# Patient Record
Sex: MALE | Race: BLACK OR AFRICAN AMERICAN | NOT HISPANIC OR LATINO | Employment: OTHER | URBAN - NONMETROPOLITAN AREA
[De-identification: names, ages, dates, MRNs, and addresses within clinical notes are randomized per-mention and may not be internally consistent; named-entity substitution may affect disease eponyms.]

---

## 2018-02-28 ENCOUNTER — TELEPHONE (OUTPATIENT)
Dept: FAMILY MEDICINE CLINIC | Facility: CLINIC | Age: 48
End: 2018-02-28

## 2018-02-28 ENCOUNTER — OFFICE VISIT (OUTPATIENT)
Dept: FAMILY MEDICINE CLINIC | Facility: CLINIC | Age: 48
End: 2018-02-28

## 2018-02-28 VITALS
HEART RATE: 73 BPM | HEIGHT: 71 IN | OXYGEN SATURATION: 98 % | BODY MASS INDEX: 30.02 KG/M2 | DIASTOLIC BLOOD PRESSURE: 76 MMHG | SYSTOLIC BLOOD PRESSURE: 120 MMHG | RESPIRATION RATE: 18 BRPM | WEIGHT: 214.4 LBS | TEMPERATURE: 97.8 F

## 2018-02-28 DIAGNOSIS — Z79.4 TYPE 2 DIABETES MELLITUS WITH DIABETIC POLYNEUROPATHY, WITH LONG-TERM CURRENT USE OF INSULIN (HCC): Primary | ICD-10-CM

## 2018-02-28 DIAGNOSIS — G89.29 CHRONIC BILATERAL LOW BACK PAIN WITHOUT SCIATICA: ICD-10-CM

## 2018-02-28 DIAGNOSIS — E11.42 TYPE 2 DIABETES MELLITUS WITH DIABETIC POLYNEUROPATHY, WITH LONG-TERM CURRENT USE OF INSULIN (HCC): Primary | ICD-10-CM

## 2018-02-28 DIAGNOSIS — M54.50 CHRONIC BILATERAL LOW BACK PAIN WITHOUT SCIATICA: ICD-10-CM

## 2018-02-28 DIAGNOSIS — Z76.89 ENCOUNTER TO ESTABLISH CARE: ICD-10-CM

## 2018-02-28 PROCEDURE — 99203 OFFICE O/P NEW LOW 30 MIN: CPT | Performed by: NURSE PRACTITIONER

## 2018-03-12 ENCOUNTER — TELEPHONE (OUTPATIENT)
Dept: FAMILY MEDICINE CLINIC | Facility: CLINIC | Age: 48
End: 2018-03-12

## 2018-03-12 DIAGNOSIS — L70.0 ACNE VULGARIS: Primary | ICD-10-CM

## 2018-03-12 RX ORDER — CLINDAMYCIN PHOSPHATE 11.9 MG/ML
SOLUTION TOPICAL EVERY 12 HOURS SCHEDULED
Qty: 60 ML | Refills: 3 | Status: SHIPPED | OUTPATIENT
Start: 2018-03-12 | End: 2018-10-25

## 2018-03-13 ENCOUNTER — TELEPHONE (OUTPATIENT)
Dept: FAMILY MEDICINE CLINIC | Facility: CLINIC | Age: 48
End: 2018-03-13

## 2018-03-13 NOTE — TELEPHONE ENCOUNTER
Per NATHANIEL Garcia, Edd Willingham was called and informed that Proactive cannot be prescribed.  He was prescribed a similar medication sent to [facility]; instructed to use twice a day on his face.  Patient verbalized understanding.      ----- Message from NATHANIEL Oliva sent at 3/12/2018  4:35 PM CDT -----  Regarding: Medication  Please call Mr. Strickland let him know that proactive is not prescription medication.  Will send in some clindamycin gel he is to use twice a day on his face.  I sent it in to [facility].  Thank you!

## 2018-10-25 ENCOUNTER — APPOINTMENT (OUTPATIENT)
Dept: CT IMAGING | Facility: HOSPITAL | Age: 48
End: 2018-10-25

## 2018-10-25 ENCOUNTER — APPOINTMENT (OUTPATIENT)
Dept: GENERAL RADIOLOGY | Facility: HOSPITAL | Age: 48
End: 2018-10-25

## 2018-10-25 ENCOUNTER — HOSPITAL ENCOUNTER (INPATIENT)
Facility: HOSPITAL | Age: 48
LOS: 5 days | Discharge: HOME | End: 2018-10-30
Attending: EMERGENCY MEDICINE | Admitting: FAMILY MEDICINE

## 2018-10-25 DIAGNOSIS — R07.9 CHEST PAIN, UNSPECIFIED TYPE: Primary | ICD-10-CM

## 2018-10-25 DIAGNOSIS — J90 PLEURAL EFFUSION, BILATERAL: ICD-10-CM

## 2018-10-25 DIAGNOSIS — I48.91 ATRIAL FIBRILLATION, UNSPECIFIED TYPE (HCC): ICD-10-CM

## 2018-10-25 DIAGNOSIS — R77.8 TROPONIN LEVEL ELEVATED: ICD-10-CM

## 2018-10-25 PROBLEM — F41.9 ANXIETY: Status: ACTIVE | Noted: 2018-10-25

## 2018-10-25 PROBLEM — R01.1 HEART MURMUR: Status: ACTIVE | Noted: 2018-10-25

## 2018-10-25 PROBLEM — F32.A DEPRESSION: Status: ACTIVE | Noted: 2018-10-25

## 2018-10-25 PROBLEM — K21.9 GERD (GASTROESOPHAGEAL REFLUX DISEASE): Status: ACTIVE | Noted: 2018-10-25

## 2018-10-25 PROBLEM — E11.9 TYPE 2 DIABETES MELLITUS (HCC): Status: ACTIVE | Noted: 2018-10-25

## 2018-10-25 PROBLEM — I50.9 CHF (CONGESTIVE HEART FAILURE) (HCC): Status: ACTIVE | Noted: 2018-10-25

## 2018-10-25 PROBLEM — R06.00 DYSPNEA: Status: ACTIVE | Noted: 2018-10-25

## 2018-10-25 LAB
ALBUMIN SERPL-MCNC: 3.5 G/DL (ref 3.4–4.8)
ALBUMIN/GLOB SERPL: 1.1 G/DL (ref 1.1–1.8)
ALP SERPL-CCNC: 73 U/L (ref 38–126)
ALT SERPL W P-5'-P-CCNC: 43 U/L (ref 21–72)
AMPHET+METHAMPHET UR QL: POSITIVE
ANION GAP SERPL CALCULATED.3IONS-SCNC: 4 MMOL/L (ref 5–15)
AST SERPL-CCNC: 30 U/L (ref 17–59)
BARBITURATES UR QL SCN: NEGATIVE
BASOPHILS # BLD AUTO: 0.02 10*3/MM3 (ref 0–0.2)
BASOPHILS NFR BLD AUTO: 0.3 % (ref 0–2)
BENZODIAZ UR QL SCN: NEGATIVE
BILIRUB SERPL-MCNC: 0.4 MG/DL (ref 0.2–1.3)
BUN BLD-MCNC: 14 MG/DL (ref 7–21)
BUN/CREAT SERPL: 15.6 (ref 7–25)
CALCIUM SPEC-SCNC: 8.7 MG/DL (ref 8.4–10.2)
CANNABINOIDS SERPL QL: NEGATIVE
CHLORIDE SERPL-SCNC: 103 MMOL/L (ref 95–110)
CO2 SERPL-SCNC: 30 MMOL/L (ref 22–31)
COCAINE UR QL: NEGATIVE
CREAT BLD-MCNC: 0.9 MG/DL (ref 0.7–1.3)
D-DIMER, QUANTITATIVE (MAD,POW, STR): 556 NG/ML (FEU) (ref 0–470)
DEPRECATED RDW RBC AUTO: 40.8 FL (ref 35.1–43.9)
EOSINOPHIL # BLD AUTO: 0.08 10*3/MM3 (ref 0–0.7)
EOSINOPHIL NFR BLD AUTO: 1.4 % (ref 0–7)
ERYTHROCYTE [DISTWIDTH] IN BLOOD BY AUTOMATED COUNT: 13.2 % (ref 11.5–14.5)
GFR SERPL CREATININE-BSD FRML MDRD: 109 ML/MIN/1.73 (ref 63–147)
GLOBULIN UR ELPH-MCNC: 3.2 GM/DL (ref 2.3–3.5)
GLUCOSE BLD-MCNC: 98 MG/DL (ref 60–100)
GLUCOSE BLDC GLUCOMTR-MCNC: 169 MG/DL (ref 70–130)
HCT VFR BLD AUTO: 41.7 % (ref 39–49)
HGB BLD-MCNC: 13.7 G/DL (ref 13.7–17.3)
HOLD SPECIMEN: NORMAL
IMM GRANULOCYTES # BLD: 0 10*3/MM3 (ref 0–0.02)
IMM GRANULOCYTES NFR BLD: 0 % (ref 0–0.5)
INR PPP: 1.06 (ref 0.8–1.2)
LYMPHOCYTES # BLD AUTO: 1.27 10*3/MM3 (ref 0.6–4.2)
LYMPHOCYTES NFR BLD AUTO: 22.1 % (ref 10–50)
MCH RBC QN AUTO: 27.7 PG (ref 26.5–34)
MCHC RBC AUTO-ENTMCNC: 32.9 G/DL (ref 31.5–36.3)
MCV RBC AUTO: 84.4 FL (ref 80–98)
METHADONE UR QL SCN: NEGATIVE
MONOCYTES # BLD AUTO: 0.32 10*3/MM3 (ref 0–0.9)
MONOCYTES NFR BLD AUTO: 5.6 % (ref 0–12)
NEUTROPHILS # BLD AUTO: 4.06 10*3/MM3 (ref 2–8.6)
NEUTROPHILS NFR BLD AUTO: 70.6 % (ref 37–80)
NT-PROBNP SERPL-MCNC: 3410 PG/ML (ref 0–450)
OPIATES UR QL: NEGATIVE
OXYCODONE UR QL SCN: NEGATIVE
PLATELET # BLD AUTO: 238 10*3/MM3 (ref 150–450)
PMV BLD AUTO: 11.4 FL (ref 8–12)
POTASSIUM BLD-SCNC: 4.1 MMOL/L (ref 3.5–5.1)
PROT SERPL-MCNC: 6.7 G/DL (ref 6.3–8.6)
PROTHROMBIN TIME: 13.6 SECONDS (ref 11.1–15.3)
RBC # BLD AUTO: 4.94 10*6/MM3 (ref 4.37–5.74)
SODIUM BLD-SCNC: 137 MMOL/L (ref 137–145)
TROPONIN I SERPL-MCNC: 0.09 NG/ML
TROPONIN I SERPL-MCNC: 0.13 NG/ML
TROPONIN I SERPL-MCNC: 0.14 NG/ML
TROPONIN I SERPL-MCNC: 0.17 NG/ML
WBC NRBC COR # BLD: 5.75 10*3/MM3 (ref 3.2–9.8)
WHOLE BLOOD HOLD SPECIMEN: NORMAL
WHOLE BLOOD HOLD SPECIMEN: NORMAL

## 2018-10-25 PROCEDURE — 80307 DRUG TEST PRSMV CHEM ANLYZR: CPT | Performed by: NURSE PRACTITIONER

## 2018-10-25 PROCEDURE — 93005 ELECTROCARDIOGRAM TRACING: CPT | Performed by: FAMILY MEDICINE

## 2018-10-25 PROCEDURE — 25010000002 LEVOFLOXACIN PER 250 MG: Performed by: PHYSICIAN ASSISTANT

## 2018-10-25 PROCEDURE — 25010000002 FUROSEMIDE PER 20 MG: Performed by: PHYSICIAN ASSISTANT

## 2018-10-25 PROCEDURE — 84484 ASSAY OF TROPONIN QUANT: CPT | Performed by: PHYSICIAN ASSISTANT

## 2018-10-25 PROCEDURE — 85025 COMPLETE CBC W/AUTO DIFF WBC: CPT | Performed by: PHYSICIAN ASSISTANT

## 2018-10-25 PROCEDURE — 94760 N-INVAS EAR/PLS OXIMETRY 1: CPT

## 2018-10-25 PROCEDURE — 80053 COMPREHEN METABOLIC PANEL: CPT | Performed by: PHYSICIAN ASSISTANT

## 2018-10-25 PROCEDURE — 63710000001 INSULIN ASPART PER 5 UNITS: Performed by: NURSE PRACTITIONER

## 2018-10-25 PROCEDURE — 94799 UNLISTED PULMONARY SVC/PX: CPT

## 2018-10-25 PROCEDURE — 85379 FIBRIN DEGRADATION QUANT: CPT | Performed by: PHYSICIAN ASSISTANT

## 2018-10-25 PROCEDURE — 85610 PROTHROMBIN TIME: CPT | Performed by: PHYSICIAN ASSISTANT

## 2018-10-25 PROCEDURE — 99285 EMERGENCY DEPT VISIT HI MDM: CPT

## 2018-10-25 PROCEDURE — 83880 ASSAY OF NATRIURETIC PEPTIDE: CPT | Performed by: PHYSICIAN ASSISTANT

## 2018-10-25 PROCEDURE — 93010 ELECTROCARDIOGRAM REPORT: CPT | Performed by: INTERNAL MEDICINE

## 2018-10-25 PROCEDURE — G0378 HOSPITAL OBSERVATION PER HR: HCPCS

## 2018-10-25 PROCEDURE — 71275 CT ANGIOGRAPHY CHEST: CPT

## 2018-10-25 PROCEDURE — 84484 ASSAY OF TROPONIN QUANT: CPT | Performed by: NURSE PRACTITIONER

## 2018-10-25 PROCEDURE — 93005 ELECTROCARDIOGRAM TRACING: CPT | Performed by: EMERGENCY MEDICINE

## 2018-10-25 PROCEDURE — 36415 COLL VENOUS BLD VENIPUNCTURE: CPT | Performed by: PHYSICIAN ASSISTANT

## 2018-10-25 PROCEDURE — 25010000002 ENOXAPARIN PER 10 MG: Performed by: FAMILY MEDICINE

## 2018-10-25 PROCEDURE — 0 IOPAMIDOL PER 1 ML: Performed by: EMERGENCY MEDICINE

## 2018-10-25 PROCEDURE — 82962 GLUCOSE BLOOD TEST: CPT

## 2018-10-25 PROCEDURE — 71046 X-RAY EXAM CHEST 2 VIEWS: CPT

## 2018-10-25 RX ORDER — NITROGLYCERIN 0.4 MG/1
0.4 TABLET SUBLINGUAL
Status: DISCONTINUED | OUTPATIENT
Start: 2018-10-25 | End: 2018-10-30 | Stop reason: HOSPADM

## 2018-10-25 RX ORDER — NICOTINE POLACRILEX 4 MG
15 LOZENGE BUCCAL
Status: DISCONTINUED | OUTPATIENT
Start: 2018-10-25 | End: 2018-10-30 | Stop reason: HOSPADM

## 2018-10-25 RX ORDER — FUROSEMIDE 10 MG/ML
80 INJECTION INTRAMUSCULAR; INTRAVENOUS ONCE
Status: COMPLETED | OUTPATIENT
Start: 2018-10-25 | End: 2018-10-25

## 2018-10-25 RX ORDER — ASPIRIN 81 MG/1
324 TABLET, CHEWABLE ORAL ONCE
Status: COMPLETED | OUTPATIENT
Start: 2018-10-25 | End: 2018-10-25

## 2018-10-25 RX ORDER — SODIUM CHLORIDE 0.9 % (FLUSH) 0.9 %
10 SYRINGE (ML) INJECTION AS NEEDED
Status: DISCONTINUED | OUTPATIENT
Start: 2018-10-25 | End: 2018-10-30 | Stop reason: HOSPADM

## 2018-10-25 RX ORDER — ONDANSETRON 2 MG/ML
4 INJECTION INTRAMUSCULAR; INTRAVENOUS EVERY 6 HOURS PRN
Status: DISCONTINUED | OUTPATIENT
Start: 2018-10-25 | End: 2018-10-30 | Stop reason: HOSPADM

## 2018-10-25 RX ORDER — DEXTROSE MONOHYDRATE 25 G/50ML
25 INJECTION, SOLUTION INTRAVENOUS
Status: DISCONTINUED | OUTPATIENT
Start: 2018-10-25 | End: 2018-10-30 | Stop reason: HOSPADM

## 2018-10-25 RX ORDER — LEVOFLOXACIN 5 MG/ML
750 INJECTION, SOLUTION INTRAVENOUS ONCE
Status: COMPLETED | OUTPATIENT
Start: 2018-10-25 | End: 2018-10-25

## 2018-10-25 RX ORDER — ASPIRIN 81 MG/1
81 TABLET ORAL DAILY
Status: DISCONTINUED | OUTPATIENT
Start: 2018-10-26 | End: 2018-10-30 | Stop reason: HOSPADM

## 2018-10-25 RX ORDER — ONDANSETRON 4 MG/1
4 TABLET, ORALLY DISINTEGRATING ORAL EVERY 6 HOURS PRN
Status: DISCONTINUED | OUTPATIENT
Start: 2018-10-25 | End: 2018-10-30 | Stop reason: HOSPADM

## 2018-10-25 RX ORDER — ATORVASTATIN CALCIUM 20 MG/1
20 TABLET, FILM COATED ORAL DAILY
Status: DISCONTINUED | OUTPATIENT
Start: 2018-10-25 | End: 2018-10-30 | Stop reason: HOSPADM

## 2018-10-25 RX ORDER — HEPARIN SODIUM 5000 [USP'U]/ML
5000 INJECTION, SOLUTION INTRAVENOUS; SUBCUTANEOUS EVERY 8 HOURS SCHEDULED
Status: DISCONTINUED | OUTPATIENT
Start: 2018-10-25 | End: 2018-10-25

## 2018-10-25 RX ORDER — ASPIRIN 81 MG/1
81 TABLET, CHEWABLE ORAL ONCE
Status: COMPLETED | OUTPATIENT
Start: 2018-10-25 | End: 2018-10-25

## 2018-10-25 RX ORDER — SODIUM CHLORIDE 0.9 % (FLUSH) 0.9 %
3-10 SYRINGE (ML) INJECTION AS NEEDED
Status: DISCONTINUED | OUTPATIENT
Start: 2018-10-25 | End: 2018-10-30 | Stop reason: HOSPADM

## 2018-10-25 RX ORDER — ONDANSETRON 4 MG/1
4 TABLET, FILM COATED ORAL EVERY 6 HOURS PRN
Status: DISCONTINUED | OUTPATIENT
Start: 2018-10-25 | End: 2018-10-30 | Stop reason: HOSPADM

## 2018-10-25 RX ORDER — NALOXONE HCL 0.4 MG/ML
0.4 VIAL (ML) INJECTION
Status: DISCONTINUED | OUTPATIENT
Start: 2018-10-25 | End: 2018-10-30 | Stop reason: HOSPADM

## 2018-10-25 RX ORDER — LOSARTAN POTASSIUM 100 MG/1
50 TABLET ORAL DAILY
COMMUNITY
End: 2018-11-13 | Stop reason: CLARIF

## 2018-10-25 RX ORDER — METFORMIN HYDROCHLORIDE 500 MG/1
500 TABLET, EXTENDED RELEASE ORAL 2 TIMES DAILY
COMMUNITY
End: 2018-11-07 | Stop reason: SDUPTHER

## 2018-10-25 RX ORDER — FUROSEMIDE 10 MG/ML
40 INJECTION INTRAMUSCULAR; INTRAVENOUS EVERY 12 HOURS
Status: DISCONTINUED | OUTPATIENT
Start: 2018-10-26 | End: 2018-10-30 | Stop reason: HOSPADM

## 2018-10-25 RX ORDER — METFORMIN HYDROCHLORIDE 500 MG/1
500 TABLET, EXTENDED RELEASE ORAL 2 TIMES DAILY
Status: DISCONTINUED | OUTPATIENT
Start: 2018-10-25 | End: 2018-10-26

## 2018-10-25 RX ORDER — GABAPENTIN 400 MG/1
400 CAPSULE ORAL 3 TIMES DAILY
COMMUNITY
End: 2020-10-14 | Stop reason: SDDI

## 2018-10-25 RX ORDER — NAPROXEN 500 MG/1
500 TABLET ORAL 2 TIMES DAILY PRN
COMMUNITY
End: 2018-10-30 | Stop reason: HOSPADM

## 2018-10-25 RX ORDER — LOSARTAN POTASSIUM 50 MG/1
100 TABLET ORAL DAILY
Status: DISCONTINUED | OUTPATIENT
Start: 2018-10-25 | End: 2018-10-30 | Stop reason: HOSPADM

## 2018-10-25 RX ORDER — MORPHINE SULFATE 2 MG/ML
1 INJECTION, SOLUTION INTRAMUSCULAR; INTRAVENOUS EVERY 4 HOURS PRN
Status: DISCONTINUED | OUTPATIENT
Start: 2018-10-25 | End: 2018-10-30 | Stop reason: HOSPADM

## 2018-10-25 RX ORDER — SIMVASTATIN 40 MG
20 TABLET ORAL NIGHTLY
COMMUNITY
End: 2019-02-21 | Stop reason: ALTCHOICE

## 2018-10-25 RX ORDER — GABAPENTIN 400 MG/1
400 CAPSULE ORAL 3 TIMES DAILY
Status: DISCONTINUED | OUTPATIENT
Start: 2018-10-25 | End: 2018-10-30 | Stop reason: HOSPADM

## 2018-10-25 RX ORDER — ACETAMINOPHEN 325 MG/1
650 TABLET ORAL EVERY 4 HOURS PRN
Status: DISCONTINUED | OUTPATIENT
Start: 2018-10-25 | End: 2018-10-30 | Stop reason: HOSPADM

## 2018-10-25 RX ORDER — SODIUM CHLORIDE 0.9 % (FLUSH) 0.9 %
3 SYRINGE (ML) INJECTION EVERY 12 HOURS SCHEDULED
Status: DISCONTINUED | OUTPATIENT
Start: 2018-10-25 | End: 2018-10-30 | Stop reason: HOSPADM

## 2018-10-25 RX ADMIN — ASPIRIN 81 MG CHEWABLE TABLET 81 MG: 81 TABLET CHEWABLE at 20:28

## 2018-10-25 RX ADMIN — LOSARTAN POTASSIUM 100 MG: 50 TABLET, FILM COATED ORAL at 20:28

## 2018-10-25 RX ADMIN — ATORVASTATIN CALCIUM 20 MG: 20 TABLET, FILM COATED ORAL at 20:28

## 2018-10-25 RX ADMIN — FUROSEMIDE 80 MG: 10 INJECTION, SOLUTION INTRAMUSCULAR; INTRAVENOUS at 14:55

## 2018-10-25 RX ADMIN — ENOXAPARIN SODIUM 100 MG: 100 INJECTION SUBCUTANEOUS at 20:47

## 2018-10-25 RX ADMIN — METFORMIN HYDROCHLORIDE 500 MG: 500 TABLET, EXTENDED RELEASE ORAL at 20:28

## 2018-10-25 RX ADMIN — ASPIRIN 81 MG 324 MG: 81 TABLET ORAL at 13:00

## 2018-10-25 RX ADMIN — INSULIN ASPART 2 UNITS: 100 INJECTION, SOLUTION INTRAVENOUS; SUBCUTANEOUS at 20:29

## 2018-10-25 RX ADMIN — LEVOFLOXACIN 750 MG: 5 INJECTION, SOLUTION INTRAVENOUS at 17:26

## 2018-10-25 RX ADMIN — GABAPENTIN 400 MG: 400 CAPSULE ORAL at 20:28

## 2018-10-25 RX ADMIN — Medication 3 ML: at 20:29

## 2018-10-25 RX ADMIN — IOPAMIDOL 79 ML: 755 INJECTION, SOLUTION INTRAVENOUS at 16:25

## 2018-10-26 ENCOUNTER — APPOINTMENT (OUTPATIENT)
Dept: GENERAL RADIOLOGY | Facility: HOSPITAL | Age: 48
End: 2018-10-26

## 2018-10-26 ENCOUNTER — APPOINTMENT (OUTPATIENT)
Dept: CARDIOLOGY | Facility: HOSPITAL | Age: 48
End: 2018-10-26

## 2018-10-26 LAB
ANION GAP SERPL CALCULATED.3IONS-SCNC: 8 MMOL/L (ref 5–15)
BASOPHILS # BLD AUTO: 0.02 10*3/MM3 (ref 0–0.2)
BASOPHILS NFR BLD AUTO: 0.3 % (ref 0–2)
BH CV ECHO MEAS - ACS: 2.5 CM
BH CV ECHO MEAS - AI DEC SLOPE: 169 CM/SEC^2
BH CV ECHO MEAS - AI MAX PG: 57.8 MMHG
BH CV ECHO MEAS - AI MAX VEL: 380 CM/SEC
BH CV ECHO MEAS - AI P1/2T: 658.6 MSEC
BH CV ECHO MEAS - AO MAX PG (FULL): 6.5 MMHG
BH CV ECHO MEAS - AO MAX PG: 10.8 MMHG
BH CV ECHO MEAS - AO MEAN PG (FULL): 3 MMHG
BH CV ECHO MEAS - AO MEAN PG: 6 MMHG
BH CV ECHO MEAS - AO ROOT AREA (BSA CORRECTED): 1.7
BH CV ECHO MEAS - AO ROOT AREA: 10.8 CM^2
BH CV ECHO MEAS - AO ROOT DIAM: 3.7 CM
BH CV ECHO MEAS - AO V2 MAX: 164 CM/SEC
BH CV ECHO MEAS - AO V2 MEAN: 113 CM/SEC
BH CV ECHO MEAS - AO V2 VTI: 24.2 CM
BH CV ECHO MEAS - ASC AORTA: 3.5 CM
BH CV ECHO MEAS - AVA(I,A): 2.7 CM^2
BH CV ECHO MEAS - AVA(I,D): 2.7 CM^2
BH CV ECHO MEAS - AVA(V,A): 2.6 CM^2
BH CV ECHO MEAS - AVA(V,D): 2.6 CM^2
BH CV ECHO MEAS - BSA(HAYCOCK): 2.2 M^2
BH CV ECHO MEAS - BSA: 2.2 M^2
BH CV ECHO MEAS - BZI_BMI: 27.9 KILOGRAMS/M^2
BH CV ECHO MEAS - BZI_METRIC_HEIGHT: 182.9 CM
BH CV ECHO MEAS - BZI_METRIC_WEIGHT: 93.4 KG
BH CV ECHO MEAS - EDV(CUBED): 190.1 ML
BH CV ECHO MEAS - EDV(MOD-SP4): 154 ML
BH CV ECHO MEAS - EDV(TEICH): 163.3 ML
BH CV ECHO MEAS - EF(CUBED): 13.4 %
BH CV ECHO MEAS - EF(MOD-SP4): 28.6 %
BH CV ECHO MEAS - EF(TEICH): 10.5 %
BH CV ECHO MEAS - ESV(CUBED): 164.6 ML
BH CV ECHO MEAS - ESV(MOD-SP4): 110 ML
BH CV ECHO MEAS - ESV(TEICH): 146.2 ML
BH CV ECHO MEAS - FS: 4.7 %
BH CV ECHO MEAS - IVS/LVPW: 0.99
BH CV ECHO MEAS - IVSD: 1.2 CM
BH CV ECHO MEAS - LA DIMENSION: 4.2 CM
BH CV ECHO MEAS - LA/AO: 1.1
BH CV ECHO MEAS - LV DIASTOLIC VOL/BSA (35-75): 71.4 ML/M^2
BH CV ECHO MEAS - LV MASS(C)D: 304.5 GRAMS
BH CV ECHO MEAS - LV MASS(C)DI: 141.2 GRAMS/M^2
BH CV ECHO MEAS - LV MAX PG: 4.2 MMHG
BH CV ECHO MEAS - LV MEAN PG: 3 MMHG
BH CV ECHO MEAS - LV SYSTOLIC VOL/BSA (12-30): 51 ML/M^2
BH CV ECHO MEAS - LV V1 MAX: 103 CM/SEC
BH CV ECHO MEAS - LV V1 MEAN: 77.7 CM/SEC
BH CV ECHO MEAS - LV V1 VTI: 16 CM
BH CV ECHO MEAS - LVIDD: 5.8 CM
BH CV ECHO MEAS - LVIDS: 5.5 CM
BH CV ECHO MEAS - LVLD AP4: 9.8 CM
BH CV ECHO MEAS - LVLS AP4: 9.6 CM
BH CV ECHO MEAS - LVOT AREA (M): 4.2 CM^2
BH CV ECHO MEAS - LVOT AREA: 4.2 CM^2
BH CV ECHO MEAS - LVOT DIAM: 2.3 CM
BH CV ECHO MEAS - LVPWD: 1.2 CM
BH CV ECHO MEAS - MR MAX PG: 67.2 MMHG
BH CV ECHO MEAS - MR MAX VEL: 410 CM/SEC
BH CV ECHO MEAS - MV A MAX VEL: 121 CM/SEC
BH CV ECHO MEAS - MV DEC SLOPE: 520 CM/SEC^2
BH CV ECHO MEAS - MV E MAX VEL: 79.8 CM/SEC
BH CV ECHO MEAS - MV E/A: 0.66
BH CV ECHO MEAS - MV P1/2T MAX VEL: 121 CM/SEC
BH CV ECHO MEAS - MV P1/2T: 68.2 MSEC
BH CV ECHO MEAS - MVA P1/2T LCG: 1.8 CM^2
BH CV ECHO MEAS - MVA(P1/2T): 3.2 CM^2
BH CV ECHO MEAS - PA MAX PG: 1 MMHG
BH CV ECHO MEAS - PA V2 MAX: 50.4 CM/SEC
BH CV ECHO MEAS - RAP SYSTOLE: 5 MMHG
BH CV ECHO MEAS - RVDD: 3.1 CM
BH CV ECHO MEAS - RVSP: 19.9 MMHG
BH CV ECHO MEAS - SI(AO): 120.6 ML/M^2
BH CV ECHO MEAS - SI(CUBED): 11.8 ML/M^2
BH CV ECHO MEAS - SI(LVOT): 30.8 ML/M^2
BH CV ECHO MEAS - SI(MOD-SP4): 20.4 ML/M^2
BH CV ECHO MEAS - SI(TEICH): 7.9 ML/M^2
BH CV ECHO MEAS - SV(AO): 260.2 ML
BH CV ECHO MEAS - SV(CUBED): 25.5 ML
BH CV ECHO MEAS - SV(LVOT): 66.5 ML
BH CV ECHO MEAS - SV(MOD-SP4): 44 ML
BH CV ECHO MEAS - SV(TEICH): 17.1 ML
BH CV ECHO MEAS - TR MAX VEL: 193 CM/SEC
BUN BLD-MCNC: 14 MG/DL (ref 7–21)
BUN/CREAT SERPL: 14.3 (ref 7–25)
CALCIUM SPEC-SCNC: 8.4 MG/DL (ref 8.4–10.2)
CHLORIDE SERPL-SCNC: 103 MMOL/L (ref 95–110)
CO2 SERPL-SCNC: 24 MMOL/L (ref 22–31)
CREAT BLD-MCNC: 0.98 MG/DL (ref 0.7–1.3)
DEPRECATED RDW RBC AUTO: 39.3 FL (ref 35.1–43.9)
EOSINOPHIL # BLD AUTO: 0.09 10*3/MM3 (ref 0–0.7)
EOSINOPHIL NFR BLD AUTO: 1.3 % (ref 0–7)
ERYTHROCYTE [DISTWIDTH] IN BLOOD BY AUTOMATED COUNT: 13 % (ref 11.5–14.5)
GFR SERPL CREATININE-BSD FRML MDRD: 99 ML/MIN/1.73 (ref 63–147)
GLUCOSE BLD-MCNC: 287 MG/DL (ref 60–100)
GLUCOSE BLDC GLUCOMTR-MCNC: 121 MG/DL (ref 70–130)
GLUCOSE BLDC GLUCOMTR-MCNC: 223 MG/DL (ref 70–130)
GLUCOSE BLDC GLUCOMTR-MCNC: 317 MG/DL (ref 70–130)
GLUCOSE BLDC GLUCOMTR-MCNC: 93 MG/DL (ref 70–130)
HCT VFR BLD AUTO: 42.1 % (ref 39–49)
HGB BLD-MCNC: 14.2 G/DL (ref 13.7–17.3)
IMM GRANULOCYTES # BLD: 0.02 10*3/MM3 (ref 0–0.02)
IMM GRANULOCYTES NFR BLD: 0.3 % (ref 0–0.5)
LYMPHOCYTES # BLD AUTO: 1.65 10*3/MM3 (ref 0.6–4.2)
LYMPHOCYTES NFR BLD AUTO: 24 % (ref 10–50)
MCH RBC QN AUTO: 27.6 PG (ref 26.5–34)
MCHC RBC AUTO-ENTMCNC: 33.7 G/DL (ref 31.5–36.3)
MCV RBC AUTO: 81.9 FL (ref 80–98)
MONOCYTES # BLD AUTO: 0.45 10*3/MM3 (ref 0–0.9)
MONOCYTES NFR BLD AUTO: 6.6 % (ref 0–12)
NEUTROPHILS # BLD AUTO: 4.64 10*3/MM3 (ref 2–8.6)
NEUTROPHILS NFR BLD AUTO: 67.5 % (ref 37–80)
PLATELET # BLD AUTO: 250 10*3/MM3 (ref 150–450)
PMV BLD AUTO: 11.9 FL (ref 8–12)
POTASSIUM BLD-SCNC: 3.9 MMOL/L (ref 3.5–5.1)
RBC # BLD AUTO: 5.14 10*6/MM3 (ref 4.37–5.74)
SODIUM BLD-SCNC: 135 MMOL/L (ref 137–145)
WBC NRBC COR # BLD: 6.87 10*3/MM3 (ref 3.2–9.8)

## 2018-10-26 PROCEDURE — 25010000002 MAGNESIUM SULFATE 2 GM/50ML SOLUTION: Performed by: INTERNAL MEDICINE

## 2018-10-26 PROCEDURE — 99152 MOD SED SAME PHYS/QHP 5/>YRS: CPT | Performed by: INTERNAL MEDICINE

## 2018-10-26 PROCEDURE — 25010000002 IBUTILIDE FUMARATE PER 1 MG: Performed by: INTERNAL MEDICINE

## 2018-10-26 PROCEDURE — C1894 INTRO/SHEATH, NON-LASER: HCPCS | Performed by: INTERNAL MEDICINE

## 2018-10-26 PROCEDURE — 0 IOPAMIDOL PER 1 ML: Performed by: INTERNAL MEDICINE

## 2018-10-26 PROCEDURE — 85025 COMPLETE CBC W/AUTO DIFF WBC: CPT | Performed by: NURSE PRACTITIONER

## 2018-10-26 PROCEDURE — B2151ZZ FLUOROSCOPY OF LEFT HEART USING LOW OSMOLAR CONTRAST: ICD-10-PCS | Performed by: INTERNAL MEDICINE

## 2018-10-26 PROCEDURE — 71045 X-RAY EXAM CHEST 1 VIEW: CPT

## 2018-10-26 PROCEDURE — 25010000002 MIDAZOLAM PER 1 MG: Performed by: INTERNAL MEDICINE

## 2018-10-26 PROCEDURE — 93458 L HRT ARTERY/VENTRICLE ANGIO: CPT | Performed by: INTERNAL MEDICINE

## 2018-10-26 PROCEDURE — 99254 IP/OBS CNSLTJ NEW/EST MOD 60: CPT | Performed by: INTERNAL MEDICINE

## 2018-10-26 PROCEDURE — B2111ZZ FLUOROSCOPY OF MULTIPLE CORONARY ARTERIES USING LOW OSMOLAR CONTRAST: ICD-10-PCS | Performed by: INTERNAL MEDICINE

## 2018-10-26 PROCEDURE — 93306 TTE W/DOPPLER COMPLETE: CPT

## 2018-10-26 PROCEDURE — 4A023N7 MEASUREMENT OF CARDIAC SAMPLING AND PRESSURE, LEFT HEART, PERCUTANEOUS APPROACH: ICD-10-PCS | Performed by: INTERNAL MEDICINE

## 2018-10-26 PROCEDURE — C1760 CLOSURE DEV, VASC: HCPCS | Performed by: INTERNAL MEDICINE

## 2018-10-26 PROCEDURE — 80048 BASIC METABOLIC PNL TOTAL CA: CPT | Performed by: NURSE PRACTITIONER

## 2018-10-26 PROCEDURE — 25010000002 ENOXAPARIN PER 10 MG: Performed by: FAMILY MEDICINE

## 2018-10-26 PROCEDURE — C1769 GUIDE WIRE: HCPCS | Performed by: INTERNAL MEDICINE

## 2018-10-26 PROCEDURE — 25010000002 FUROSEMIDE PER 20 MG: Performed by: NURSE PRACTITIONER

## 2018-10-26 PROCEDURE — 63710000001 INSULIN ASPART PER 5 UNITS: Performed by: NURSE PRACTITIONER

## 2018-10-26 PROCEDURE — 82962 GLUCOSE BLOOD TEST: CPT

## 2018-10-26 PROCEDURE — 25010000002 FENTANYL CITRATE (PF) 100 MCG/2ML SOLUTION: Performed by: INTERNAL MEDICINE

## 2018-10-26 RX ORDER — MAGNESIUM SULFATE HEPTAHYDRATE 40 MG/ML
2 INJECTION, SOLUTION INTRAVENOUS ONCE
Status: COMPLETED | OUTPATIENT
Start: 2018-10-26 | End: 2018-10-26

## 2018-10-26 RX ORDER — METFORMIN HYDROCHLORIDE 500 MG/1
500 TABLET, EXTENDED RELEASE ORAL 2 TIMES DAILY
Status: DISCONTINUED | OUTPATIENT
Start: 2018-10-28 | End: 2018-10-30 | Stop reason: HOSPADM

## 2018-10-26 RX ORDER — SODIUM CHLORIDE 0.9 % (FLUSH) 0.9 %
3 SYRINGE (ML) INJECTION EVERY 12 HOURS SCHEDULED
Status: DISCONTINUED | OUTPATIENT
Start: 2018-10-26 | End: 2018-10-26 | Stop reason: HOSPADM

## 2018-10-26 RX ORDER — FENTANYL CITRATE 50 UG/ML
INJECTION, SOLUTION INTRAMUSCULAR; INTRAVENOUS AS NEEDED
Status: DISCONTINUED | OUTPATIENT
Start: 2018-10-26 | End: 2018-10-26 | Stop reason: HOSPADM

## 2018-10-26 RX ORDER — DILTIAZEM HYDROCHLORIDE 180 MG/1
180 CAPSULE, COATED, EXTENDED RELEASE ORAL
Status: DISCONTINUED | OUTPATIENT
Start: 2018-10-26 | End: 2018-10-30 | Stop reason: HOSPADM

## 2018-10-26 RX ORDER — AMIODARONE HYDROCHLORIDE 200 MG/1
200 TABLET ORAL EVERY 12 HOURS SCHEDULED
Status: DISCONTINUED | OUTPATIENT
Start: 2018-10-26 | End: 2018-10-30 | Stop reason: HOSPADM

## 2018-10-26 RX ORDER — MIDAZOLAM HYDROCHLORIDE 1 MG/ML
INJECTION INTRAMUSCULAR; INTRAVENOUS AS NEEDED
Status: DISCONTINUED | OUTPATIENT
Start: 2018-10-26 | End: 2018-10-26 | Stop reason: HOSPADM

## 2018-10-26 RX ORDER — SODIUM CHLORIDE 0.9 % (FLUSH) 0.9 %
1-10 SYRINGE (ML) INJECTION AS NEEDED
Status: DISCONTINUED | OUTPATIENT
Start: 2018-10-26 | End: 2018-10-26 | Stop reason: HOSPADM

## 2018-10-26 RX ORDER — SODIUM CHLORIDE 9 MG/ML
1-3 INJECTION, SOLUTION INTRAVENOUS CONTINUOUS
Status: DISCONTINUED | OUTPATIENT
Start: 2018-10-26 | End: 2018-10-26 | Stop reason: SDUPTHER

## 2018-10-26 RX ORDER — SODIUM CHLORIDE 9 MG/ML
100 INJECTION, SOLUTION INTRAVENOUS CONTINUOUS
Status: DISCONTINUED | OUTPATIENT
Start: 2018-10-26 | End: 2018-10-27

## 2018-10-26 RX ORDER — LIDOCAINE HYDROCHLORIDE 20 MG/ML
INJECTION, SOLUTION INFILTRATION; PERINEURAL AS NEEDED
Status: DISCONTINUED | OUTPATIENT
Start: 2018-10-26 | End: 2018-10-26 | Stop reason: HOSPADM

## 2018-10-26 RX ADMIN — GABAPENTIN 400 MG: 400 CAPSULE ORAL at 08:57

## 2018-10-26 RX ADMIN — GABAPENTIN 400 MG: 400 CAPSULE ORAL at 15:55

## 2018-10-26 RX ADMIN — DILTIAZEM HYDROCHLORIDE 180 MG: 180 CAPSULE, COATED, EXTENDED RELEASE ORAL at 15:55

## 2018-10-26 RX ADMIN — AMIODARONE HYDROCHLORIDE 200 MG: 200 TABLET ORAL at 22:26

## 2018-10-26 RX ADMIN — DILTIAZEM HYDROCHLORIDE 5 MG/HR: 5 INJECTION INTRAVENOUS at 18:52

## 2018-10-26 RX ADMIN — APIXABAN 5 MG: 5 TABLET, FILM COATED ORAL at 22:26

## 2018-10-26 RX ADMIN — GABAPENTIN 400 MG: 400 CAPSULE ORAL at 22:26

## 2018-10-26 RX ADMIN — ATORVASTATIN CALCIUM 20 MG: 20 TABLET, FILM COATED ORAL at 08:56

## 2018-10-26 RX ADMIN — METOPROLOL TARTRATE 5 MG: 1 INJECTION, SOLUTION INTRAVENOUS at 01:10

## 2018-10-26 RX ADMIN — SODIUM CHLORIDE 1 MG: 9 INJECTION, SOLUTION INTRAVENOUS at 14:52

## 2018-10-26 RX ADMIN — Medication 3 ML: at 09:05

## 2018-10-26 RX ADMIN — ASPIRIN 81 MG: 81 TABLET, COATED ORAL at 08:57

## 2018-10-26 RX ADMIN — LOSARTAN POTASSIUM 100 MG: 50 TABLET, FILM COATED ORAL at 08:56

## 2018-10-26 RX ADMIN — SODIUM CHLORIDE 100 ML/HR: 900 INJECTION, SOLUTION INTRAVENOUS at 15:57

## 2018-10-26 RX ADMIN — MAGNESIUM SULFATE HEPTAHYDRATE 2 G: 40 INJECTION, SOLUTION INTRAVENOUS at 14:39

## 2018-10-26 RX ADMIN — INSULIN ASPART 7 UNITS: 100 INJECTION, SOLUTION INTRAVENOUS; SUBCUTANEOUS at 09:04

## 2018-10-26 RX ADMIN — INSULIN ASPART 4 UNITS: 100 INJECTION, SOLUTION INTRAVENOUS; SUBCUTANEOUS at 22:26

## 2018-10-26 RX ADMIN — FUROSEMIDE 40 MG: 10 INJECTION, SOLUTION INTRAMUSCULAR; INTRAVENOUS at 22:26

## 2018-10-26 RX ADMIN — ENOXAPARIN SODIUM 100 MG: 100 INJECTION SUBCUTANEOUS at 08:56

## 2018-10-26 RX ADMIN — FUROSEMIDE 40 MG: 10 INJECTION, SOLUTION INTRAMUSCULAR; INTRAVENOUS at 06:26

## 2018-10-26 RX ADMIN — METOPROLOL TARTRATE 5 MG: 1 INJECTION, SOLUTION INTRAVENOUS at 00:16

## 2018-10-27 PROBLEM — I48.91 ATRIAL FIBRILLATION WITH RVR: Status: ACTIVE | Noted: 2018-10-27

## 2018-10-27 LAB
ANION GAP SERPL CALCULATED.3IONS-SCNC: 7 MMOL/L (ref 5–15)
BASOPHILS # BLD AUTO: 0.02 10*3/MM3 (ref 0–0.2)
BASOPHILS NFR BLD AUTO: 0.4 % (ref 0–2)
BUN BLD-MCNC: 12 MG/DL (ref 7–21)
BUN/CREAT SERPL: 11.4 (ref 7–25)
CALCIUM SPEC-SCNC: 8.6 MG/DL (ref 8.4–10.2)
CHLORIDE SERPL-SCNC: 101 MMOL/L (ref 95–110)
CO2 SERPL-SCNC: 25 MMOL/L (ref 22–31)
CREAT BLD-MCNC: 1.05 MG/DL (ref 0.7–1.3)
DEPRECATED RDW RBC AUTO: 38 FL (ref 35.1–43.9)
EOSINOPHIL # BLD AUTO: 0.07 10*3/MM3 (ref 0–0.7)
EOSINOPHIL NFR BLD AUTO: 1.4 % (ref 0–7)
ERYTHROCYTE [DISTWIDTH] IN BLOOD BY AUTOMATED COUNT: 12.8 % (ref 11.5–14.5)
GFR SERPL CREATININE-BSD FRML MDRD: 91 ML/MIN/1.73 (ref 63–147)
GLUCOSE BLD-MCNC: 331 MG/DL (ref 60–100)
GLUCOSE BLDC GLUCOMTR-MCNC: 279 MG/DL (ref 70–130)
GLUCOSE BLDC GLUCOMTR-MCNC: 319 MG/DL (ref 70–130)
HCT VFR BLD AUTO: 42.6 % (ref 39–49)
HGB BLD-MCNC: 14.6 G/DL (ref 13.7–17.3)
IMM GRANULOCYTES # BLD: 0.02 10*3/MM3 (ref 0–0.02)
IMM GRANULOCYTES NFR BLD: 0.4 % (ref 0–0.5)
LYMPHOCYTES # BLD AUTO: 1.76 10*3/MM3 (ref 0.6–4.2)
LYMPHOCYTES NFR BLD AUTO: 36.1 % (ref 10–50)
MCH RBC QN AUTO: 27.7 PG (ref 26.5–34)
MCHC RBC AUTO-ENTMCNC: 34.3 G/DL (ref 31.5–36.3)
MCV RBC AUTO: 80.8 FL (ref 80–98)
MONOCYTES # BLD AUTO: 0.47 10*3/MM3 (ref 0–0.9)
MONOCYTES NFR BLD AUTO: 9.6 % (ref 0–12)
NEUTROPHILS # BLD AUTO: 2.54 10*3/MM3 (ref 2–8.6)
NEUTROPHILS NFR BLD AUTO: 52.1 % (ref 37–80)
PLATELET # BLD AUTO: 252 10*3/MM3 (ref 150–450)
PMV BLD AUTO: 11.4 FL (ref 8–12)
POTASSIUM BLD-SCNC: 4 MMOL/L (ref 3.5–5.1)
RBC # BLD AUTO: 5.27 10*6/MM3 (ref 4.37–5.74)
SODIUM BLD-SCNC: 133 MMOL/L (ref 137–145)
WBC NRBC COR # BLD: 4.88 10*3/MM3 (ref 3.2–9.8)

## 2018-10-27 PROCEDURE — 93005 ELECTROCARDIOGRAM TRACING: CPT | Performed by: FAMILY MEDICINE

## 2018-10-27 PROCEDURE — 25010000002 FUROSEMIDE PER 20 MG: Performed by: NURSE PRACTITIONER

## 2018-10-27 PROCEDURE — 82962 GLUCOSE BLOOD TEST: CPT

## 2018-10-27 PROCEDURE — 83036 HEMOGLOBIN GLYCOSYLATED A1C: CPT | Performed by: FAMILY MEDICINE

## 2018-10-27 PROCEDURE — 63710000001 INSULIN ASPART PER 5 UNITS: Performed by: NURSE PRACTITIONER

## 2018-10-27 PROCEDURE — 85025 COMPLETE CBC W/AUTO DIFF WBC: CPT | Performed by: NURSE PRACTITIONER

## 2018-10-27 PROCEDURE — 93010 ELECTROCARDIOGRAM REPORT: CPT | Performed by: INTERNAL MEDICINE

## 2018-10-27 PROCEDURE — 80048 BASIC METABOLIC PNL TOTAL CA: CPT | Performed by: NURSE PRACTITIONER

## 2018-10-27 RX ADMIN — ATORVASTATIN CALCIUM 20 MG: 20 TABLET, FILM COATED ORAL at 20:26

## 2018-10-27 RX ADMIN — GABAPENTIN 400 MG: 400 CAPSULE ORAL at 18:12

## 2018-10-27 RX ADMIN — APIXABAN 5 MG: 5 TABLET, FILM COATED ORAL at 20:26

## 2018-10-27 RX ADMIN — FUROSEMIDE 40 MG: 10 INJECTION, SOLUTION INTRAMUSCULAR; INTRAVENOUS at 06:52

## 2018-10-27 RX ADMIN — INSULIN ASPART 7 UNITS: 100 INJECTION, SOLUTION INTRAVENOUS; SUBCUTANEOUS at 20:27

## 2018-10-27 RX ADMIN — APIXABAN 5 MG: 5 TABLET, FILM COATED ORAL at 08:28

## 2018-10-27 RX ADMIN — LOSARTAN POTASSIUM 100 MG: 50 TABLET, FILM COATED ORAL at 08:28

## 2018-10-27 RX ADMIN — Medication 3 ML: at 08:29

## 2018-10-27 RX ADMIN — ASPIRIN 81 MG: 81 TABLET, COATED ORAL at 08:28

## 2018-10-27 RX ADMIN — INSULIN ASPART 7 UNITS: 100 INJECTION, SOLUTION INTRAVENOUS; SUBCUTANEOUS at 11:59

## 2018-10-27 RX ADMIN — INSULIN ASPART 6 UNITS: 100 INJECTION, SOLUTION INTRAVENOUS; SUBCUTANEOUS at 08:27

## 2018-10-27 RX ADMIN — Medication 3 ML: at 20:29

## 2018-10-27 RX ADMIN — GABAPENTIN 400 MG: 400 CAPSULE ORAL at 20:27

## 2018-10-27 RX ADMIN — DILTIAZEM HYDROCHLORIDE 180 MG: 180 CAPSULE, COATED, EXTENDED RELEASE ORAL at 08:28

## 2018-10-27 RX ADMIN — FUROSEMIDE 40 MG: 10 INJECTION, SOLUTION INTRAMUSCULAR; INTRAVENOUS at 18:12

## 2018-10-27 RX ADMIN — AMIODARONE HYDROCHLORIDE 200 MG: 200 TABLET ORAL at 08:28

## 2018-10-27 RX ADMIN — AMIODARONE HYDROCHLORIDE 200 MG: 200 TABLET ORAL at 20:26

## 2018-10-27 RX ADMIN — GABAPENTIN 400 MG: 400 CAPSULE ORAL at 08:28

## 2018-10-28 LAB
ANION GAP SERPL CALCULATED.3IONS-SCNC: 9 MMOL/L (ref 5–15)
BASOPHILS # BLD AUTO: 0.02 10*3/MM3 (ref 0–0.2)
BASOPHILS NFR BLD AUTO: 0.4 % (ref 0–2)
BUN BLD-MCNC: 14 MG/DL (ref 7–21)
BUN/CREAT SERPL: 12.3 (ref 7–25)
CALCIUM SPEC-SCNC: 8.9 MG/DL (ref 8.4–10.2)
CHLORIDE SERPL-SCNC: 97 MMOL/L (ref 95–110)
CO2 SERPL-SCNC: 27 MMOL/L (ref 22–31)
CREAT BLD-MCNC: 1.14 MG/DL (ref 0.7–1.3)
DEPRECATED RDW RBC AUTO: 38 FL (ref 35.1–43.9)
EOSINOPHIL # BLD AUTO: 0.09 10*3/MM3 (ref 0–0.7)
EOSINOPHIL NFR BLD AUTO: 1.6 % (ref 0–7)
ERYTHROCYTE [DISTWIDTH] IN BLOOD BY AUTOMATED COUNT: 12.8 % (ref 11.5–14.5)
GFR SERPL CREATININE-BSD FRML MDRD: 83 ML/MIN/1.73 (ref 63–147)
GLUCOSE BLD-MCNC: 355 MG/DL (ref 60–100)
GLUCOSE BLDC GLUCOMTR-MCNC: 142 MG/DL (ref 70–130)
GLUCOSE BLDC GLUCOMTR-MCNC: 200 MG/DL (ref 70–130)
GLUCOSE BLDC GLUCOMTR-MCNC: 214 MG/DL (ref 70–130)
GLUCOSE BLDC GLUCOMTR-MCNC: 301 MG/DL (ref 70–130)
GLUCOSE BLDC GLUCOMTR-MCNC: 308 MG/DL (ref 70–130)
GLUCOSE BLDC GLUCOMTR-MCNC: 321 MG/DL (ref 70–130)
GLUCOSE BLDC GLUCOMTR-MCNC: 339 MG/DL (ref 70–130)
HCT VFR BLD AUTO: 43 % (ref 39–49)
HGB BLD-MCNC: 14.6 G/DL (ref 13.7–17.3)
IMM GRANULOCYTES # BLD: 0.02 10*3/MM3 (ref 0–0.02)
IMM GRANULOCYTES NFR BLD: 0.4 % (ref 0–0.5)
LYMPHOCYTES # BLD AUTO: 1.47 10*3/MM3 (ref 0.6–4.2)
LYMPHOCYTES NFR BLD AUTO: 25.8 % (ref 10–50)
MCH RBC QN AUTO: 27.3 PG (ref 26.5–34)
MCHC RBC AUTO-ENTMCNC: 34 G/DL (ref 31.5–36.3)
MCV RBC AUTO: 80.5 FL (ref 80–98)
MONOCYTES # BLD AUTO: 0.6 10*3/MM3 (ref 0–0.9)
MONOCYTES NFR BLD AUTO: 10.5 % (ref 0–12)
NEUTROPHILS # BLD AUTO: 3.5 10*3/MM3 (ref 2–8.6)
NEUTROPHILS NFR BLD AUTO: 61.3 % (ref 37–80)
PLATELET # BLD AUTO: 238 10*3/MM3 (ref 150–450)
PMV BLD AUTO: 11.5 FL (ref 8–12)
POTASSIUM BLD-SCNC: 4.6 MMOL/L (ref 3.5–5.1)
RBC # BLD AUTO: 5.34 10*6/MM3 (ref 4.37–5.74)
SODIUM BLD-SCNC: 133 MMOL/L (ref 137–145)
WBC NRBC COR # BLD: 5.7 10*3/MM3 (ref 3.2–9.8)

## 2018-10-28 PROCEDURE — 82962 GLUCOSE BLOOD TEST: CPT

## 2018-10-28 PROCEDURE — 25010000002 FUROSEMIDE PER 20 MG: Performed by: NURSE PRACTITIONER

## 2018-10-28 PROCEDURE — 80048 BASIC METABOLIC PNL TOTAL CA: CPT | Performed by: NURSE PRACTITIONER

## 2018-10-28 PROCEDURE — 63710000001 INSULIN DETEMIR PER 5 UNITS: Performed by: FAMILY MEDICINE

## 2018-10-28 PROCEDURE — 93005 ELECTROCARDIOGRAM TRACING: CPT | Performed by: FAMILY MEDICINE

## 2018-10-28 PROCEDURE — 85025 COMPLETE CBC W/AUTO DIFF WBC: CPT | Performed by: NURSE PRACTITIONER

## 2018-10-28 PROCEDURE — 63710000001 INSULIN ASPART PER 5 UNITS: Performed by: NURSE PRACTITIONER

## 2018-10-28 PROCEDURE — 83036 HEMOGLOBIN GLYCOSYLATED A1C: CPT | Performed by: FAMILY MEDICINE

## 2018-10-28 PROCEDURE — 93010 ELECTROCARDIOGRAM REPORT: CPT | Performed by: INTERNAL MEDICINE

## 2018-10-28 RX ADMIN — INSULIN DETEMIR 15 UNITS: 100 INJECTION, SOLUTION SUBCUTANEOUS at 21:47

## 2018-10-28 RX ADMIN — INSULIN ASPART 4 UNITS: 100 INJECTION, SOLUTION INTRAVENOUS; SUBCUTANEOUS at 12:53

## 2018-10-28 RX ADMIN — FUROSEMIDE 40 MG: 10 INJECTION, SOLUTION INTRAMUSCULAR; INTRAVENOUS at 17:30

## 2018-10-28 RX ADMIN — AMIODARONE HYDROCHLORIDE 200 MG: 200 TABLET ORAL at 08:37

## 2018-10-28 RX ADMIN — INSULIN ASPART 7 UNITS: 100 INJECTION, SOLUTION INTRAVENOUS; SUBCUTANEOUS at 17:30

## 2018-10-28 RX ADMIN — GABAPENTIN 400 MG: 400 CAPSULE ORAL at 08:37

## 2018-10-28 RX ADMIN — ASPIRIN 81 MG: 81 TABLET, COATED ORAL at 08:37

## 2018-10-28 RX ADMIN — ACETAMINOPHEN 650 MG: 325 TABLET ORAL at 22:20

## 2018-10-28 RX ADMIN — DILTIAZEM HYDROCHLORIDE 180 MG: 180 CAPSULE, COATED, EXTENDED RELEASE ORAL at 08:37

## 2018-10-28 RX ADMIN — INSULIN ASPART 7 UNITS: 100 INJECTION, SOLUTION INTRAVENOUS; SUBCUTANEOUS at 08:38

## 2018-10-28 RX ADMIN — APIXABAN 5 MG: 5 TABLET, FILM COATED ORAL at 21:44

## 2018-10-28 RX ADMIN — APIXABAN 5 MG: 5 TABLET, FILM COATED ORAL at 08:37

## 2018-10-28 RX ADMIN — GABAPENTIN 400 MG: 400 CAPSULE ORAL at 21:43

## 2018-10-28 RX ADMIN — Medication 3 ML: at 08:40

## 2018-10-28 RX ADMIN — FUROSEMIDE 40 MG: 10 INJECTION, SOLUTION INTRAMUSCULAR; INTRAVENOUS at 06:48

## 2018-10-28 RX ADMIN — GABAPENTIN 400 MG: 400 CAPSULE ORAL at 17:30

## 2018-10-28 RX ADMIN — METFORMIN HYDROCHLORIDE 500 MG: 500 TABLET, EXTENDED RELEASE ORAL at 21:43

## 2018-10-28 RX ADMIN — ATORVASTATIN CALCIUM 20 MG: 20 TABLET, FILM COATED ORAL at 08:37

## 2018-10-28 RX ADMIN — INSULIN ASPART 4 UNITS: 100 INJECTION, SOLUTION INTRAVENOUS; SUBCUTANEOUS at 21:45

## 2018-10-28 RX ADMIN — Medication 3 ML: at 21:49

## 2018-10-28 RX ADMIN — LOSARTAN POTASSIUM 100 MG: 50 TABLET, FILM COATED ORAL at 08:37

## 2018-10-28 RX ADMIN — AMIODARONE HYDROCHLORIDE 200 MG: 200 TABLET ORAL at 21:44

## 2018-10-29 LAB
ANION GAP SERPL CALCULATED.3IONS-SCNC: 10 MMOL/L (ref 5–15)
BASOPHILS # BLD AUTO: 0.05 10*3/MM3 (ref 0–0.2)
BASOPHILS NFR BLD AUTO: 0.8 % (ref 0–2)
BUN BLD-MCNC: 17 MG/DL (ref 7–21)
BUN/CREAT SERPL: 16.8 (ref 7–25)
CALCIUM SPEC-SCNC: 9 MG/DL (ref 8.4–10.2)
CHLORIDE SERPL-SCNC: 98 MMOL/L (ref 95–110)
CO2 SERPL-SCNC: 26 MMOL/L (ref 22–31)
CREAT BLD-MCNC: 1.01 MG/DL (ref 0.7–1.3)
DEPRECATED RDW RBC AUTO: 36.7 FL (ref 35.1–43.9)
EOSINOPHIL # BLD AUTO: 0.15 10*3/MM3 (ref 0–0.7)
EOSINOPHIL NFR BLD AUTO: 2.3 % (ref 0–7)
ERYTHROCYTE [DISTWIDTH] IN BLOOD BY AUTOMATED COUNT: 12.5 % (ref 11.5–14.5)
GFR SERPL CREATININE-BSD FRML MDRD: 95 ML/MIN/1.73 (ref 63–147)
GLUCOSE BLD-MCNC: 362 MG/DL (ref 60–100)
GLUCOSE BLDC GLUCOMTR-MCNC: 161 MG/DL (ref 70–130)
GLUCOSE BLDC GLUCOMTR-MCNC: 201 MG/DL (ref 70–130)
GLUCOSE BLDC GLUCOMTR-MCNC: 438 MG/DL (ref 70–130)
GLUCOSE BLDC GLUCOMTR-MCNC: 88 MG/DL (ref 70–130)
HBA1C MFR BLD: 9.5 % (ref 4–5.6)
HBA1C MFR BLD: 9.5 % (ref 4–5.6)
HCT VFR BLD AUTO: 44.2 % (ref 39–49)
HGB BLD-MCNC: 15.4 G/DL (ref 13.7–17.3)
IMM GRANULOCYTES # BLD: 0.01 10*3/MM3 (ref 0–0.02)
IMM GRANULOCYTES NFR BLD: 0.2 % (ref 0–0.5)
LYMPHOCYTES # BLD AUTO: 1.86 10*3/MM3 (ref 0.6–4.2)
LYMPHOCYTES NFR BLD AUTO: 28.5 % (ref 10–50)
MCH RBC QN AUTO: 28.1 PG (ref 26.5–34)
MCHC RBC AUTO-ENTMCNC: 34.8 G/DL (ref 31.5–36.3)
MCV RBC AUTO: 80.5 FL (ref 80–98)
MONOCYTES # BLD AUTO: 0.49 10*3/MM3 (ref 0–0.9)
MONOCYTES NFR BLD AUTO: 7.5 % (ref 0–12)
NEUTROPHILS # BLD AUTO: 3.97 10*3/MM3 (ref 2–8.6)
NEUTROPHILS NFR BLD AUTO: 60.7 % (ref 37–80)
NRBC BLD MANUAL-RTO: 0 /100 WBC (ref 0–0)
PLATELET # BLD AUTO: 232 10*3/MM3 (ref 150–450)
PMV BLD AUTO: 11.5 FL (ref 8–12)
POTASSIUM BLD-SCNC: 4.2 MMOL/L (ref 3.5–5.1)
RBC # BLD AUTO: 5.49 10*6/MM3 (ref 4.37–5.74)
SODIUM BLD-SCNC: 134 MMOL/L (ref 137–145)
WBC NRBC COR # BLD: 6.53 10*3/MM3 (ref 3.2–9.8)

## 2018-10-29 PROCEDURE — 85025 COMPLETE CBC W/AUTO DIFF WBC: CPT | Performed by: NURSE PRACTITIONER

## 2018-10-29 PROCEDURE — 93010 ELECTROCARDIOGRAM REPORT: CPT | Performed by: INTERNAL MEDICINE

## 2018-10-29 PROCEDURE — 63710000001 INSULIN DETEMIR PER 5 UNITS: Performed by: FAMILY MEDICINE

## 2018-10-29 PROCEDURE — 82962 GLUCOSE BLOOD TEST: CPT

## 2018-10-29 PROCEDURE — 93005 ELECTROCARDIOGRAM TRACING: CPT | Performed by: FAMILY MEDICINE

## 2018-10-29 PROCEDURE — 25010000002 DIGOXIN PER 500 MCG: Performed by: FAMILY MEDICINE

## 2018-10-29 PROCEDURE — 80048 BASIC METABOLIC PNL TOTAL CA: CPT | Performed by: NURSE PRACTITIONER

## 2018-10-29 PROCEDURE — 25010000002 FUROSEMIDE PER 20 MG: Performed by: NURSE PRACTITIONER

## 2018-10-29 PROCEDURE — 63710000001 INSULIN ASPART PER 5 UNITS: Performed by: FAMILY MEDICINE

## 2018-10-29 PROCEDURE — 63710000001 INSULIN ASPART PER 5 UNITS: Performed by: NURSE PRACTITIONER

## 2018-10-29 RX ORDER — DIGOXIN 0.25 MG/ML
250 INJECTION INTRAMUSCULAR; INTRAVENOUS
Status: DISCONTINUED | OUTPATIENT
Start: 2018-10-29 | End: 2018-10-29

## 2018-10-29 RX ORDER — DIGOXIN 0.25 MG/ML
250 INJECTION INTRAMUSCULAR; INTRAVENOUS ONCE
Status: COMPLETED | OUTPATIENT
Start: 2018-10-29 | End: 2018-10-29

## 2018-10-29 RX ORDER — DIGOXIN 0.25 MG/ML
250 INJECTION INTRAMUSCULAR; INTRAVENOUS
Status: DISCONTINUED | OUTPATIENT
Start: 2018-10-29 | End: 2018-10-29 | Stop reason: SDUPTHER

## 2018-10-29 RX ORDER — DIGOXIN 250 MCG
250 TABLET ORAL
Status: DISCONTINUED | OUTPATIENT
Start: 2018-10-30 | End: 2018-10-30

## 2018-10-29 RX ORDER — AMIODARONE HYDROCHLORIDE 200 MG/1
400 TABLET ORAL ONCE
Status: COMPLETED | OUTPATIENT
Start: 2018-10-29 | End: 2018-10-29

## 2018-10-29 RX ADMIN — FUROSEMIDE 40 MG: 10 INJECTION, SOLUTION INTRAMUSCULAR; INTRAVENOUS at 06:27

## 2018-10-29 RX ADMIN — Medication 3 ML: at 08:18

## 2018-10-29 RX ADMIN — APIXABAN 5 MG: 5 TABLET, FILM COATED ORAL at 08:15

## 2018-10-29 RX ADMIN — ATORVASTATIN CALCIUM 20 MG: 20 TABLET, FILM COATED ORAL at 08:15

## 2018-10-29 RX ADMIN — GABAPENTIN 400 MG: 400 CAPSULE ORAL at 08:15

## 2018-10-29 RX ADMIN — METFORMIN HYDROCHLORIDE 500 MG: 500 TABLET, EXTENDED RELEASE ORAL at 21:48

## 2018-10-29 RX ADMIN — INSULIN ASPART 2 UNITS: 100 INJECTION, SOLUTION INTRAVENOUS; SUBCUTANEOUS at 12:07

## 2018-10-29 RX ADMIN — GABAPENTIN 400 MG: 400 CAPSULE ORAL at 16:34

## 2018-10-29 RX ADMIN — LOSARTAN POTASSIUM 100 MG: 50 TABLET, FILM COATED ORAL at 08:15

## 2018-10-29 RX ADMIN — INSULIN ASPART 4 UNITS: 100 INJECTION, SOLUTION INTRAVENOUS; SUBCUTANEOUS at 21:49

## 2018-10-29 RX ADMIN — INSULIN ASPART 9 UNITS: 100 INJECTION, SOLUTION INTRAVENOUS; SUBCUTANEOUS at 08:17

## 2018-10-29 RX ADMIN — APIXABAN 5 MG: 5 TABLET, FILM COATED ORAL at 21:48

## 2018-10-29 RX ADMIN — DIGOXIN 250 MCG: 0.25 INJECTION INTRAMUSCULAR; INTRAVENOUS at 12:02

## 2018-10-29 RX ADMIN — DILTIAZEM HYDROCHLORIDE 180 MG: 180 CAPSULE, COATED, EXTENDED RELEASE ORAL at 08:15

## 2018-10-29 RX ADMIN — AMIODARONE HYDROCHLORIDE 400 MG: 200 TABLET ORAL at 21:48

## 2018-10-29 RX ADMIN — INSULIN ASPART 10 UNITS: 100 INJECTION, SOLUTION INTRAVENOUS; SUBCUTANEOUS at 12:06

## 2018-10-29 RX ADMIN — GABAPENTIN 400 MG: 400 CAPSULE ORAL at 21:54

## 2018-10-29 RX ADMIN — FUROSEMIDE 40 MG: 10 INJECTION, SOLUTION INTRAMUSCULAR; INTRAVENOUS at 18:22

## 2018-10-29 RX ADMIN — DIGOXIN 250 MCG: 0.25 INJECTION INTRAMUSCULAR; INTRAVENOUS at 14:35

## 2018-10-29 RX ADMIN — POLYETHYLENE GLYCOL 3350 17 G: 17 POWDER, FOR SOLUTION ORAL at 09:39

## 2018-10-29 RX ADMIN — ASPIRIN 81 MG: 81 TABLET, COATED ORAL at 08:15

## 2018-10-29 RX ADMIN — INSULIN ASPART 10 UNITS: 100 INJECTION, SOLUTION INTRAVENOUS; SUBCUTANEOUS at 09:40

## 2018-10-29 RX ADMIN — METFORMIN HYDROCHLORIDE 500 MG: 500 TABLET, EXTENDED RELEASE ORAL at 08:15

## 2018-10-29 RX ADMIN — AMIODARONE HYDROCHLORIDE 200 MG: 200 TABLET ORAL at 08:15

## 2018-10-29 RX ADMIN — INSULIN DETEMIR 25 UNITS: 100 INJECTION, SOLUTION SUBCUTANEOUS at 21:48

## 2018-10-30 VITALS
TEMPERATURE: 98.9 F | HEIGHT: 72 IN | BODY MASS INDEX: 26.55 KG/M2 | DIASTOLIC BLOOD PRESSURE: 72 MMHG | OXYGEN SATURATION: 99 % | RESPIRATION RATE: 20 BRPM | HEART RATE: 88 BPM | SYSTOLIC BLOOD PRESSURE: 122 MMHG | WEIGHT: 196 LBS

## 2018-10-30 LAB
ANION GAP SERPL CALCULATED.3IONS-SCNC: 9 MMOL/L (ref 5–15)
BASOPHILS # BLD AUTO: 0.04 10*3/MM3 (ref 0–0.2)
BASOPHILS NFR BLD AUTO: 0.5 % (ref 0–2)
BUN BLD-MCNC: 22 MG/DL (ref 7–21)
BUN/CREAT SERPL: 19 (ref 7–25)
CALCIUM SPEC-SCNC: 9.5 MG/DL (ref 8.4–10.2)
CHLORIDE SERPL-SCNC: 102 MMOL/L (ref 95–110)
CO2 SERPL-SCNC: 23 MMOL/L (ref 22–31)
CREAT BLD-MCNC: 1.16 MG/DL (ref 0.7–1.3)
DEPRECATED RDW RBC AUTO: 37.8 FL (ref 35.1–43.9)
EOSINOPHIL # BLD AUTO: 0.07 10*3/MM3 (ref 0–0.7)
EOSINOPHIL NFR BLD AUTO: 0.9 % (ref 0–7)
ERYTHROCYTE [DISTWIDTH] IN BLOOD BY AUTOMATED COUNT: 12.8 % (ref 11.5–14.5)
GFR SERPL CREATININE-BSD FRML MDRD: 81 ML/MIN/1.73 (ref 63–147)
GLUCOSE BLD-MCNC: 214 MG/DL (ref 60–100)
GLUCOSE BLDC GLUCOMTR-MCNC: 150 MG/DL (ref 70–130)
GLUCOSE BLDC GLUCOMTR-MCNC: 210 MG/DL (ref 70–130)
GLUCOSE BLDC GLUCOMTR-MCNC: 238 MG/DL (ref 70–130)
HCT VFR BLD AUTO: 50.9 % (ref 39–49)
HGB BLD-MCNC: 17.6 G/DL (ref 13.7–17.3)
IMM GRANULOCYTES # BLD: 0.02 10*3/MM3 (ref 0–0.02)
IMM GRANULOCYTES NFR BLD: 0.2 % (ref 0–0.5)
LYMPHOCYTES # BLD AUTO: 1.69 10*3/MM3 (ref 0.6–4.2)
LYMPHOCYTES NFR BLD AUTO: 20.9 % (ref 10–50)
MCH RBC QN AUTO: 27.6 PG (ref 26.5–34)
MCHC RBC AUTO-ENTMCNC: 34.6 G/DL (ref 31.5–36.3)
MCV RBC AUTO: 79.9 FL (ref 80–98)
MONOCYTES # BLD AUTO: 0.59 10*3/MM3 (ref 0–0.9)
MONOCYTES NFR BLD AUTO: 7.3 % (ref 0–12)
NEUTROPHILS # BLD AUTO: 5.69 10*3/MM3 (ref 2–8.6)
NEUTROPHILS NFR BLD AUTO: 70.2 % (ref 37–80)
NRBC BLD MANUAL-RTO: 0 /100 WBC (ref 0–0)
PLATELET # BLD AUTO: 261 10*3/MM3 (ref 150–450)
PMV BLD AUTO: 10.6 FL (ref 8–12)
POTASSIUM BLD-SCNC: 4.7 MMOL/L (ref 3.5–5.1)
POTASSIUM BLD-SCNC: 5.2 MMOL/L (ref 3.5–5.1)
RBC # BLD AUTO: 6.37 10*6/MM3 (ref 4.37–5.74)
SODIUM BLD-SCNC: 134 MMOL/L (ref 137–145)
WBC NRBC COR # BLD: 8.1 10*3/MM3 (ref 3.2–9.8)

## 2018-10-30 PROCEDURE — 85025 COMPLETE CBC W/AUTO DIFF WBC: CPT | Performed by: NURSE PRACTITIONER

## 2018-10-30 PROCEDURE — 80048 BASIC METABOLIC PNL TOTAL CA: CPT | Performed by: NURSE PRACTITIONER

## 2018-10-30 PROCEDURE — 63710000001 INSULIN ASPART PER 5 UNITS: Performed by: FAMILY MEDICINE

## 2018-10-30 PROCEDURE — 63710000001 INSULIN ASPART PER 5 UNITS: Performed by: NURSE PRACTITIONER

## 2018-10-30 PROCEDURE — 84132 ASSAY OF SERUM POTASSIUM: CPT | Performed by: FAMILY MEDICINE

## 2018-10-30 PROCEDURE — 82962 GLUCOSE BLOOD TEST: CPT

## 2018-10-30 PROCEDURE — 93010 ELECTROCARDIOGRAM REPORT: CPT | Performed by: INTERNAL MEDICINE

## 2018-10-30 PROCEDURE — 25010000002 FUROSEMIDE PER 20 MG: Performed by: NURSE PRACTITIONER

## 2018-10-30 PROCEDURE — 93005 ELECTROCARDIOGRAM TRACING: CPT | Performed by: FAMILY MEDICINE

## 2018-10-30 RX ORDER — AMIODARONE HYDROCHLORIDE 200 MG/1
200 TABLET ORAL EVERY 12 HOURS SCHEDULED
Qty: 60 TABLET | Refills: 0 | Status: SHIPPED | OUTPATIENT
Start: 2018-10-30 | End: 2018-11-27 | Stop reason: SDUPTHER

## 2018-10-30 RX ORDER — ASPIRIN 81 MG/1
81 TABLET ORAL DAILY
Qty: 30 TABLET | Refills: 0 | Status: SHIPPED | OUTPATIENT
Start: 2018-10-31 | End: 2020-10-14 | Stop reason: SDDI

## 2018-10-30 RX ORDER — DILTIAZEM HYDROCHLORIDE 180 MG/1
180 CAPSULE, COATED, EXTENDED RELEASE ORAL
Qty: 30 CAPSULE | Refills: 0 | Status: SHIPPED | OUTPATIENT
Start: 2018-10-31 | End: 2018-11-27 | Stop reason: SDUPTHER

## 2018-10-30 RX ORDER — METFORMIN HYDROCHLORIDE 500 MG/1
500 TABLET, EXTENDED RELEASE ORAL 2 TIMES DAILY
Qty: 60 TABLET | Refills: 0 | Status: SHIPPED | OUTPATIENT
Start: 2018-10-30 | End: 2018-11-27 | Stop reason: SDUPTHER

## 2018-10-30 RX ORDER — DIGOXIN 125 MCG
125 TABLET ORAL
Qty: 30 TABLET | Refills: 0 | Status: SHIPPED | OUTPATIENT
Start: 2018-10-31 | End: 2018-11-27 | Stop reason: SDUPTHER

## 2018-10-30 RX ORDER — DIGOXIN 125 MCG
125 TABLET ORAL
Status: DISCONTINUED | OUTPATIENT
Start: 2018-10-31 | End: 2018-10-30 | Stop reason: HOSPADM

## 2018-10-30 RX ORDER — SODIUM POLYSTYRENE SULFONATE 15 G/60ML
15 SUSPENSION ORAL; RECTAL ONCE
Status: COMPLETED | OUTPATIENT
Start: 2018-10-30 | End: 2018-10-30

## 2018-10-30 RX ORDER — POTASSIUM CHLORIDE 750 MG/1
10 TABLET, FILM COATED, EXTENDED RELEASE ORAL DAILY
Qty: 30 TABLET | Refills: 0 | Status: SHIPPED | OUTPATIENT
Start: 2018-10-30 | End: 2018-11-27 | Stop reason: SDUPTHER

## 2018-10-30 RX ORDER — FUROSEMIDE 40 MG/1
40 TABLET ORAL 2 TIMES DAILY
Qty: 60 TABLET | Refills: 0 | Status: SHIPPED | OUTPATIENT
Start: 2018-10-30 | End: 2018-11-27 | Stop reason: SDUPTHER

## 2018-10-30 RX ADMIN — FUROSEMIDE 40 MG: 10 INJECTION, SOLUTION INTRAMUSCULAR; INTRAVENOUS at 05:49

## 2018-10-30 RX ADMIN — METFORMIN HYDROCHLORIDE 500 MG: 500 TABLET, EXTENDED RELEASE ORAL at 08:18

## 2018-10-30 RX ADMIN — AMIODARONE HYDROCHLORIDE 200 MG: 200 TABLET ORAL at 08:17

## 2018-10-30 RX ADMIN — INSULIN ASPART 4 UNITS: 100 INJECTION, SOLUTION INTRAVENOUS; SUBCUTANEOUS at 12:08

## 2018-10-30 RX ADMIN — ATORVASTATIN CALCIUM 20 MG: 20 TABLET, FILM COATED ORAL at 08:18

## 2018-10-30 RX ADMIN — INSULIN ASPART 10 UNITS: 100 INJECTION, SOLUTION INTRAVENOUS; SUBCUTANEOUS at 12:07

## 2018-10-30 RX ADMIN — GABAPENTIN 400 MG: 400 CAPSULE ORAL at 17:46

## 2018-10-30 RX ADMIN — INSULIN ASPART 10 UNITS: 100 INJECTION, SOLUTION INTRAVENOUS; SUBCUTANEOUS at 08:17

## 2018-10-30 RX ADMIN — Medication 3 ML: at 09:00

## 2018-10-30 RX ADMIN — DIGOXIN 250 MCG: 250 TABLET ORAL at 12:08

## 2018-10-30 RX ADMIN — GABAPENTIN 400 MG: 400 CAPSULE ORAL at 08:18

## 2018-10-30 RX ADMIN — ACETAMINOPHEN 650 MG: 325 TABLET ORAL at 05:49

## 2018-10-30 RX ADMIN — DILTIAZEM HYDROCHLORIDE 180 MG: 180 CAPSULE, COATED, EXTENDED RELEASE ORAL at 08:18

## 2018-10-30 RX ADMIN — INSULIN ASPART 10 UNITS: 100 INJECTION, SOLUTION INTRAVENOUS; SUBCUTANEOUS at 17:46

## 2018-10-30 RX ADMIN — APIXABAN 5 MG: 5 TABLET, FILM COATED ORAL at 08:17

## 2018-10-30 RX ADMIN — LOSARTAN POTASSIUM 100 MG: 50 TABLET, FILM COATED ORAL at 08:17

## 2018-10-30 RX ADMIN — SODIUM POLYSTYRENE SULFONATE 15 G: 15 SUSPENSION ORAL; RECTAL at 10:13

## 2018-10-30 RX ADMIN — POLYETHYLENE GLYCOL 3350 17 G: 17 POWDER, FOR SOLUTION ORAL at 08:19

## 2018-10-30 RX ADMIN — INSULIN ASPART 4 UNITS: 100 INJECTION, SOLUTION INTRAVENOUS; SUBCUTANEOUS at 17:47

## 2018-10-30 RX ADMIN — ASPIRIN 81 MG: 81 TABLET, COATED ORAL at 08:18

## 2018-10-31 ENCOUNTER — READMISSION MANAGEMENT (OUTPATIENT)
Dept: CALL CENTER | Facility: HOSPITAL | Age: 48
End: 2018-10-31

## 2018-10-31 NOTE — OUTREACH NOTE
Prep Survey      Responses   Facility patient discharged from?  [facility]   Is patient eligible?  Yes   Discharge diagnosis  Dyspnea, CHF, anxiety, depression, GERD, heart murmur, DM II, Bilateral pleural effusion,chest pain, Afib with RVR   Does the patient have one of the following disease processes/diagnoses(primary or secondary)?  CHF   Does the patient have Home health ordered?  Yes   What is the Home health agency?   No agency named in AVS   Is there a DME ordered?  No   Comments regarding appointments  See AVS   Prep survey completed?  Yes          Nelly Gross RN

## 2018-11-01 ENCOUNTER — READMISSION MANAGEMENT (OUTPATIENT)
Dept: CALL CENTER | Facility: HOSPITAL | Age: 48
End: 2018-11-01

## 2018-11-01 NOTE — OUTREACH NOTE
CHF Week 1 Survey      Responses   Facility patient discharged from?  [facility]   Does the patient have one of the following disease processes/diagnoses(primary or secondary)?  CHF   Is there a successful TCM telephone encounter documented?  No   CHF Week 1 attempt successful?  No   Unsuccessful attempts  Attempt 1          Nataliya Andrews RN

## 2018-11-02 ENCOUNTER — READMISSION MANAGEMENT (OUTPATIENT)
Dept: CALL CENTER | Facility: HOSPITAL | Age: 48
End: 2018-11-02

## 2018-11-02 NOTE — OUTREACH NOTE
CHF Week 1 Survey      Responses   Facility patient discharged from?  [facility]   Does the patient have one of the following disease processes/diagnoses(primary or secondary)?  CHF   Is there a successful TCM telephone encounter documented?  No   CHF Week 1 attempt successful?  Yes   Call start time  1712   Call end time  1722   Discharge diagnosis  Dyspnea, CHF, anxiety, depression, GERD, heart murmur, DM II, Bilateral pleural effusion,chest pain, Afib with RVR   Is patient permission given to speak with other caregiver?  Yes   List who call center can speak with  MotherNelly   Person spoke with today (if not patient) and relationship  Mother, Nelly Willingham   Meds reviewed with patient/caregiver?  Yes   Is the patient having any side effects they believe may be caused by any medication additions or changes?  -- [Mother does not know if patient got all his medications or other details regarding meds. ]   Medication comments  I advised mother to have patient review discharge instructions carefully and to call PCP with medication issues.    Does the patient have a primary care provider?   Yes   Does the patient have an appointment with their PCP within 7 days of discharge?  Yes   Comments regarding PCP  Cam Perry, APRN. Nov 7th, 930am.    Has the patient kept scheduled appointments due by today?  N/A   Comments  Reviewed needed and scheduled appts with mother.    Home health comments  Mother does not know if patient has been seen by HH.    Psychosocial issues?  No   Did the patient receive a copy of their discharge instructions?  Yes   Nursing interventions  Reviewed instructions with patient [Reviewed with mother. ]   What is the patient's perception of their health status since discharge?  Improving   Nursing interventions  Nurse provided patient education   Is the patient weighing daily?  No   Does the patient have scales?  No [Mother states that she was aware that patient needed to get scales. ]    Daily weight interventions  Education provided on importance of daily weight   Is the patient able to teach back Heart Failure diet management?  No   Is the patient able to teach back Heart Failure Zones?  No   Is the patient able to teach back signs and symptoms of worsening condition? (i.e. weight gain, shortness of air, etc.)  Yes   Is the patient/caregiver able to teach back the hierarchy of who to call/visit for symptoms/problems? PCP, Specialist, Home health nurse, Urgent Care, ED, 911  Yes   Additional teach back comments  Reviewed importance of daily weight monitoring and perimeters to notify cardiology with gain > 2# overnight or > 5# in a week. Mother states patient going to get a fitbit to monitor heart rate.     CHF Week 1 call completed?  Yes          Nelly Buckley RN

## 2018-11-07 ENCOUNTER — APPOINTMENT (OUTPATIENT)
Dept: LAB | Facility: HOSPITAL | Age: 48
End: 2018-11-07

## 2018-11-07 ENCOUNTER — OFFICE VISIT (OUTPATIENT)
Dept: FAMILY MEDICINE CLINIC | Facility: CLINIC | Age: 48
End: 2018-11-07

## 2018-11-07 VITALS
TEMPERATURE: 98 F | DIASTOLIC BLOOD PRESSURE: 86 MMHG | HEIGHT: 72 IN | WEIGHT: 195.8 LBS | OXYGEN SATURATION: 98 % | BODY MASS INDEX: 26.52 KG/M2 | HEART RATE: 74 BPM | SYSTOLIC BLOOD PRESSURE: 120 MMHG | RESPIRATION RATE: 18 BRPM

## 2018-11-07 DIAGNOSIS — Z09 HOSPITAL DISCHARGE FOLLOW-UP: ICD-10-CM

## 2018-11-07 DIAGNOSIS — I50.21 ACUTE SYSTOLIC CONGESTIVE HEART FAILURE (HCC): Primary | ICD-10-CM

## 2018-11-07 LAB
ALBUMIN SERPL-MCNC: 4.2 G/DL (ref 3.4–4.8)
ALBUMIN/GLOB SERPL: 1.1 G/DL (ref 1.1–1.8)
ALP SERPL-CCNC: 89 U/L (ref 38–126)
ALT SERPL W P-5'-P-CCNC: 27 U/L (ref 21–72)
ANION GAP SERPL CALCULATED.3IONS-SCNC: 10 MMOL/L (ref 5–15)
AST SERPL-CCNC: 31 U/L (ref 17–59)
BASOPHILS # BLD AUTO: 0.03 10*3/MM3 (ref 0–0.2)
BASOPHILS NFR BLD AUTO: 0.6 % (ref 0–2)
BILIRUB SERPL-MCNC: 0.2 MG/DL (ref 0.2–1.3)
BUN BLD-MCNC: 32 MG/DL (ref 7–21)
BUN/CREAT SERPL: 22.2 (ref 7–25)
CALCIUM SPEC-SCNC: 9.4 MG/DL (ref 8.4–10.2)
CHLORIDE SERPL-SCNC: 92 MMOL/L (ref 95–110)
CO2 SERPL-SCNC: 34 MMOL/L (ref 22–31)
CREAT BLD-MCNC: 1.44 MG/DL (ref 0.7–1.3)
DEPRECATED RDW RBC AUTO: 38.3 FL (ref 35.1–43.9)
EOSINOPHIL # BLD AUTO: 0.08 10*3/MM3 (ref 0–0.7)
EOSINOPHIL NFR BLD AUTO: 1.6 % (ref 0–7)
ERYTHROCYTE [DISTWIDTH] IN BLOOD BY AUTOMATED COUNT: 12.7 % (ref 11.5–14.5)
GFR SERPL CREATININE-BSD FRML MDRD: 63 ML/MIN/1.73 (ref 63–147)
GLOBULIN UR ELPH-MCNC: 3.9 GM/DL (ref 2.3–3.5)
GLUCOSE BLD-MCNC: 266 MG/DL (ref 60–100)
HCT VFR BLD AUTO: 46.1 % (ref 39–49)
HGB BLD-MCNC: 15.7 G/DL (ref 13.7–17.3)
IMM GRANULOCYTES # BLD: 0.01 10*3/MM3 (ref 0–0.02)
IMM GRANULOCYTES NFR BLD: 0.2 % (ref 0–0.5)
LYMPHOCYTES # BLD AUTO: 1.46 10*3/MM3 (ref 0.6–4.2)
LYMPHOCYTES NFR BLD AUTO: 29.7 % (ref 10–50)
MCH RBC QN AUTO: 27.9 PG (ref 26.5–34)
MCHC RBC AUTO-ENTMCNC: 34.1 G/DL (ref 31.5–36.3)
MCV RBC AUTO: 81.9 FL (ref 80–98)
MONOCYTES # BLD AUTO: 0.49 10*3/MM3 (ref 0–0.9)
MONOCYTES NFR BLD AUTO: 10 % (ref 0–12)
NEUTROPHILS # BLD AUTO: 2.84 10*3/MM3 (ref 2–8.6)
NEUTROPHILS NFR BLD AUTO: 57.9 % (ref 37–80)
PLATELET # BLD AUTO: 331 10*3/MM3 (ref 150–450)
PMV BLD AUTO: 11.5 FL (ref 8–12)
POTASSIUM BLD-SCNC: 4.4 MMOL/L (ref 3.5–5.1)
PROT SERPL-MCNC: 8.1 G/DL (ref 6.3–8.6)
RBC # BLD AUTO: 5.63 10*6/MM3 (ref 4.37–5.74)
SODIUM BLD-SCNC: 136 MMOL/L (ref 137–145)
WBC NRBC COR # BLD: 4.91 10*3/MM3 (ref 3.2–9.8)

## 2018-11-07 PROCEDURE — 99213 OFFICE O/P EST LOW 20 MIN: CPT | Performed by: NURSE PRACTITIONER

## 2018-11-07 PROCEDURE — 36415 COLL VENOUS BLD VENIPUNCTURE: CPT | Performed by: NURSE PRACTITIONER

## 2018-11-07 PROCEDURE — 80053 COMPREHEN METABOLIC PANEL: CPT | Performed by: NURSE PRACTITIONER

## 2018-11-07 PROCEDURE — 85025 COMPLETE CBC W/AUTO DIFF WBC: CPT | Performed by: NURSE PRACTITIONER

## 2018-11-07 RX ORDER — POTASSIUM CHLORIDE 750 MG/1
CAPSULE, EXTENDED RELEASE ORAL
COMMUNITY
Start: 2018-10-31 | End: 2018-11-20 | Stop reason: SDUPTHER

## 2018-11-08 DIAGNOSIS — R79.89 ELEVATED SERUM CREATININE: Primary | ICD-10-CM

## 2018-11-08 NOTE — PROGRESS NOTES
Creatinine is elevated. I placed an order for him to get another CMP next week. Tell him to go to lab next week and get his CMP collected. If he has an issues or not feeling well have him schedule a follow up sooner than next month. Thanks.

## 2018-11-09 ENCOUNTER — TELEPHONE (OUTPATIENT)
Dept: FAMILY MEDICINE CLINIC | Facility: CLINIC | Age: 48
End: 2018-11-09

## 2018-11-12 ENCOUNTER — READMISSION MANAGEMENT (OUTPATIENT)
Dept: CALL CENTER | Facility: HOSPITAL | Age: 48
End: 2018-11-12

## 2018-11-12 NOTE — OUTREACH NOTE
CHF Week 2 Survey      Responses   Facility patient discharged from?  [facility]   Does the patient have one of the following disease processes/diagnoses(primary or secondary)?  CHF   Week 2 attempt successful?  Yes   Call start time  1021   Revoke  Decline to participate   Call end time  1022   Discharge diagnosis  Dyspnea, CHF, anxiety, depression, GERD, heart murmur, DM II, Bilateral pleural effusion,chest pain, Afib with RVR   Person spoke with today (if not patient) and relationship  Mother, Nelly Willingham          Tete Leone, RN   5

## 2018-11-13 ENCOUNTER — LAB (OUTPATIENT)
Dept: LAB | Facility: HOSPITAL | Age: 48
End: 2018-11-13

## 2018-11-13 ENCOUNTER — OFFICE VISIT (OUTPATIENT)
Dept: CARDIOLOGY | Facility: CLINIC | Age: 48
End: 2018-11-13

## 2018-11-13 VITALS
WEIGHT: 204.4 LBS | DIASTOLIC BLOOD PRESSURE: 80 MMHG | SYSTOLIC BLOOD PRESSURE: 110 MMHG | OXYGEN SATURATION: 96 % | HEIGHT: 72 IN | BODY MASS INDEX: 27.68 KG/M2 | HEART RATE: 86 BPM

## 2018-11-13 DIAGNOSIS — R79.89 ELEVATED SERUM CREATININE: ICD-10-CM

## 2018-11-13 DIAGNOSIS — I50.22 CHRONIC SYSTOLIC CONGESTIVE HEART FAILURE (HCC): Primary | ICD-10-CM

## 2018-11-13 DIAGNOSIS — I48.0 PAF (PAROXYSMAL ATRIAL FIBRILLATION) (HCC): ICD-10-CM

## 2018-11-13 LAB
ALBUMIN SERPL-MCNC: 4.3 G/DL (ref 3.4–4.8)
ALBUMIN/GLOB SERPL: 1.4 G/DL (ref 1.1–1.8)
ALP SERPL-CCNC: 97 U/L (ref 38–126)
ALT SERPL W P-5'-P-CCNC: 16 U/L (ref 21–72)
ANION GAP SERPL CALCULATED.3IONS-SCNC: 12 MMOL/L (ref 5–15)
AST SERPL-CCNC: 25 U/L (ref 17–59)
BILIRUB SERPL-MCNC: 0.2 MG/DL (ref 0.2–1.3)
BUN BLD-MCNC: 18 MG/DL (ref 7–21)
BUN/CREAT SERPL: 16.5 (ref 7–25)
CALCIUM SPEC-SCNC: 8.9 MG/DL (ref 8.4–10.2)
CHLORIDE SERPL-SCNC: 100 MMOL/L (ref 95–110)
CO2 SERPL-SCNC: 24 MMOL/L (ref 22–31)
CREAT BLD-MCNC: 1.09 MG/DL (ref 0.7–1.3)
GFR SERPL CREATININE-BSD FRML MDRD: 87 ML/MIN/1.73 (ref 63–147)
GLOBULIN UR ELPH-MCNC: 3.1 GM/DL (ref 2.3–3.5)
GLUCOSE BLD-MCNC: 297 MG/DL (ref 60–100)
NT-PROBNP SERPL-MCNC: 925 PG/ML (ref 0–450)
POTASSIUM BLD-SCNC: 4.3 MMOL/L (ref 3.5–5.1)
PROT SERPL-MCNC: 7.4 G/DL (ref 6.3–8.6)
SODIUM BLD-SCNC: 136 MMOL/L (ref 137–145)

## 2018-11-13 PROCEDURE — 83880 ASSAY OF NATRIURETIC PEPTIDE: CPT | Performed by: NURSE PRACTITIONER

## 2018-11-13 PROCEDURE — 99215 OFFICE O/P EST HI 40 MIN: CPT | Performed by: NURSE PRACTITIONER

## 2018-11-13 PROCEDURE — 36415 COLL VENOUS BLD VENIPUNCTURE: CPT

## 2018-11-13 PROCEDURE — 80053 COMPREHEN METABOLIC PANEL: CPT

## 2018-11-13 RX ORDER — OMEPRAZOLE 20 MG/1
20 CAPSULE, DELAYED RELEASE ORAL DAILY
COMMUNITY
End: 2020-12-21 | Stop reason: SDUPTHER

## 2018-11-13 NOTE — PATIENT INSTRUCTIONS
STOP Losartan     BEGIN Entresto 24/26mg twice daily.    REDUCE lasix tablet to once daily if possible.     IF weight increases by 3lbs overnight or 5lbs in one week, will need to take additional Lasix. CALL ME.

## 2018-11-13 NOTE — PROGRESS NOTES
Creatinine back down to within normal limits. Glucose elevated 297. Monitor blood glucose levels. Follow-up with PCP as scheduled.

## 2018-11-13 NOTE — PROGRESS NOTES
[facility] CHF CLINIC OFFICE VISIT    Subjective:     Congestive Heart Failure (chief complaint)      Congestive Heart Failure   Presents for initial visit. The disease course has been stable. The condition has lasted for 3 weeks. Pertinent negatives include no abdominal pain, chest pain, claudication, edema, fatigue, orthopnea, palpitations, shortness of breath or unexpected weight change. The symptoms have been stable. Past treatments include angiotensin receptor blockers, beta blockers, digoxin, exercise and salt and fluid restriction. The treatment provided moderate relief. Compliance with prior treatments has been good. His past medical history is significant for arrhythmia, DM and HTN. There is no history of CAD or chronic lung disease.     Mr. Willingham is a 47 year old male who presented to [facility] with 2 week symptoms of orthopnea, palpitations, fatigue, dyspnea, and chest pressure. He was found to have AF with RVR, EF of 20%, and diagnosed with NICM. He diuresed well on IV Lasix and lost 26lbs. His rate was controlled with Lopressor, Amiodarone, Cardizem, and Digoxin. He does have history of DM and HTN.   Began noticing symptoms sometime in the summer. But symptoms escalated in October when he could not walk back from the store. Usually smokes less than a pack a day. Uses Dip. Has been using Meth for the past two years. Has not used in the past 2 weeks.     Diet: Used to add salt to food before tasting. Now they spend more time looking at food labels and buying lower sodium food. Not adding salt to food now.   Fluids: Drinks water. Used to drink as much water as he could. Now he has been drinking more soda than usual. Sprite Zero. Drinks out of a 32oz cup. Buys 2L bottles.   Activity: Very active out around house. He is finding now how much he cannot do with dyspnea and fatigue. Catches his breath quicker than he used to.     PCP: NATHANIEL Garrido  Cardiologist: Dr. Mcfarland  Nephrologist: n/a  Pulmonologist:  n/a    Hospitalizations:  10/25/18- 10/30/18- [facility]    Past Medical History:   Diagnosis Date   • Anxiety    • Depression    • Diabetes mellitus (CMS/HCC)    • GERD (gastroesophageal reflux disease)    • Heart murmur    • Low back pain      Past Surgical History:   Procedure Laterality Date   • ELBOW PROCEDURE      left   • ROTATOR CUFF REPAIR      RIGHT   • TUMOR REMOVAL      FATTY TUMOR   • WRIST SURGERY      RIGHT     Social History     Socioeconomic History   • Marital status:      Spouse name: Not on file   • Number of children: Not on file   • Years of education: Not on file   • Highest education level: Not on file   Tobacco Use   • Smoking status: Current Every Day Smoker     Packs/day: 1.00     Types: Cigarettes   • Smokeless tobacco: Current User     Types: Snuff   Substance and Sexual Activity   • Alcohol use: Yes     Alcohol/week: 0.6 oz     Types: 1 Cans of beer per week     Frequency: 2-4 times a month     Drinks per session: 1 or 2     Binge frequency: Never     Comment: moderate   • Drug use: Yes     Types: Marijuana, Methamphetamines     Comment: occassionally   • Sexual activity: Defer     Allergies:  Allergies   Allergen Reactions   • Sulfa Antibiotics Unknown (See Comments)     Remembers mother always saying he was allergic       Review of Systems   Constitution: Negative for chills, decreased appetite, fatigue, fever, weakness and unexpected weight change.   HENT: Negative.    Eyes: Negative.    Cardiovascular: Negative for chest pain, claudication, dyspnea on exertion, irregular heartbeat, leg swelling and palpitations.   Respiratory: Negative for cough, shortness of breath and wheezing.    Endocrine: Negative.    Skin: Negative for dry skin, flushing and rash.   Musculoskeletal: Negative for falls and myalgias.   Gastrointestinal: Negative for abdominal pain, change in bowel habit and melena.   Genitourinary: Negative for frequency and hematuria.   Neurological: Negative for dizziness,  light-headedness and loss of balance.   Psychiatric/Behavioral: Negative for altered mental status and memory loss. The patient is not nervous/anxious.        Current Outpatient Medications   Medication Sig Dispense Refill   • amiodarone (PACERONE) 200 MG tablet Take 1 tablet by mouth Every 12 (Twelve) Hours. 60 tablet 0   • apixaban (ELIQUIS) 5 MG tablet tablet Take 1 tablet by mouth Every 12 (Twelve) Hours. 60 tablet 0   • aspirin 81 MG EC tablet Take 1 tablet by mouth Daily. 30 tablet 0   • Cholecalciferol (VITAMIN D PO) Take 2,000 Units by mouth 2 (Two) Times a Day. Take 1 tablet 2 times daily     • digoxin (LANOXIN) 125 MCG tablet Take 1 tablet by mouth Daily. 30 tablet 0   • diltiaZEM CD (CARDIZEM CD) 180 MG 24 hr capsule Take 1 capsule by mouth Daily. 30 capsule 0   • furosemide (LASIX) 40 MG tablet Take 1 tablet by mouth 2 (Two) Times a Day. 60 tablet 0   • gabapentin (NEURONTIN) 400 MG capsule Take 400 mg by mouth 3 (Three) Times a Day.     • insulin aspart (novoLOG) 100 UNIT/ML injection Inject 10 Units under the skin into the appropriate area as directed 3 (Three) Times a Day With Meals. 10 mL 0   • Insulin Glargine (LANTUS SOLOSTAR) 100 UNIT/ML injection pen Inject 1-42 Units under the skin into the appropriate area as directed 3 (Three) Times a Day.     • losartan (COZAAR) 100 MG tablet Take 50 mg by mouth Daily.     • metFORMIN ER (GLUCOPHAGE-XR) 500 MG 24 hr tablet Take 1 tablet by mouth 2 (Two) Times a Day. 60 tablet 0   • metoprolol tartrate (LOPRESSOR) 25 MG tablet Take 0.5 tablets by mouth Every 12 (Twelve) Hours. 30 tablet 0   • Multiple Vitamins-Minerals (CENTRUM ADULTS PO) Take 1 tablet by mouth Daily.     • omeprazole (priLOSEC) 20 MG capsule Take 20 mg by mouth Daily.     • potassium chloride (K-DUR) 10 MEQ CR tablet Take 1 tablet by mouth Daily. When taking lasix. 30 tablet 0   • potassium chloride (MICRO-K) 10 MEQ CR capsule      • simvastatin (ZOCOR) 40 MG tablet Take 20 mg by mouth Every  "Night.       No current facility-administered medications for this visit.         Objective:     Vitals:    11/13/18 0937   BP: 110/80   BP Location: Left arm   Patient Position: Sitting   Cuff Size: Adult   Pulse: 86   SpO2: 96%   Weight: 92.7 kg (204 lb 6.4 oz)   Height: 182.9 cm (72\")       Wt Readings from Last 3 Encounters:   11/13/18 92.7 kg (204 lb 6.4 oz)   11/07/18 88.8 kg (195 lb 12.8 oz)   10/30/18 88.9 kg (196 lb)   hospital weights:        Physical Exam   Constitutional: He is oriented to person, place, and time. He appears well-developed and well-nourished. No distress.   HENT:   Head: Normocephalic and atraumatic.   Neck: Normal range of motion. No JVD present.   Cardiovascular: Normal rate, regular rhythm, S1 normal, S2 normal, normal heart sounds and intact distal pulses.   No murmur heard.  Pulmonary/Chest: Effort normal and breath sounds normal. No respiratory distress. He has no wheezes. He has no rales.   Abdominal: Soft. Bowel sounds are normal.   Musculoskeletal: Normal range of motion. He exhibits no edema.   Neurological: He is alert and oriented to person, place, and time.   Skin: Skin is warm and dry. No erythema.   Psychiatric: He has a normal mood and affect. His behavior is normal. Judgment and thought content normal.       Cardiographics      Results for orders placed during the hospital encounter of 10/25/18   Adult Transthoracic Echo Complete W/ Cont if Necessary Per Protocol    Narrative · The following left ventricular wall segments are hypokinetic: mid   anterior, apical anterior, basal anterolateral, mid anterolateral, apical   lateral, basal inferolateral, mid inferolateral, apical inferior, mid   inferior, apical septal, basal inferoseptal, mid inferoseptal, apex   hypokinetic, mid anteroseptal, basal anterior, basal inferior and basal   inferoseptal.  · The left ventricular cavity is borderline dilated.  · Left atrial cavity size is mildly dilated.  · Mild mitral valve " regurgitation is present  · Mild aortic valve regurgitation is present.  · Mild tricuspid valve regurgitation is present.        Left Ventricle Estimated EF appears to be in the range of 21 - 25%. There is left ventricular global hypokinesis noted.   The left ventricular cavity is borderline dilated. Left ventricular diastolic function is normal. There is no evidence of a left ventricular mass or thrombus present.       [facility] 10/26/18  Hemodynamic   Aortic pressure:140/96  Mean:113  Left ventricular pressure:120/32  Left ventriculogram: Not performed.         Coronaries: Right dominant system.     Left Main coronary artery: Left main coronary artery was a medium caliber vessel which was free of any disease.  The left main coronary artery bifurcated into the left anterior descending artery and circumflex artery.     Left anterior descending artery: Left anterior descending artery was a medium caliber vessel which were free of any obstructive stenosis with good GAMA-3 flow.  The diagonal branch was free of any obstructive stenosis with good GAMA-3 flow.     Circumflex artery: The circumflex artery was a medium caliber vessel with the ramus intermedius branch the first and the second obtuse marginal branch which was free of any obstructive stenosis with good GAMA-3 flow.  The circumflex artery distally was also free of any obstructive stenosis with good GAMA-3 flow     Right coronary artery: The right coronary artery was a medium to large caliber dominant vessel which were free of any obstructive stenosis with good GAMA-3 flow     Estimated Blood Loss:  Minimal         Complications:  None; patient tolerated the procedure well.      Impression   1.  Elevated left ventricular end-diastolic pressure.  2.  No evidence of any obstructive epicardial coronary artery disease.     Recommendations: Patient positive troponin was probably secondary to the atrial fibrillation.  Patient was recommended medical management for the  atrial fibrillation and to workup noncardiac etiology of chest pain.  Patient would need anticoagulation with Eliquis for the atrial fibrillation.           Param Mcfarland MD  10/26/2018  2:41 PM    Xr Chest 2 View    Result Date: 10/25/2018  Predominantly bilateral perihilar heterogeneous airspace disease with associated mild vascular congestion most likely represents edema with differential pneumonitis. No pleural effusion. [id number] Electronically signed by:  Beau La MD  10/25/2018 1:36 PM CDT [facility]station: [facility]    Xr Chest 1 View    Result Date: 10/26/2018  Improved bilateral opacities likely improved edema. Electronically signed by:  Vic Gardner  10/26/2018 5:51 AM CDT Workstation: [id number]    Ct Angiogram Chest With Contrast    Result Date: 10/25/2018  CONCLUSION:      1.  No evidence of pulmonary embolism.          2.  No evidence of pathology associated with the visualized aorta.   3. Bilateral multifocal airspace disease. 4. Moderate-sized bilateral pleural fluid collections.                                                     Electronically signed by:  JORDEN Casillas MD  10/25/2018 4:46 PM CDT Workstation: [id number]    Lab Review     No results found for: TSH  Lab Results   Component Value Date    GLUCOSE 266 (H) 11/07/2018    BUN 32 (H) 11/07/2018    CREATININE 1.44 (H) 11/07/2018    EGFRIFAFRI 63 11/07/2018    BCR 22.2 11/07/2018    K 4.4 11/07/2018    CO2 34.0 (H) 11/07/2018    CALCIUM 9.4 11/07/2018    ALBUMIN 4.20 11/07/2018    AST 31 11/07/2018    ALT 27 11/07/2018     Lab Results   Component Value Date    WBC 4.91 11/07/2018    HGB 15.7 11/07/2018    HCT 46.1 11/07/2018    MCV 81.9 11/07/2018     11/07/2018       Lab Results   Component Value Date    TROPONINI 0.142 (C) 10/25/2018     Lab Results   Component Value Date    PROBNP 3,410.0 (H) 10/25/2018       PFTS:         The following portions of the patient's history were reviewed and updated as appropriate:  allergies, current medications, past family history, past medical history, past social history, past surgical history and problem list.     Old records reviewed and pertinent information is included in the above objective data.     Assessment/Plan:   BUN/Cr stable in hospital. Now elevated with 40mg BID Lasix. However, there is a 9lb weight gain on my scale from hospital. He has notice a variable weight from 195-203lbs.     Will change Losartan to Entresto for Mr. Willingham. PA completed with favorable outcome.      Diagnosis Plan   1. Chronic systolic congestive heart failure (CMS/[facility])  NICM: EF:20%. NYHA Class II, Stage C. Patient appears euvolemic.  and in a well perfused physiologic state. Hemodynamics are acceptable  BETA-BLOCKER: Lopressor 25mg BID per Dr. Mcfarland. Not an CHF approved BB (also on Cardizem, Dig and Amio)  ACE/ARB: STOP losartan  ENTRESTO: indicated. Started 24/26mg BID 11/13/18  DIURETIC: Lasix 40mg BID- will decrease due to elevated BUN/Cr  ALDOSTERONT ANTAGONIST: NYHA Class II- AA indicated.   IMDUR/HYDRALAZINE: n/a  DIGOXIN: 125mcg Dig  Fluid restriction: 2 L  Sodium restriction:2 grams  6MWT: 11/13/18: not enough time to complete.   Cardiac Rehab: indicated.  ICD: needs 3 month OMT   ADHF: yes  CardioMEMS:not indicated  Corvue: n/a  LVAD: not indicated at this time. Will evaluate response over the next 3 months.    Presented an overview of heart failure, expected course, considerations, risk factors and exacerbation prevention.  Recommended daily weight monitoring. Information provided.  Recommended restricted dietary sodium intake of 2g/day  Fluid restrictions of 2L/day.  Discussed patient action plan for heart failure;  Recommended avoiding NSAIDs use.  Discussed warning signs requiring additional medical attention for heart failure.  Do not hesitate to contact this office for further symptoms or concerns. Contact information provided to the patient and understanding verbalized.     BMP, BNP  today. Improved creatinine.        2. PAF (paroxysmal atrial fibrillation) (CMS/HCC)  Atrial fibrillation paroxysmal - 7 days or less  CHADSVASC: CHF, HTN and DM  EF is 20%. No evidence for LVH. LA size is mildly dilated.    Anticoagulation:ordered  - Eliquis 5mg BID    Rate control agents:ordered   - Digoxin 125mcg  - Lopressor 25mg BID  - Amiodarone 200mg BID   - Cardizem 180mg     Rhythm control agents:not indicated    Follows with Dr. Mcfarland           Follow up in 2 weeks     60 minutes out of 60 minutes face to face spent in counseling/coordination of care as relates to presentation of CHF with dietary and lifestyle modifications/procedures as outlined above.

## 2018-11-14 ENCOUNTER — TELEPHONE (OUTPATIENT)
Dept: FAMILY MEDICINE CLINIC | Facility: CLINIC | Age: 48
End: 2018-11-14

## 2018-11-14 NOTE — TELEPHONE ENCOUNTER
Edd odell was called regarding recent lab results.  No answer, could not leave a message.  A result letter was mailed.             ----- Message from NATHANIEL Cole sent at 11/13/2018 11:47 AM CST -----  Creatinine back down to within normal limits. Glucose elevated 297. Monitor blood glucose levels. Follow-up with PCP as scheduled.

## 2018-11-20 ENCOUNTER — OFFICE VISIT (OUTPATIENT)
Dept: CARDIAC SURGERY | Facility: CLINIC | Age: 48
End: 2018-11-20

## 2018-11-20 VITALS
DIASTOLIC BLOOD PRESSURE: 70 MMHG | SYSTOLIC BLOOD PRESSURE: 122 MMHG | WEIGHT: 202 LBS | HEART RATE: 87 BPM | HEIGHT: 72 IN | BODY MASS INDEX: 27.36 KG/M2 | TEMPERATURE: 96.9 F | OXYGEN SATURATION: 99 %

## 2018-11-20 DIAGNOSIS — Z71.89 ENCOUNTER FOR ANTICOAGULATION DISCUSSION AND COUNSELING: ICD-10-CM

## 2018-11-20 DIAGNOSIS — I48.0 PAF (PAROXYSMAL ATRIAL FIBRILLATION) (HCC): Primary | ICD-10-CM

## 2018-11-20 PROCEDURE — 99214 OFFICE O/P EST MOD 30 MIN: CPT | Performed by: NURSE PRACTITIONER

## 2018-11-27 ENCOUNTER — OFFICE VISIT (OUTPATIENT)
Dept: CARDIOLOGY | Facility: CLINIC | Age: 48
End: 2018-11-27

## 2018-11-27 VITALS
BODY MASS INDEX: 27.63 KG/M2 | SYSTOLIC BLOOD PRESSURE: 106 MMHG | WEIGHT: 204 LBS | DIASTOLIC BLOOD PRESSURE: 78 MMHG | OXYGEN SATURATION: 99 % | HEIGHT: 72 IN | HEART RATE: 74 BPM

## 2018-11-27 DIAGNOSIS — I50.22 CHRONIC SYSTOLIC CONGESTIVE HEART FAILURE (HCC): Primary | ICD-10-CM

## 2018-11-27 DIAGNOSIS — I48.0 PAF (PAROXYSMAL ATRIAL FIBRILLATION) (HCC): ICD-10-CM

## 2018-11-27 PROCEDURE — 99214 OFFICE O/P EST MOD 30 MIN: CPT | Performed by: NURSE PRACTITIONER

## 2018-11-27 RX ORDER — DIGOXIN 125 MCG
125 TABLET ORAL
Qty: 30 TABLET | Refills: 6 | Status: SHIPPED | OUTPATIENT
Start: 2018-11-27 | End: 2020-10-14 | Stop reason: SDDI

## 2018-11-27 RX ORDER — DILTIAZEM HYDROCHLORIDE 180 MG/1
180 CAPSULE, COATED, EXTENDED RELEASE ORAL
Qty: 30 CAPSULE | Refills: 6 | Status: SHIPPED | OUTPATIENT
Start: 2018-11-27 | End: 2020-10-14 | Stop reason: SDDI

## 2018-11-27 RX ORDER — POTASSIUM CHLORIDE 750 MG/1
10 TABLET, FILM COATED, EXTENDED RELEASE ORAL DAILY
Qty: 30 TABLET | Refills: 0 | Status: SHIPPED | OUTPATIENT
Start: 2018-11-27 | End: 2019-04-16 | Stop reason: SDUPTHER

## 2018-11-27 RX ORDER — AMIODARONE HYDROCHLORIDE 200 MG/1
200 TABLET ORAL EVERY 12 HOURS SCHEDULED
Qty: 60 TABLET | Refills: 6 | Status: SHIPPED | OUTPATIENT
Start: 2018-11-27 | End: 2020-10-14 | Stop reason: SDDI

## 2018-11-27 RX ORDER — METFORMIN HYDROCHLORIDE 500 MG/1
500 TABLET, EXTENDED RELEASE ORAL 2 TIMES DAILY
Qty: 60 TABLET | Refills: 11 | Status: SHIPPED | OUTPATIENT
Start: 2018-11-27 | End: 2019-02-21 | Stop reason: DRUGHIGH

## 2018-11-27 RX ORDER — FUROSEMIDE 40 MG/1
40 TABLET ORAL 2 TIMES DAILY
Qty: 60 TABLET | Refills: 6 | Status: SHIPPED | OUTPATIENT
Start: 2018-11-27 | End: 2019-03-12 | Stop reason: SDUPTHER

## 2018-11-27 RX ORDER — METOPROLOL SUCCINATE 25 MG/1
25 TABLET, EXTENDED RELEASE ORAL DAILY
Qty: 30 TABLET | Refills: 11 | Status: SHIPPED | OUTPATIENT
Start: 2018-11-27 | End: 2020-10-14 | Stop reason: SDUPTHER

## 2018-11-27 NOTE — PROGRESS NOTES
[facility] CHF CLINIC OFFICE VISIT    Subjective:     Congestive Heart Failure (chief complaint)      Congestive Heart Failure   Presents for initial visit. The disease course has been stable. The condition has lasted for 3 weeks. Pertinent negatives include no abdominal pain, chest pain, claudication, edema, fatigue, orthopnea, palpitations, shortness of breath or unexpected weight change. The symptoms have been stable. Past treatments include angiotensin receptor blockers, beta blockers, digoxin, exercise and salt and fluid restriction. The treatment provided moderate relief. Compliance with prior treatments has been good. His past medical history is significant for arrhythmia, DM and HTN. There is no history of CAD or chronic lung disease.     Mr. Willingham is a 47 year old male who presented to [facility] with 2 week symptoms of orthopnea, palpitations, fatigue, dyspnea, and chest pressure. He was found to have AF with RVR, EF of 20%, and diagnosed with NICM. He diuresed well on IV Lasix and lost 26lbs. His rate was controlled with Lopressor, Amiodarone, Cardizem, and Digoxin. He does have history of DM and HTN.   Began noticing symptoms sometime in the summer. But symptoms escalated in October when he could not walk back from the store. Usually smokes less than a pack a day. Uses Dip. Has been using Meth for the past two years. Has not used in the past 2 weeks.     Changed Mr. Willingham to Entresto 11/13/18 and attempted to lower Lasix dose.     Diet: Used to add salt to food before tasting. Now they spend more time looking at food labels and buying lower sodium food. Not adding salt to food now.   Fluids: Drinks water. Used to drink as much water as he could. Now he has been drinking more soda than usual. Sprite Zero. Drinks out of a 32oz cup. Buys 2L bottles.   Activity: Very active out around house. He is finding now how much he cannot do with dyspnea and fatigue. Catches his breath quicker than he used to.     PCP: Unruly Perry,  NATHANIEL  Cardiologist: Dr. Mcfarland  Nephrologist: n/a  Pulmonologist: n/a    Hospitalizations:  10/25/18- 10/30/18- [facility]    Past Medical History:   Diagnosis Date   • Anxiety    • Depression    • Diabetes mellitus (CMS/HCC)    • GERD (gastroesophageal reflux disease)    • Heart murmur    • Low back pain      Past Surgical History:   Procedure Laterality Date   • ELBOW PROCEDURE      left   • ROTATOR CUFF REPAIR      RIGHT   • TUMOR REMOVAL      FATTY TUMOR   • WRIST SURGERY      RIGHT     Social History     Socioeconomic History   • Marital status:      Spouse name: Not on file   • Number of children: Not on file   • Years of education: Not on file   • Highest education level: Not on file   Tobacco Use   • Smoking status: Current Every Day Smoker     Packs/day: 1.00     Types: Cigarettes   • Smokeless tobacco: Current User     Types: Snuff   Substance and Sexual Activity   • Alcohol use: Yes     Alcohol/week: 0.6 oz     Types: 1 Cans of beer per week     Frequency: 2-4 times a month     Drinks per session: 1 or 2     Binge frequency: Never     Comment: moderate   • Drug use: Yes     Types: Marijuana, Methamphetamines     Comment: occassionally   • Sexual activity: Defer     Allergies:  Allergies   Allergen Reactions   • Sulfa Antibiotics Unknown (See Comments)     Remembers mother always saying he was allergic       Review of Systems   Constitution: Negative for chills, decreased appetite, fatigue, fever, weakness and unexpected weight change.   HENT: Negative.    Eyes: Negative.    Cardiovascular: Negative for chest pain, claudication, dyspnea on exertion, irregular heartbeat, leg swelling and palpitations.   Respiratory: Negative for cough, shortness of breath and wheezing.    Endocrine: Negative.    Skin: Negative for dry skin, flushing and rash.   Musculoskeletal: Negative for falls and myalgias.   Gastrointestinal: Negative for abdominal pain, change in bowel habit and melena.   Genitourinary: Negative for  frequency and hematuria.   Neurological: Negative for dizziness, light-headedness and loss of balance.   Psychiatric/Behavioral: Negative for altered mental status and memory loss. The patient is not nervous/anxious.        Current Outpatient Medications   Medication Sig Dispense Refill   • amiodarone (PACERONE) 200 MG tablet Take 1 tablet by mouth Every 12 (Twelve) Hours. 60 tablet 0   • apixaban (ELIQUIS) 5 MG tablet tablet Take 1 tablet by mouth Every 12 (Twelve) Hours. 60 tablet 0   • aspirin 81 MG EC tablet Take 1 tablet by mouth Daily. 30 tablet 0   • Cholecalciferol (VITAMIN D PO) Take 2,000 Units by mouth 2 (Two) Times a Day. Take 1 tablet 2 times daily     • digoxin (LANOXIN) 125 MCG tablet Take 1 tablet by mouth Daily. 30 tablet 0   • diltiaZEM CD (CARDIZEM CD) 180 MG 24 hr capsule Take 1 capsule by mouth Daily. 30 capsule 0   • furosemide (LASIX) 40 MG tablet Take 1 tablet by mouth 2 (Two) Times a Day. 60 tablet 0   • gabapentin (NEURONTIN) 400 MG capsule Take 400 mg by mouth 3 (Three) Times a Day.     • insulin aspart (novoLOG) 100 UNIT/ML injection Inject 10 Units under the skin into the appropriate area as directed 3 (Three) Times a Day With Meals. 10 mL 0   • Insulin Glargine (LANTUS SOLOSTAR) 100 UNIT/ML injection pen Inject 1-42 Units under the skin into the appropriate area as directed 3 (Three) Times a Day.     • metFORMIN ER (GLUCOPHAGE-XR) 500 MG 24 hr tablet Take 1 tablet by mouth 2 (Two) Times a Day. 60 tablet 0   • metoprolol tartrate (LOPRESSOR) 25 MG tablet Take 0.5 tablets by mouth Every 12 (Twelve) Hours. 30 tablet 0   • Multiple Vitamins-Minerals (CENTRUM ADULTS PO) Take 1 tablet by mouth Daily.     • omeprazole (priLOSEC) 20 MG capsule Take 20 mg by mouth Daily.     • potassium chloride (K-DUR) 10 MEQ CR tablet Take 1 tablet by mouth Daily. When taking lasix. 30 tablet 0   • sacubitril-valsartan (ENTRESTO) 24-26 MG tablet Take 1 tablet by mouth Every 12 (Twelve) Hours. 60 tablet 6   •  "simvastatin (ZOCOR) 40 MG tablet Take 20 mg by mouth Every Night.       No current facility-administered medications for this visit.         Objective:     Vitals:    11/27/18 1119   BP: 106/78   BP Location: Left arm   Patient Position: Sitting   Cuff Size: Adult   Pulse: 74   SpO2: 99%   Weight: 92.5 kg (204 lb)   Height: 182.9 cm (72\")       Wt Readings from Last 3 Encounters:   11/27/18 92.5 kg (204 lb)   11/20/18 91.6 kg (202 lb)   11/13/18 92.7 kg (204 lb 6.4 oz)   hospital weights:        Physical Exam   Constitutional: He is oriented to person, place, and time. He appears well-developed and well-nourished. No distress.   HENT:   Head: Normocephalic and atraumatic.   Neck: Normal range of motion. No JVD present.   Cardiovascular: Normal rate, regular rhythm, S1 normal, S2 normal, normal heart sounds and intact distal pulses.   No murmur heard.  Pulmonary/Chest: Effort normal and breath sounds normal. No respiratory distress. He has no wheezes. He has no rales.   Abdominal: Soft. Bowel sounds are normal.   Musculoskeletal: Normal range of motion. He exhibits no edema.   Neurological: He is alert and oriented to person, place, and time.   Skin: Skin is warm and dry. No erythema.   Psychiatric: He has a normal mood and affect. His behavior is normal. Judgment and thought content normal.       Cardiographics      Results for orders placed during the hospital encounter of 10/25/18   Adult Transthoracic Echo Complete W/ Cont if Necessary Per Protocol    Narrative · The following left ventricular wall segments are hypokinetic: mid   anterior, apical anterior, basal anterolateral, mid anterolateral, apical   lateral, basal inferolateral, mid inferolateral, apical inferior, mid   inferior, apical septal, basal inferoseptal, mid inferoseptal, apex   hypokinetic, mid anteroseptal, basal anterior, basal inferior and basal   inferoseptal.  · The left ventricular cavity is borderline dilated.  · Left atrial cavity size is " mildly dilated.  · Mild mitral valve regurgitation is present  · Mild aortic valve regurgitation is present.  · Mild tricuspid valve regurgitation is present.        Left Ventricle Estimated EF appears to be in the range of 21 - 25%. There is left ventricular global hypokinesis noted.   The left ventricular cavity is borderline dilated. Left ventricular diastolic function is normal. There is no evidence of a left ventricular mass or thrombus present.       [facility] 10/26/18  Hemodynamic   Aortic pressure:140/96  Mean:113  Left ventricular pressure:120/32  Left ventriculogram: Not performed.         Coronaries: Right dominant system.     Left Main coronary artery: Left main coronary artery was a medium caliber vessel which was free of any disease.  The left main coronary artery bifurcated into the left anterior descending artery and circumflex artery.     Left anterior descending artery: Left anterior descending artery was a medium caliber vessel which were free of any obstructive stenosis with good GAMA-3 flow.  The diagonal branch was free of any obstructive stenosis with good GAMA-3 flow.     Circumflex artery: The circumflex artery was a medium caliber vessel with the ramus intermedius branch the first and the second obtuse marginal branch which was free of any obstructive stenosis with good GAMA-3 flow.  The circumflex artery distally was also free of any obstructive stenosis with good GAMA-3 flow     Right coronary artery: The right coronary artery was a medium to large caliber dominant vessel which were free of any obstructive stenosis with good GAMA-3 flow     Estimated Blood Loss:  Minimal         Complications:  None; patient tolerated the procedure well.      Impression   1.  Elevated left ventricular end-diastolic pressure.  2.  No evidence of any obstructive epicardial coronary artery disease.     Recommendations: Patient positive troponin was probably secondary to the atrial fibrillation.  Patient was  recommended medical management for the atrial fibrillation and to workup noncardiac etiology of chest pain.  Patient would need anticoagulation with Eliquis for the atrial fibrillation.           Param Mcfarland MD  10/26/2018  2:41 PM    No radiology results for the last 30 days.  Lab Review     No results found for: TSH  Lab Results   Component Value Date    GLUCOSE 297 (H) 11/13/2018    BUN 18 11/13/2018    CREATININE 1.09 11/13/2018    EGFRIFAFRI 87 11/13/2018    BCR 16.5 11/13/2018    K 4.3 11/13/2018    CO2 24.0 11/13/2018    CALCIUM 8.9 11/13/2018    ALBUMIN 4.30 11/13/2018    AST 25 11/13/2018    ALT 16 (L) 11/13/2018     Lab Results   Component Value Date    WBC 4.91 11/07/2018    HGB 15.7 11/07/2018    HCT 46.1 11/07/2018    MCV 81.9 11/07/2018     11/07/2018       Lab Results   Component Value Date    TROPONINI 0.142 (C) 10/25/2018     Lab Results   Component Value Date    PROBNP 925.0 (H) 11/13/2018       PFTS:         The following portions of the patient's history were reviewed and updated as appropriate: allergies, current medications, past family history, past medical history, past social history, past surgical history and problem list.     Old records reviewed and pertinent information is included in the above objective data.     Assessment/Plan:   BUN/Cr stable in hospital. Now elevated with 40mg BID Lasix. However, there is a 9lb weight gain on my scale from hospital. He has notice a variable weight from 195-203lbs.     Will change Losartan to Entresto for Mr. Willingham. PA completed with favorable outcome. Had not started this yet as directed.      Diagnosis Plan   1. Chronic systolic congestive heart failure (CMS/HCC)  NICM: EF:20%. NYHA Class II, Stage C. Patient appears euvolemic.  and in a well perfused physiologic state. Hemodynamics are acceptable  BETA-BLOCKER: Changed to Toprol XL 25mg   ACE/ARB: STOP losartan  ENTRESTO: indicated. Started 24/26mg BID. Did not fill until 11/26/18.    DIURETIC: Lasix 40mg BID-will decrease to daily  ALDOSTERONT ANTAGONIST: NYHA Class II- AA indicated.   IMDUR/HYDRALAZINE: n/a  DIGOXIN: 125mcg Dig  Fluid restriction: 2 L  Sodium restriction:2 grams  6MWT: 11/13/18: not enough time to complete.   Cardiac Rehab: indicated.  ICD: needs 3 month OMT   ADHF: yes  CardioMEMS:not indicated  Corvue: n/a  LVAD: not indicated at this time. Will evaluate response over the next 3 months.    Reiterated all educational points this visit.  Continue daily weight monitoring.  Continue restricted dietary sodium intake.  Discussed patient action plan for heart failure. Contact information for this office verbalized.  Recommended avoiding NSAIDs use.  Discussed warning signs requiring additional medical attention for heart failure.   Education points repeated back by patient.          2. PAF (paroxysmal atrial fibrillation) (CMS/HCC)  Atrial fibrillation paroxysmal - 7 days or less  CHADSVASC: CHF, HTN and DM  EF is 20%. No evidence for LVH. LA size is mildly dilated.    Anticoagulation:ordered  - Eliquis 5mg BID    Rate control agents:ordered   - Digoxin 125mcg  - Lopressor 25mg BID- change to Toprol XL 25mg daily  - Amiodarone 200mg BID   - Cardizem 180mg     Rhythm control agents:not indicated    Follows with Dr. Mcfarland         Follow up in 2 weeks after beginning entresto.

## 2018-11-27 NOTE — PATIENT INSTRUCTIONS
Patient Instructions      STOP Losartan      BEGIN Entresto 24/26mg twice daily.     REDUCE lasix tablet to once daily if possible.      IF weight increases by 3lbs overnight or 5lbs in one week, will need to take additional Lasix. CALL ME.     STOP Metoprolol Tartate and begin Metoprolol succinate. I put an X on the Metoprolol bottle.

## 2018-11-30 ENCOUNTER — TELEPHONE (OUTPATIENT)
Dept: CARDIOLOGY | Facility: CLINIC | Age: 48
End: 2018-11-30

## 2018-11-30 ENCOUNTER — PATIENT MESSAGE (OUTPATIENT)
Dept: CARDIOLOGY | Facility: CLINIC | Age: 48
End: 2018-11-30

## 2018-12-02 NOTE — PROGRESS NOTES
Subjective   Patient ID: Edd Willingham is a 48 y.o. male is being seen for consultation today at the request of Hospitalist, Dr Galarza for anticoagulation.    Chief Complaint   Patient presents with   • Anticoagulation     A fib.   Denies any bleeding or unexplained bruising.  Tolerating Eliquis well.      History of Present Illness  47 y/o gentleman presented to [facility] with chest pain, Afib RVR, and CHF.  Comorbidity includes:  DM, GERD anxiety and depression.  Treated with amiodarone as well as rate control with BB, Dig, and diltiazem.  Anticoagulation was initiated with Eliquis.  GFR is WNL.  Pt is not anemic.  Does not use NSAIDs or take SSI.    Presents today for anticoagulation management and counseling.     The following portions of the patient's history were reviewed and updated as appropriate: allergies, current medications, past family history, past medical history, past social history, past surgical history and problem list.  Allergies   Allergen Reactions   • Sulfa Antibiotics Unknown (See Comments)     Remembers mother always saying he was allergic       Current Outpatient Medications:   •  aspirin 81 MG EC tablet, Take 1 tablet by mouth Daily., Disp: 30 tablet, Rfl: 0  •  Cholecalciferol (VITAMIN D PO), Take 2,000 Units by mouth 2 (Two) Times a Day. Take 1 tablet 2 times daily, Disp: , Rfl:   •  gabapentin (NEURONTIN) 400 MG capsule, Take 400 mg by mouth 3 (Three) Times a Day., Disp: , Rfl:   •  insulin aspart (novoLOG) 100 UNIT/ML injection, Inject 10 Units under the skin into the appropriate area as directed 3 (Three) Times a Day With Meals., Disp: 10 mL, Rfl: 0  •  Insulin Glargine (LANTUS SOLOSTAR) 100 UNIT/ML injection pen, Inject 1-42 Units under the skin into the appropriate area as directed 3 (Three) Times a Day., Disp: , Rfl:   •  Multiple Vitamins-Minerals (CENTRUM ADULTS PO), Take 1 tablet by mouth Daily., Disp: , Rfl:   •  omeprazole (priLOSEC) 20 MG capsule, Take 20 mg by mouth Daily.,  Disp: , Rfl:   •  sacubitril-valsartan (ENTRESTO) 24-26 MG tablet, Take 1 tablet by mouth Every 12 (Twelve) Hours., Disp: 60 tablet, Rfl: 6  •  simvastatin (ZOCOR) 40 MG tablet, Take 20 mg by mouth Every Night., Disp: , Rfl:   •  amiodarone (PACERONE) 200 MG tablet, Take 1 tablet by mouth Every 12 (Twelve) Hours., Disp: 60 tablet, Rfl: 6  •  apixaban (ELIQUIS) 5 MG tablet tablet, Take 1 tablet by mouth Every 12 (Twelve) Hours., Disp: 60 tablet, Rfl: 6  •  digoxin (LANOXIN) 125 MCG tablet, Take 1 tablet by mouth Daily., Disp: 30 tablet, Rfl: 6  •  diltiaZEM CD (CARDIZEM CD) 180 MG 24 hr capsule, Take 1 capsule by mouth Daily., Disp: 30 capsule, Rfl: 6  •  furosemide (LASIX) 40 MG tablet, Take 1 tablet by mouth 2 (Two) Times a Day., Disp: 60 tablet, Rfl: 6  •  metFORMIN ER (GLUCOPHAGE-XR) 500 MG 24 hr tablet, Take 1 tablet by mouth 2 (Two) Times a Day., Disp: 60 tablet, Rfl: 11  •  metoprolol succinate XL (TOPROL-XL) 25 MG 24 hr tablet, Take 1 tablet by mouth Daily., Disp: 30 tablet, Rfl: 11  •  potassium chloride (K-DUR) 10 MEQ CR tablet, Take 1 tablet by mouth Daily. When taking lasix., Disp: 30 tablet, Rfl: 0  Current outpatient and discharge medications have been reconciled for the patient.  Reviewed by: NATHANIEL Cintron  Past Medical History:   Diagnosis Date   • Anxiety    • Depression    • Diabetes mellitus (CMS/HCC)    • GERD (gastroesophageal reflux disease)    • Heart murmur    • Low back pain      Past Surgical History:   Procedure Laterality Date   • ELBOW PROCEDURE      left   • ROTATOR CUFF REPAIR      RIGHT   • TUMOR REMOVAL      FATTY TUMOR   • WRIST SURGERY      RIGHT       Review of Systems   Constitution: Negative for chills, decreased appetite, fever and malaise/fatigue.        Denies use of power tools, electric knives, chain saws, or conttact sports.  Shaves with safety razor.  Aware to use soft tooth brush and waxed floss.  No recent falls.  Denies:  GIB, GUB, or Seizures.     HENT:  Negative for hoarse voice, nosebleeds and sore throat.    Eyes: Negative for visual disturbance.   Cardiovascular: Negative for chest pain, cyanosis, dyspnea on exertion, leg swelling, near-syncope and syncope.   Respiratory: Negative for cough, hemoptysis and shortness of breath.    Hematologic/Lymphatic: Negative for bleeding problem. Does not bruise/bleed easily.   Skin: Negative for color change, flushing, poor wound healing and rash.   Musculoskeletal: Negative for falls, muscle cramps and muscle weakness.   Gastrointestinal: Negative for change in bowel habit, heartburn, hematemesis, melena, nausea and vomiting.   Genitourinary: Negative for hematuria.   Neurological: Negative for brief paralysis, difficulty with concentration, focal weakness, headaches, light-headedness, numbness, paresthesias, seizures and sensory change.   Psychiatric/Behavioral: Negative for altered mental status.        Hx of anxiety and depression    Allergic/Immunologic: Negative for hives.        Objective   Physical Exam   Constitutional: He is oriented to person, place, and time. He appears well-nourished. No distress.   Body mass index is 27.4 kg/m².     HENT:   Head: Normocephalic.   Mouth/Throat: Oropharynx is clear and moist.   Tongue midline Facial movements symmetrical      Eyes: Conjunctivae and EOM are normal. Pupils are equal, round, and reactive to light.   Neck: Neck supple. No JVD present. No tracheal deviation present.   Cardiovascular: Normal rate, regular rhythm, normal heart sounds and intact distal pulses.   Pulses:       Carotid pulses are 1+ on the right side, and 1+ on the left side.       Radial pulses are 2+ on the right side, and 2+ on the left side.        Posterior tibial pulses are 2+ on the right side, and 2+ on the left side.   Pulmonary/Chest: Effort normal and breath sounds normal. No respiratory distress.   Abdominal: Soft. Bowel sounds are normal. He exhibits no distension. There is no tenderness.    Musculoskeletal: He exhibits no edema or tenderness.   Neurological: He is alert and oriented to person, place, and time. No cranial nerve deficit.   Skin: Skin is warm and dry. Capillary refill takes less than 2 seconds. No rash noted. No erythema. No pallor.   Psychiatric: His behavior is normal. Judgment normal.   Nursing note and vitals reviewed.      Vitals:    11/20/18 1115   BP: 122/70   Pulse: 87   Temp: 96.9 °F (36.1 °C)   SpO2: 99%     Lab Results   Component Value Date    GLUCOSE 297 (H) 11/13/2018    BUN 18 11/13/2018    CREATININE 1.09 11/13/2018    EGFRIFAFRI 87 11/13/2018    BCR 16.5 11/13/2018    K 4.3 11/13/2018    CO2 24.0 11/13/2018    CALCIUM 8.9 11/13/2018    ALBUMIN 4.30 11/13/2018    AST 25 11/13/2018    ALT 16 (L) 11/13/2018     Lab Results   Component Value Date    WBC 4.91 11/07/2018    HGB 15.7 11/07/2018    HCT 46.1 11/07/2018    MCV 81.9 11/07/2018     11/07/2018   · 10/26/18  ECHO:  The following left ventricular wall segments are hypokinetic: mid anterior, apical anterior, basal anterolateral, mid anterolateral, apical lateral, basal inferolateral, mid inferolateral, apical inferior, mid inferior, apical septal, basal inferoseptal, mid inferoseptal, apex hypokinetic, mid anteroseptal, basal anterior, basal inferior and basal inferoseptal.  · The left ventricular cavity is borderline dilated.  · Left atrial cavity size is mildly dilated.  · Mild mitral valve regurgitation is present  · Mild aortic valve regurgitation is present.  · Mild tricuspid valve regurgitation is present.   10/26/18  LHC:  1.  Elevated left ventricular end-diastolic pressure.  2.  No evidence of any obstructive epicardial coronary artery disease.  10/31/18  EKG RSR with PAC         Assessment/Plan   Independent Review of Radiographic Studies:    As above    1. PAF (paroxysmal atrial fibrillation) (CMS/HCC)  Indication for anticoagulation     2. Encounter for anticoagulation discussion and  counseling  Continue present Eliquis maintance  dosing Twenty minutes of education provided, including but not limited to medication, dosing, side effects, signs of bleeding and inappropriate brusing, control of risk, and nutritional education provided.  Verbal and written education.  Notify provider if you experience excessive bleeding from the nose, cuts, gums, rectum, urinary tract. Reddish or brown urine or stool. Vomiting of blood or hemorrhoidal bleeding. If major injury occurs present to the Emergency Department.Patient Education:  Prescription risks and side effects discussed, Should issues or concerns arise regarding anticoagulation therapy, please contact our services regarding further instructions,, or present to an urgent/emergent care facility for further evaluation  Patient Education Materials provided to pt (anticoag booklet).   Pt. stated willingness to be compliant and understanding of life style restrictions and medication dosing.  No other safety concerns at this time.

## 2018-12-11 ENCOUNTER — TELEPHONE (OUTPATIENT)
Dept: CARDIOLOGY | Facility: CLINIC | Age: 48
End: 2018-12-11

## 2018-12-11 NOTE — PROGRESS NOTES
[facility] CHF CLINIC OFFICE VISIT    Subjective:     Congestive Heart Failure (chief complaint)      Congestive Heart Failure   Presents for initial visit. The disease course has been stable. The condition has lasted for 3 weeks. Pertinent negatives include no abdominal pain, chest pain, claudication, edema, fatigue, orthopnea, palpitations, shortness of breath or unexpected weight change. The symptoms have been stable. Past treatments include angiotensin receptor blockers, beta blockers, digoxin, exercise and salt and fluid restriction. The treatment provided moderate relief. Compliance with prior treatments has been good. His past medical history is significant for arrhythmia, DM and HTN. There is no history of CAD or chronic lung disease.     Mr. Willingham is a 47 year old male who presented to [facility] with 2 week symptoms of orthopnea, palpitations, fatigue, dyspnea, and chest pressure. He was found to have AF with RVR, EF of 20%, and diagnosed with NICM. He diuresed well on IV Lasix and lost 26lbs. His rate was controlled with Lopressor, Amiodarone, Cardizem, and Digoxin. He does have history of DM and HTN.   Began noticing symptoms sometime in the summer. But symptoms escalated in October when he could not walk back from the store. Usually smokes less than a pack a day. Uses Dip. Has been using Meth for the past two years. Has not used in the past 2 weeks. Appears high today.     Changed Mr. Willingham to Entresto 11/13/18 and attempted to lower Lasix dose. Tolerating this change well. He has seen Dr. Mcfarland who lowered AMIO to once daily.      Diet: Used to add salt to food before tasting. Now they spend more time looking at food labels and buying lower sodium food. Not adding salt to food now.   Fluids: Drinks water. Used to drink as much water as he could. Now he has been drinking more soda than usual. Sprite Zero. Drinks out of a 32oz cup. Buys 2L bottles.   Activity: Very active out around house. He is finding now how  much he cannot do with dyspnea and fatigue. Catches his breath quicker than he used to.     PCP: NATHANIEL Garrido  Cardiologist: Dr. Mcfarland  Nephrologist: n/a  Pulmonologist: n/a    Hospitalizations:  10/25/18- 10/30/18- [facility]    Past Medical History:   Diagnosis Date   • Anxiety    • Depression    • Diabetes mellitus (CMS/HCC)    • GERD (gastroesophageal reflux disease)    • Heart murmur    • Low back pain      Past Surgical History:   Procedure Laterality Date   • ELBOW PROCEDURE      left   • ROTATOR CUFF REPAIR      RIGHT   • TUMOR REMOVAL      FATTY TUMOR   • WRIST SURGERY      RIGHT     Social History     Socioeconomic History   • Marital status:      Spouse name: Not on file   • Number of children: Not on file   • Years of education: Not on file   • Highest education level: Not on file   Tobacco Use   • Smoking status: Current Every Day Smoker     Packs/day: 1.00     Types: Cigarettes   • Smokeless tobacco: Current User     Types: Snuff   Substance and Sexual Activity   • Alcohol use: Yes     Alcohol/week: 0.6 oz     Types: 1 Cans of beer per week     Frequency: 2-4 times a month     Drinks per session: 1 or 2     Binge frequency: Never     Comment: moderate   • Drug use: Yes     Types: Marijuana, Methamphetamines     Comment: occassionally   • Sexual activity: Defer     Allergies:  Allergies   Allergen Reactions   • Sulfa Antibiotics Unknown (See Comments)     Remembers mother always saying he was allergic       Review of Systems   Constitution: Negative for chills, decreased appetite, fatigue, fever, weakness and unexpected weight change.   HENT: Negative.    Eyes: Negative.    Cardiovascular: Negative for chest pain, claudication, dyspnea on exertion, irregular heartbeat, leg swelling and palpitations.   Respiratory: Negative for cough, shortness of breath and wheezing.    Endocrine: Negative.    Skin: Negative for dry skin, flushing and rash.   Musculoskeletal: Negative for falls and myalgias.    Gastrointestinal: Negative for abdominal pain, change in bowel habit and melena.   Genitourinary: Negative for frequency and hematuria.   Neurological: Negative for dizziness, light-headedness and loss of balance.   Psychiatric/Behavioral: Negative for altered mental status and memory loss. The patient is not nervous/anxious.        Current Outpatient Medications   Medication Sig Dispense Refill   • amiodarone (PACERONE) 200 MG tablet Take 1 tablet by mouth Every 12 (Twelve) Hours. (Patient taking differently: Take 100 mg by mouth Daily.) 60 tablet 6   • apixaban (ELIQUIS) 5 MG tablet tablet Take 1 tablet by mouth Every 12 (Twelve) Hours. 60 tablet 6   • aspirin 81 MG EC tablet Take 1 tablet by mouth Daily. 30 tablet 0   • Cholecalciferol (VITAMIN D PO) Take 2,000 Units by mouth 2 (Two) Times a Day. Take 1 tablet 2 times daily     • digoxin (LANOXIN) 125 MCG tablet Take 1 tablet by mouth Daily. 30 tablet 6   • diltiaZEM CD (CARDIZEM CD) 180 MG 24 hr capsule Take 1 capsule by mouth Daily. 30 capsule 6   • furosemide (LASIX) 40 MG tablet Take 1 tablet by mouth 2 (Two) Times a Day. 60 tablet 6   • gabapentin (NEURONTIN) 400 MG capsule Take 400 mg by mouth 3 (Three) Times a Day.     • insulin aspart (novoLOG) 100 UNIT/ML injection Inject 10 Units under the skin into the appropriate area as directed 3 (Three) Times a Day With Meals. 10 mL 0   • Insulin Glargine (LANTUS SOLOSTAR) 100 UNIT/ML injection pen Inject 1-42 Units under the skin into the appropriate area as directed 3 (Three) Times a Day.     • metFORMIN ER (GLUCOPHAGE-XR) 500 MG 24 hr tablet Take 1 tablet by mouth 2 (Two) Times a Day. 60 tablet 11   • metoprolol succinate XL (TOPROL-XL) 25 MG 24 hr tablet Take 1 tablet by mouth Daily. 30 tablet 11   • Multiple Vitamins-Minerals (CENTRUM ADULTS PO) Take 1 tablet by mouth Daily.     • omeprazole (priLOSEC) 20 MG capsule Take 20 mg by mouth Daily.     • potassium chloride (K-DUR) 10 MEQ CR tablet Take 1 tablet by  "mouth Daily. When taking lasix. 30 tablet 0   • sacubitril-valsartan (ENTRESTO) 24-26 MG tablet Take 1 tablet by mouth Every 12 (Twelve) Hours. 60 tablet 6   • simvastatin (ZOCOR) 40 MG tablet Take 20 mg by mouth Every Night.       No current facility-administered medications for this visit.         Objective:     Vitals:    12/12/18 1058   BP: 118/70   BP Location: Left arm   Patient Position: Sitting   Cuff Size: Adult   Pulse: 88   SpO2: 99%   Weight: 91 kg (200 lb 9.6 oz)   Height: 182.9 cm (72\")       Wt Readings from Last 3 Encounters:   12/12/18 91 kg (200 lb 9.6 oz)   11/27/18 92.5 kg (204 lb)   11/20/18 91.6 kg (202 lb)   hospital weights:        Physical Exam   Constitutional: He is oriented to person, place, and time. He appears well-developed and well-nourished. No distress.   HENT:   Head: Normocephalic and atraumatic.   Neck: Normal range of motion. No JVD present.   Cardiovascular: Normal rate, regular rhythm, S1 normal, S2 normal, normal heart sounds and intact distal pulses.   No murmur heard.  Pulmonary/Chest: Effort normal and breath sounds normal. No respiratory distress. He has no wheezes. He has no rales.   Abdominal: Soft. Bowel sounds are normal.   Musculoskeletal: Normal range of motion. He exhibits no edema.   Neurological: He is alert and oriented to person, place, and time.   Skin: Skin is warm and dry. No erythema.   Psychiatric: He has a normal mood and affect. His behavior is normal. Judgment and thought content normal.       Cardiographics      Results for orders placed during the hospital encounter of 10/25/18   Adult Transthoracic Echo Complete W/ Cont if Necessary Per Protocol    Narrative · The following left ventricular wall segments are hypokinetic: mid   anterior, apical anterior, basal anterolateral, mid anterolateral, apical   lateral, basal inferolateral, mid inferolateral, apical inferior, mid   inferior, apical septal, basal inferoseptal, mid inferoseptal, apex "   hypokinetic, mid anteroseptal, basal anterior, basal inferior and basal   inferoseptal.  · The left ventricular cavity is borderline dilated.  · Left atrial cavity size is mildly dilated.  · Mild mitral valve regurgitation is present  · Mild aortic valve regurgitation is present.  · Mild tricuspid valve regurgitation is present.        Left Ventricle Estimated EF appears to be in the range of 21 - 25%. There is left ventricular global hypokinesis noted.   The left ventricular cavity is borderline dilated. Left ventricular diastolic function is normal. There is no evidence of a left ventricular mass or thrombus present.       [facility] 10/26/18  Hemodynamic   Aortic pressure:140/96  Mean:113  Left ventricular pressure:120/32  Left ventriculogram: Not performed.         Coronaries: Right dominant system.     Left Main coronary artery: Left main coronary artery was a medium caliber vessel which was free of any disease.  The left main coronary artery bifurcated into the left anterior descending artery and circumflex artery.     Left anterior descending artery: Left anterior descending artery was a medium caliber vessel which were free of any obstructive stenosis with good GAMA-3 flow.  The diagonal branch was free of any obstructive stenosis with good GAMA-3 flow.     Circumflex artery: The circumflex artery was a medium caliber vessel with the ramus intermedius branch the first and the second obtuse marginal branch which was free of any obstructive stenosis with good GAMA-3 flow.  The circumflex artery distally was also free of any obstructive stenosis with good GAMA-3 flow     Right coronary artery: The right coronary artery was a medium to large caliber dominant vessel which were free of any obstructive stenosis with good GAMA-3 flow     Estimated Blood Loss:  Minimal         Complications:  None; patient tolerated the procedure well.      Impression   1.  Elevated left ventricular end-diastolic pressure.  2.  No  evidence of any obstructive epicardial coronary artery disease.     Recommendations: Patient positive troponin was probably secondary to the atrial fibrillation.  Patient was recommended medical management for the atrial fibrillation and to workup noncardiac etiology of chest pain.  Patient would need anticoagulation with Eliquis for the atrial fibrillation.           Param Mcfarland MD  10/26/2018  2:41 PM    No radiology results for the last 30 days.  Lab Review     No results found for: TSH  Lab Results   Component Value Date    GLUCOSE 297 (H) 11/13/2018    BUN 18 11/13/2018    CREATININE 1.09 11/13/2018    EGFRIFAFRI 87 11/13/2018    BCR 16.5 11/13/2018    K 4.3 11/13/2018    CO2 24.0 11/13/2018    CALCIUM 8.9 11/13/2018    ALBUMIN 4.30 11/13/2018    AST 25 11/13/2018    ALT 16 (L) 11/13/2018     Lab Results   Component Value Date    WBC 4.91 11/07/2018    HGB 15.7 11/07/2018    HCT 46.1 11/07/2018    MCV 81.9 11/07/2018     11/07/2018       Lab Results   Component Value Date    TROPONINI 0.142 (C) 10/25/2018     Lab Results   Component Value Date    PROBNP 925.0 (H) 11/13/2018       PFTS:         The following portions of the patient's history were reviewed and updated as appropriate: allergies, current medications, past family history, past medical history, past social history, past surgical history and problem list.     Old records reviewed and pertinent information is included in the above objective data.     Assessment/Plan:     Will change Losartan to Entresto for Mr. Willingham. PA completed with favorable outcome. Tolerating well. Tolerating decrease of Lasix to once daily.      Diagnosis Plan   1. Chronic systolic congestive heart failure (CMS/LTAC, located within [facility])  NICM: EF:20%. NYHA Class II, Stage C. Patient appears euvolemic.  and in a well perfused physiologic state. Hemodynamics are acceptable  BETA-BLOCKER: Toprol XL 25mg   ACE/ARB/ENTRESTO: indicated. Started 24/26mg BID. Did not fill until 11/26/18.    DIURETIC: Lasix 40mg daily  ALDOSTERONT ANTAGONIST: NYHA Class II- AA indicated.   IMDUR/HYDRALAZINE: n/a  DIGOXIN: 125mcg Dig  Fluid restriction: 2 L  Sodium restriction:2 grams  6MWT: 11/13/18: not enough time to complete.   Cardiac Rehab: indicated.  ICD: needs 3 month OMT   ADHF: yes  CardioMEMS:not indicated  Corvue: n/a  LVAD: not indicated at this time. Will evaluate response over the next 3 months.    Reiterated all educational points this visit.  Continue daily weight monitoring.  Continue restricted dietary sodium intake.  Discussed patient action plan for heart failure. Contact information for this office verbalized.  Recommended avoiding NSAIDs use.  Discussed warning signs requiring additional medical attention for heart failure.   Education points repeated back by patient.          2. PAF (paroxysmal atrial fibrillation) (CMS/HCC)  Atrial fibrillation paroxysmal - 7 days or less  CHADSVASC: CHF, HTN and DM  EF is 20%. No evidence for LVH. LA size is mildly dilated.    Anticoagulation:ordered  - Eliquis 5mg BID    Rate control agents:ordered   - Digoxin 125mcg  - Toprol XL 25mg daily  - Amiodarone 200mg daily  - Cardizem 180mg     Rhythm control agents:not indicated    Follows with Dr. Mcfarland. Medication regimen directed by him.          Follow up 3 months.

## 2018-12-11 NOTE — TELEPHONE ENCOUNTER
Received a message from Lasha[place] that Krissy from the [facility] pharmacy was needing someone from [place] office to contact them about patients medications so I contacted her and she was wanting to discuss drug interactions and about the middle of the conversations she was asking for me to give her a yes or a no on whether or not the patient should continue the medications so at that point I reached out to Kimberly so that she could verify and she informed her that the patient should stay on the meds that she prescribed

## 2018-12-12 ENCOUNTER — OFFICE VISIT (OUTPATIENT)
Dept: CARDIOLOGY | Facility: CLINIC | Age: 48
End: 2018-12-12

## 2018-12-12 VITALS
WEIGHT: 200.6 LBS | DIASTOLIC BLOOD PRESSURE: 70 MMHG | HEIGHT: 72 IN | SYSTOLIC BLOOD PRESSURE: 118 MMHG | HEART RATE: 88 BPM | BODY MASS INDEX: 27.17 KG/M2 | OXYGEN SATURATION: 99 %

## 2018-12-12 DIAGNOSIS — I50.22 CHRONIC SYSTOLIC CONGESTIVE HEART FAILURE (HCC): Primary | ICD-10-CM

## 2018-12-12 DIAGNOSIS — I48.0 PAF (PAROXYSMAL ATRIAL FIBRILLATION) (HCC): ICD-10-CM

## 2018-12-12 PROCEDURE — 99214 OFFICE O/P EST MOD 30 MIN: CPT | Performed by: NURSE PRACTITIONER

## 2019-02-05 NOTE — PROGRESS NOTES
Abiel Willingham is a 47 y.o. male.     Mr. Willingham presents today for hospital follow up related to CHF. He reported increased fatigue and loss of endurance for approximately 6 months. Recently he also developed increasing soa. He had a heart cath while hospitalized did reveal any blockages but did show a decreased EF of 21-25%. Since hospital discharge patient has not had chest pain, soa, cough, fever, chills, leg pain or dysuria. SOA with ambulation and decreased indurance persist.          The following portions of the patient's history were reviewed and updated as appropriate: allergies, current medications, past family history, past medical history, past social history, past surgical history and problem list.    Review of Systems   Constitutional: Positive for fatigue. Negative for activity change, appetite change, unexpected weight gain and unexpected weight loss.   HENT: Negative for congestion, sore throat, trouble swallowing and voice change.    Eyes: Negative.    Respiratory: Positive for shortness of breath. Negative for cough, chest tightness and wheezing.    Cardiovascular: Negative for chest pain, palpitations and leg swelling.   Gastrointestinal: Negative for abdominal pain, diarrhea, nausea and vomiting.   Endocrine: Negative.    Genitourinary: Negative for dysuria, hematuria and urgency.   Musculoskeletal: Negative for arthralgias and myalgias.   Skin: Negative for rash.   Neurological: Negative for dizziness, weakness, light-headedness and headache.   Hematological: Negative.    Psychiatric/Behavioral: Negative.        Objective   Physical Exam   Constitutional: He is oriented to person, place, and time. He appears well-developed and well-nourished. No distress.   HENT:   Head: Normocephalic and atraumatic.   Eyes: Conjunctivae are normal.   Neck: Normal range of motion.   Cardiovascular: Normal rate, regular rhythm, normal heart sounds and intact distal pulses.  Exam reveals no  gallop and no friction rub.    No murmur heard.  Pulmonary/Chest: Effort normal and breath sounds normal. No respiratory distress. He has no wheezes. He has no rales.   Abdominal: Soft. Bowel sounds are normal. He exhibits no distension and no mass. There is no tenderness. There is no rebound and no guarding. No hernia.   Musculoskeletal: Normal range of motion. He exhibits no edema or tenderness.   Neurological: He is alert and oriented to person, place, and time.   Skin: Skin is warm and dry. No rash noted. He is not diaphoretic. No erythema. No pallor.   Psychiatric: He has a normal mood and affect. His behavior is normal. Judgment and thought content normal.   Nursing note and vitals reviewed.  Current outpatient and discharge medications have been reconciled for the patient.  Reviewed by: NATHANIEL Cole        Assessment/Plan   dEd was seen today for follow-up.    Diagnoses and all orders for this visit:    Acute systolic congestive heart failure (CMS/HCC)  -     CBC & Differential  -     CBC Auto Differential    Hospital discharge follow-up    Other orders  -     Comprehensive Metabolic Panel    1. Acute systolic heart failure- stabilized. Patient in no distress. Will do repeat CBC and CMP to followup on hospital labs.will call with results.     2. Follow up in 4 weeks or sooner if needed.             This document has been electronically signed by NATHANIEL Cole on November 10, 2018 12:13 PM                Universal Safety Interventions

## 2019-02-21 ENCOUNTER — OFFICE VISIT (OUTPATIENT)
Dept: CARDIAC SURGERY | Facility: CLINIC | Age: 49
End: 2019-02-21

## 2019-02-21 VITALS
TEMPERATURE: 98.8 F | HEART RATE: 81 BPM | SYSTOLIC BLOOD PRESSURE: 120 MMHG | WEIGHT: 205.2 LBS | OXYGEN SATURATION: 98 % | HEIGHT: 72 IN | DIASTOLIC BLOOD PRESSURE: 80 MMHG | BODY MASS INDEX: 27.79 KG/M2

## 2019-02-21 DIAGNOSIS — Z71.89 ENCOUNTER FOR ANTICOAGULATION DISCUSSION AND COUNSELING: Primary | ICD-10-CM

## 2019-02-21 PROCEDURE — 99213 OFFICE O/P EST LOW 20 MIN: CPT | Performed by: NURSE PRACTITIONER

## 2019-02-21 NOTE — PATIENT INSTRUCTIONS
Notify provider if you experience excessive bleeding from the nose, cuts, gums, rectum, urinary tract, or vagina. Reddish or brown urine or stool. Vomiting of blood or hemorrhoidal bleeding. If major injury occurs present to the Emergency Department.  Kimberly ARMSTRONG will follow your anticoagulation and heart failure with next appointment in March.

## 2019-02-26 NOTE — PROGRESS NOTES
CVTS Office Progress Note     Subjective   Patient ID: Edd Willingham is a 48 y.o. male is here today for follow-up Anticoagulation Counseling.    Chief Complaint:    Chief Complaint   Patient presents with   • Atrial Fibrillation   • Anticoagulation     eliquis       PCP:  Elyssa Prado APRN  Cardiology:  Dr. Mcfarland/Kimberly ARMSTRONG     History of Present Illness  Mr. Willingham is a 48 year old male who presents with a history of HTN, HLD, PAF, HFrEF, prior use of methamphetamine, and tobacco dependence syndrome.  Prior admission to [facility] with orthopnea, CHF exacerbation, and A-Fib RVR in 2018.  He was placed on rate control agents and anticoagulation.  He has since established with heart failure clinic and is being managed with maximized medical therapy entresto, toprol XL, Digoxin, maintenance diuretic therapy.  He had established with [facility] for follow up of anticoagulation, initially was having some difficulty with affording his eliquis.  He denies history of adverse bleeding events, most recent H/H stable, and his eliquis is now affordable.  His compliance remains questionable.      The following portions of the patient's history were reviewed and updated as appropriate: allergies, current medications, past family history, past medical history, past social history, past surgical history and problem list.  Recent images independently reviewed.  Available laboratory values reviewed.    Past Medical History:   Diagnosis Date   • Anxiety    • Depression    • Diabetes mellitus (CMS/HCC)    • GERD (gastroesophageal reflux disease)    • Heart murmur    • Low back pain      Past Surgical History:   Procedure Laterality Date   • CARDIAC CATHETERIZATION N/A 10/26/2018    Procedure: Left Heart Cath;  Surgeon: Param Mcfarland MD;  Location: [facility] CATH INVASIVE LOCATION;  Service: Cardiology   • ELBOW PROCEDURE      left   • ROTATOR CUFF REPAIR      RIGHT   • TUMOR REMOVAL      FATTY TUMOR   • WRIST SURGERY       RIGHT     Family History   Problem Relation Age of Onset   • Diabetes Mother    • Hypertension Mother    • Diabetes Father    • No Known Problems Brother    • Colon polyps Paternal Uncle      Social History     Tobacco Use   • Smoking status: Current Every Day Smoker     Packs/day: 1.00     Types: Cigarettes   • Smokeless tobacco: Current User     Types: Snuff   Substance Use Topics   • Alcohol use: Yes     Alcohol/week: 0.6 oz     Types: 1 Cans of beer per week     Frequency: 2-4 times a month     Drinks per session: 1 or 2     Binge frequency: Never     Comment: moderate   • Drug use: Yes     Types: Marijuana, Methamphetamines     Comment: occassionally       ALLERGIES:   Sulfa antibiotics    MEDICATIONS:      Current Outpatient Medications:   •  amiodarone (PACERONE) 200 MG tablet, Take 1 tablet by mouth Every 12 (Twelve) Hours. (Patient taking differently: Take 100 mg by mouth Daily.), Disp: 60 tablet, Rfl: 6  •  apixaban (ELIQUIS) 5 MG tablet tablet, Take 1 tablet by mouth Every 12 (Twelve) Hours., Disp: 60 tablet, Rfl: 6  •  aspirin 81 MG EC tablet, Take 1 tablet by mouth Daily., Disp: 30 tablet, Rfl: 0  •  Cholecalciferol (VITAMIN D PO), Take 2,000 Units by mouth 2 (Two) Times a Day. Take 1 tablet 2 times daily, Disp: , Rfl:   •  digoxin (LANOXIN) 125 MCG tablet, Take 1 tablet by mouth Daily., Disp: 30 tablet, Rfl: 6  •  diltiaZEM CD (CARDIZEM CD) 180 MG 24 hr capsule, Take 1 capsule by mouth Daily., Disp: 30 capsule, Rfl: 6  •  furosemide (LASIX) 40 MG tablet, Take 1 tablet by mouth 2 (Two) Times a Day., Disp: 60 tablet, Rfl: 6  •  gabapentin (NEURONTIN) 400 MG capsule, Take 400 mg by mouth 3 (Three) Times a Day., Disp: , Rfl:   •  insulin aspart (novoLOG) 100 UNIT/ML injection, Inject 10 Units under the skin into the appropriate area as directed 3 (Three) Times a Day With Meals., Disp: 10 mL, Rfl: 0  •  Insulin Glargine (LANTUS SOLOSTAR) 100 UNIT/ML injection pen, Inject 1-42 Units under the skin into the  appropriate area as directed 3 (Three) Times a Day., Disp: , Rfl:   •  metFORMIN (GLUCOPHAGE) 1000 MG tablet, Take 1,000 mg by mouth 2 (Two) Times a Day With Meals., Disp: , Rfl:   •  metoprolol succinate XL (TOPROL-XL) 25 MG 24 hr tablet, Take 1 tablet by mouth Daily., Disp: 30 tablet, Rfl: 11  •  Multiple Vitamins-Minerals (CENTRUM ADULTS PO), Take 1 tablet by mouth Daily., Disp: , Rfl:   •  omeprazole (priLOSEC) 20 MG capsule, Take 20 mg by mouth Daily., Disp: , Rfl:   •  potassium chloride (K-DUR) 10 MEQ CR tablet, Take 1 tablet by mouth Daily. When taking lasix., Disp: 30 tablet, Rfl: 0  •  sacubitril-valsartan (ENTRESTO) 24-26 MG tablet, Take 1 tablet by mouth Every 12 (Twelve) Hours., Disp: 60 tablet, Rfl: 6    Review of Systems   Constitution: Positive for decreased appetite. Negative for chills, fever, weakness and weight loss.   HENT: Negative for congestion, nosebleeds and sore throat.    Eyes: Negative for blurred vision, visual disturbance and visual halos.   Cardiovascular: Positive for dyspnea on exertion and irregular heartbeat. Negative for chest pain and leg swelling.   Respiratory: Positive for cough and shortness of breath. Negative for sputum production and wheezing.    Endocrine: Negative for cold intolerance and polyuria.   Hematologic/Lymphatic: Negative for bleeding problem. Bruises/bleeds easily.        Does not report S/S of adverse bleeding event including nose bleeds, hematuria, melena, gingival bleeding, or hematemesis.   Skin: Negative for flushing, nail changes and unusual hair distribution.   Musculoskeletal: Positive for arthritis, back pain and joint pain.   Gastrointestinal: Negative for bloating, abdominal pain, hematemesis, melena, nausea and vomiting.   Genitourinary: Negative for flank pain and hematuria.   Neurological: Negative for brief paralysis, difficulty with concentration, focal weakness, light-headedness, loss of balance, numbness and paresthesias.        No amaurosis  fugax, TIA, or CVA.     Psychiatric/Behavioral: Negative for altered mental status, depression, substance abuse and suicidal ideas.        He is lethargic at todays visit   Allergic/Immunologic: Negative for hives and persistent infections.     Vitals:    02/21/19 1308   BP: 120/80   Pulse: 81   Temp: 98.8 °F (37.1 °C)   SpO2: 98%      Objective      Body mass index is 27.83 kg/m².  Physical Exam   Constitutional: He is oriented to person, place, and time. He appears well-developed and well-nourished.   HENT:   Head: Normocephalic and atraumatic.   Mouth/Throat: Oropharynx is clear and moist.   Eyes: Conjunctivae and EOM are normal. Pupils are equal, round, and reactive to light.   Neck: Normal range of motion. Neck supple. No JVD present.   Cardiovascular: Normal rate. An irregularly irregular rhythm present.   Murmur heard.  Pulmonary/Chest: Effort normal and breath sounds normal. No respiratory distress.   Abdominal: Soft. Bowel sounds are normal. He exhibits no distension. There is no tenderness.   Genitourinary:   Genitourinary Comments: deferred   Musculoskeletal: Normal range of motion. He exhibits edema (trace BLE).   Lymphadenopathy:     He has no cervical adenopathy.   Neurological: He is alert and oriented to person, place, and time. No cranial nerve deficit. Coordination normal.   Skin: Skin is warm and dry. Capillary refill takes 2 to 3 seconds. No erythema.   Psychiatric: He has a normal mood and affect. His behavior is normal. Judgment normal.   Nursing note and vitals reviewed.        Assessment & Plan       Paroxsymal Atrial Fibrillation  Controlled with agents digoxin, diltiazem, amiodarone, and toprol XL.   Embolic prophylaxis with eliquis.  Indication for anticoagulation.    Encounter for Anticoagulation Counseling  Adverse bleeding events that require evaluation includes blood in the urine, stool, gums, and nose.  He denies these symptoms, last H/H stable.  If you are to experience these  symptoms you should present to the heart and vascular center for evaluation.  Avoid NSAID's for pain relief as these medications increase your risk of gastrointestinal bleeding.  Over the counter supplements such as garlic, gingko biloba, and other herbs may increase bleeding risks.  I have discussed case with Kimberly ARMSTRONG who is agreeable to prescribe and manage Mr. Willingham's anticoagulation.      Detailed discussion regarding risks, benefits, and treatment plan. Images independently reviewed. Patient understands, agrees, and wishes to proceed with plan.       This document has been electronically signed by FRANKIE nAne  On February 26, 2019 2:33 PM

## 2019-03-12 ENCOUNTER — OFFICE VISIT (OUTPATIENT)
Dept: CARDIOLOGY | Facility: CLINIC | Age: 49
End: 2019-03-12

## 2019-03-12 VITALS
WEIGHT: 196 LBS | DIASTOLIC BLOOD PRESSURE: 70 MMHG | HEIGHT: 72 IN | OXYGEN SATURATION: 98 % | SYSTOLIC BLOOD PRESSURE: 110 MMHG | BODY MASS INDEX: 26.55 KG/M2 | HEART RATE: 92 BPM

## 2019-03-12 DIAGNOSIS — I50.22 CHRONIC SYSTOLIC CONGESTIVE HEART FAILURE (HCC): Primary | ICD-10-CM

## 2019-03-12 DIAGNOSIS — N52.1 ERECTILE DISORDER DUE TO MEDICAL CONDITION IN MALE: ICD-10-CM

## 2019-03-12 DIAGNOSIS — I48.0 PAF (PAROXYSMAL ATRIAL FIBRILLATION) (HCC): ICD-10-CM

## 2019-03-12 PROCEDURE — 94618 PULMONARY STRESS TESTING: CPT | Performed by: NURSE PRACTITIONER

## 2019-03-12 PROCEDURE — 99214 OFFICE O/P EST MOD 30 MIN: CPT | Performed by: NURSE PRACTITIONER

## 2019-03-12 RX ORDER — FUROSEMIDE 40 MG/1
40 TABLET ORAL 2 TIMES DAILY
Qty: 60 TABLET | Refills: 6 | Status: SHIPPED | OUTPATIENT
Start: 2019-03-12 | End: 2020-10-14 | Stop reason: SDDI

## 2019-03-12 RX ORDER — TADALAFIL 5 MG/1
5 TABLET ORAL DAILY PRN
Qty: 30 TABLET | Refills: 3 | Status: SHIPPED | OUTPATIENT
Start: 2019-03-12 | End: 2020-10-14 | Stop reason: SDDI

## 2019-03-12 NOTE — PROGRESS NOTES
Edd Willingham  Procedure: 6 Minute Walk Test   3/12/19  Indication:CHF    Pretest: BP:110/70               HR:92               Sa02:0               Dyspnea:0               Fatigue:98    Post Test: BP:128/80               HR:110               Sa02:88               Dyspnea:5               Fatigue:7    First 6MWT:yes    Supplemental oxygen during test:no    Stopped before 6 minutes: no    Pauses: no    Results in distance walked: 516.1m    Did individual experience any pain or discomfort: no    I was immediately present and agree with the data.   NYHA Class I          This document has been electronically signed by NATHANIEL Wilson on March 12, 2019 11:22 AM

## 2019-03-12 NOTE — PROGRESS NOTES
[facility] CHF CLINIC OFFICE VISIT    Subjective:     Follow-up      Congestive Heart Failure   Presents for initial visit. The disease course has been stable. The condition has lasted for 3 weeks. Pertinent negatives include no abdominal pain, chest pain, claudication, edema, fatigue, orthopnea, palpitations, shortness of breath or unexpected weight change. The symptoms have been stable. Past treatments include angiotensin receptor blockers, beta blockers, digoxin, exercise and salt and fluid restriction. The treatment provided moderate relief. Compliance with prior treatments has been good. His past medical history is significant for arrhythmia, DM and HTN. There is no history of CAD or chronic lung disease.     Mr. Willingham is a 47 year old male who presented to [facility] with 2 week symptoms of orthopnea, palpitations, fatigue, dyspnea, and chest pressure. He was found to have AF with RVR, EF of 20%, and diagnosed with NICM. He diuresed well on IV Lasix and lost 26lbs. His rate was controlled with Lopressor, Amiodarone, Cardizem, and Digoxin. He does have history of DM and HTN.   Began noticing symptoms sometime in the summer. But symptoms escalated in October when he could not walk back from the store. Usually smokes less than a pack a day. Uses Dip. Has been using Meth for the past two years. Has not used in the past 2 weeks. Appears high today.     Changed Mr. Willingham to Entresto 11/13/18 and attempted to lower Lasix dose. Tolerating this change well. He has seen Dr. Mcfarland who lowered AMIO to once daily.      Diet: Used to add salt to food before tasting. Now they spend more time looking at food labels and buying lower sodium food. Not adding salt to food now.   Fluids: Drinks water. Used to drink as much water as he could. Now he has been drinking more soda than usual. Sprite Zero. Drinks out of a 32oz cup. Buys 2L bottles.   Activity: Very active out around house. He is finding now how much he cannot do with dyspnea and  fatigue. Catches his breath quicker than he used to.     PCP: NATHANIEL Garrido  Cardiologist: Dr. Mcfarland  Nephrologist: n/a  Pulmonologist: n/a    Hospitalizations:  10/25/18- 10/30/18- [facility]    Past Medical History:   Diagnosis Date   • Anxiety    • Depression    • Diabetes mellitus (CMS/HCC)    • GERD (gastroesophageal reflux disease)    • Heart murmur    • Low back pain      Past Surgical History:   Procedure Laterality Date   • CARDIAC CATHETERIZATION N/A 10/26/2018    Procedure: Left Heart Cath;  Surgeon: Param Mcfarland MD;  Location: [facility] CATH INVASIVE LOCATION;  Service: Cardiology   • ELBOW PROCEDURE      left   • ROTATOR CUFF REPAIR      RIGHT   • TUMOR REMOVAL      FATTY TUMOR   • WRIST SURGERY      RIGHT     Social History     Socioeconomic History   • Marital status:      Spouse name: Not on file   • Number of children: Not on file   • Years of education: Not on file   • Highest education level: Not on file   Tobacco Use   • Smoking status: Current Every Day Smoker     Packs/day: 1.00     Types: Cigarettes   • Smokeless tobacco: Current User     Types: Snuff   Substance and Sexual Activity   • Alcohol use: Yes     Alcohol/week: 0.6 oz     Types: 1 Cans of beer per week     Frequency: 2-4 times a month     Drinks per session: 1 or 2     Binge frequency: Never     Comment: moderate   • Drug use: Yes     Types: Marijuana, Methamphetamines     Comment: occassionally   • Sexual activity: Defer     Allergies:  Allergies   Allergen Reactions   • Sulfa Antibiotics Unknown (See Comments)     Remembers mother always saying he was allergic       Review of Systems   Constitution: Negative for chills, decreased appetite, fatigue, fever, weakness and unexpected weight change.   HENT: Negative.    Eyes: Negative.    Cardiovascular: Negative for chest pain, claudication, dyspnea on exertion, irregular heartbeat, leg swelling and palpitations.   Respiratory: Negative for cough, shortness of breath and  wheezing.    Endocrine: Negative.    Skin: Negative for dry skin, flushing and rash.   Musculoskeletal: Negative for falls and myalgias.   Gastrointestinal: Negative for abdominal pain, change in bowel habit and melena.   Genitourinary: Negative for frequency and hematuria.   Neurological: Negative for dizziness, light-headedness and loss of balance.   Psychiatric/Behavioral: Negative for altered mental status and memory loss. The patient is not nervous/anxious.        Current Outpatient Medications   Medication Sig Dispense Refill   • amiodarone (PACERONE) 200 MG tablet Take 1 tablet by mouth Every 12 (Twelve) Hours. (Patient taking differently: Take 100 mg by mouth Daily.) 60 tablet 6   • apixaban (ELIQUIS) 5 MG tablet tablet Take 1 tablet by mouth Every 12 (Twelve) Hours. 60 tablet 6   • aspirin 81 MG EC tablet Take 1 tablet by mouth Daily. 30 tablet 0   • Cholecalciferol (VITAMIN D PO) Take 2,000 Units by mouth 2 (Two) Times a Day. Take 1 tablet 2 times daily     • digoxin (LANOXIN) 125 MCG tablet Take 1 tablet by mouth Daily. 30 tablet 6   • diltiaZEM CD (CARDIZEM CD) 180 MG 24 hr capsule Take 1 capsule by mouth Daily. 30 capsule 6   • furosemide (LASIX) 40 MG tablet Take 1 tablet by mouth 2 (Two) Times a Day. 60 tablet 6   • gabapentin (NEURONTIN) 400 MG capsule Take 400 mg by mouth 3 (Three) Times a Day.     • insulin aspart (novoLOG) 100 UNIT/ML injection Inject 10 Units under the skin into the appropriate area as directed 3 (Three) Times a Day With Meals. 10 mL 0   • Insulin Glargine (LANTUS SOLOSTAR) 100 UNIT/ML injection pen Inject 1-42 Units under the skin into the appropriate area as directed 3 (Three) Times a Day.     • metFORMIN (GLUCOPHAGE) 1000 MG tablet Take 1,000 mg by mouth 2 (Two) Times a Day With Meals.     • metoprolol succinate XL (TOPROL-XL) 25 MG 24 hr tablet Take 1 tablet by mouth Daily. 30 tablet 11   • Multiple Vitamins-Minerals (CENTRUM ADULTS PO) Take 1 tablet by mouth Daily.     •  "omeprazole (priLOSEC) 20 MG capsule Take 20 mg by mouth Daily.     • potassium chloride (K-DUR) 10 MEQ CR tablet Take 1 tablet by mouth Daily. When taking lasix. 30 tablet 0   • sacubitril-valsartan (ENTRESTO) 24-26 MG tablet Take 1 tablet by mouth Every 12 (Twelve) Hours. 60 tablet 6     No current facility-administered medications for this visit.         Objective:     Vitals:    03/12/19 1044   BP: 110/70   Pulse: 92   SpO2: 98%   Weight: 88.9 kg (196 lb)   Height: 182.9 cm (72.01\")       Wt Readings from Last 3 Encounters:   03/12/19 88.9 kg (196 lb)   02/21/19 93.1 kg (205 lb 3.2 oz)   12/12/18 91 kg (200 lb 9.6 oz)   hospital weights:        Physical Exam   Constitutional: He is oriented to person, place, and time. He appears well-developed and well-nourished. No distress.   HENT:   Head: Normocephalic and atraumatic.   Neck: Normal range of motion. No JVD present.   Cardiovascular: Normal rate, regular rhythm, S1 normal, S2 normal, normal heart sounds and intact distal pulses.   No murmur heard.  Pulmonary/Chest: Effort normal and breath sounds normal. No respiratory distress. He has no wheezes. He has no rales.   Abdominal: Soft. Bowel sounds are normal.   Musculoskeletal: Normal range of motion. He exhibits no edema.   Neurological: He is alert and oriented to person, place, and time.   Skin: Skin is warm and dry. No erythema.   Psychiatric: He has a normal mood and affect. His behavior is normal. Judgment and thought content normal.       Cardiographics      Results for orders placed during the hospital encounter of 10/25/18   Adult Transthoracic Echo Complete W/ Cont if Necessary Per Protocol    Narrative · The following left ventricular wall segments are hypokinetic: mid   anterior, apical anterior, basal anterolateral, mid anterolateral, apical   lateral, basal inferolateral, mid inferolateral, apical inferior, mid   inferior, apical septal, basal inferoseptal, mid inferoseptal, apex   hypokinetic, mid " anteroseptal, basal anterior, basal inferior and basal   inferoseptal.  · The left ventricular cavity is borderline dilated.  · Left atrial cavity size is mildly dilated.  · Mild mitral valve regurgitation is present  · Mild aortic valve regurgitation is present.  · Mild tricuspid valve regurgitation is present.        Left Ventricle Estimated EF appears to be in the range of 21 - 25%. There is left ventricular global hypokinesis noted.   The left ventricular cavity is borderline dilated. Left ventricular diastolic function is normal. There is no evidence of a left ventricular mass or thrombus present.       [facility] 10/26/18  Hemodynamic   Aortic pressure:140/96  Mean:113  Left ventricular pressure:120/32  Left ventriculogram: Not performed.         Coronaries: Right dominant system.     Left Main coronary artery: Left main coronary artery was a medium caliber vessel which was free of any disease.  The left main coronary artery bifurcated into the left anterior descending artery and circumflex artery.     Left anterior descending artery: Left anterior descending artery was a medium caliber vessel which were free of any obstructive stenosis with good GAMA-3 flow.  The diagonal branch was free of any obstructive stenosis with good GAMA-3 flow.     Circumflex artery: The circumflex artery was a medium caliber vessel with the ramus intermedius branch the first and the second obtuse marginal branch which was free of any obstructive stenosis with good GAMA-3 flow.  The circumflex artery distally was also free of any obstructive stenosis with good GAMA-3 flow     Right coronary artery: The right coronary artery was a medium to large caliber dominant vessel which were free of any obstructive stenosis with good GAMA-3 flow     Estimated Blood Loss:  Minimal         Complications:  None; patient tolerated the procedure well.      Impression   1.  Elevated left ventricular end-diastolic pressure.  2.  No evidence of any  obstructive epicardial coronary artery disease.     Recommendations: Patient positive troponin was probably secondary to the atrial fibrillation.  Patient was recommended medical management for the atrial fibrillation and to workup noncardiac etiology of chest pain.  Patient would need anticoagulation with Eliquis for the atrial fibrillation.           Param Mcfarland MD  10/26/2018  2:41 PM    No radiology results for the last 30 days.  Lab Review     No results found for: TSH  Lab Results   Component Value Date    GLUCOSE 297 (H) 11/13/2018    BUN 18 11/13/2018    CREATININE 1.09 11/13/2018    EGFRIFAFRI 87 11/13/2018    BCR 16.5 11/13/2018    K 4.3 11/13/2018    CO2 24.0 11/13/2018    CALCIUM 8.9 11/13/2018    ALBUMIN 4.30 11/13/2018    AST 25 11/13/2018    ALT 16 (L) 11/13/2018     Lab Results   Component Value Date    WBC 4.91 11/07/2018    HGB 15.7 11/07/2018    HCT 46.1 11/07/2018    MCV 81.9 11/07/2018     11/07/2018       Lab Results   Component Value Date    TROPONINI 0.142 (C) 10/25/2018     Lab Results   Component Value Date    PROBNP 925.0 (H) 11/13/2018       PFTS:         The following portions of the patient's history were reviewed and updated as appropriate: allergies, current medications, past family history, past medical history, past social history, past surgical history and problem list.     Old records reviewed and pertinent information is included in the above objective data.     Assessment/Plan:     Will change Losartan to Entresto for Mr. Willingham. PA completed with favorable outcome. Tolerating well. Tolerating decrease of Lasix to once daily. Will attempt PA on Cialis.     Diagnosis Plan   1. Chronic systolic congestive heart failure (CMS/[facility])  NICM: EF:20%. NYHA Class II, Stage C. Patient appears euvolemic.  and in a well perfused physiologic state. Hemodynamics are acceptable  BETA-BLOCKER: Toprol XL 25mg   ACE/ARB/ENTRESTO: indicated. 24/26mg BID  DIURETIC: Lasix 40mg daily. Has  taken twice daily on rare occasion- 2 in a month  ALDOSTERONT ANTAGONIST: NYHA Class I-II  IMDUR/HYDRALAZINE: n/a  DIGOXIN: 125mcg Dig  Fluid restriction: 2 L  Sodium restriction:2 grams  6MWT: 11/13/18: not enough time to complete.   3/12/19: 516.1m  Cardiac Rehab: indicated.  ICD: needs 3 month OMT   ADHF: yes  CardioMEMS:not indicated  Corvue: n/a  LVAD: not indicated at this time. Will evaluate response over the next 3 months.    Reiterated all educational points this visit.  Continue daily weight monitoring.  Continue restricted dietary sodium intake.  Discussed patient action plan for heart failure. Contact information for this office verbalized.  Recommended avoiding NSAIDs use.  Discussed warning signs requiring additional medical attention for heart failure.   Education points repeated back by patient.     Echo soon re-eval EF       2. PAF (paroxysmal atrial fibrillation) (CMS/HCC)  Atrial fibrillation paroxysmal - 7 days or less  CHADSVASC: CHF, HTN and DM  EF is 20%. No evidence for LVH. LA size is mildly dilated.    Anticoagulation:ordered  - Eliquis 5mg BID    Rate control agents:ordered   - Digoxin 125mcg  - Toprol XL 25mg daily  - Amiodarone 200mg daily  - Cardizem 180mg     Rhythm control agents:not indicated    Follows with Dr. Mcfarland. Medication regimen directed by him.        3. Erectile dysfunction secondary to medical condition WIll attempt PA on Cialis 5mg daily PRN         Follow up 3 months.

## 2019-04-09 ENCOUNTER — TELEPHONE (OUTPATIENT)
Dept: FAMILY MEDICINE CLINIC | Facility: CLINIC | Age: 49
End: 2019-04-09

## 2019-04-09 NOTE — TELEPHONE ENCOUNTER
Pt wants to be referred to have a test for diabetic neuropathy on his hands and feet. Phone is [phone].

## 2019-04-15 ENCOUNTER — TELEPHONE (OUTPATIENT)
Dept: FAMILY MEDICINE CLINIC | Facility: CLINIC | Age: 49
End: 2019-04-15

## 2019-04-16 RX ORDER — POTASSIUM CHLORIDE 750 MG/1
10 TABLET, FILM COATED, EXTENDED RELEASE ORAL DAILY
Qty: 30 TABLET | Refills: 0 | Status: SHIPPED | OUTPATIENT
Start: 2019-04-16 | End: 2019-07-01 | Stop reason: SDUPTHER

## 2019-04-24 ENCOUNTER — OFFICE VISIT (OUTPATIENT)
Dept: FAMILY MEDICINE CLINIC | Facility: CLINIC | Age: 49
End: 2019-04-24

## 2019-04-24 VITALS
DIASTOLIC BLOOD PRESSURE: 82 MMHG | HEIGHT: 72 IN | WEIGHT: 200.2 LBS | SYSTOLIC BLOOD PRESSURE: 130 MMHG | BODY MASS INDEX: 27.12 KG/M2

## 2019-04-24 DIAGNOSIS — R20.0 NUMBNESS AND TINGLING IN BOTH HANDS: ICD-10-CM

## 2019-04-24 DIAGNOSIS — Z79.4 TYPE 2 DIABETES MELLITUS WITH DIABETIC POLYNEUROPATHY, WITH LONG-TERM CURRENT USE OF INSULIN (HCC): ICD-10-CM

## 2019-04-24 DIAGNOSIS — I50.22 CHRONIC SYSTOLIC CONGESTIVE HEART FAILURE (HCC): Primary | ICD-10-CM

## 2019-04-24 DIAGNOSIS — F41.9 ANXIETY: ICD-10-CM

## 2019-04-24 DIAGNOSIS — K02.9 DENTAL DECAY: ICD-10-CM

## 2019-04-24 DIAGNOSIS — E11.42 TYPE 2 DIABETES MELLITUS WITH DIABETIC POLYNEUROPATHY, WITH LONG-TERM CURRENT USE OF INSULIN (HCC): ICD-10-CM

## 2019-04-24 DIAGNOSIS — Z13.220 SCREENING FOR HYPERLIPIDEMIA: ICD-10-CM

## 2019-04-24 DIAGNOSIS — R20.2 NUMBNESS AND TINGLING IN BOTH HANDS: ICD-10-CM

## 2019-04-24 DIAGNOSIS — R42 DIZZY SPELLS: ICD-10-CM

## 2019-04-24 PROCEDURE — 99214 OFFICE O/P EST MOD 30 MIN: CPT | Performed by: NURSE PRACTITIONER

## 2019-04-24 RX ORDER — PENICILLIN V POTASSIUM 500 MG/1
500 TABLET ORAL 4 TIMES DAILY
Qty: 28 TABLET | Refills: 0 | Status: SHIPPED | OUTPATIENT
Start: 2019-04-24 | End: 2019-05-01

## 2019-04-24 NOTE — PROGRESS NOTES
"Subjective   Edd Kraig Willingham is a 48 y.o. male.   Follow-up for diabetes and hypertension.  Has been complaining of burning and numbness to his hands and feet for the past several months.  \"I did check him a sugars and they are just running high all the time.\"  For the past several months has been having intermittent dizzy spells \"especially when I bend over.\"  Is currently attempting to obtain disability \"but they keep denying me.\"  Also complains of a sore inside of his mouth that he has had for the past several months.  \"It comes and goes but certain real bad today.\"    Back Pain   This is a chronic problem. The current episode started more than 1 year ago. The problem occurs constantly. The problem is unchanged. The pain is present in the lumbar spine. The quality of the pain is described as aching and stabbing. Radiates to: left hip. The pain is at a severity of 6/10. The pain is moderate. The pain is the same all the time. The symptoms are aggravated by standing and sitting. Associated symptoms include leg pain, numbness and tingling. Pertinent negatives include no fever. Risk factors include poor posture and sedentary lifestyle. He has tried muscle relaxant, NSAIDs and bed rest for the symptoms. The treatment provided mild relief.   Diabetes   He presents for his follow-up diabetic visit. He has type 2 diabetes mellitus. No MedicAlert identification noted. The initial diagnosis of diabetes was made 15 years ago. His disease course has been fluctuating. Hypoglycemia symptoms include dizziness. Associated symptoms include fatigue and foot paresthesias. There are no hypoglycemic complications. Symptoms are stable. Diabetic complications include peripheral neuropathy. Risk factors for coronary artery disease include diabetes mellitus, hypertension, male sex and sedentary lifestyle. Current diabetic treatment includes insulin injections and oral agent (monotherapy). He is compliant with treatment some of the " time. His weight is stable. He is following a generally unhealthy diet. When asked about meal planning, he reported none. He rarely participates in exercise. His home blood glucose trend is fluctuating dramatically. An ACE inhibitor/angiotensin II receptor blocker is being taken. He does not see a podiatrist.Eye exam is not current.   Dizziness   This is a chronic problem. The current episode started more than 1 month ago. The problem occurs intermittently. The problem has been waxing and waning. Associated symptoms include arthralgias, fatigue, numbness and vertigo. Pertinent negatives include no chills, diaphoresis or fever. Exacerbated by: position changes. He has tried nothing for the symptoms. The treatment provided no relief.   Oral Pain    This is a chronic problem. The current episode started more than 1 month ago. The problem has been waxing and waning. The pain is at a severity of 8/10. The pain is moderate. Associated symptoms include facial pain. Pertinent negatives include no fever. He has tried acetaminophen for the symptoms. The treatment provided no relief.   Upper Extremity Issue   This is a chronic problem. The current episode started more than 1 year ago. The problem occurs constantly. The problem has been gradually worsening. Associated symptoms include arthralgias, fatigue, numbness and vertigo. Pertinent negatives include no chills, diaphoresis or fever. Nothing aggravates the symptoms. He has tried nothing for the symptoms. The treatment provided no relief.        The following portions of the patient's history were reviewed and updated as appropriate:     Current Outpatient Medications   Medication Sig Dispense Refill   • amiodarone (PACERONE) 200 MG tablet Take 1 tablet by mouth Every 12 (Twelve) Hours. (Patient taking differently: Take 100 mg by mouth Daily.) 60 tablet 6   • apixaban (ELIQUIS) 5 MG tablet tablet Take 1 tablet by mouth Every 12 (Twelve) Hours. 60 tablet 6   • aspirin 81 MG  EC tablet Take 1 tablet by mouth Daily. 30 tablet 0   • Cholecalciferol (VITAMIN D PO) Take 2,000 Units by mouth 2 (Two) Times a Day. Take 1 tablet 2 times daily     • digoxin (LANOXIN) 125 MCG tablet Take 1 tablet by mouth Daily. 30 tablet 6   • diltiaZEM CD (CARDIZEM CD) 180 MG 24 hr capsule Take 1 capsule by mouth Daily. 30 capsule 6   • furosemide (LASIX) 40 MG tablet Take 1 tablet by mouth 2 (Two) Times a Day. 60 tablet 6   • gabapentin (NEURONTIN) 400 MG capsule Take 400 mg by mouth 3 (Three) Times a Day.     • insulin aspart (novoLOG) 100 UNIT/ML injection Inject 10 Units under the skin into the appropriate area as directed 3 (Three) Times a Day With Meals. 10 mL 0   • Insulin Glargine (LANTUS SOLOSTAR) 100 UNIT/ML injection pen Inject 1-42 Units under the skin into the appropriate area as directed 3 (Three) Times a Day.     • metFORMIN (GLUCOPHAGE) 1000 MG tablet Take 1,000 mg by mouth 2 (Two) Times a Day With Meals.     • metoprolol succinate XL (TOPROL-XL) 25 MG 24 hr tablet Take 1 tablet by mouth Daily. 30 tablet 11   • Multiple Vitamins-Minerals (CENTRUM ADULTS PO) Take 1 tablet by mouth Daily.     • omeprazole (priLOSEC) 20 MG capsule Take 20 mg by mouth Daily.     • potassium chloride (K-DUR) 10 MEQ CR tablet Take 1 tablet by mouth Daily. When taking lasix. 30 tablet 0   • sacubitril-valsartan (ENTRESTO) 24-26 MG tablet Take 1 tablet by mouth Every 12 (Twelve) Hours. 60 tablet 6   • tadalafil (CIALIS) 5 MG tablet Take 1 tablet by mouth Daily As Needed for erectile dysfunction. 30 tablet 3   • penicillin v potassium (VEETID) 500 MG tablet Take 1 tablet by mouth 4 (Four) Times a Day for 7 days. 28 tablet 0     No current facility-administered medications for this visit.      Current Outpatient Medications on File Prior to Visit   Medication Sig   • amiodarone (PACERONE) 200 MG tablet Take 1 tablet by mouth Every 12 (Twelve) Hours. (Patient taking differently: Take 100 mg by mouth Daily.)   • apixaban  (ELIQUIS) 5 MG tablet tablet Take 1 tablet by mouth Every 12 (Twelve) Hours.   • aspirin 81 MG EC tablet Take 1 tablet by mouth Daily.   • Cholecalciferol (VITAMIN D PO) Take 2,000 Units by mouth 2 (Two) Times a Day. Take 1 tablet 2 times daily   • digoxin (LANOXIN) 125 MCG tablet Take 1 tablet by mouth Daily.   • diltiaZEM CD (CARDIZEM CD) 180 MG 24 hr capsule Take 1 capsule by mouth Daily.   • furosemide (LASIX) 40 MG tablet Take 1 tablet by mouth 2 (Two) Times a Day.   • gabapentin (NEURONTIN) 400 MG capsule Take 400 mg by mouth 3 (Three) Times a Day.   • insulin aspart (novoLOG) 100 UNIT/ML injection Inject 10 Units under the skin into the appropriate area as directed 3 (Three) Times a Day With Meals.   • Insulin Glargine (LANTUS SOLOSTAR) 100 UNIT/ML injection pen Inject 1-42 Units under the skin into the appropriate area as directed 3 (Three) Times a Day.   • metFORMIN (GLUCOPHAGE) 1000 MG tablet Take 1,000 mg by mouth 2 (Two) Times a Day With Meals.   • metoprolol succinate XL (TOPROL-XL) 25 MG 24 hr tablet Take 1 tablet by mouth Daily.   • Multiple Vitamins-Minerals (CENTRUM ADULTS PO) Take 1 tablet by mouth Daily.   • omeprazole (priLOSEC) 20 MG capsule Take 20 mg by mouth Daily.   • potassium chloride (K-DUR) 10 MEQ CR tablet Take 1 tablet by mouth Daily. When taking lasix.   • sacubitril-valsartan (ENTRESTO) 24-26 MG tablet Take 1 tablet by mouth Every 12 (Twelve) Hours.   • tadalafil (CIALIS) 5 MG tablet Take 1 tablet by mouth Daily As Needed for erectile dysfunction.     No current facility-administered medications on file prior to visit.      He is allergic to sulfa antibiotics..    Review of Systems   Constitutional: Positive for fatigue. Negative for chills, diaphoresis and fever.   HENT: Positive for dental problem.    Eyes: Negative.    Respiratory: Negative.    Cardiovascular: Negative.    Gastrointestinal: Negative.    Genitourinary: Negative.    Musculoskeletal: Positive for arthralgias and back  "pain.   Skin: Negative.    Neurological: Positive for dizziness, vertigo, tingling and numbness.   Psychiatric/Behavioral: Negative.        Objective    Visit Vitals  /82   Ht 182.9 cm (72\")   Wt 90.8 kg (200 lb 3.2 oz)   BMI 27.15 kg/m²       Physical Exam   Constitutional: He is oriented to person, place, and time. He appears well-developed and well-nourished.   HENT:   Head: Normocephalic.   Right Ear: External ear normal.   Left Ear: External ear normal.   Mouth/Throat: Tonsillar exudate: penicillin v.       Eyes: EOM are normal. Pupils are equal, round, and reactive to light.   Neck: Normal range of motion. Neck supple.   Cardiovascular: Normal rate, regular rhythm and normal heart sounds.   Pulmonary/Chest: Effort normal and breath sounds normal.   Abdominal: Soft. Bowel sounds are normal.   Musculoskeletal: Normal range of motion.    Edd had a diabetic foot exam performed today.   During the foot exam he had a monofilament test performed (5/10 bilaterally ).  Skin Integrity  -  His right foot skin is intact. He has right foot onychomycosis, callous right foot and right foot ingrown toenail.His left foot skin is intact.He has left foot onychomycosis, callous left foot and left foot ingrown toenail..  Neurological: He is alert and oriented to person, place, and time.   Skin: Skin is warm. Capillary refill takes less than 2 seconds.   Psychiatric: He has a normal mood and affect. His behavior is normal.   Nursing note and vitals reviewed.      Assessment/Plan   Problems Addressed this Visit        Cardiovascular and Mediastinum    Chronic systolic congestive heart failure (CMS/HCC) - Primary    Relevant Orders    CBC & Differential    Comprehensive Metabolic Panel       Endocrine    Type 2 diabetes mellitus (CMS/HCC)    Relevant Orders    Hemoglobin A1c       Other    Anxiety    Relevant Orders    TSH      Other Visit Diagnoses     Dizzy spells        Numbness and tingling in both hands        Relevant " Orders    Ambulatory Referral to Neurology (Completed)    Nerve Conduction Test    Screening for hyperlipidemia        Relevant Orders    Lipid panel    Dental decay        Relevant Medications    penicillin v potassium (VEETID) 500 MG tablet        1.  Chronic systolic congestive heart failure:  Continue on Lanoxin, Entresto, Pacerone and Eliquis as previously prescribed  Complete CBC and chemistry panel as ordered and will notify of results when available  Continue cardiology follow-up with NATHANIEL Jonas as scheduled  Encouraged to seek emergency medical treatment for any new or worsening edema, shortness of breath    2.  Type 2 diabetes mellitus:  Continue on Glucophage, Lantus and NovoLog as previously prescribed  Complete hemoglobin A1c is ordered and will notify of results when available  Encouraged to adhere to ADA diet  Educated on importance of thorough foot care with diabetes  Strongly encouraged to schedule ophthalmologist appointment for eye examination    3.  Anxiety:  Complete TSH is ordered and will notify of results when available    4.  Numbness and tingling in both hands:  Referral placed to Dr. Jeffery for evaluation for neurology and will call to schedule appointment  Order placed for nerve conduction study    5.  Screening for hyperlipidemia:  Complete fasting lipid panel is ordered and will notify results when available  Encouraged to adhere to low-fat diet    6.  Dental decay:  Begin penicillin V potassium as prescribed  Strongly educated on importance of completing full dose of antibiotic therapy  Encouraged to contact dentist office immediately for extraction of decayed tooth    2.  Dizzy spells:  Complete CBC and chemistry panel as discussed above  Encouraged to increase fluids to help promote hydration  Encouraged to change positions slowly in order to reduce fall risk  Begin Antivert OTC according to package directions    Continue on current medications as previously prescribed    Return in about 3 months (around 7/24/2019) for Recheck.  Strongly educated on importance of keeping scheduled appointments to provide optimal medical care        This document has been electronically signed by NATHANIEL Oliva on April 24, 2019 10:06 AM

## 2019-06-04 DIAGNOSIS — R20.0 NUMBNESS AND TINGLING IN BOTH HANDS: ICD-10-CM

## 2019-06-04 DIAGNOSIS — R20.2 NUMBNESS AND TINGLING IN BOTH HANDS: ICD-10-CM

## 2019-06-28 ENCOUNTER — TELEPHONE (OUTPATIENT)
Dept: FAMILY MEDICINE CLINIC | Facility: CLINIC | Age: 49
End: 2019-06-28

## 2019-07-01 RX ORDER — POTASSIUM CHLORIDE 750 MG/1
10 TABLET, FILM COATED, EXTENDED RELEASE ORAL DAILY
Qty: 30 TABLET | Refills: 0 | Status: SHIPPED | OUTPATIENT
Start: 2019-07-01 | End: 2020-10-14 | Stop reason: SDDI

## 2020-05-27 ENCOUNTER — TELEPHONE (OUTPATIENT)
Dept: CARDIOLOGY | Facility: CLINIC | Age: 50
End: 2020-05-27

## 2020-05-27 NOTE — TELEPHONE ENCOUNTER
Called both numbers to reach patient, left a vm on the mobile number and the vm wasn't set up on the home number

## 2020-08-28 ENCOUNTER — HOSPITAL ENCOUNTER (EMERGENCY)
Facility: HOSPITAL | Age: 50
Discharge: HOME | End: 2020-08-28
Attending: EMERGENCY MEDICINE | Admitting: EMERGENCY MEDICINE

## 2020-08-28 ENCOUNTER — APPOINTMENT (OUTPATIENT)
Dept: GENERAL RADIOLOGY | Facility: HOSPITAL | Age: 50
End: 2020-08-28

## 2020-08-28 VITALS
BODY MASS INDEX: 31.5 KG/M2 | TEMPERATURE: 98.2 F | OXYGEN SATURATION: 97 % | RESPIRATION RATE: 20 BRPM | DIASTOLIC BLOOD PRESSURE: 81 MMHG | HEART RATE: 106 BPM | HEIGHT: 71 IN | WEIGHT: 225 LBS | SYSTOLIC BLOOD PRESSURE: 157 MMHG

## 2020-08-28 DIAGNOSIS — R73.9 HYPERGLYCEMIA: ICD-10-CM

## 2020-08-28 DIAGNOSIS — R07.9 CHEST PAIN, UNSPECIFIED TYPE: Primary | ICD-10-CM

## 2020-08-28 DIAGNOSIS — R51.9 ACUTE NONINTRACTABLE HEADACHE, UNSPECIFIED HEADACHE TYPE: ICD-10-CM

## 2020-08-28 LAB
ALBUMIN SERPL-MCNC: 4.2 G/DL (ref 3.5–5.2)
ALBUMIN/GLOB SERPL: 1.3 G/DL
ALP SERPL-CCNC: 102 U/L (ref 39–117)
ALT SERPL W P-5'-P-CCNC: 17 U/L (ref 1–41)
ANION GAP SERPL CALCULATED.3IONS-SCNC: 13 MMOL/L (ref 5–15)
AST SERPL-CCNC: 13 U/L (ref 1–40)
BASOPHILS # BLD AUTO: 0.03 10*3/MM3 (ref 0–0.2)
BASOPHILS NFR BLD AUTO: 0.5 % (ref 0–1.5)
BILIRUB SERPL-MCNC: 0.3 MG/DL (ref 0–1.2)
BUN SERPL-MCNC: 19 MG/DL (ref 6–20)
BUN/CREAT SERPL: 16.5 (ref 7–25)
CALCIUM SPEC-SCNC: 9.6 MG/DL (ref 8.6–10.5)
CHLORIDE SERPL-SCNC: 96 MMOL/L (ref 98–107)
CO2 SERPL-SCNC: 23 MMOL/L (ref 22–29)
CREAT SERPL-MCNC: 1.15 MG/DL (ref 0.76–1.27)
DEPRECATED RDW RBC AUTO: 39.2 FL (ref 37–54)
DIGOXIN SERPL-MCNC: <0.3 NG/ML (ref 0.6–1.2)
EOSINOPHIL # BLD AUTO: 0.06 10*3/MM3 (ref 0–0.4)
EOSINOPHIL NFR BLD AUTO: 1.1 % (ref 0.3–6.2)
ERYTHROCYTE [DISTWIDTH] IN BLOOD BY AUTOMATED COUNT: 13.4 % (ref 12.3–15.4)
GFR SERPL CREATININE-BSD FRML MDRD: 81 ML/MIN/1.73
GLOBULIN UR ELPH-MCNC: 3.3 GM/DL
GLUCOSE BLDC GLUCOMTR-MCNC: 321 MG/DL (ref 70–130)
GLUCOSE SERPL-MCNC: 534 MG/DL (ref 65–99)
HCT VFR BLD AUTO: 45.1 % (ref 37.5–51)
HGB BLD-MCNC: 15 G/DL (ref 13–17.7)
HOLD SPECIMEN: NORMAL
HOLD SPECIMEN: NORMAL
IMM GRANULOCYTES # BLD AUTO: 0.02 10*3/MM3 (ref 0–0.05)
IMM GRANULOCYTES NFR BLD AUTO: 0.4 % (ref 0–0.5)
LIPASE SERPL-CCNC: 25 U/L (ref 13–60)
LYMPHOCYTES # BLD AUTO: 1.2 10*3/MM3 (ref 0.7–3.1)
LYMPHOCYTES NFR BLD AUTO: 21.7 % (ref 19.6–45.3)
MCH RBC QN AUTO: 26.9 PG (ref 26.6–33)
MCHC RBC AUTO-ENTMCNC: 33.3 G/DL (ref 31.5–35.7)
MCV RBC AUTO: 80.8 FL (ref 79–97)
MONOCYTES # BLD AUTO: 0.42 10*3/MM3 (ref 0.1–0.9)
MONOCYTES NFR BLD AUTO: 7.6 % (ref 5–12)
NEUTROPHILS NFR BLD AUTO: 3.81 10*3/MM3 (ref 1.7–7)
NEUTROPHILS NFR BLD AUTO: 68.7 % (ref 42.7–76)
NRBC BLD AUTO-RTO: 0 /100 WBC (ref 0–0.2)
NT-PROBNP SERPL-MCNC: 107.9 PG/ML (ref 0–900)
PLATELET # BLD AUTO: 182 10*3/MM3 (ref 140–450)
PMV BLD AUTO: 12.5 FL (ref 6–12)
POTASSIUM SERPL-SCNC: 4.5 MMOL/L (ref 3.5–5.2)
PROT SERPL-MCNC: 7.5 G/DL (ref 6–8.5)
RBC # BLD AUTO: 5.58 10*6/MM3 (ref 4.14–5.8)
SODIUM SERPL-SCNC: 132 MMOL/L (ref 136–145)
TROPONIN T SERPL-MCNC: <0.01 NG/ML (ref 0–0.03)
WBC # BLD AUTO: 5.54 10*3/MM3 (ref 3.4–10.8)
WHOLE BLOOD HOLD SPECIMEN: NORMAL
WHOLE BLOOD HOLD SPECIMEN: NORMAL

## 2020-08-28 PROCEDURE — 25010000002 ONDANSETRON PER 1 MG: Performed by: EMERGENCY MEDICINE

## 2020-08-28 PROCEDURE — 83880 ASSAY OF NATRIURETIC PEPTIDE: CPT | Performed by: EMERGENCY MEDICINE

## 2020-08-28 PROCEDURE — 71045 X-RAY EXAM CHEST 1 VIEW: CPT

## 2020-08-28 PROCEDURE — 96375 TX/PRO/DX INJ NEW DRUG ADDON: CPT

## 2020-08-28 PROCEDURE — 80162 ASSAY OF DIGOXIN TOTAL: CPT | Performed by: EMERGENCY MEDICINE

## 2020-08-28 PROCEDURE — 93010 ELECTROCARDIOGRAM REPORT: CPT | Performed by: INTERNAL MEDICINE

## 2020-08-28 PROCEDURE — 80053 COMPREHEN METABOLIC PANEL: CPT | Performed by: EMERGENCY MEDICINE

## 2020-08-28 PROCEDURE — 82962 GLUCOSE BLOOD TEST: CPT

## 2020-08-28 PROCEDURE — 99285 EMERGENCY DEPT VISIT HI MDM: CPT

## 2020-08-28 PROCEDURE — 93005 ELECTROCARDIOGRAM TRACING: CPT | Performed by: EMERGENCY MEDICINE

## 2020-08-28 PROCEDURE — 83690 ASSAY OF LIPASE: CPT | Performed by: EMERGENCY MEDICINE

## 2020-08-28 PROCEDURE — 63710000001 INSULIN REGULAR HUMAN PER 5 UNITS: Performed by: EMERGENCY MEDICINE

## 2020-08-28 PROCEDURE — 96374 THER/PROPH/DIAG INJ IV PUSH: CPT

## 2020-08-28 PROCEDURE — 85025 COMPLETE CBC W/AUTO DIFF WBC: CPT | Performed by: EMERGENCY MEDICINE

## 2020-08-28 PROCEDURE — 84484 ASSAY OF TROPONIN QUANT: CPT | Performed by: EMERGENCY MEDICINE

## 2020-08-28 RX ORDER — ASPIRIN 325 MG
325 TABLET ORAL ONCE
Status: DISCONTINUED | OUTPATIENT
Start: 2020-08-28 | End: 2020-08-28 | Stop reason: HOSPADM

## 2020-08-28 RX ORDER — FAMOTIDINE 10 MG/ML
20 INJECTION, SOLUTION INTRAVENOUS ONCE
Status: COMPLETED | OUTPATIENT
Start: 2020-08-28 | End: 2020-08-28

## 2020-08-28 RX ORDER — SODIUM CHLORIDE 0.9 % (FLUSH) 0.9 %
10 SYRINGE (ML) INJECTION AS NEEDED
Status: DISCONTINUED | OUTPATIENT
Start: 2020-08-28 | End: 2020-08-28 | Stop reason: HOSPADM

## 2020-08-28 RX ORDER — ONDANSETRON 2 MG/ML
4 INJECTION INTRAMUSCULAR; INTRAVENOUS ONCE
Status: COMPLETED | OUTPATIENT
Start: 2020-08-28 | End: 2020-08-28

## 2020-08-28 RX ADMIN — FAMOTIDINE 20 MG: 10 INJECTION INTRAVENOUS at 01:47

## 2020-08-28 RX ADMIN — HUMAN INSULIN 8 UNITS: 100 INJECTION, SOLUTION SUBCUTANEOUS at 02:59

## 2020-08-28 RX ADMIN — ONDANSETRON 4 MG: 2 INJECTION INTRAMUSCULAR; INTRAVENOUS at 03:05

## 2020-08-29 LAB — GLUCOSE BLDC GLUCOMTR-MCNC: 501 MG/DL (ref 70–130)

## 2020-10-14 ENCOUNTER — OFFICE VISIT (OUTPATIENT)
Dept: CARDIOLOGY | Facility: CLINIC | Age: 50
End: 2020-10-14

## 2020-10-14 VITALS
SYSTOLIC BLOOD PRESSURE: 128 MMHG | HEART RATE: 65 BPM | OXYGEN SATURATION: 96 % | HEIGHT: 71 IN | DIASTOLIC BLOOD PRESSURE: 80 MMHG | BODY MASS INDEX: 31.08 KG/M2 | WEIGHT: 222 LBS

## 2020-10-14 DIAGNOSIS — R55 SYNCOPE AND COLLAPSE: ICD-10-CM

## 2020-10-14 DIAGNOSIS — E11.42 TYPE 2 DIABETES MELLITUS WITH DIABETIC POLYNEUROPATHY, WITH LONG-TERM CURRENT USE OF INSULIN (HCC): ICD-10-CM

## 2020-10-14 DIAGNOSIS — Z79.4 TYPE 2 DIABETES MELLITUS WITH DIABETIC POLYNEUROPATHY, WITH LONG-TERM CURRENT USE OF INSULIN (HCC): ICD-10-CM

## 2020-10-14 DIAGNOSIS — I50.22 CHRONIC SYSTOLIC CONGESTIVE HEART FAILURE (HCC): Primary | ICD-10-CM

## 2020-10-14 DIAGNOSIS — R25.2 LEG CRAMPS: ICD-10-CM

## 2020-10-14 DIAGNOSIS — I48.0 PAF (PAROXYSMAL ATRIAL FIBRILLATION) (HCC): ICD-10-CM

## 2020-10-14 PROCEDURE — 99215 OFFICE O/P EST HI 40 MIN: CPT | Performed by: NURSE PRACTITIONER

## 2020-10-14 RX ORDER — DAPAGLIFLOZIN 10 MG/1
10 TABLET, FILM COATED ORAL DAILY
Qty: 30 TABLET | Refills: 11 | Status: SHIPPED | OUTPATIENT
Start: 2020-10-14 | End: 2021-03-29 | Stop reason: SDUPTHER

## 2020-10-14 RX ORDER — METOPROLOL SUCCINATE 25 MG/1
25 TABLET, EXTENDED RELEASE ORAL DAILY
Qty: 30 TABLET | Refills: 11 | Status: SHIPPED | OUTPATIENT
Start: 2020-10-14 | End: 2020-11-09

## 2020-10-14 RX ORDER — SIMVASTATIN 40 MG
20 TABLET ORAL NIGHTLY
COMMUNITY
End: 2020-12-21 | Stop reason: SINTOL

## 2020-10-14 RX ORDER — HUMAN INSULIN 100 [USP'U]/ML
30 INJECTION, SUSPENSION SUBCUTANEOUS 2 TIMES DAILY WITH MEALS
Qty: 1 EACH | Refills: 12 | Status: SHIPPED | OUTPATIENT
Start: 2020-10-14 | End: 2021-11-05 | Stop reason: SDUPTHER

## 2020-10-14 NOTE — PROGRESS NOTES
[facility] CHF CLINIC OFFICE VISIT    Subjective:     Congestive Heart Failure      Congestive Heart Failure  Presents for follow-up visit. The disease course has been stable. The condition has lasted for 3 weeks. Associated symptoms include near-syncope. Pertinent negatives include no abdominal pain, chest pain, claudication, edema, fatigue, orthopnea, palpitations, shortness of breath or unexpected weight change. The symptoms have been stable. Past treatments include angiotensin receptor blockers, beta blockers, digoxin, exercise and salt and fluid restriction. The treatment provided moderate relief. Compliance with prior treatments has been good. His past medical history is significant for arrhythmia, DM and HTN. There is no history of CAD or chronic lung disease. Compliance with total regimen is 51-75%. Compliance with diet is 51-75%. Compliance with exercise is 51-75%. Compliance with medications is 0-25%.     Mr. Willingham is a 47 year old male who presented to [facility] with 2 week symptoms of orthopnea, palpitations, fatigue, dyspnea, and chest pressure. He was found to have AF with RVR, EF of 20%, and diagnosed with NICM. He diuresed well on IV Lasix and lost 26lbs. His rate was controlled with Lopressor, Amiodarone, Cardizem, and Digoxin. He does have history of DM and HTN.   Began noticing symptoms sometime in the summer. But symptoms escalated in October when he could not walk back from the store. Usually smokes less than a pack a day. Uses Dip. Has been using Meth for the past two years. Has not used in the past 2 weeks. Appears high today.     Changed Mr. Willingham to Entresto 11/13/18 and attempted to lower Lasix dose. Tolerating this change well. He has seen Dr. Mcfarland who lowered AMIO to once daily.      10/14/20: Mr. Willingham presents today following a 1.5 year absence due to incarceration. He was on most of his medications while in [facility]. He remains NYHA Class I.   Complains of recent falls. Falls from standing with a  tunnel vision. Sounds like syncope. Happened 7 times since March. Will place heart monitor. Repeat echo.   Leg cramps this morning. Sounds like a statin allergy, but he is not sure since he's only had a couple doses this month. Has dealt with cramps in hands and legs prior with self medication of magnesium, potassium, tomato juice, and bananas. Has neuropathy in his legs already.         Diet: Used to add salt to food before tasting. Now they spend more time looking at food labels and buying lower sodium food. Not adding salt to food now.   Fluids: Drinks water. Used to drink as much water as he could. Now he has been drinking more soda than usual. Sprite Zero. Drinks out of a 32oz cup. Buys 2L bottles.   Activity: Very active out around house. He is finding now how much he cannot do with dyspnea and fatigue. Catches his breath quicker than he used to.     PCP: NATHANIEL Garcia  Cardiologist: [facility]. I will follow him for now and establish as needed with MD  Nephrologist: n/a  Pulmonologist: n/a    Hospitalizations:  10/25/18- 10/30/18- [facility]    Past Medical History:   Diagnosis Date   • Anxiety    • Depression    • Diabetes mellitus (CMS/HCC)    • GERD (gastroesophageal reflux disease)    • Heart murmur    • Low back pain      Past Surgical History:   Procedure Laterality Date   • CARDIAC CATHETERIZATION N/A 10/26/2018    Procedure: Left Heart Cath;  Surgeon: Param Mcfarland MD;  Location: [facility] INVASIVE LOCATION;  Service: Cardiology   • ELBOW PROCEDURE      left   • ROTATOR CUFF REPAIR      RIGHT   • TUMOR REMOVAL      FATTY TUMOR   • WRIST SURGERY      RIGHT     Social History     Socioeconomic History   • Marital status:      Spouse name: Not on file   • Number of children: Not on file   • Years of education: Not on file   • Highest education level: Not on file   Tobacco Use   • Smoking status: Current Every Day Smoker     Packs/day: 1.00     Types: Cigarettes   • Smokeless tobacco:  Current User     Types: Snuff   Substance and Sexual Activity   • Alcohol use: Yes     Alcohol/week: 1.0 standard drinks     Types: 1 Cans of beer per week     Frequency: 2-4 times a month     Drinks per session: 1 or 2     Binge frequency: Never     Comment: moderate   • Drug use: Yes     Types: Marijuana, Methamphetamines     Comment: meth in the last month or so.    • Sexual activity: Defer     Allergies:  Allergies   Allergen Reactions   • Sulfa Antibiotics Unknown (See Comments)     Remembers mother always saying he was allergic       Review of Systems   Constitution: Negative for chills, decreased appetite, fatigue, fever and unexpected weight change.   HENT: Negative.    Eyes: Negative.    Cardiovascular: Positive for near-syncope. Negative for chest pain, claudication, dyspnea on exertion, irregular heartbeat, leg swelling and palpitations.   Respiratory: Negative for cough, shortness of breath and wheezing.    Endocrine: Negative.    Skin: Negative for dry skin, flushing and rash.   Musculoskeletal: Negative for falls and myalgias.   Gastrointestinal: Negative for abdominal pain, change in bowel habit and melena.   Genitourinary: Negative for frequency and hematuria.   Neurological: Negative for dizziness, light-headedness, loss of balance and weakness.   Psychiatric/Behavioral: Negative for altered mental status and memory loss. The patient is not nervous/anxious.        Current Outpatient Medications   Medication Sig Dispense Refill   • apixaban (ELIQUIS) 5 MG tablet tablet Take 1 tablet by mouth Every 12 (Twelve) Hours. 60 tablet 11   • Cholecalciferol (vitamin D3) 125 MCG (5000 UT) capsule capsule Take 2,000 Units by mouth Daily.     • insulin aspart (novoLOG) 100 UNIT/ML injection Inject 10 Units under the skin into the appropriate area as directed 3 (Three) Times a Day With Meals. 10 mL 6   • metoprolol succinate XL (TOPROL-XL) 25 MG 24 hr tablet Take 1 tablet by mouth Daily. 30 tablet 11   •  "omeprazole (priLOSEC) 20 MG capsule Take 20 mg by mouth Daily.     • sacubitril-valsartan (ENTRESTO) 24-26 MG tablet Take 1 tablet by mouth 2 (Two) Times a Day. 60 tablet 6   • simvastatin (ZOCOR) 40 MG tablet Take 20 mg by mouth Every Night.     • Dapagliflozin Propanediol (Farxiga) 10 MG tablet Take 10 mg by mouth Daily. 30 tablet 11   • insulin NPH-insulin regular (NovoLIN 70/30) (70-30) 100 UNIT/ML injection Inject 30 Units under the skin into the appropriate area as directed 2 (Two) Times a Day With Meals. 1 each 12     No current facility-administered medications for this visit.         Objective:     Vitals:    10/14/20 1055   BP: 128/80   BP Location: Left arm   Patient Position: Sitting   Cuff Size: Adult   Pulse: 65   SpO2: 96%   Weight: 101 kg (222 lb)   Height: 180.3 cm (70.98\")       Wt Readings from Last 3 Encounters:   10/14/20 101 kg (222 lb)   08/28/20 102 kg (225 lb)   04/24/19 90.8 kg (200 lb 3.2 oz)   hospital weights:        Physical Exam   Constitutional: He is oriented to person, place, and time. He appears well-developed and well-nourished. No distress.   HENT:   Head: Normocephalic and atraumatic.   Neck: Normal range of motion. No JVD present.   Cardiovascular: Normal rate, regular rhythm, S1 normal, S2 normal, normal heart sounds and intact distal pulses.   No murmur heard.  Pulmonary/Chest: Effort normal and breath sounds normal. No respiratory distress. He has no wheezes. He has no rales.   Abdominal: Soft. Bowel sounds are normal.   Musculoskeletal: Normal range of motion.         General: No edema.   Neurological: He is alert and oriented to person, place, and time.   Skin: Skin is warm and dry. No erythema.   Psychiatric: He has a normal mood and affect. His behavior is normal. Judgment and thought content normal.       Cardiographics      Results for orders placed during the hospital encounter of 04/09/19   Adult Transthoracic Echo Complete W/ Cont if Necessary Per Protocol    " Narrative · Mild aortic valve regurgitation is present.  · Mild tricuspid valve regurgitation is present.  · Left atrial cavity size is mildly dilated.  · Left ventricular diastolic dysfunction (grade I a) consistent with   impaired relaxation.  · Estimated EF appears to be in the range of 41 - 45%.        Left Ventricle Estimated EF appears to be in the range of 21 - 25%. There is left ventricular global hypokinesis noted.   The left ventricular cavity is borderline dilated. Left ventricular diastolic function is normal. There is no evidence of a left ventricular mass or thrombus present.       [facility] 10/26/18  Hemodynamic   Aortic pressure:140/96  Mean:113  Left ventricular pressure:120/32  Left ventriculogram: Not performed.         Coronaries: Right dominant system.     Left Main coronary artery: Left main coronary artery was a medium caliber vessel which was free of any disease.  The left main coronary artery bifurcated into the left anterior descending artery and circumflex artery.     Left anterior descending artery: Left anterior descending artery was a medium caliber vessel which were free of any obstructive stenosis with good GAMA-3 flow.  The diagonal branch was free of any obstructive stenosis with good GAMA-3 flow.     Circumflex artery: The circumflex artery was a medium caliber vessel with the ramus intermedius branch the first and the second obtuse marginal branch which was free of any obstructive stenosis with good GAMA-3 flow.  The circumflex artery distally was also free of any obstructive stenosis with good GAMA-3 flow     Right coronary artery: The right coronary artery was a medium to large caliber dominant vessel which were free of any obstructive stenosis with good GAMA-3 flow     Estimated Blood Loss:  Minimal         Complications:  None; patient tolerated the procedure well.      Impression   1.  Elevated left ventricular end-diastolic pressure.  2.  No evidence of any obstructive epicardial  coronary artery disease.     Recommendations: Patient positive troponin was probably secondary to the atrial fibrillation.  Patient was recommended medical management for the atrial fibrillation and to workup noncardiac etiology of chest pain.  Patient would need anticoagulation with Eliquis for the atrial fibrillation.           Param Mcfarland MD  10/26/2018  2:41 PM    No radiology results for the last 30 days.  Lab Review     No results found for: TSH  Lab Results   Component Value Date    GLUCOSE 534 (C) 08/28/2020    BUN 19 08/28/2020    CREATININE 1.15 08/28/2020    EGFRIFAFRI 81 08/28/2020    BCR 16.5 08/28/2020    K 4.5 08/28/2020    CO2 23.0 08/28/2020    CALCIUM 9.6 08/28/2020    ALBUMIN 4.20 08/28/2020    AST 13 08/28/2020    ALT 17 08/28/2020     Lab Results   Component Value Date    WBC 5.54 08/28/2020    HGB 15.0 08/28/2020    HCT 45.1 08/28/2020    MCV 80.8 08/28/2020     08/28/2020       Lab Results   Component Value Date    TROPONINI 0.142 (C) 10/25/2018    TROPONINT <0.010 08/28/2020     Lab Results   Component Value Date    PROBNP 107.9 08/28/2020          The following portions of the patient's history were reviewed and updated as appropriate: allergies, current medications, past family history, past medical history, past social history, past surgical history and problem list.     Old records reviewed and pertinent information is included in the above objective data.     Assessment/Plan:   Mr. Willingham is seen today after a 1.5 year absence due to incarceration. He is still using Meth. He appears euvolemic. He has very uncontrolled blood sugars which we discussed today and made changes on. I refilled his medications. His BP and HR are controlled.      Diagnosis Plan   1. Chronic systolic congestive heart failure, EF improved (CMS/[facility])  NICM: EF:20% improved to 41-45%. NYHA Class I, Stage C. Patient appears euvolemic.  and in a well perfused physiologic state. Hemodynamics are  acceptable  BETA-BLOCKER: Toprol XL 25mg   ACE/ARB/ENTRESTO: 24/26mg BID  DIURETIC: n/a  ALDOSTERONT ANTAGONIST: NYHA Class I-II  IMDUR/HYDRALAZINE: n/a  DIGOXIN: n/a  Fluid restriction: 2 L  Sodium restriction:2 grams  6MWT: 11/13/18: not enough time to complete.   3/12/19: 516.1m  Cardiac Rehab: indicated.  ICD: needs 3 month OMT   ADHF: yes  CardioMEMS:not indicated  Corvue: n/a  LVAD: not indicated at this time. Will evaluate response over the next 3 months.    Reiterated all educational points this visit.  Continue daily weight monitoring.  Continue restricted dietary sodium intake.  Discussed patient action plan for heart failure. Contact information for this office verbalized.  Recommended avoiding NSAIDs use.  Discussed warning signs requiring additional medical attention for heart failure.   Education points repeated back by patient.     Mobile Cardiac Outpatient Telemetry    Adult Transthoracic Echo Complete W/ Cont if Necessary Per Protocol       2. PAF (paroxysmal atrial fibrillation) (CMS/[facility])  Atrial fibrillation paroxysmal - 7 days or less  CHADSVASC: CHF, HTN and DM  EF is 20%. No evidence for LVH. LA size is mildly dilated.    Anticoagulation:ordered  - Eliquis 5mg BID    Rate control agents:ordered   - Toprol XL 25mg daily      Rhythm control agents:not indicated    No longer follows with Dr. Mcfarland        3. Syncope and collapse  Concern for VT since he had quick return to baseline  Will order 21 day heart monitor.       4. Leg cramps  Could be due to the fact that he took his statin this AM, but he has had cramps in the past.   He self medicates with tomato juice, potassium, and magnesium.   Definitely has neuropathy, but this may be medication side effect. We discussed the best way to protect our legs is to control our blood sugar.        5. Type 2 diabetes mellitus with diabetic polyneuropathy, with long-term current use of insulin (CMS/[facility])  He has not done a good job at all with his blood  sugars. They were not controlled in senior living either.   He has been out of insulin, but using his dad's 70/30. He is using 40 units BID.   He also has Novolog-r (?). He has been taking 5 units for every 100 over 250-300.   I think he could use more short acting insulin. Lantus/levimer is not on formulary for [place] (according to Epic), so I will stick with the 70/30 because he did not like the PEN to begin with.     70/30 - 30 units BID  Novolog- 10 units for every 100 over 200.   Farxiga 10mg daily    Will see in 2 weeks to evaluate response to the changes.          Follow up 3 months.     50 minutes out of 60 minutes face to face spent in counseling/coordination of care as relates to presentation of CHF, DM2 with dietary and lifestyle modifications/procedures as outlined above. Most time was spent with diabetes education and outlining SSI and multiple new complaints as above.           This document has been electronically signed by NATHANIEL Wilson on October 14, 2020 12:10 CDT

## 2020-10-14 NOTE — PATIENT INSTRUCTIONS
NOVOLOG 10units for every 100 over 200    70/30 - 30 units twice daily. Do not take as needed. Take twice daily. This will take the place of Lantus    Add Farxiga for an oral diabetes medications    Heart monitor for 21 days, but I want to catch the passing out episodes. I am afraid you are having V-tach, which is an arrythmia that can cause death.    Will repeat echo to check on heart functions. Was at 40%, up from 20%.

## 2020-10-21 ENCOUNTER — TELEPHONE (OUTPATIENT)
Dept: CARDIOLOGY | Facility: CLINIC | Age: 50
End: 2020-10-21

## 2020-10-21 NOTE — TELEPHONE ENCOUNTER
Event reports received from [employer]. They have been reviewed by que lau. Reports were from 10/21 at 10:44 am, 10:05 am, 9:47 am, and 9:45 am.

## 2020-10-28 ENCOUNTER — DOCUMENTATION (OUTPATIENT)
Dept: CARDIOLOGY | Facility: CLINIC | Age: 50
End: 2020-10-28

## 2020-10-28 NOTE — PROGRESS NOTES
Short run of VT noted on Simtrole event monitor. Faxed to us 10/27/20 on 6:51pm.   This is noted and being monitored. No syncopal events.     See attached.

## 2020-11-09 ENCOUNTER — OFFICE VISIT (OUTPATIENT)
Dept: CARDIOLOGY | Facility: CLINIC | Age: 50
End: 2020-11-09

## 2020-11-09 VITALS
SYSTOLIC BLOOD PRESSURE: 142 MMHG | HEART RATE: 81 BPM | BODY MASS INDEX: 30.8 KG/M2 | DIASTOLIC BLOOD PRESSURE: 80 MMHG | WEIGHT: 227.4 LBS | OXYGEN SATURATION: 94 % | HEIGHT: 72 IN

## 2020-11-09 DIAGNOSIS — I50.22 CHRONIC SYSTOLIC CONGESTIVE HEART FAILURE (HCC): Primary | ICD-10-CM

## 2020-11-09 DIAGNOSIS — R55 SYNCOPE AND COLLAPSE: ICD-10-CM

## 2020-11-09 DIAGNOSIS — I48.0 PAF (PAROXYSMAL ATRIAL FIBRILLATION) (HCC): ICD-10-CM

## 2020-11-09 PROCEDURE — 99214 OFFICE O/P EST MOD 30 MIN: CPT | Performed by: NURSE PRACTITIONER

## 2020-11-09 RX ORDER — METOPROLOL SUCCINATE 50 MG/1
50 TABLET, EXTENDED RELEASE ORAL DAILY
Qty: 90 TABLET | Refills: 3 | Status: SHIPPED | OUTPATIENT
Start: 2020-11-09 | End: 2021-03-29 | Stop reason: SDUPTHER

## 2020-11-09 NOTE — PROGRESS NOTES
[facility] CH[facility] CLINIC OFFICE VISIT    Subjective:     Congestive Heart Failure (chief complaint)      Congestive Heart Failure  Presents for follow-up visit. The disease course has been stable. The condition has lasted for 3 weeks. Associated symptoms include near-syncope. Pertinent negatives include no abdominal pain, chest pain, claudication, edema, fatigue, orthopnea, palpitations, shortness of breath or unexpected weight change. The symptoms have been stable. Past treatments include angiotensin receptor blockers, beta blockers, digoxin, exercise and salt and fluid restriction. The treatment provided moderate relief. Compliance with prior treatments has been good. His past medical history is significant for arrhythmia, DM and HTN. There is no history of CAD or chronic lung disease. Compliance with total regimen is 51-75%. Compliance with diet is 51-75%. Compliance with exercise is 51-75%. Compliance with medications is 0-25%.     Mr. Willingham is a 47 year old male who presented to [facility] with 2 week symptoms of orthopnea, palpitations, fatigue, dyspnea, and chest pressure. He was found to have AF with RVR, EF of 20%, and diagnosed with NICM. He diuresed well on IV Lasix and lost 26lbs. His rate was controlled with Lopressor, Amiodarone, Cardizem, and Digoxin. He does have history of DM and HTN.   Began noticing symptoms sometime in the summer. But symptoms escalated in October when he could not walk back from the store. Usually smokes less than a pack a day. Uses Dip. Has been using Meth for the past two years. Has not used in the past 2 weeks. Appears high today.     Changed Mr. Willingham to Entresto 11/13/18 and attempted to lower Lasix dose. Tolerating this change well. He has seen Dr. Mcfarland who lowered AMIO to once daily.      10/14/20: Mr. Willingham presents today following a 1.5 year absence due to incarceration. He was on most of his medications while in long-term. He remains NYHA Class I.   Complains of recent falls. Falls  from standing with a tunnel vision. Sounds like syncope. Happened 7 times since March. Will place heart monitor. Repeat echo.   Leg cramps this morning. Sounds like a statin allergy, but he is not sure since he's only had a couple doses this month. Has dealt with cramps in hands and legs prior with self medication of magnesium, potassium, tomato juice, and bananas. Has neuropathy in his legs already.     11/9/20: Improved. Blood sugars around 200. Farxiga and 70/30 working. Small weight gain, eating well. Back to adding salt. Drinking lots of water and fluids. We discussed the balance of intake and output. Discussed medication complaince. He is making a good attempt. Mom and dad are helpful, the house he lives in with GF and her kids is not a good environment. He told me his anxiety and depression is worsening. He does admit to THC use which is helpful in controlling his anxiety and keeps him from using meth.       Diet: Back to adding salt to his food. Has a salt shaker on his night stand.   Fluids: Drinks water. Used to drink as much water as he could. Now he has been drinking more soda than usual. Sprite Zero. Drinks out of a 32oz cup. Buys 2L bottles.   Activity: Very active out around house.     PCP: NATHANIEL Garcia  Cardiologist: [facility]. I will follow him for now and establish as needed with MD  Nephrologist: n/a  Pulmonologist: n/a    Hospitalizations:  10/25/18- 10/30/18- [facility]    Past Medical History:   Diagnosis Date   • Anxiety    • Depression    • Diabetes mellitus (CMS/HCC)    • GERD (gastroesophageal reflux disease)    • Heart murmur    • Low back pain      Past Surgical History:   Procedure Laterality Date   • CARDIAC CATHETERIZATION N/A 10/26/2018    Procedure: Left Heart Cath;  Surgeon: Param Mcfarland MD;  Location: [facility] CATH INVASIVE LOCATION;  Service: Cardiology   • ELBOW PROCEDURE      left   • ROTATOR CUFF REPAIR      RIGHT   • TUMOR REMOVAL      FATTY TUMOR   • WRIST SURGERY       RIGHT     Social History     Socioeconomic History   • Marital status:      Spouse name: Not on file   • Number of children: Not on file   • Years of education: Not on file   • Highest education level: Not on file   Tobacco Use   • Smoking status: Current Every Day Smoker     Packs/day: 1.00     Types: Cigarettes   • Smokeless tobacco: Current User     Types: Snuff   Substance and Sexual Activity   • Alcohol use: Yes     Alcohol/week: 1.0 standard drinks     Types: 1 Cans of beer per week     Frequency: 2-4 times a month     Drinks per session: 1 or 2     Binge frequency: Never     Comment: moderate   • Drug use: Yes     Types: Marijuana, Methamphetamines     Comment: meth in the last month or so.    • Sexual activity: Defer     Allergies:  Allergies   Allergen Reactions   • Sulfa Antibiotics Unknown (See Comments)     Remembers mother always saying he was allergic       Review of Systems   Constitution: Negative for chills, decreased appetite, fatigue, fever and unexpected weight change.   HENT: Negative.    Eyes: Negative.    Cardiovascular: Positive for near-syncope. Negative for chest pain, claudication, dyspnea on exertion, irregular heartbeat, leg swelling and palpitations.   Respiratory: Negative for cough, shortness of breath and wheezing.    Endocrine: Negative.    Skin: Negative for dry skin, flushing and rash.   Musculoskeletal: Negative for falls and myalgias.   Gastrointestinal: Negative for abdominal pain, change in bowel habit and melena.   Genitourinary: Negative for frequency and hematuria.   Neurological: Negative for dizziness, light-headedness, loss of balance and weakness.   Psychiatric/Behavioral: Negative for altered mental status and memory loss. The patient is not nervous/anxious.        Current Outpatient Medications   Medication Sig Dispense Refill   • apixaban (ELIQUIS) 5 MG tablet tablet Take 1 tablet by mouth Every 12 (Twelve) Hours. 60 tablet 11   • Cholecalciferol (vitamin D3)  "125 MCG (5000 UT) capsule capsule Take 2,000 Units by mouth Daily.     • Dapagliflozin Propanediol (Farxiga) 10 MG tablet Take 10 mg by mouth Daily. 30 tablet 11   • insulin aspart (novoLOG) 100 UNIT/ML injection Inject 10 Units under the skin into the appropriate area as directed 3 (Three) Times a Day With Meals. 10 mL 6   • insulin NPH-insulin regular (NovoLIN 70/30) (70-30) 100 UNIT/ML injection Inject 30 Units under the skin into the appropriate area as directed 2 (Two) Times a Day With Meals. 1 each 12   • metoprolol succinate XL (TOPROL-XL) 25 MG 24 hr tablet Take 1 tablet by mouth Daily. 30 tablet 11   • omeprazole (priLOSEC) 20 MG capsule Take 20 mg by mouth Daily.     • sacubitril-valsartan (ENTRESTO) 24-26 MG tablet Take 1 tablet by mouth 2 (Two) Times a Day. 60 tablet 6   • simvastatin (ZOCOR) 40 MG tablet Take 20 mg by mouth Every Night.       No current facility-administered medications for this visit.         Objective:     Vitals:    11/09/20 0947   BP: 142/80   BP Location: Left arm   Patient Position: Sitting   Cuff Size: Adult   Pulse: 81   SpO2: 94%   Weight: 103 kg (227 lb 6.4 oz)   Height: 182.9 cm (72\")       Wt Readings from Last 3 Encounters:   11/09/20 103 kg (227 lb 6.4 oz)   10/19/20 101 kg (222 lb 10.6 oz)   10/14/20 101 kg (222 lb)   hospital weights:        Physical Exam   Constitutional: He is oriented to person, place, and time. He appears well-developed and well-nourished. No distress.   HENT:   Head: Normocephalic and atraumatic.   Neck: Normal range of motion. No JVD present.   Cardiovascular: Normal rate, regular rhythm, S1 normal, S2 normal, normal heart sounds and intact distal pulses.   No murmur heard.  Pulmonary/Chest: Effort normal and breath sounds normal. No respiratory distress. He has no wheezes. He has no rales.   Abdominal: Soft. Bowel sounds are normal.   Musculoskeletal: Normal range of motion.         General: No edema.   Neurological: He is alert and oriented to " person, place, and time.   Skin: Skin is warm and dry. No erythema.   Psychiatric: He has a normal mood and affect. His behavior is normal. Judgment and thought content normal.       Cardiographics        Results for orders placed during the hospital encounter of 10/19/20   Adult Transthoracic Echo Complete W/ Cont if Necessary Per Protocol    Narrative · Left ventricular wall thickness is consistent with concentric   hypertrophy.  · Estimated left ventricular EF = 53% Left ventricular ejection fraction   appears to be 51 - 55%. Left ventricular systolic function is normal.  · Left ventricular diastolic function is consistent with (grade I)   impaired relaxation.  · The right ventricular cavity is mildly dilated.  · Mild aortic valve regurgitation is present.  · Trace mitral valve regurgitation is present.  · Estimated right ventricular systolic pressure from tricuspid   regurgitation is normal (<35 mmHg).  · Mild dilation of the aortic root is present (3.8 cms)        Left Ventricle Estimated EF appears to be in the range of 21 - 25%. There is left ventricular global hypokinesis noted.   The left ventricular cavity is borderline dilated. Left ventricular diastolic function is normal. There is no evidence of a left ventricular mass or thrombus present.       [facility] 10/26/18  Hemodynamic   Aortic pressure:140/96  Mean:113  Left ventricular pressure:120/32  Left ventriculogram: Not performed.         Coronaries: Right dominant system.     Left Main coronary artery: Left main coronary artery was a medium caliber vessel which was free of any disease.  The left main coronary artery bifurcated into the left anterior descending artery and circumflex artery.     Left anterior descending artery: Left anterior descending artery was a medium caliber vessel which were free of any obstructive stenosis with good GAMA-3 flow.  The diagonal branch was free of any obstructive stenosis with good GAMA-3 flow.     Circumflex artery: The  circumflex artery was a medium caliber vessel with the ramus intermedius branch the first and the second obtuse marginal branch which was free of any obstructive stenosis with good GAMA-3 flow.  The circumflex artery distally was also free of any obstructive stenosis with good GAMA-3 flow     Right coronary artery: The right coronary artery was a medium to large caliber dominant vessel which were free of any obstructive stenosis with good GAMA-3 flow     Estimated Blood Loss:  Minimal         Complications:  None; patient tolerated the procedure well.      Impression   1.  Elevated left ventricular end-diastolic pressure.  2.  No evidence of any obstructive epicardial coronary artery disease.     Recommendations: Patient positive troponin was probably secondary to the atrial fibrillation.  Patient was recommended medical management for the atrial fibrillation and to workup noncardiac etiology of chest pain.  Patient would need anticoagulation with Eliquis for the atrial fibrillation.           Param Mcfarland MD  10/26/2018  2:41 PM    No radiology results for the last 30 days.  Lab Review     No results found for: TSH  Lab Results   Component Value Date    GLUCOSE 534 (C) 08/28/2020    BUN 19 08/28/2020    CREATININE 1.15 08/28/2020    EGFRIFAFRI 81 08/28/2020    BCR 16.5 08/28/2020    K 4.5 08/28/2020    CO2 23.0 08/28/2020    CALCIUM 9.6 08/28/2020    ALBUMIN 4.20 08/28/2020    AST 13 08/28/2020    ALT 17 08/28/2020     Lab Results   Component Value Date    WBC 5.54 08/28/2020    HGB 15.0 08/28/2020    HCT 45.1 08/28/2020    MCV 80.8 08/28/2020     08/28/2020       Lab Results   Component Value Date    TROPONINI 0.142 (C) 10/25/2018    TROPONINT <0.010 08/28/2020     Lab Results   Component Value Date    PROBNP 107.9 08/28/2020          The following portions of the patient's history were reviewed and updated as appropriate: allergies, current medications, past family history, past medical history, past  social history, past surgical history and problem list.     Old records reviewed and pertinent information is included in the above objective data.     Assessment/Plan:        Diagnosis Plan   1. Chronic systolic congestive heart failure, EF improved (CMS/[facility])  NICM: EF:20% improved to 41-45%. NYHA Class I, Stage C. Patient appears euvolemic.  and in a well perfused physiologic state. Hemodynamics are acceptable  BETA-BLOCKER: Toprol XL 25mg - increase to 50mg.  ACE/ARB/ENTRESTO: 24/26mg BID   DIURETIC: n/a  ALDOSTERONT ANTAGONIST: NYHA Class I-II  IMDUR/HYDRALAZINE: n/a  DIGOXIN: n/a  Fluid restriction: 2 L  Sodium restriction:2 grams  6MWT: 11/13/18: not enough time to complete.   3/12/19: 516.1m  Cardiac Rehab: indicated.  ICD: needs 3 month OMT   ADHF: yes  CardioMEMS:not indicated  [place]: n/a  LVAD: not indicated at this time. Will evaluate response over the next 3 months.    Reiterated all educational points this visit.  Continue daily weight monitoring.  Continue restricted dietary sodium intake.  Discussed patient action plan for heart failure. Contact information for this office verbalized.  Recommended avoiding NSAIDs use.  Discussed warning signs requiring additional medical attention for heart failure.   Education points repeated back by patient.            2. PAF (paroxysmal atrial fibrillation) (CMS/[facility])  Atrial fibrillation paroxysmal - 7 days or less  CHADSVASC: CHF, HTN and DM  EF is 20%. No evidence for LVH. LA size is mildly dilated.    Anticoagulation:ordered  - Eliquis 5mg BID    Rate control agents:ordered   - Toprol XL 25mg daily- increase to 50mg     - Monitor wore, lots of AF    Rhythm control agents:not indicated    No longer follows with Dr. Mcfarland        3. Syncope and collapse  No more syncopal episodes. Had AF on monitor, no sustained VT       4. Leg cramps  - absent without statin. Will add as allergy    Could be due to the fact that he took his statin this AM, but he has had cramps  "in the past.   He self medicates with tomato juice, potassium, and magnesium.   Definitely has neuropathy, but this may be medication side effect. We discussed the best way to protect our legs is to control our blood sugar.        5. Type 2 diabetes mellitus with diabetic polyneuropathy, with long-term current use of insulin (CMS/[facility])  He has not done a good job at all with his blood sugars. They were not controlled in alf either.   He has been out of insulin, but using his dad's 70/30. He is using 40 units BID.   He also has Novolog-r (?). He has been taking 5 units for every 100 over 250-300.   I think he could use more short acting insulin. Lantus/levimer is not on formulary for [place] (according to Epic), so I will stick with the 70/30 because he did not like the PEN to begin with.     70/30 - 30 units BID  Novolog- 10 units for every 100 over 200.   Farxiga 10mg daily    Has not had to have Novolog. Has been skipping 70/30 because sugars is 200 \"and that's better\". Encouraged him to continue taking his insulin- will decrease the dose and not skip it. We discussed the fact that his sugar will only go higher throughout the day and skipping it is not acceptable. He seemed to understand, but unfortunately his compliance waxes and wanes.          Follow up 6 weeks.            This document has been electronically signed by NATHANIEL Wilson on November 9, 2020 09:51 CST      "

## 2020-11-09 NOTE — PATIENT INSTRUCTIONS
Insulin Aspart; Insulin Aspart Protamine injection  What is this medicine?  INSULIN ASPART; INSULIN ASPART PROTAMINE (IN oswald anuja AS part; IN oswald anuja AS part PRO kashif zhang) is a human-made form of insulin. This drug lowers the amount of sugar in your blood. This medicine is a mixture of a rapid-acting insulin and a longer-acting insulin. It starts working 10 to 20 minutes after injection and continues to work for as long as 12 to 24 hours.  This medicine may be used for other purposes; ask your health care provider or pharmacist if you have questions.  COMMON BRAND NAME(S): NovoLog Mix 70/30  What should I tell my health care provider before I take this medicine?  They need to know if you have any of these conditions:  · episodes of low blood sugar  · eye disease, vision problems  · kidney disease  · liver disease  · an unusual or allergic reaction to insulin, metacresol, other medicines, foods, dyes, or preservatives  · pregnant or trying to get pregnant  · breast-feeding  How should I use this medicine?  This medicine is for injection under the skin. Use exactly as directed. It is important to follow the directions given to you by your health care professional or doctor. You should inject this medicine within 15 minutes of starting your meal. You will be taught how to use this medicine and how to adjust doses for activities and illness. Do not use more insulin than prescribed. Do not use more or less often than prescribed.  Always check the appearance of your insulin before using it. This medicine should be white and cloudy. Do not use if it is not uniformly cloudy after mixing. To mix this medicine, roll the vial gently 10 times in your hands. Make sure to perform the mixing procedures before each injection. Do not mix this medicine with any other insulin or diluent.  If you use a pen, be sure to take off the outer needle cover before using the dose. It is important that you put your used needles and syringes in a  special sharps container. Do not put them in a trash can. If you do not have a sharps container, call your pharmacist or healthcare provider to get one.  This drug comes with INSTRUCTIONS FOR USE. Ask your pharmacist for directions on how to use this drug. Read the information carefully. Talk to your pharmacist or health care provider if you have questions.  Talk to your pediatrician regarding the use of this medicine in children. Special care may be needed.  Overdosage: If you think you have taken too much of this medicine contact a poison control center or emergency room at once.  NOTE: This medicine is only for you. Do not share this medicine with others.  What if I miss a dose?  It is important not to miss a dose. Your health care professional or doctor should discuss a plan for missed doses with you. If you do miss a dose, follow their plan. Do not take double doses.  What may interact with this medicine?  · other medicines for diabetes  Many medications may cause changes in blood sugar, these include:  · alcohol containing beverages  · antiviral medicines for HIV or AIDS  · aspirin and aspirin-like drugs  · certain medicines for blood pressure, heart disease, irregular heart beat  · chromium  · diuretics  · female hormones, such as estrogens or progestins, birth control pills  · fenofibrate  · gemfibrozil  · isoniazid  · lanreotide  · male hormones or anabolic steroids  · MAOIs like Carbex, Eldepryl, Marplan, Nardil, and Parnate  · medicines for weight loss  · medicines for allergies, asthma, cold, or cough  · medicines for depression, anxiety, or psychotic disturbances  · niacin  · nicotine  · NSAIDs, medicines for pain and inflammation, like ibuprofen or naproxen  · octreotide  · pasireotide  · pentamidine  · phenytoin  · probenecid  · quinolone antibiotics such as ciprofloxacin, levofloxacin, ofloxacin  · some herbal dietary supplements  · steroid medicines such as prednisone or  cortisone  · sulfamethoxazole; trimethoprim  · thyroid hormones  Some medications can hide the warning symptoms of low blood sugar (hypoglycemia). You may need to monitor your blood sugar more closely if you are taking one of these medications. These include:  · beta-blockers, often used for high blood pressure or heart problems (examples include atenolol, metoprolol, propranolol)  · clonidine  · guanethidine  · reserpine  This list may not describe all possible interactions. Give your health care provider a list of all the medicines, herbs, non-prescription drugs, or dietary supplements you use. Also tell them if you smoke, drink alcohol, or use illegal drugs. Some items may interact with your medicine.  What should I watch for while using this medicine?  Visit your health care professional or doctor for regular checks on your progress.  A test called the HbA1C (A1C) will be monitored. This is a simple blood test. It measures your blood sugar control over the last 2 to 3 months. You will receive this test every 3 to 6 months.  Learn how to check your blood sugar. Learn the symptoms of low and high blood sugar and how to manage them.  Always carry a quick-source of sugar with you in case you have symptoms of low blood sugar. Examples include hard sugar candy or glucose tablets. Make sure others know that you can choke if you eat or drink when you develop serious symptoms of low blood sugar, such as seizures or unconsciousness. They must get medical help at once.  Tell your doctor or health care professional if you have high blood sugar. You might need to change the dose of your medicine. If you are sick or exercising more than usual, you might need to change the dose of your medicine.  Do not skip meals. Ask your doctor or health care professional if you should avoid alcohol. Many nonprescription cough and cold products contain sugar or alcohol. These can affect blood sugar.  Make sure that you have the right kind  of syringe for the type of insulin you use. Try not to change the brand and type of insulin or syringe unless your health care professional or doctor tells you to. Switching insulin brand or type can cause dangerously high or low blood sugar. Always keep an extra supply of insulin, syringes, and needles on hand. Use a syringe one time only. Throw away syringe and needle in a closed container to prevent accidental needle sticks.  Insulin pens and cartridges should never be shared. Even if the needle is changed, sharing may result in passing of viruses like hepatitis or HIV.  Each time you get a new box of pen needles, check to see if they are the same type as the ones you were trained to use. If not, ask your health care professional to show you how to use this new type properly.  Wear a medical ID bracelet or chain, and carry a card that describes your disease and details of your medicine and dosage times.  What side effects may I notice from receiving this medicine?  Side effects that you should report to your doctor or health care professional as soon as possible:  · allergic reactions like skin rash, itching or hives, swelling of the face, lips, or tongue  · breathing problems  · signs and symptoms of high blood sugar such as dizziness, dry mouth, dry skin, fruity breath, nausea, stomach pain, increased hunger or thirst, increased urination  · signs and symptoms of low blood sugar such as feeling anxious, confusion, dizziness, increased hunger, unusually weak or tired, sweating, shakiness, cold, irritable, headache, blurred vision, fast heartbeat, loss of consciousness  Side effects that usually do not require medical attention (report to your doctor or health care professional if they continue or are bothersome):  · increase or decrease in fatty tissue under the skin due to overuse of a particular injection site  · itching, burning, swelling, or rash at site where injected  This list may not describe all  possible side effects. Call your doctor for medical advice about side effects. You may report side effects to FDA at 8-475-FDA-3958.  Where should I keep my medicine?  Keep out of the reach of children.  Unopened Vials:  Novolog Mix Vials: Store in a refrigerator between 2 and 8 degrees C (36 and 46 degrees F) or at room temperature below 30 degrees C (86 degrees F). Do not freeze or use if the insulin has been frozen. Protect from light and excessive heat. If stored at room temperature, the vial must be discarded after 28 days. Throw away any unopened and unused medicine that has been stored in the refrigerator after the expiration date.  Unopened Pens:  Novolog Mix FlexPens: Store in a refrigerator between 2 and 8 degrees C (36 and 46 degrees F) or at room temperature below 30 degrees C (86 degrees F). Do not freeze or use if the insulin has been frozen. Protect from light and excessive heat. If stored at room temperature, the pen must be discarded after 14 days. Throw away any unopened and unused medicine that has been stored in the refrigerator after the expiration date.  Vials that you are using:  Novolog Mix Vials: Store in the refrigerator or at room temperature below 30 degrees C (86 degrees F). Do not freeze. Keep away from heat and light. Throw the opened vial away after 28 days.  Pens that you are using:  Novolog Mix FlexPens: Store at room temperature below 30 degrees C (86 degrees F). Do not refrigerate or freeze. Keep away from heat and light. Throw the pen away after 14 days, even if it still has insulin left in it.  NOTE: This sheet is a summary. It may not cover all possible information. If you have questions about this medicine, talk to your doctor, pharmacist, or health care provider.  © 2020 [employer] (2020-09-01 08:04:20)     Diverticula of colon  09/20/2018    Active  Eitan Li

## 2020-12-21 ENCOUNTER — OFFICE VISIT (OUTPATIENT)
Dept: CARDIOLOGY | Facility: CLINIC | Age: 50
End: 2020-12-21

## 2020-12-21 VITALS — BODY MASS INDEX: 30.84 KG/M2 | HEIGHT: 72 IN

## 2020-12-21 DIAGNOSIS — I50.22 CHRONIC SYSTOLIC CONGESTIVE HEART FAILURE (HCC): Primary | ICD-10-CM

## 2020-12-21 DIAGNOSIS — R55 SYNCOPE AND COLLAPSE: ICD-10-CM

## 2020-12-21 DIAGNOSIS — Z79.4 TYPE 2 DIABETES MELLITUS WITH DIABETIC POLYNEUROPATHY, WITH LONG-TERM CURRENT USE OF INSULIN (HCC): ICD-10-CM

## 2020-12-21 DIAGNOSIS — I48.0 PAF (PAROXYSMAL ATRIAL FIBRILLATION) (HCC): ICD-10-CM

## 2020-12-21 DIAGNOSIS — E11.42 TYPE 2 DIABETES MELLITUS WITH DIABETIC POLYNEUROPATHY, WITH LONG-TERM CURRENT USE OF INSULIN (HCC): ICD-10-CM

## 2020-12-21 PROCEDURE — 99214 OFFICE O/P EST MOD 30 MIN: CPT | Performed by: NURSE PRACTITIONER

## 2020-12-21 RX ORDER — OMEPRAZOLE 20 MG/1
20 CAPSULE, DELAYED RELEASE ORAL DAILY
Qty: 30 CAPSULE | Refills: 11 | Status: SHIPPED | OUTPATIENT
Start: 2020-12-21 | End: 2021-02-25 | Stop reason: SDUPTHER

## 2020-12-21 NOTE — PROGRESS NOTES
[facility] CHF CLINIC OFFICE VISIT    Subjective:     Congestive Heart Failure      Congestive Heart Failure  Presents for follow-up visit. The disease course has been stable. The condition has lasted for 3 weeks. Associated symptoms include near-syncope. Pertinent negatives include no abdominal pain, chest pain, claudication, edema, fatigue, orthopnea, palpitations, shortness of breath or unexpected weight change. The symptoms have been stable. Past treatments include angiotensin receptor blockers, beta blockers, digoxin, exercise and salt and fluid restriction. The treatment provided moderate relief. Compliance with prior treatments has been good. His past medical history is significant for arrhythmia, DM and HTN. There is no history of CAD or chronic lung disease. Compliance with total regimen is 51-75%. Compliance with diet is 51-75%. Compliance with exercise is 51-75%. Compliance with medications is 0-25%.     Mr. Willingham is a 47 year old male who presented to [facility] with 2 week symptoms of orthopnea, palpitations, fatigue, dyspnea, and chest pressure. He was found to have AF with RVR, EF of 20%, and diagnosed with NICM. He diuresed well on IV Lasix and lost 26lbs. His rate was controlled with Lopressor, Amiodarone, Cardizem, and Digoxin. He does have history of DM and HTN.   Began noticing symptoms sometime in the summer. But symptoms escalated in October when he could not walk back from the store. Usually smokes less than a pack a day. Uses Dip. Has been using Meth for the past two years. Has not used in the past 2 weeks. Appears high today.     Changed Mr. Willingham to Entresto 11/13/18 and attempted to lower Lasix dose. Tolerating this change well. He has seen Dr. Mcfarland who lowered AMIO to once daily.      10/14/20: Mr. Willingham presents today following a 1.5 year absence due to incarceration. He was on most of his medications while in skilled nursing. He remains NYHA Class I.   Complains of recent falls. Falls from standing with a  "tunnel vision. Sounds like syncope. Happened 7 times since March. Will place heart monitor. Repeat echo.   Leg cramps this morning. Sounds like a statin allergy, but he is not sure since he's only had a couple doses this month. Has dealt with cramps in hands and legs prior with self medication of magnesium, potassium, tomato juice, and bananas. Has neuropathy in his legs already.     11/9/20: Improved. Blood sugars around 200. Farxiga and 70/30 working. Small weight gain, eating well. Back to adding salt. Drinking lots of water and fluids. We discussed the balance of intake and output. Discussed medication complaince. He is making a good attempt. Mom and dad are helpful, the house he lives in with GF and her kids is not a good environment. He told me his anxiety and depression is worsening. He does admit to THC use which is helpful in controlling his anxiety and keeps him from using meth.     12/21/20: You have chosen to receive care through a telephone visit. Do you consent to use a telephone visit for your medical care today? Yes  This visit has been rescheduled as a phone visit to comply with patient safety concerns in accordance with CDC recommendations. Total time of discussion was 12 minutes.    70/30 working when he takes it. He fell asleep last night and forgot it. BS this AM was over 400. Again, he was pleaded with to take care of himself. He has a blister wound on his foot he says is healing well. He knows the risks associated with that and will present for any foul smell or non-healing area. No syncopal episodes. Increased his Toprol XL last visit due to AF and VT on holter monitor. If he has anymore syncope he will let me know, he may need ICD placement. He is taking his medications. We went over them today. When asked about drug use, he is still using THC for anxiety (which helps him a lot). He has used meth \"a little\". He was again, pleaded with to stop. He has not required diuretic. He drinks a lot of " volume and urinates well.       Diet: Back to adding salt to his food. Has a salt shaker on his night stand.   Fluids: Drinks water. Used to drink as much water as he could. Now he has been drinking more soda than usual. Sprite Zero. Drinks out of a 32oz cup. Buys 2L bottles.   Activity: Very active out around house.     PCP: NATHANIEL Garcia  Cardiologist: [facility]. I will follow him for now and establish as needed with MD  Nephrologist: n/a  Pulmonologist: n/a    Hospitalizations:  10/25/18- 10/30/18- [facility]    Past Medical History:   Diagnosis Date   • Anxiety    • Depression    • Diabetes mellitus (CMS/HCC)    • GERD (gastroesophageal reflux disease)    • Heart murmur    • Low back pain      Past Surgical History:   Procedure Laterality Date   • CARDIAC CATHETERIZATION N/A 10/26/2018    Procedure: Left Heart Cath;  Surgeon: Param Mcfarland MD;  Location: [facility] INVASIVE LOCATION;  Service: Cardiology   • ELBOW PROCEDURE      left   • ROTATOR CUFF REPAIR      RIGHT   • TUMOR REMOVAL      FATTY TUMOR   • WRIST SURGERY      RIGHT     Social History     Socioeconomic History   • Marital status:      Spouse name: Not on file   • Number of children: Not on file   • Years of education: Not on file   • Highest education level: Not on file   Tobacco Use   • Smoking status: Current Every Day Smoker     Packs/day: 1.00     Types: Cigarettes   • Smokeless tobacco: Current User     Types: Snuff   Substance and Sexual Activity   • Alcohol use: Yes     Alcohol/week: 1.0 standard drinks     Types: 1 Cans of beer per week     Frequency: 2-4 times a month     Drinks per session: 1 or 2     Binge frequency: Never     Comment: moderate   • Drug use: Yes     Types: Marijuana, Methamphetamines     Comment: meth in the last month or so.    • Sexual activity: Defer     Allergies:  Allergies   Allergen Reactions   • Statins Myalgia   • Sulfa Antibiotics Unknown (See Comments)     Remembers mother always saying he was  allergic       Review of Systems   Constitution: Negative for chills, decreased appetite, fatigue, fever and unexpected weight change.   HENT: Negative.    Eyes: Negative.    Cardiovascular: Positive for near-syncope. Negative for chest pain, claudication, dyspnea on exertion, irregular heartbeat, leg swelling and palpitations.   Respiratory: Negative for cough, shortness of breath and wheezing.    Endocrine: Negative.    Skin: Negative for dry skin, flushing and rash.   Musculoskeletal: Negative for falls and myalgias.   Gastrointestinal: Negative for abdominal pain, change in bowel habit and melena.   Genitourinary: Negative for frequency and hematuria.   Neurological: Negative for dizziness, light-headedness, loss of balance and weakness.   Psychiatric/Behavioral: Negative for altered mental status and memory loss. The patient is not nervous/anxious.        Current Outpatient Medications   Medication Sig Dispense Refill   • apixaban (ELIQUIS) 5 MG tablet tablet Take 1 tablet by mouth Every 12 (Twelve) Hours. 60 tablet 11   • Cholecalciferol (vitamin D3) 125 MCG (5000 UT) capsule capsule Take 2,000 Units by mouth Daily.     • Dapagliflozin Propanediol (Farxiga) 10 MG tablet Take 10 mg by mouth Daily. 30 tablet 11   • insulin aspart (novoLOG) 100 UNIT/ML injection Inject 10 Units under the skin into the appropriate area as directed 3 (Three) Times a Day With Meals. 10 mL 6   • insulin NPH-insulin regular (NovoLIN 70/30) (70-30) 100 UNIT/ML injection Inject 30 Units under the skin into the appropriate area as directed 2 (Two) Times a Day With Meals. 1 each 12   • metoprolol succinate XL (TOPROL-XL) 50 MG 24 hr tablet Take 1 tablet by mouth Daily. 90 tablet 3   • omeprazole (priLOSEC) 20 MG capsule Take 20 mg by mouth Daily.     • sacubitril-valsartan (ENTRESTO) 24-26 MG tablet Take 1 tablet by mouth 2 (Two) Times a Day. 60 tablet 6     No current facility-administered medications for this visit.         Objective:  "    Vitals:    12/21/20 0901   Height: 182.9 cm (72\")       Wt Readings from Last 3 Encounters:   11/09/20 103 kg (227 lb 6.4 oz)   10/19/20 101 kg (222 lb 10.6 oz)   10/14/20 101 kg (222 lb)   hospital weights:        Physical Exam       Cardiographics        Results for orders placed during the hospital encounter of 10/19/20   Adult Transthoracic Echo Complete W/ Cont if Necessary Per Protocol    Narrative · Left ventricular wall thickness is consistent with concentric   hypertrophy.  · Estimated left ventricular EF = 53% Left ventricular ejection fraction   appears to be 51 - 55%. Left ventricular systolic function is normal.  · Left ventricular diastolic function is consistent with (grade I)   impaired relaxation.  · The right ventricular cavity is mildly dilated.  · Mild aortic valve regurgitation is present.  · Trace mitral valve regurgitation is present.  · Estimated right ventricular systolic pressure from tricuspid   regurgitation is normal (<35 mmHg).  · Mild dilation of the aortic root is present (3.8 cms)        Left Ventricle Estimated EF appears to be in the range of 21 - 25%. There is left ventricular global hypokinesis noted.   The left ventricular cavity is borderline dilated. Left ventricular diastolic function is normal. There is no evidence of a left ventricular mass or thrombus present.       [facility] 10/26/18  Hemodynamic   Aortic pressure:140/96  Mean:113  Left ventricular pressure:120/32  Left ventriculogram: Not performed.         Coronaries: Right dominant system.     Left Main coronary artery: Left main coronary artery was a medium caliber vessel which was free of any disease.  The left main coronary artery bifurcated into the left anterior descending artery and circumflex artery.     Left anterior descending artery: Left anterior descending artery was a medium caliber vessel which were free of any obstructive stenosis with good GAMA-3 flow.  The diagonal branch was free of any obstructive " stenosis with good GAMA-3 flow.     Circumflex artery: The circumflex artery was a medium caliber vessel with the ramus intermedius branch the first and the second obtuse marginal branch which was free of any obstructive stenosis with good GAMA-3 flow.  The circumflex artery distally was also free of any obstructive stenosis with good GAMA-3 flow     Right coronary artery: The right coronary artery was a medium to large caliber dominant vessel which were free of any obstructive stenosis with good GAMA-3 flow     Estimated Blood Loss:  Minimal         Complications:  None; patient tolerated the procedure well.      Impression   1.  Elevated left ventricular end-diastolic pressure.  2.  No evidence of any obstructive epicardial coronary artery disease.     Recommendations: Patient positive troponin was probably secondary to the atrial fibrillation.  Patient was recommended medical management for the atrial fibrillation and to workup noncardiac etiology of chest pain.  Patient would need anticoagulation with Eliquis for the atrial fibrillation.           Param Mcfarland MD  10/26/2018  2:41 PM    No radiology results for the last 30 days.  Lab Review     No results found for: TSH  Lab Results   Component Value Date    GLUCOSE 534 (C) 08/28/2020    BUN 19 08/28/2020    CREATININE 1.15 08/28/2020    EGFRIFAFRI 81 08/28/2020    BCR 16.5 08/28/2020    K 4.5 08/28/2020    CO2 23.0 08/28/2020    CALCIUM 9.6 08/28/2020    ALBUMIN 4.20 08/28/2020    AST 13 08/28/2020    ALT 17 08/28/2020     Lab Results   Component Value Date    WBC 5.54 08/28/2020    HGB 15.0 08/28/2020    HCT 45.1 08/28/2020    MCV 80.8 08/28/2020     08/28/2020       Lab Results   Component Value Date    TROPONINI 0.142 (C) 10/25/2018    TROPONINT <0.010 08/28/2020     Lab Results   Component Value Date    PROBNP 107.9 08/28/2020          The following portions of the patient's history were reviewed and updated as appropriate: allergies, current  medications, past family history, past medical history, past social history, past surgical history and problem list.     Old records reviewed and pertinent information is included in the above objective data.     Assessment/Plan:        Diagnosis Plan   1. Chronic systolic congestive heart failure, EF improved (CMS/[facility])  NICM: EF:20% improved to 41-45%. NYHA Class I, Stage C. Patient appears euvolemic.  and in a well perfused physiologic state. Hemodynamics are acceptable  BETA-BLOCKER: Toprol XL 50mg.  ACE/ARB/ENTRESTO: 24/26mg BID   DIURETIC: n/a  ALDOSTERONT ANTAGONIST: NYHA Class I-II  IMDUR/HYDRALAZINE: n/a  DIGOXIN: n/a  Fluid restriction: 2 L  Sodium restriction:2 grams  6MWT: 11/13/18: not enough time to complete.   3/12/19: 516.1m  Cardiac Rehab: indicated.  ICD: needs 3 month OMT   ADHF: yes  CardioMEMS:not indicated  Corvue: n/a  LVAD: not indicated at this time. Will evaluate response over the next 3 months.    Reiterated all educational points this visit.  Continue daily weight monitoring.  Continue restricted dietary sodium intake.  Discussed patient action plan for heart failure. Contact information for this office verbalized.  Recommended avoiding NSAIDs use.  Discussed warning signs requiring additional medical attention for heart failure.   Education points repeated back by patient.            2. PAF (paroxysmal atrial fibrillation) (CMS/[facility])  Atrial fibrillation paroxysmal - 7 days or less  CHADSVASC: CHF, HTN and DM  EF is 20%. No evidence for LVH. LA size is mildly dilated.    Anticoagulation:ordered  - Eliquis 5mg BID    Rate control agents:ordered   - Toprol XL 50mg     - Monitor wore, lots of AF    Rhythm control agents:not indicated    No longer follows with Dr. Mcfarland        3. Syncope and collapse  No more syncopal episodes. Had AF on monitor, no sustained VT but short run.       4. Leg cramps  - absent without statin. Will add as allergy    Could be due to the fact that he took his  statin this AM, but he has had cramps in the past.   He self medicates with tomato juice, potassium, and magnesium.   Definitely has neuropathy, but this may be medication side effect. We discussed the best way to protect our legs is to control our blood sugar.        5. Type 2 diabetes mellitus with diabetic polyneuropathy, with long-term current use of insulin (CMS/[facility])  He has not done a good job at all with his blood sugars. They were not controlled in half-way either.   He has been out of insulin, but using his dad's 70/30. He is using 40 units BID.   He also has Novolog-r (?). He has been taking 5 units for every 100 over 250-300.   I think he could use more short acting insulin. Lantus/levimer is not on formulary for [place] (according to Epic), so I will stick with the 70/30 because he did not like the PEN to begin with.     70/30 - 30 units BID  Novolog- 10 units for every 100 over 200.   Farxiga 10mg daily  - statin intolerance    Has not had to have Novolog (or has not used it)         Follow up 3 months.             This document has been electronically signed by NATHANIEL Wilson on December 21, 2020 11:03 CST

## 2021-02-25 RX ORDER — OMEPRAZOLE 20 MG/1
20 CAPSULE, DELAYED RELEASE ORAL DAILY
Qty: 30 CAPSULE | Refills: 11 | Status: ON HOLD | OUTPATIENT
Start: 2021-02-25 | End: 2021-03-02 | Stop reason: SDUPTHER

## 2021-02-27 ENCOUNTER — APPOINTMENT (OUTPATIENT)
Dept: GENERAL RADIOLOGY | Facility: HOSPITAL | Age: 51
End: 2021-02-27

## 2021-02-27 ENCOUNTER — HOSPITAL ENCOUNTER (OUTPATIENT)
Facility: HOSPITAL | Age: 51
Setting detail: OBSERVATION
Discharge: HOME | End: 2021-03-02
Attending: EMERGENCY MEDICINE | Admitting: FAMILY MEDICINE

## 2021-02-27 ENCOUNTER — APPOINTMENT (OUTPATIENT)
Dept: CT IMAGING | Facility: HOSPITAL | Age: 51
End: 2021-02-27

## 2021-02-27 DIAGNOSIS — I49.3 FREQUENT PVCS: ICD-10-CM

## 2021-02-27 DIAGNOSIS — R55 NEAR SYNCOPE: Primary | ICD-10-CM

## 2021-02-27 LAB
ALBUMIN SERPL-MCNC: 3.9 G/DL (ref 3.5–5.2)
ALBUMIN/GLOB SERPL: 1.1 G/DL
ALP SERPL-CCNC: 76 U/L (ref 39–117)
ALT SERPL W P-5'-P-CCNC: 19 U/L (ref 1–41)
ANION GAP SERPL CALCULATED.3IONS-SCNC: 12 MMOL/L (ref 5–15)
AST SERPL-CCNC: 16 U/L (ref 1–40)
BASOPHILS # BLD AUTO: 0.04 10*3/MM3 (ref 0–0.2)
BASOPHILS NFR BLD AUTO: 0.5 % (ref 0–1.5)
BILIRUB SERPL-MCNC: 0.3 MG/DL (ref 0–1.2)
BUN SERPL-MCNC: 17 MG/DL (ref 6–20)
BUN/CREAT SERPL: 18.9 (ref 7–25)
CALCIUM SPEC-SCNC: 9.6 MG/DL (ref 8.6–10.5)
CHLORIDE SERPL-SCNC: 97 MMOL/L (ref 98–107)
CO2 SERPL-SCNC: 25 MMOL/L (ref 22–29)
CREAT SERPL-MCNC: 0.9 MG/DL (ref 0.76–1.27)
DEPRECATED RDW RBC AUTO: 40.4 FL (ref 37–54)
EOSINOPHIL # BLD AUTO: 0.03 10*3/MM3 (ref 0–0.4)
EOSINOPHIL NFR BLD AUTO: 0.3 % (ref 0.3–6.2)
ERYTHROCYTE [DISTWIDTH] IN BLOOD BY AUTOMATED COUNT: 13.9 % (ref 12.3–15.4)
ETHANOL BLD-MCNC: <10 MG/DL (ref 0–10)
ETHANOL UR QL: <0.01 %
GFR SERPL CREATININE-BSD FRML MDRD: 107 ML/MIN/1.73
GLOBULIN UR ELPH-MCNC: 3.6 GM/DL
GLUCOSE SERPL-MCNC: 180 MG/DL (ref 65–99)
HCT VFR BLD AUTO: 44.1 % (ref 37.5–51)
HGB BLD-MCNC: 14.9 G/DL (ref 13–17.7)
IMM GRANULOCYTES # BLD AUTO: 0.02 10*3/MM3 (ref 0–0.05)
IMM GRANULOCYTES NFR BLD AUTO: 0.2 % (ref 0–0.5)
LIPASE SERPL-CCNC: 16 U/L (ref 13–60)
LYMPHOCYTES # BLD AUTO: 1.62 10*3/MM3 (ref 0.7–3.1)
LYMPHOCYTES NFR BLD AUTO: 18.7 % (ref 19.6–45.3)
MAGNESIUM SERPL-MCNC: 2.3 MG/DL (ref 1.6–2.6)
MCH RBC QN AUTO: 27.7 PG (ref 26.6–33)
MCHC RBC AUTO-ENTMCNC: 33.8 G/DL (ref 31.5–35.7)
MCV RBC AUTO: 82 FL (ref 79–97)
MONOCYTES # BLD AUTO: 0.74 10*3/MM3 (ref 0.1–0.9)
MONOCYTES NFR BLD AUTO: 8.5 % (ref 5–12)
NEUTROPHILS NFR BLD AUTO: 6.22 10*3/MM3 (ref 1.7–7)
NEUTROPHILS NFR BLD AUTO: 71.8 % (ref 42.7–76)
NRBC BLD AUTO-RTO: 0 /100 WBC (ref 0–0.2)
PLATELET # BLD AUTO: 227 10*3/MM3 (ref 140–450)
PMV BLD AUTO: 11.6 FL (ref 6–12)
POTASSIUM SERPL-SCNC: 3.8 MMOL/L (ref 3.5–5.2)
PROT SERPL-MCNC: 7.5 G/DL (ref 6–8.5)
RBC # BLD AUTO: 5.38 10*6/MM3 (ref 4.14–5.8)
SODIUM SERPL-SCNC: 134 MMOL/L (ref 136–145)
TROPONIN T SERPL-MCNC: <0.01 NG/ML (ref 0–0.03)
WBC # BLD AUTO: 8.67 10*3/MM3 (ref 3.4–10.8)

## 2021-02-27 PROCEDURE — 83735 ASSAY OF MAGNESIUM: CPT | Performed by: EMERGENCY MEDICINE

## 2021-02-27 PROCEDURE — 80053 COMPREHEN METABOLIC PANEL: CPT | Performed by: EMERGENCY MEDICINE

## 2021-02-27 PROCEDURE — 74177 CT ABD & PELVIS W/CONTRAST: CPT

## 2021-02-27 PROCEDURE — 99285 EMERGENCY DEPT VISIT HI MDM: CPT

## 2021-02-27 PROCEDURE — 25010000002 PROCHLORPERAZINE 10 MG/2ML SOLUTION: Performed by: EMERGENCY MEDICINE

## 2021-02-27 PROCEDURE — 85025 COMPLETE CBC W/AUTO DIFF WBC: CPT | Performed by: EMERGENCY MEDICINE

## 2021-02-27 PROCEDURE — 93005 ELECTROCARDIOGRAM TRACING: CPT | Performed by: EMERGENCY MEDICINE

## 2021-02-27 PROCEDURE — 71045 X-RAY EXAM CHEST 1 VIEW: CPT

## 2021-02-27 PROCEDURE — 83690 ASSAY OF LIPASE: CPT | Performed by: EMERGENCY MEDICINE

## 2021-02-27 PROCEDURE — 96374 THER/PROPH/DIAG INJ IV PUSH: CPT

## 2021-02-27 PROCEDURE — 84484 ASSAY OF TROPONIN QUANT: CPT | Performed by: EMERGENCY MEDICINE

## 2021-02-27 PROCEDURE — 93010 ELECTROCARDIOGRAM REPORT: CPT | Performed by: INTERNAL MEDICINE

## 2021-02-27 PROCEDURE — 82077 ASSAY SPEC XCP UR&BREATH IA: CPT | Performed by: EMERGENCY MEDICINE

## 2021-02-27 PROCEDURE — 96361 HYDRATE IV INFUSION ADD-ON: CPT

## 2021-02-27 RX ORDER — ONDANSETRON 2 MG/ML
4 INJECTION INTRAMUSCULAR; INTRAVENOUS ONCE
Status: DISCONTINUED | OUTPATIENT
Start: 2021-02-27 | End: 2021-02-27

## 2021-02-27 RX ORDER — SODIUM CHLORIDE 0.9 % (FLUSH) 0.9 %
10 SYRINGE (ML) INJECTION AS NEEDED
Status: DISCONTINUED | OUTPATIENT
Start: 2021-02-27 | End: 2021-02-28

## 2021-02-27 RX ORDER — PROCHLORPERAZINE EDISYLATE 5 MG/ML
5 INJECTION INTRAMUSCULAR; INTRAVENOUS ONCE
Status: COMPLETED | OUTPATIENT
Start: 2021-02-27 | End: 2021-02-27

## 2021-02-27 RX ADMIN — SODIUM CHLORIDE 1000 ML: 9 INJECTION, SOLUTION INTRAVENOUS at 22:50

## 2021-02-27 RX ADMIN — PROCHLORPERAZINE EDISYLATE 5 MG: 5 INJECTION INTRAMUSCULAR; INTRAVENOUS at 22:50

## 2021-02-28 PROBLEM — R55 NEAR SYNCOPE: Status: ACTIVE | Noted: 2021-02-28

## 2021-02-28 LAB
AMPHET+METHAMPHET UR QL: NEGATIVE
AMPHETAMINES UR QL: NEGATIVE
BACTERIA UR QL AUTO: ABNORMAL /HPF
BARBITURATES UR QL SCN: NEGATIVE
BENZODIAZ UR QL SCN: NEGATIVE
BILIRUB UR QL STRIP: NEGATIVE
BUPRENORPHINE SERPL-MCNC: NEGATIVE NG/ML
CANNABINOIDS SERPL QL: POSITIVE
CLARITY UR: CLEAR
COCAINE UR QL: NEGATIVE
COLOR UR: YELLOW
FLUAV RNA RESP QL NAA+PROBE: NOT DETECTED
FLUBV RNA RESP QL NAA+PROBE: NOT DETECTED
GLUCOSE BLDC GLUCOMTR-MCNC: 123 MG/DL (ref 70–130)
GLUCOSE BLDC GLUCOMTR-MCNC: 185 MG/DL (ref 70–130)
GLUCOSE BLDC GLUCOMTR-MCNC: 232 MG/DL (ref 70–130)
GLUCOSE BLDC GLUCOMTR-MCNC: 244 MG/DL (ref 70–130)
GLUCOSE UR STRIP-MCNC: ABNORMAL MG/DL
HGB UR QL STRIP.AUTO: NEGATIVE
HOLD SPECIMEN: NORMAL
HYALINE CASTS UR QL AUTO: ABNORMAL /LPF
KETONES UR QL STRIP: ABNORMAL
LEUKOCYTE ESTERASE UR QL STRIP.AUTO: NEGATIVE
METHADONE UR QL SCN: NEGATIVE
NITRITE UR QL STRIP: NEGATIVE
OPIATES UR QL: NEGATIVE
OXYCODONE UR QL SCN: NEGATIVE
PCP UR QL SCN: NEGATIVE
PH UR STRIP.AUTO: 7.5 [PH] (ref 5–9)
PROPOXYPH UR QL: NEGATIVE
PROT UR QL STRIP: ABNORMAL
QT INTERVAL: 398 MS
QTC INTERVAL: 486 MS
RBC # UR: ABNORMAL /HPF
REF LAB TEST METHOD: ABNORMAL
SARS-COV-2 RNA RESP QL NAA+PROBE: NOT DETECTED
SP GR UR STRIP: 1.04 (ref 1–1.03)
SQUAMOUS #/AREA URNS HPF: ABNORMAL /HPF
TRICYCLICS UR QL SCN: NEGATIVE
TROPONIN T SERPL-MCNC: <0.01 NG/ML (ref 0–0.03)
UROBILINOGEN UR QL STRIP: ABNORMAL
WBC UR QL AUTO: ABNORMAL /HPF
WHOLE BLOOD HOLD SPECIMEN: NORMAL
WHOLE BLOOD HOLD SPECIMEN: NORMAL

## 2021-02-28 PROCEDURE — 82962 GLUCOSE BLOOD TEST: CPT

## 2021-02-28 PROCEDURE — 25010000002 IOPAMIDOL 61 % SOLUTION: Performed by: EMERGENCY MEDICINE

## 2021-02-28 PROCEDURE — G0378 HOSPITAL OBSERVATION PER HR: HCPCS

## 2021-02-28 PROCEDURE — 81001 URINALYSIS AUTO W/SCOPE: CPT | Performed by: EMERGENCY MEDICINE

## 2021-02-28 PROCEDURE — 87636 SARSCOV2 & INF A&B AMP PRB: CPT | Performed by: EMERGENCY MEDICINE

## 2021-02-28 PROCEDURE — 63710000001 INSULIN ISOPHANE & REGULAR PER 5 UNITS: Performed by: INTERNAL MEDICINE

## 2021-02-28 PROCEDURE — 96361 HYDRATE IV INFUSION ADD-ON: CPT

## 2021-02-28 PROCEDURE — 80306 DRUG TEST PRSMV INSTRMNT: CPT | Performed by: EMERGENCY MEDICINE

## 2021-02-28 PROCEDURE — 84484 ASSAY OF TROPONIN QUANT: CPT | Performed by: INTERNAL MEDICINE

## 2021-02-28 PROCEDURE — C9803 HOPD COVID-19 SPEC COLLECT: HCPCS

## 2021-02-28 PROCEDURE — 63710000001 INSULIN ASPART PER 5 UNITS: Performed by: INTERNAL MEDICINE

## 2021-02-28 RX ORDER — DEXTROSE MONOHYDRATE 25 G/50ML
25 INJECTION, SOLUTION INTRAVENOUS
Status: DISCONTINUED | OUTPATIENT
Start: 2021-02-28 | End: 2021-03-02 | Stop reason: HOSPADM

## 2021-02-28 RX ORDER — SODIUM CHLORIDE, SODIUM LACTATE, POTASSIUM CHLORIDE, CALCIUM CHLORIDE 600; 310; 30; 20 MG/100ML; MG/100ML; MG/100ML; MG/100ML
75 INJECTION, SOLUTION INTRAVENOUS CONTINUOUS
Status: DISCONTINUED | OUTPATIENT
Start: 2021-02-28 | End: 2021-03-02 | Stop reason: HOSPADM

## 2021-02-28 RX ORDER — ONDANSETRON 2 MG/ML
4 INJECTION INTRAMUSCULAR; INTRAVENOUS EVERY 6 HOURS PRN
Status: DISCONTINUED | OUTPATIENT
Start: 2021-02-28 | End: 2021-03-02 | Stop reason: HOSPADM

## 2021-02-28 RX ORDER — METOPROLOL SUCCINATE 50 MG/1
50 TABLET, EXTENDED RELEASE ORAL DAILY
Status: DISCONTINUED | OUTPATIENT
Start: 2021-02-28 | End: 2021-03-02 | Stop reason: HOSPADM

## 2021-02-28 RX ORDER — PANTOPRAZOLE SODIUM 40 MG/1
40 TABLET, DELAYED RELEASE ORAL EVERY MORNING
Status: DISCONTINUED | OUTPATIENT
Start: 2021-02-28 | End: 2021-03-02 | Stop reason: HOSPADM

## 2021-02-28 RX ORDER — SODIUM CHLORIDE 0.9 % (FLUSH) 0.9 %
10 SYRINGE (ML) INJECTION AS NEEDED
Status: DISCONTINUED | OUTPATIENT
Start: 2021-02-28 | End: 2021-03-02 | Stop reason: HOSPADM

## 2021-02-28 RX ORDER — ACETAMINOPHEN 325 MG/1
650 TABLET ORAL EVERY 4 HOURS PRN
Status: DISCONTINUED | OUTPATIENT
Start: 2021-02-28 | End: 2021-03-02 | Stop reason: HOSPADM

## 2021-02-28 RX ORDER — NICOTINE POLACRILEX 4 MG
15 LOZENGE BUCCAL
Status: DISCONTINUED | OUTPATIENT
Start: 2021-02-28 | End: 2021-03-02 | Stop reason: HOSPADM

## 2021-02-28 RX ORDER — SODIUM CHLORIDE 0.9 % (FLUSH) 0.9 %
10 SYRINGE (ML) INJECTION EVERY 12 HOURS SCHEDULED
Status: DISCONTINUED | OUTPATIENT
Start: 2021-02-28 | End: 2021-03-02 | Stop reason: HOSPADM

## 2021-02-28 RX ADMIN — PANTOPRAZOLE SODIUM 40 MG: 40 TABLET, DELAYED RELEASE ORAL at 06:21

## 2021-02-28 RX ADMIN — SODIUM CHLORIDE, POTASSIUM CHLORIDE, SODIUM LACTATE AND CALCIUM CHLORIDE 100 ML/HR: 600; 310; 30; 20 INJECTION, SOLUTION INTRAVENOUS at 05:30

## 2021-02-28 RX ADMIN — METOPROLOL SUCCINATE 50 MG: 50 TABLET, EXTENDED RELEASE ORAL at 09:12

## 2021-02-28 RX ADMIN — SODIUM CHLORIDE, POTASSIUM CHLORIDE, SODIUM LACTATE AND CALCIUM CHLORIDE 75 ML/HR: 600; 310; 30; 20 INJECTION, SOLUTION INTRAVENOUS at 20:35

## 2021-02-28 RX ADMIN — HUMAN INSULIN 30 UNITS: 100 INJECTION, SUSPENSION SUBCUTANEOUS at 17:39

## 2021-02-28 RX ADMIN — SODIUM CHLORIDE, PRESERVATIVE FREE 10 ML: 5 INJECTION INTRAVENOUS at 09:11

## 2021-02-28 RX ADMIN — IOPAMIDOL 90 ML: 612 INJECTION, SOLUTION INTRAVENOUS at 00:18

## 2021-02-28 RX ADMIN — INSULIN ASPART 2 UNITS: 100 INJECTION, SOLUTION INTRAVENOUS; SUBCUTANEOUS at 17:39

## 2021-02-28 RX ADMIN — INSULIN ASPART 4 UNITS: 100 INJECTION, SOLUTION INTRAVENOUS; SUBCUTANEOUS at 11:51

## 2021-02-28 RX ADMIN — HUMAN INSULIN 30 UNITS: 100 INJECTION, SUSPENSION SUBCUTANEOUS at 09:11

## 2021-03-01 ENCOUNTER — APPOINTMENT (OUTPATIENT)
Dept: CARDIOLOGY | Facility: HOSPITAL | Age: 51
End: 2021-03-01

## 2021-03-01 ENCOUNTER — APPOINTMENT (OUTPATIENT)
Dept: NUCLEAR MEDICINE | Facility: HOSPITAL | Age: 51
End: 2021-03-01

## 2021-03-01 LAB
BH CV STRESS BP STAGE 1: NORMAL
BH CV STRESS COMMENTS STAGE 1: NORMAL
BH CV STRESS DOSE REGADENOSON STAGE 1: 0.4
BH CV STRESS DURATION MIN STAGE 1: 0
BH CV STRESS DURATION SEC STAGE 1: 10
BH CV STRESS HR STAGE 1: 119
BH CV STRESS PROTOCOL 1: NORMAL
BH CV STRESS RECOVERY BP: NORMAL MMHG
BH CV STRESS RECOVERY HR: 111 BPM
BH CV STRESS STAGE 1: 1
GLUCOSE BLDC GLUCOMTR-MCNC: 152 MG/DL (ref 70–130)
GLUCOSE BLDC GLUCOMTR-MCNC: 186 MG/DL (ref 70–130)
GLUCOSE BLDC GLUCOMTR-MCNC: 206 MG/DL (ref 70–130)
GLUCOSE BLDC GLUCOMTR-MCNC: 231 MG/DL (ref 70–130)
LV EF NUC BP: 49 %
MAXIMAL PREDICTED HEART RATE: 168 BPM
PERCENT MAX PREDICTED HR: 73.21 %
STRESS BASELINE BP: NORMAL MMHG
STRESS BASELINE HR: 89 BPM
STRESS PERCENT HR: 86 %
STRESS POST ESTIMATED WORKLOAD: 1 METS
STRESS POST PEAK BP: NORMAL MMHG
STRESS POST PEAK HR: 123 BPM
STRESS TARGET HR: 143 BPM

## 2021-03-01 PROCEDURE — 93017 CV STRESS TEST TRACING ONLY: CPT

## 2021-03-01 PROCEDURE — 25010000002 ONDANSETRON PER 1 MG: Performed by: INTERNAL MEDICINE

## 2021-03-01 PROCEDURE — G0378 HOSPITAL OBSERVATION PER HR: HCPCS

## 2021-03-01 PROCEDURE — 96375 TX/PRO/DX INJ NEW DRUG ADDON: CPT

## 2021-03-01 PROCEDURE — 78452 HT MUSCLE IMAGE SPECT MULT: CPT

## 2021-03-01 PROCEDURE — 0 TECHNETIUM SESTAMIBI: Performed by: INTERNAL MEDICINE

## 2021-03-01 PROCEDURE — 63710000001 INSULIN ASPART PER 5 UNITS: Performed by: INTERNAL MEDICINE

## 2021-03-01 PROCEDURE — 96361 HYDRATE IV INFUSION ADD-ON: CPT

## 2021-03-01 PROCEDURE — 82962 GLUCOSE BLOOD TEST: CPT

## 2021-03-01 PROCEDURE — 93016 CV STRESS TEST SUPVJ ONLY: CPT | Performed by: INTERNAL MEDICINE

## 2021-03-01 PROCEDURE — 25010000002 REGADENOSON 0.4 MG/5ML SOLUTION: Performed by: INTERNAL MEDICINE

## 2021-03-01 PROCEDURE — 78452 HT MUSCLE IMAGE SPECT MULT: CPT | Performed by: INTERNAL MEDICINE

## 2021-03-01 PROCEDURE — A9500 TC99M SESTAMIBI: HCPCS | Performed by: INTERNAL MEDICINE

## 2021-03-01 PROCEDURE — 63710000001 INSULIN ISOPHANE & REGULAR PER 5 UNITS: Performed by: INTERNAL MEDICINE

## 2021-03-01 PROCEDURE — 93018 CV STRESS TEST I&R ONLY: CPT | Performed by: INTERNAL MEDICINE

## 2021-03-01 RX ORDER — SODIUM CHLORIDE 0.9 % (FLUSH) 0.9 %
10 SYRINGE (ML) INJECTION ONCE
Status: COMPLETED | OUTPATIENT
Start: 2021-03-01 | End: 2021-03-01

## 2021-03-01 RX ADMIN — INSULIN ASPART 4 UNITS: 100 INJECTION, SOLUTION INTRAVENOUS; SUBCUTANEOUS at 17:20

## 2021-03-01 RX ADMIN — PANTOPRAZOLE SODIUM 40 MG: 40 TABLET, DELAYED RELEASE ORAL at 05:23

## 2021-03-01 RX ADMIN — SODIUM CHLORIDE, PRESERVATIVE FREE 10 ML: 5 INJECTION INTRAVENOUS at 09:17

## 2021-03-01 RX ADMIN — SODIUM CHLORIDE, PRESERVATIVE FREE 10 ML: 5 INJECTION INTRAVENOUS at 10:30

## 2021-03-01 RX ADMIN — APIXABAN 5 MG: 5 TABLET, FILM COATED ORAL at 20:24

## 2021-03-01 RX ADMIN — TECHNETIUM TC 99M SESTAMIBI 1 DOSE: 1 INJECTION INTRAVENOUS at 07:32

## 2021-03-01 RX ADMIN — APIXABAN 5 MG: 5 TABLET, FILM COATED ORAL at 10:37

## 2021-03-01 RX ADMIN — SACUBITRIL AND VALSARTAN 1 TABLET: 24; 26 TABLET, FILM COATED ORAL at 20:24

## 2021-03-01 RX ADMIN — TECHNETIUM TC 99M SESTAMIBI 1 DOSE: 1 INJECTION INTRAVENOUS at 09:17

## 2021-03-01 RX ADMIN — METOPROLOL SUCCINATE 50 MG: 50 TABLET, EXTENDED RELEASE ORAL at 10:37

## 2021-03-01 RX ADMIN — SACUBITRIL AND VALSARTAN 1 TABLET: 24; 26 TABLET, FILM COATED ORAL at 10:36

## 2021-03-01 RX ADMIN — REGADENOSON 0.4 MG: 0.08 INJECTION, SOLUTION INTRAVENOUS at 09:17

## 2021-03-01 RX ADMIN — Medication 1 APPLICATION: at 17:19

## 2021-03-01 RX ADMIN — COLLAGENASE SANTYL: 250 OINTMENT TOPICAL at 17:19

## 2021-03-01 RX ADMIN — ONDANSETRON 4 MG: 2 INJECTION INTRAMUSCULAR; INTRAVENOUS at 10:29

## 2021-03-01 RX ADMIN — HUMAN INSULIN 30 UNITS: 100 INJECTION, SUSPENSION SUBCUTANEOUS at 17:20

## 2021-03-01 NOTE — NURSING NOTE
"Patient has open wound on right lateral ankle It Measures 1.5 x 1.5 x 0.2 cm Wound bed 50% granulation and 50% yellow slough. He scar tissue on left lateral ankle and right foot medial plantar.  Healing wounds noted on left posterior leg. He states \" I got ahold of some bad \"Meth\" and it broke me out. .\" Needs wund care , offloading and protection. POA yes  "

## 2021-03-01 NOTE — PLAN OF CARE
Goal Outcome Evaluation:  Plan of Care Reviewed With: patient  Progress: no change  Outcome Summary: Initital assessment.  Intake 75% - 1x, 25% 0 1x, 0% - 2x.  Encourage intake of meals and supplements.

## 2021-03-01 NOTE — PLAN OF CARE
Goal Outcome Evaluation:     Progress: improving         Stress test completed today. Wound care evaluated pt for open area to RIGHT lateral ankle; other areas are approximated and callused/scabbed. Wound Care would like to have pt f/u in office after d/c for tx. Santyl ordered to wound on right ankle; wound dressed at this time. Bag balm ordered for the other dry, callused areas to BLE; bag balm applied at this time also. Pt reports feeling better during my shift after being able to shower; no needs identified at this time; VSS; will continue to monitor.

## 2021-03-01 NOTE — PROGRESS NOTES
"    [facility] Medicine Services  INPATIENT PROGRESS NOTE    Length of Stay: 0  Date of Admission: 2/27/2021  Primary Care Physician: Elyssa Prado APRN    Subjective   Chief Complaint: \"I feel terrible\"  HPI: Patient describes a constant \"ache\" in his abdomen.  He states difficult to describe.  Does admit to associated nausea and recent increase in reflux symptoms.  He states the pain increases with oral intake.  He has poor appetite and has minimal intake over the past 24 hours.    Review of Systems     All pertinent negatives and positives are as above. All other systems have been reviewed and are negative unless otherwise stated.     Objective    Temp:  [96.9 °F (36.1 °C)-98.1 °F (36.7 °C)] 97.3 °F (36.3 °C)  Heart Rate:  [63-97] 63  Resp:  [18] 18  BP: (100-180)/(63-90) 145/83    Physical Exam  Constitutional:       General: He is not in acute distress.     Appearance: Normal appearance.   HENT:      Head: Normocephalic.   Cardiovascular:      Rate and Rhythm: Normal rate and regular rhythm.      Pulses: Normal pulses.      Heart sounds: Normal heart sounds.   Pulmonary:      Effort: Pulmonary effort is normal.      Breath sounds: Normal breath sounds.   Abdominal:      General: Bowel sounds are normal.      Palpations: Abdomen is soft.      Tenderness: There is no abdominal tenderness.   Musculoskeletal:      Right lower leg: No edema.   Skin:     General: Skin is warm and dry.   Neurological:      Mental Status: He is alert.             Results Review:  I have reviewed the labs, radiology results, and diagnostic studies.    Laboratory Data:   Results from last 7 days   Lab Units 02/27/21  2253   SODIUM mmol/L 134*   POTASSIUM mmol/L 3.8   CHLORIDE mmol/L 97*   CO2 mmol/L 25.0   BUN mg/dL 17   CREATININE mg/dL 0.90   GLUCOSE mg/dL 180*   CALCIUM mg/dL 9.6   BILIRUBIN mg/dL 0.3   ALK PHOS U/L 76   ALT (SGPT) U/L 19   AST (SGOT) U/L 16   ANION GAP mmol/L 12.0     Estimated " Creatinine Clearance: 110.8 mL/min (by C-G formula based on SCr of 0.9 mg/dL).  Results from last 7 days   Lab Units 02/27/21  2253   MAGNESIUM mg/dL 2.3         Results from last 7 days   Lab Units 02/27/21  2253   WBC 10*3/mm3 8.67   HEMOGLOBIN g/dL 14.9   HEMATOCRIT % 44.1   PLATELETS 10*3/mm3 227           Culture Data:   No results found for: BLOODCX  No results found for: URINECX  No results found for: RESPCX  No results found for: WOUNDCX  No results found for: STOOLCX  No components found for: BODYFLD    Radiology Data:   Imaging Results (Last 24 Hours)     ** No results found for the last 24 hours. **          I have reviewed the patient's current medications.     Assessment/Plan     Principal Problem:    Near syncope    Chest pain-atypical for cardiac etiology by history.  Stress test read as equivocal without suggestion of ischemia.  Pain is suggestive of gastrointestinal etiology.  We will continue PPI and request GI consult.    Chronic congestive heart failure-EF 49 by lexiscan. Clinically euvolemic.  Continue beta-blocker and Entresto.    Hypertension-controlled on metoprolol and Entresto.  Type 2 diabetes mellitus-controlled by review of Accu-Cheks.  Continue 70/30 insulin with sliding scale insulin and Accu-Cheks.  Atrial fibrillation-rate controlled.  Oral anticoagulation with Eliquis.  Discharge Planning: I expect patient to be discharged 1-2 days.    Lashay Pope MD

## 2021-03-01 NOTE — CONSULTS
Adult Nutrition  Assessment    Patient Name:  Edd Willingham  YOB: 1970  MRN: [id number]  Admit Date:  2/27/2021    Assessment Date:  3/1/2021    Comments:  Pt admitted due to lightheadedness.  Dx include Type 2 DM, Heart Failure.  Pt reports poor appetite.  Reports nausea, heartburn, reflux.  Protonix, PRN Zofran prescribed.  Pt indicates he is able to take liquids and is willing to receive milk with meals and a supplement at breakfast.  RN indicates possible GI consult.  Intake 75% - 1x, 25% -1x, 0% - 2x.  Blood glucose is elevated.  70/30 NPH, sliding scale novolog prescribed.  Pt with wound right lateral ankle, wound left lateral ankle.  Will maintain pt on prescribed diet adding milk to meals and Boost Glucose Control to breakfast.    Reason for Assessment     Row Name 03/01/21 1630          Reason for Assessment    Reason For Assessment  per organizational policy wounds     Diagnosis  other (see comments) wound rigaht lateral ankle, wound left lateral ankle             Labs/Tests/Procedures/Meds     Row Name 03/01/21 1631          Labs/Procedures/Meds    Lab Results Reviewed  reviewed, pertinent        Medications    Pertinent Medications Reviewed  reviewed, pertinent         Physical Findings     Row Name 03/01/21 1632          Physical Findings    Skin  other (see comments) wound right lateral ankle, wound left lateral ankle         Estimated/Assessed Needs     Row Name 03/01/21 1633          Calculation Measurements    Weight Used For Calculations  91 kg (200 lb 9.9 oz)        Estimated/Assessed Needs    Additional Documentation  Fluid Requirements (Group);Calorie Requirements (Group);Jessamine-St. Jeor Equation (Group);Protein Requirements (Group)        Calorie Requirements    Estimated Calorie Requirement (kcal/day)  2316     Estimated Calorie Need Method  Jessamine-St Jeor        Jessamine-St. Jeor Equation    RMR (Jessamine-St. Jeor Equation)  1782.125        Protein Requirements     Weight Used For Protein Calculations  91 kg (200 lb 9.9 oz)     Est Protein Requirement Amount (gms/kg)  1.2 gm protein     Estimated Protein Requirements (gms/day)  109.2        Fluid Requirements    Fluid Requirements (mL/day)  2730     RDA Method (mL)  2730         Nutrition Prescription Ordered     Row Name 03/01/21 1636          Nutrition Prescription PO    Current PO Diet  Regular     Common Modifiers  Cardiac;Consistent Carbohydrate         Evaluation of Received Nutrient/Fluid Intake     Row Name 03/01/21 1637          PO Evaluation    Number of Meals  4     % PO Intake  75% - 1x, 25% - 1x, 0% - 1x               Electronically signed by:  Madyson Fry, TOMY  03/01/21 16:38 CST

## 2021-03-02 ENCOUNTER — READMISSION MANAGEMENT (OUTPATIENT)
Dept: CALL CENTER | Facility: HOSPITAL | Age: 51
End: 2021-03-02

## 2021-03-02 VITALS
WEIGHT: 199 LBS | SYSTOLIC BLOOD PRESSURE: 139 MMHG | BODY MASS INDEX: 27.86 KG/M2 | OXYGEN SATURATION: 99 % | HEART RATE: 79 BPM | RESPIRATION RATE: 18 BRPM | HEIGHT: 71 IN | TEMPERATURE: 97.7 F | DIASTOLIC BLOOD PRESSURE: 80 MMHG

## 2021-03-02 LAB
ALBUMIN SERPL-MCNC: 3.3 G/DL (ref 3.5–5.2)
ALBUMIN/GLOB SERPL: 0.9 G/DL
ALP SERPL-CCNC: 68 U/L (ref 39–117)
ALT SERPL W P-5'-P-CCNC: 10 U/L (ref 1–41)
AMYLASE SERPL-CCNC: 57 U/L (ref 28–100)
ANION GAP SERPL CALCULATED.3IONS-SCNC: 10 MMOL/L (ref 5–15)
AST SERPL-CCNC: 11 U/L (ref 1–40)
BASOPHILS # BLD AUTO: 0.05 10*3/MM3 (ref 0–0.2)
BASOPHILS NFR BLD AUTO: 0.7 % (ref 0–1.5)
BILIRUB SERPL-MCNC: 0.5 MG/DL (ref 0–1.2)
BUN SERPL-MCNC: 13 MG/DL (ref 6–20)
BUN/CREAT SERPL: 13 (ref 7–25)
CALCIUM SPEC-SCNC: 8.4 MG/DL (ref 8.6–10.5)
CHLORIDE SERPL-SCNC: 98 MMOL/L (ref 98–107)
CO2 SERPL-SCNC: 26 MMOL/L (ref 22–29)
CREAT SERPL-MCNC: 1 MG/DL (ref 0.76–1.27)
DEPRECATED RDW RBC AUTO: 41.8 FL (ref 37–54)
EOSINOPHIL # BLD AUTO: 0.04 10*3/MM3 (ref 0–0.4)
EOSINOPHIL NFR BLD AUTO: 0.6 % (ref 0.3–6.2)
ERYTHROCYTE [DISTWIDTH] IN BLOOD BY AUTOMATED COUNT: 14.2 % (ref 12.3–15.4)
GFR SERPL CREATININE-BSD FRML MDRD: 95 ML/MIN/1.73
GLOBULIN UR ELPH-MCNC: 3.5 GM/DL
GLUCOSE BLDC GLUCOMTR-MCNC: 108 MG/DL (ref 70–130)
GLUCOSE BLDC GLUCOMTR-MCNC: 118 MG/DL (ref 70–130)
GLUCOSE BLDC GLUCOMTR-MCNC: 386 MG/DL (ref 70–130)
GLUCOSE SERPL-MCNC: 126 MG/DL (ref 65–99)
HCT VFR BLD AUTO: 50.8 % (ref 37.5–51)
HGB BLD-MCNC: 16.6 G/DL (ref 13–17.7)
IMM GRANULOCYTES # BLD AUTO: 0.05 10*3/MM3 (ref 0–0.05)
IMM GRANULOCYTES NFR BLD AUTO: 0.7 % (ref 0–0.5)
LYMPHOCYTES # BLD AUTO: 2.53 10*3/MM3 (ref 0.7–3.1)
LYMPHOCYTES NFR BLD AUTO: 37 % (ref 19.6–45.3)
MCH RBC QN AUTO: 27.1 PG (ref 26.6–33)
MCHC RBC AUTO-ENTMCNC: 32.7 G/DL (ref 31.5–35.7)
MCV RBC AUTO: 82.9 FL (ref 79–97)
MONOCYTES # BLD AUTO: 0.7 10*3/MM3 (ref 0.1–0.9)
MONOCYTES NFR BLD AUTO: 10.2 % (ref 5–12)
NEUTROPHILS NFR BLD AUTO: 3.46 10*3/MM3 (ref 1.7–7)
NEUTROPHILS NFR BLD AUTO: 50.8 % (ref 42.7–76)
NRBC BLD AUTO-RTO: 0 /100 WBC (ref 0–0.2)
PLATELET # BLD AUTO: 241 10*3/MM3 (ref 140–450)
PMV BLD AUTO: 12 FL (ref 6–12)
POTASSIUM SERPL-SCNC: 3.8 MMOL/L (ref 3.5–5.2)
PROT SERPL-MCNC: 6.8 G/DL (ref 6–8.5)
RBC # BLD AUTO: 6.13 10*6/MM3 (ref 4.14–5.8)
SODIUM SERPL-SCNC: 134 MMOL/L (ref 136–145)
WBC # BLD AUTO: 6.83 10*3/MM3 (ref 3.4–10.8)

## 2021-03-02 PROCEDURE — 82962 GLUCOSE BLOOD TEST: CPT

## 2021-03-02 PROCEDURE — 82150 ASSAY OF AMYLASE: CPT | Performed by: FAMILY MEDICINE

## 2021-03-02 PROCEDURE — 63710000001 INSULIN ASPART PER 5 UNITS: Performed by: INTERNAL MEDICINE

## 2021-03-02 PROCEDURE — 85025 COMPLETE CBC W/AUTO DIFF WBC: CPT | Performed by: FAMILY MEDICINE

## 2021-03-02 PROCEDURE — G0378 HOSPITAL OBSERVATION PER HR: HCPCS

## 2021-03-02 PROCEDURE — 96361 HYDRATE IV INFUSION ADD-ON: CPT

## 2021-03-02 PROCEDURE — 63710000001 INSULIN ISOPHANE & REGULAR PER 5 UNITS: Performed by: INTERNAL MEDICINE

## 2021-03-02 PROCEDURE — 80053 COMPREHEN METABOLIC PANEL: CPT | Performed by: FAMILY MEDICINE

## 2021-03-02 RX ORDER — OMEPRAZOLE 20 MG/1
20 CAPSULE, DELAYED RELEASE ORAL 2 TIMES DAILY
Qty: 60 CAPSULE | Refills: 11 | Status: SHIPPED | OUTPATIENT
Start: 2021-03-02 | End: 2021-03-29 | Stop reason: SDUPTHER

## 2021-03-02 RX ADMIN — HUMAN INSULIN 30 UNITS: 100 INJECTION, SUSPENSION SUBCUTANEOUS at 09:04

## 2021-03-02 RX ADMIN — INSULIN ASPART 8 UNITS: 100 INJECTION, SOLUTION INTRAVENOUS; SUBCUTANEOUS at 06:22

## 2021-03-02 RX ADMIN — SODIUM CHLORIDE, POTASSIUM CHLORIDE, SODIUM LACTATE AND CALCIUM CHLORIDE 75 ML/HR: 600; 310; 30; 20 INJECTION, SOLUTION INTRAVENOUS at 09:25

## 2021-03-02 RX ADMIN — APIXABAN 5 MG: 5 TABLET, FILM COATED ORAL at 09:04

## 2021-03-02 RX ADMIN — SACUBITRIL AND VALSARTAN 1 TABLET: 24; 26 TABLET, FILM COATED ORAL at 09:04

## 2021-03-02 RX ADMIN — METOPROLOL SUCCINATE 50 MG: 50 TABLET, EXTENDED RELEASE ORAL at 09:04

## 2021-03-02 RX ADMIN — Medication 1 APPLICATION: at 09:25

## 2021-03-02 RX ADMIN — PANTOPRAZOLE SODIUM 40 MG: 40 TABLET, DELAYED RELEASE ORAL at 06:11

## 2021-03-02 RX ADMIN — COLLAGENASE SANTYL: 250 OINTMENT TOPICAL at 09:25

## 2021-03-02 NOTE — DISCHARGE SUMMARY
[facility] Medicine Services  DISCHARGE SUMMARY       Date of Admission: 2/27/2021  Date of Discharge:  3/2/2021  Primary Care Physician: Elyssa Prado APRN    Presenting Problem/History of Present Illness:  Near syncope [R55]  Frequent PVCs [I49.3]       Final Discharge Diagnoses:  Active Hospital Problems    Diagnosis   • **Near syncope   • Chest pain       Consults:   Consults     No orders found from 1/29/2021 to 2/28/2021.          Procedures Performed:                 Pertinent Test Results:   Lab Results (most recent)     Procedure Component Value Units Date/Time    Comprehensive Metabolic Panel [[id number]]  (Abnormal) Collected: 03/02/21 0529    Specimen: Blood Updated: 03/02/21 0640     Glucose 126 mg/dL      BUN 13 mg/dL      Creatinine 1.00 mg/dL      Sodium 134 mmol/L      Potassium 3.8 mmol/L      Comment: Slight hemolysis detected by analyzer. Results may be affected.        Chloride 98 mmol/L      CO2 26.0 mmol/L      Calcium 8.4 mg/dL      Total Protein 6.8 g/dL      Albumin 3.30 g/dL      ALT (SGPT) 10 U/L      AST (SGOT) 11 U/L      Alkaline Phosphatase 68 U/L      Total Bilirubin 0.5 mg/dL      eGFR  African Amer 95 mL/min/1.73      Globulin 3.5 gm/dL      A/G Ratio 0.9 g/dL      BUN/Creatinine Ratio 13.0     Anion Gap 10.0 mmol/L     Narrative:      GFR Normal >60  Chronic Kidney Disease <60  Kidney Failure <15      Amylase [[id number]]  (Normal) Collected: 03/02/21 0529    Specimen: Blood Updated: 03/02/21 0640     Amylase 57 U/L     CBC & Differential [[id number]]  (Abnormal) Collected: 03/02/21 0529    Specimen: Blood Updated: 03/02/21 0621    Narrative:      The following orders were created for panel order CBC & Differential.  Procedure                               Abnormality         Status                     ---------                               -----------         ------                     CBC Auto Differential[[id number]]        Abnormal             Final result                 Please view results for these tests on the individual orders.    CBC Auto Differential [[id number]]  (Abnormal) Collected: 03/02/21 0529    Specimen: Blood Updated: 03/02/21 0621     WBC 6.83 10*3/mm3      RBC 6.13 10*6/mm3      Hemoglobin 16.6 g/dL      Hematocrit 50.8 %      MCV 82.9 fL      MCH 27.1 pg      MCHC 32.7 g/dL      RDW 14.2 %      RDW-SD 41.8 fl      MPV 12.0 fL      Platelets 241 10*3/mm3      Neutrophil % 50.8 %      Lymphocyte % 37.0 %      Monocyte % 10.2 %      Eosinophil % 0.6 %      Basophil % 0.7 %      Immature Grans % 0.7 %      Neutrophils, Absolute 3.46 10*3/mm3      Lymphocytes, Absolute 2.53 10*3/mm3      Monocytes, Absolute 0.70 10*3/mm3      Eosinophils, Absolute 0.04 10*3/mm3      Basophils, Absolute 0.05 10*3/mm3      Immature Grans, Absolute 0.05 10*3/mm3      nRBC 0.0 /100 WBC     POC Glucose Once [[id number]]  (Abnormal) Collected: 03/02/21 0549    Specimen: Blood Updated: 03/02/21 0615     Glucose 386 mg/dL      Comment: RN NotifiedOperator: [id number] RICKEY TIERNEYOpen Source Fooder ID: [id number]       POC Glucose Once [[id number]]  (Abnormal) Collected: 03/01/21 1947    Specimen: Blood Updated: 03/01/21 2114     Glucose 186 mg/dL      Comment: RN NotifiedOperator: [id number] RICKEY TIERNEYOpen Source Fooder ID: [id number]       Troponin [[id number]]  (Normal) Collected: 02/28/21 0558    Specimen: Blood Updated: 02/28/21 0706     Troponin T <0.010 ng/mL     Narrative:      Troponin T Reference Range:  <= 0.03 ng/mL-   Negative for AMI  >0.03 ng/mL-     Abnormal for myocardial necrosis.  Clinicians would have to utilize clinical acumen, EKG, Troponin and serial changes to determine if it is an Acute Myocardial Infarction or myocardial injury due to an underlying chronic condition.       Results may be falsely decreased if patient taking Biotin.      Extra Tubes [[id number]] Collected: 02/28/21 0558    Specimen: Blood, Venous Line Updated: 02/28/21 0700    Narrative:       The following orders were created for panel order Extra Tubes.  Procedure                               Abnormality         Status                     ---------                               -----------         ------                     Lavender Top[[id number]]                                     Final result                 Please view results for these tests on the individual orders.    Lavender Top [[id number]] Collected: 02/28/21 0558    Specimen: Blood Updated: 02/28/21 0700     Extra Tube hold for add-on     Comment: Auto resulted       COVID-19 and FLU A/B PCR - Swab, Nasopharynx [[id number]]  (Normal) Collected: 02/28/21 0219    Specimen: Swab from Nasopharynx Updated: 02/28/21 0306     COVID19 Not Detected     Influenza A PCR Not Detected     Influenza B PCR Not Detected    Narrative:      Fact sheet for providers: https://www.fda.gov/media/838647/download    Fact sheet for patients: https://www.fda.gov/media/883531/download    Test performed by PCR.    Urinalysis With Microscopic If Indicated (No Culture) - Urine, Clean Catch [[id number]]  (Abnormal) Collected: 02/28/21 0025    Specimen: Urine, Clean Catch Updated: 02/28/21 0053     Color, UA Yellow     Appearance, UA Clear     pH, UA 7.5     Specific Gravity, UA 1.040     Comment: Result obtained by Refractometer        Glucose, UA >=1000 mg/dL (3+)     Ketones, UA 15 mg/dL (1+)     Bilirubin, UA Negative     Blood, UA Negative     Protein, UA 30 mg/dL (1+)     Leuk Esterase, UA Negative     Nitrite, UA Negative     Urobilinogen, UA 0.2 E.U./dL    Urinalysis, Microscopic Only - Urine, Clean Catch [[id number]]  (Abnormal) Collected: 02/28/21 0025    Specimen: Urine, Clean Catch Updated: 02/28/21 0053     RBC, UA 0-2 /HPF      WBC, UA 0-2 /HPF      Bacteria, UA None Seen /HPF      Squamous Epithelial Cells, UA None Seen /HPF      Hyaline Casts, UA 3-6 /LPF      Methodology Automated Microscopy    Urine Drug Screen - Urine, Clean Catch [[id number]]  (Abnormal)  Collected: 02/28/21 0025    Specimen: Urine, Clean Catch Updated: 02/28/21 0050     THC, Screen, Urine Positive     Phencyclidine (PCP), Urine Negative     Cocaine Screen, Urine Negative     Methamphetamine, Ur Negative     Opiate Screen Negative     Amphetamine Screen, Urine Negative     Benzodiazepine Screen, Urine Negative     Tricyclic Antidepressants Screen Negative     Methadone Screen, Urine Negative     Barbiturates Screen, Urine Negative     Oxycodone Screen, Urine Negative     Propoxyphene Screen Negative     Buprenorphine, Screen, Urine Negative    Narrative:      Cutoff For Drugs Screened:    Amphetamines               500 ng/ml  Barbiturates               200 ng/ml  Benzodiazepines            150 ng/ml  Cocaine                    150 ng/ml  Methadone                  200 ng/ml  Opiates                    100 ng/ml  Phencyclidine               25 ng/ml  THC                            50 ng/ml  Methamphetamine            500 ng/ml  Tricyclic Antidepressants  300 ng/ml  Oxycodone                  100 ng/ml  Propoxyphene               300 ng/ml  Buprenorphine               10 ng/ml    The normal value for all drugs tested is negative. This report includes unconfirmed screening results, with the cutoff values listed, to be used for medical treatment purposes only.  Unconfirmed results must not be used for non-medical purposes such as employment or legal testing.  Clinical consideration should be applied to any drug of abuse test, particularly when unconfirmed results are used.      Extra Tubes [[id number]] Collected: 02/27/21 2300    Specimen: Blood, Venous Line Updated: 02/28/21 0000    Narrative:      The following orders were created for panel order Extra Tubes.  Procedure                               Abnormality         Status                     ---------                               -----------         ------                     Light Blue Top[[id number]]                                   Final result                Gold Top - SST[[id number]]                                   Final result                 Please view results for these tests on the individual orders.    Light Blue Top [[id number]] Collected: 02/27/21 2300    Specimen: Blood Updated: 02/28/21 0000     Extra Tube hold for add-on     Comment: Auto resulted       Gold Top - SST [[id number]] Collected: 02/27/21 2300    Specimen: Blood Updated: 02/28/21 0000     Extra Tube Hold for add-ons.     Comment: Auto resulted.       Comprehensive Metabolic Panel [[id number]]  (Abnormal) Collected: 02/27/21 2253    Specimen: Blood Updated: 02/27/21 2355     Glucose 180 mg/dL      BUN 17 mg/dL      Creatinine 0.90 mg/dL      Sodium 134 mmol/L      Potassium 3.8 mmol/L      Comment: Slight hemolysis detected by analyzer. Results may be affected.        Chloride 97 mmol/L      CO2 25.0 mmol/L      Calcium 9.6 mg/dL      Total Protein 7.5 g/dL      Albumin 3.90 g/dL      ALT (SGPT) 19 U/L      AST (SGOT) 16 U/L      Alkaline Phosphatase 76 U/L      Total Bilirubin 0.3 mg/dL      eGFR  African Amer 107 mL/min/1.73      Globulin 3.6 gm/dL      A/G Ratio 1.1 g/dL      BUN/Creatinine Ratio 18.9     Anion Gap 12.0 mmol/L     Narrative:      GFR Normal >60  Chronic Kidney Disease <60  Kidney Failure <15      Lipase [[id number]]  (Normal) Collected: 02/27/21 2253    Specimen: Blood Updated: 02/27/21 2355     Lipase 16 U/L     Ethanol [[id number]] Collected: 02/27/21 2253    Specimen: Blood Updated: 02/27/21 2355     Ethanol <10 mg/dL      Ethanol % <0.010 %     Magnesium [[id number]]  (Normal) Collected: 02/27/21 2253    Specimen: Blood Updated: 02/27/21 2355     Magnesium 2.3 mg/dL     Troponin [[id number]]  (Normal) Collected: 02/27/21 2253    Specimen: Blood Updated: 02/27/21 2323     Troponin T <0.010 ng/mL     Narrative:      Troponin T Reference Range:  <= 0.03 ng/mL-   Negative for AMI  >0.03 ng/mL-     Abnormal for myocardial necrosis.  Clinicians would have to utilize  clinical acumen, EKG, Troponin and serial changes to determine if it is an Acute Myocardial Infarction or myocardial injury due to an underlying chronic condition.       Results may be falsely decreased if patient taking Biotin.      CBC & Differential [[id number]]  (Abnormal) Collected: 02/27/21 2253    Specimen: Blood Updated: 02/27/21 2305    Narrative:      The following orders were created for panel order CBC & Differential.  Procedure                               Abnormality         Status                     ---------                               -----------         ------                     CBC Auto Differential[[id number]]        Abnormal            Final result                 Please view results for these tests on the individual orders.    CBC Auto Differential [[id number]]  (Abnormal) Collected: 02/27/21 2253    Specimen: Blood Updated: 02/27/21 2305     WBC 8.67 10*3/mm3      RBC 5.38 10*6/mm3      Hemoglobin 14.9 g/dL      Hematocrit 44.1 %      MCV 82.0 fL      MCH 27.7 pg      MCHC 33.8 g/dL      RDW 13.9 %      RDW-SD 40.4 fl      MPV 11.6 fL      Platelets 227 10*3/mm3      Neutrophil % 71.8 %      Lymphocyte % 18.7 %      Monocyte % 8.5 %      Eosinophil % 0.3 %      Basophil % 0.5 %      Immature Grans % 0.2 %      Neutrophils, Absolute 6.22 10*3/mm3      Lymphocytes, Absolute 1.62 10*3/mm3      Monocytes, Absolute 0.74 10*3/mm3      Eosinophils, Absolute 0.03 10*3/mm3      Basophils, Absolute 0.04 10*3/mm3      Immature Grans, Absolute 0.02 10*3/mm3      nRBC 0.0 /100 WBC         Imaging Results (Most Recent)     Procedure Component Value Units Date/Time    CT Abdomen Pelvis With Contrast [[id number]] Collected: 02/28/21 0014     Updated: 02/28/21 0054    Narrative:      EXAM DESCRIPTION:     CT ABDOMEN PELVIS W CONTRAST    CLINICAL HISTORY:     50 years  Male  abdominal pain    COMPARISON:     None    TECHNIQUE:     Images were obtained in axial, sagittal, and coronal planes.  Intravenous  contrast was administered.      This exam was performed according to our departmental  dose-optimization program which includes use of Automated  Exposure Control, adjustment of the mA and/or kV according to  patient size and/or use of iterative reconstruction technique.     FINDINGS:     No abnormality involving the liver, spleen, pancreas,  gallbladder, or adrenal glands bilaterally.    No obstructing renal or ureteral calculi bilaterally. No  hydronephrosis bilaterally. Unremarkable bladder.    Appendix within normal limits. No bowel obstruction, perforation,  or inflammation.    No dilatation of abdominal aorta. Unremarkable portal vein. No  adenopathy or abnormal fluid collections seen.    No acute osseous abnormality. Vacuum disc phenomenon at L5-S1.  Mild retrolisthesis L5 relationship to S1. Schmorl's node  superior endplate L3. Degenerative changes T9-10.    No abnormality lower lungs bilaterally.      Impression:        No acute intra-abdominal abnormality.    Electronically signed by:  Flory Monson MD  2/28/2021 12:53 AM CST  Workstation: [id number]    XR Chest 1 View [[id number]] Collected: 02/27/21 2200     Updated: 02/27/21 2219    Narrative:      EXAM: XR CHEST 1 VW    CLINICAL INDICATION: Near syncope    COMPARISON: 8/28/2020    FINDINGS: A single view of the chest was obtained. The heart size  is normal. The pulmonary vascularity is unremarkable. The lungs  are clear. There is no consolidation, infiltrate, pleural  effusion, or pneumothorax.       Impression:      No evidence of active pulmonary disease.    Electronically signed by:  Abisai Navarro MD  2/27/2021 10:18 PM  CST Workstation: [id number]          Chief Complaint on Day of Discharge: None    Hospital Course:  The patient is a 50 y.o. male with past medical history most notable for hypertension, type 2 diabetes mellitus, and atrial fibrillation on chronic anticoagulation who presented to [facility] with complaints of chest pain.   "Patient's description of chest pain was atypical for cardiac etiology however given his risk factors he was referred to the hospital service for admission.  For complete admission history details please see H&P dated February 28, 2021 as per Dr. Anguiano.  Patient was observed on continuous cardiac monitoring.  Acute coronary syndrome was excluded with EKG and serial cardiac enzymes.  Patient underwent Lexiscan stress test which was deemed equivocal without suggestion of ischemia.  Upon further discussion with the patient there is more suggestion of a gastrointestinal etiology particularly with relevant history of increasing reflux symptoms.  However despite this patient did remain stable and tolerating diet.  There has been no evidence of GI bleeding.  On date of discharge I spoke with GI consultant Dr. Drummond who agreed to see the patient in office within the next 1 week.  At this time plan for the patient will be to discharge home with scheduled appointment to establish care with GI in the next 1 week all while increasing his PPI therapy from once to twice daily.  Likely beneficial dietary recommendations and avoidance of methamphetamine/marijuana were reviewed with the patient as well.  Indications for more urgent follow-up were reviewed with the patient prior to discharge.  He does voice understanding and is in agreement with plan.    Condition on Discharge:  Stable    Physical Exam on Discharge:  /97 (BP Location: Left arm, Patient Position: Lying)   Pulse 65   Temp 97.9 °F (36.6 °C) (Temporal)   Resp 18   Ht 180.3 cm (71\")   Wt 90.3 kg (199 lb)   SpO2 98%   BMI 27.75 kg/m²   Physical Exam  Constitutional:       General: He is not in acute distress.     Appearance: Normal appearance.   HENT:      Head: Normocephalic.   Cardiovascular:      Rate and Rhythm: Normal rate and regular rhythm.      Pulses: Normal pulses.      Heart sounds: Normal heart sounds.   Pulmonary:      Effort: Pulmonary " effort is normal.      Breath sounds: Normal breath sounds.   Abdominal:      General: Bowel sounds are normal.      Palpations: Abdomen is soft.      Tenderness: There is no abdominal tenderness.   Musculoskeletal:      Right lower leg: No edema.   Skin:     General: Skin is warm and dry.   Neurological:      Mental Status: He is alert.     Discharge Disposition:  Home or Self Care    Discharge Medications:     Discharge Medications      Changes to Medications      Instructions Start Date   omeprazole 20 MG capsule  Commonly known as: priLOSEC  What changed: when to take this   20 mg, Oral, 2 times daily         Continue These Medications      Instructions Start Date   apixaban 5 MG tablet tablet  Commonly known as: ELIQUIS   5 mg, Oral, Every 12 Hours Scheduled      Farxiga 10 MG tablet  Generic drug: Dapagliflozin Propanediol   10 mg, Oral, Daily      insulin aspart 100 UNIT/ML injection  Commonly known as: novoLOG   10 Units, Subcutaneous, 3 Times Daily With Meals      metoprolol succinate XL 50 MG 24 hr tablet  Commonly known as: TOPROL-XL   50 mg, Oral, Daily      NovoLIN 70/30 (70-30) 100 UNIT/ML injection  Generic drug: insulin NPH-insulin regular   30 Units, Subcutaneous, 2 Times Daily With Meals      sacubitril-valsartan 24-26 MG tablet  Commonly known as: ENTRESTO   1 tablet, Oral, 2 Times Daily      vitamin D3 125 MCG (5000 UT) capsule capsule   2,000 Units, Oral, Daily             Discharge Diet:  diabetic    Activity at Discharge: no restriction        Follow-up Appointments:   Future Appointments   Date Time Provider Department Center   3/29/2021 10:30 AM Kimberly Hester APRN [facility] None       Test Results Pending at Discharge: none    Lashay Pope MD    Time: greater than 30 minutes spent preparing discharge

## 2021-03-02 NOTE — PLAN OF CARE
Goal Outcome Evaluation:  Plan of Care Reviewed With: patient  Progress: improving  Outcome Summary: vss, no c/o cp, stress test yesterday negative, possible GI consult

## 2021-03-03 ENCOUNTER — TRANSITIONAL CARE MANAGEMENT TELEPHONE ENCOUNTER (OUTPATIENT)
Dept: CALL CENTER | Facility: HOSPITAL | Age: 51
End: 2021-03-03

## 2021-03-03 NOTE — OUTREACH NOTE
Prep Survey      Responses   [facility] patient discharged from?  [place]   Is LACE score < 7 ?  No   Emergency Room discharge w/ pulse ox?  No   Eligibility  [facility]   Date of Admission  02/27/21   Date of Discharge  03/02/21   Discharge Disposition  Home or Self Care   Discharge diagnosis  Near syncope,     Chest pain   Does the patient have one of the following disease processes/diagnoses(primary or secondary)?  Other   Does the patient have Home health ordered?  No   Is there a DME ordered?  No   Prep survey completed?  Yes          Tatiana Baldwin RN

## 2021-03-03 NOTE — OUTREACH NOTE
Call Center TCM Note      Responses   [facility] patient discharged from?  [place]   Does the patient have one of the following disease processes/diagnoses(primary or secondary)?  Other   TCM attempt successful?  No   Unsuccessful attempts  Attempt 2          Caitlyn Horan LPN    3/3/2021, 16:27 EST

## 2021-03-03 NOTE — OUTREACH NOTE
Call Center TCM Note      Responses   [facility] patient discharged from?  [place]   Does the patient have one of the following disease processes/diagnoses(primary or secondary)?  Other   TCM attempt successful?  No   Unsuccessful attempts  Attempt 1          Caitlyn Horan LPN    3/3/2021, 14:43 EST

## 2021-03-04 ENCOUNTER — TRANSITIONAL CARE MANAGEMENT TELEPHONE ENCOUNTER (OUTPATIENT)
Dept: CALL CENTER | Facility: HOSPITAL | Age: 51
End: 2021-03-04

## 2021-03-04 NOTE — OUTREACH NOTE
Call Center TCM Note      Responses   [facility] patient discharged from?  [place]   Does the patient have one of the following disease processes/diagnoses(primary or secondary)?  Other   TCM attempt successful?  No   Unsuccessful attempts  Attempt 3          Guido Hedrick RN    3/4/2021, 09:56 EST

## 2021-03-09 ENCOUNTER — LAB (OUTPATIENT)
Dept: LAB | Facility: HOSPITAL | Age: 51
End: 2021-03-09

## 2021-03-09 ENCOUNTER — OFFICE VISIT (OUTPATIENT)
Dept: GASTROENTEROLOGY | Facility: CLINIC | Age: 51
End: 2021-03-09

## 2021-03-09 ENCOUNTER — READMISSION MANAGEMENT (OUTPATIENT)
Dept: CALL CENTER | Facility: HOSPITAL | Age: 51
End: 2021-03-09

## 2021-03-09 VITALS
WEIGHT: 215.2 LBS | BODY MASS INDEX: 29.15 KG/M2 | HEART RATE: 96 BPM | HEIGHT: 72 IN | DIASTOLIC BLOOD PRESSURE: 85 MMHG | SYSTOLIC BLOOD PRESSURE: 131 MMHG

## 2021-03-09 DIAGNOSIS — R10.32 LEFT LOWER QUADRANT ABDOMINAL PAIN: ICD-10-CM

## 2021-03-09 DIAGNOSIS — R11.0 NAUSEA: ICD-10-CM

## 2021-03-09 DIAGNOSIS — F19.11 HISTORY OF INTRAVENOUS DRUG ABUSE (HCC): ICD-10-CM

## 2021-03-09 DIAGNOSIS — K21.9 GASTROESOPHAGEAL REFLUX DISEASE, UNSPECIFIED WHETHER ESOPHAGITIS PRESENT: Primary | ICD-10-CM

## 2021-03-09 PROBLEM — Z12.11 ENCOUNTER FOR SCREENING FOR MALIGNANT NEOPLASM OF COLON: Status: ACTIVE | Noted: 2021-03-09

## 2021-03-09 LAB
HAV IGM SERPL QL IA: NORMAL
HBV CORE IGM SERPL QL IA: NORMAL
HBV SURFACE AG SERPL QL IA: NORMAL
HCV AB SER DONR QL: NORMAL
HIV1+2 AB SER QL: NORMAL

## 2021-03-09 PROCEDURE — 36415 COLL VENOUS BLD VENIPUNCTURE: CPT

## 2021-03-09 PROCEDURE — 80074 ACUTE HEPATITIS PANEL: CPT

## 2021-03-09 PROCEDURE — G0432 EIA HIV-1/HIV-2 SCREEN: HCPCS

## 2021-03-09 PROCEDURE — 99213 OFFICE O/P EST LOW 20 MIN: CPT | Performed by: NURSE PRACTITIONER

## 2021-03-09 RX ORDER — ASPIRIN 81 MG/1
81 TABLET, CHEWABLE ORAL DAILY
COMMUNITY
End: 2022-05-17

## 2021-03-09 RX ORDER — DOCUSATE SODIUM 100 MG/1
100 CAPSULE, LIQUID FILLED ORAL 2 TIMES DAILY
COMMUNITY
End: 2022-05-17

## 2021-03-09 RX ORDER — DIGOXIN 125 MCG
125 TABLET ORAL
COMMUNITY
End: 2021-03-29 | Stop reason: SDUPTHER

## 2021-03-09 RX ORDER — DEXTROSE AND SODIUM CHLORIDE 5; .45 G/100ML; G/100ML
30 INJECTION, SOLUTION INTRAVENOUS CONTINUOUS PRN
Status: CANCELLED | OUTPATIENT
Start: 2021-03-24

## 2021-03-09 NOTE — H&P (VIEW-ONLY)
Chief Complaint   Patient presents with   • Chest Pain     hospital f/u        Subjective    Edd Willingham is a 50 y.o. male. he is here today for follow-up.  50-year-old male presents for hospital follow-up he was hospitalized 2/27/2021-3/2/21 for episode of chest pain underwent cardiac evaluation who recommended GI evaluation.  States he has history of IV drug abuse and has been drug-free since February but would like to be screened for hepatitis and HIV due to reusing needles.      Chest Pain   Associated symptoms include abdominal pain and nausea. Pertinent negatives include no cough, diaphoresis, fever, shortness of breath or vomiting.   Heartburn  He complains of abdominal pain, belching, chest pain, heartburn and nausea. He reports no choking, no coughing, no dysphagia, no globus sensation, no hoarse voice or no sore throat. This is a chronic problem. The problem occurs frequently. The heartburn duration is several minutes. The heartburn wakes him from sleep. The heartburn does not limit his activity. The heartburn doesn't change with position. The symptoms are aggravated by stress. Associated symptoms include fatigue. Risk factors include smoking/tobacco exposure. He has tried a PPI (was on nexium for about ten years then changed to prilosec and still intermittent symptoms ) for the symptoms.     Plan; schedule patient for EGD and colonoscopy due to abdominal pain nausea and heartburn.        The following portions of the patient's history were reviewed and updated as appropriate:   Past Medical History:   Diagnosis Date   • Anxiety    • Depression    • Diabetes mellitus (CMS/HCC)    • GERD (gastroesophageal reflux disease)    • Heart murmur    • Low back pain      Past Surgical History:   Procedure Laterality Date   • CARDIAC CATHETERIZATION N/A 10/26/2018    Procedure: Left Heart Cath;  Surgeon: Param Mcfarland MD;  Location: [facility] CATH INVASIVE LOCATION;  Service: Cardiology   • ELBOW PROCEDURE       left   • ROTATOR CUFF REPAIR      RIGHT   • TUMOR REMOVAL      FATTY TUMOR   • WRIST SURGERY      RIGHT     Family History   Problem Relation Age of Onset   • Diabetes Mother    • Hypertension Mother    • Diabetes Father    • No Known Problems Brother    • Colon polyps Paternal Uncle        Prior to Admission medications    Medication Sig Start Date End Date Taking? Authorizing Provider   apixaban (ELIQUIS) 5 MG tablet tablet Take 1 tablet by mouth Every 12 (Twelve) Hours. 10/14/20  Yes Kimberly Hester APRN   aspirin 81 MG chewable tablet Chew 81 mg Daily.   Yes Aimee Romero MD   Cholecalciferol (vitamin D3) 125 MCG (5000 UT) capsule capsule Take 2,000 Units by mouth Daily.   Yes Aimee Romero MD   Dapagliflozin Propanediol (Farxiga) 10 MG tablet Take 10 mg by mouth Daily. 10/14/20  Yes Kimberly Hester APRN   digoxin (LANOXIN) 125 MCG tablet Take 125 mcg by mouth Daily.   Yes Aimee Romero MD   docusate sodium (COLACE) 100 MG capsule Take 100 mg by mouth 2 (Two) Times a Day.   Yes Aimee Romero MD   insulin aspart (novoLOG) 100 UNIT/ML injection Inject 10 Units under the skin into the appropriate area as directed 3 (Three) Times a Day With Meals. 10/14/20  Yes Kimberly Hester APRN   insulin NPH-insulin regular (NovoLIN 70/30) (70-30) 100 UNIT/ML injection Inject 30 Units under the skin into the appropriate area as directed 2 (Two) Times a Day With Meals. 10/14/20  Yes Kimberly Hester APRN   metoprolol succinate XL (TOPROL-XL) 50 MG 24 hr tablet Take 1 tablet by mouth Daily. 11/9/20  Yes Kimberly Hester APRN   omeprazole (priLOSEC) 20 MG capsule Take 1 capsule by mouth 2 (two) times a day. 3/2/21  Yes Lashay Pope MD   sacubitril-valsartan (ENTRESTO) 24-26 MG tablet Take 1 tablet by mouth 2 (Two) Times a Day. 10/14/20  Yes Kimberly Hester APRN     Allergies   Allergen Reactions   • Statins Myalgia   • Sulfa Antibiotics Unknown (See Comments)     Remembers  "mother always saying he was allergic     Social History     Socioeconomic History   • Marital status:      Spouse name: Not on file   • Number of children: Not on file   • Years of education: Not on file   • Highest education level: Not on file   Tobacco Use   • Smoking status: Current Every Day Smoker     Packs/day: 1.00     Types: Cigarettes   • Smokeless tobacco: Current User     Types: Snuff   Vaping Use   • Vaping Use: Former   Substance and Sexual Activity   • Alcohol use: Yes     Alcohol/week: 1.0 standard drinks     Types: 1 Cans of beer per week     Comment: moderate   • Drug use: Yes     Types: Marijuana, Methamphetamines     Comment: meth in the last month or so.    • Sexual activity: Defer       Review of Systems  Review of Systems   Constitutional: Positive for fatigue. Negative for activity change, appetite change, chills, diaphoresis, fever and unexpected weight change.   HENT: Negative for hoarse voice, sore throat and trouble swallowing.    Respiratory: Negative for cough, choking and shortness of breath.    Cardiovascular: Positive for chest pain.   Gastrointestinal: Positive for abdominal pain, heartburn and nausea. Negative for abdominal distention, anal bleeding, blood in stool, constipation, diarrhea, dysphagia, rectal pain and vomiting.   Musculoskeletal: Negative for arthralgias.   Skin: Negative for pallor.   Neurological: Negative for light-headedness.        /85 (BP Location: Left arm)   Pulse 96   Ht 182.9 cm (72\")   Wt 97.6 kg (215 lb 3.2 oz)   BMI 29.19 kg/m²     Objective    Physical Exam  Constitutional:       General: He is not in acute distress.     Appearance: Normal appearance. He is well-developed.   HENT:      Head: Normocephalic and atraumatic.   Neck:      Thyroid: No thyromegaly.   Pulmonary:      Effort: Pulmonary effort is normal.   Abdominal:      General: Bowel sounds are normal. There is no distension.      Palpations: Abdomen is soft. Abdomen is not " rigid.      Tenderness: There is abdominal tenderness in the left lower quadrant. There is no guarding.      Hernia: No hernia is present.   Musculoskeletal:      Cervical back: Normal range of motion and neck supple.   Lymphadenopathy:      Cervical: No cervical adenopathy.   Skin:     General: Skin is warm and dry.      Coloration: Skin is not pale.      Findings: No rash.   Neurological:      Mental Status: He is alert and oriented to person, place, and time.   Psychiatric:         Speech: Speech normal.         Behavior: Behavior is cooperative.       Admission on 02/27/2021, Discharged on 03/02/2021   Component Date Value Ref Range Status   • QT Interval 02/27/2021 398  ms Final   • QTC Interval 02/27/2021 486  ms Final   • Glucose 02/27/2021 180* 65 - 99 mg/dL Final   • BUN 02/27/2021 17  6 - 20 mg/dL Final   • Creatinine 02/27/2021 0.90  0.76 - 1.27 mg/dL Final   • Sodium 02/27/2021 134* 136 - 145 mmol/L Final   • Potassium 02/27/2021 3.8  3.5 - 5.2 mmol/L Final    Slight hemolysis detected by analyzer. Results may be affected.   • Chloride 02/27/2021 97* 98 - 107 mmol/L Final   • CO2 02/27/2021 25.0  22.0 - 29.0 mmol/L Final   • Calcium 02/27/2021 9.6  8.6 - 10.5 mg/dL Final   • Total Protein 02/27/2021 7.5  6.0 - 8.5 g/dL Final   • Albumin 02/27/2021 3.90  3.50 - 5.20 g/dL Final   • ALT (SGPT) 02/27/2021 19  1 - 41 U/L Final   • AST (SGOT) 02/27/2021 16  1 - 40 U/L Final   • Alkaline Phosphatase 02/27/2021 76  39 - 117 U/L Final   • Total Bilirubin 02/27/2021 0.3  0.0 - 1.2 mg/dL Final   • eGFR   Amer 02/27/2021 107  >60 mL/min/1.73 Final   • Globulin 02/27/2021 3.6  gm/dL Final   • A/G Ratio 02/27/2021 1.1  g/dL Final   • BUN/Creatinine Ratio 02/27/2021 18.9  7.0 - 25.0 Final   • Anion Gap 02/27/2021 12.0  5.0 - 15.0 mmol/L Final   • Lipase 02/27/2021 16  13 - 60 U/L Final   • Color, UA 02/28/2021 Yellow  Yellow, Straw, Dark Yellow, Peyton Final   • Appearance, UA 02/28/2021 Clear  Clear Final   •  pH, UA 02/28/2021 7.5  5.0 - 9.0 Final   • Specific Gravity, UA 02/28/2021 1.040* 1.003 - 1.030 Final    Result obtained by Refractometer   • Glucose, UA 02/28/2021 >=1000 mg/dL (3+)* Negative Final   • Ketones, UA 02/28/2021 15 mg/dL (1+)* Negative Final   • Bilirubin, UA 02/28/2021 Negative  Negative Final   • Blood, UA 02/28/2021 Negative  Negative Final   • Protein, UA 02/28/2021 30 mg/dL (1+)* Negative Final   • Leuk Esterase, UA 02/28/2021 Negative  Negative Final   • Nitrite, UA 02/28/2021 Negative  Negative Final   • Urobilinogen, UA 02/28/2021 0.2 E.U./dL  0.2 - 1.0 E.U./dL Final   • Troponin T 02/27/2021 <0.010  0.000 - 0.030 ng/mL Final   • Ethanol 02/27/2021 <10  0 - 10 mg/dL Final   • Ethanol % 02/27/2021 <0.010  % Final   • THC, Screen, Urine 02/28/2021 Positive* Negative Final   • Phencyclidine (PCP), Urine 02/28/2021 Negative  Negative Final   • Cocaine Screen, Urine 02/28/2021 Negative  Negative Final   • Methamphetamine, Ur 02/28/2021 Negative  Negative Final   • Opiate Screen 02/28/2021 Negative  Negative Final   • Amphetamine Screen, Urine 02/28/2021 Negative  Negative Final   • Benzodiazepine Screen, Urine 02/28/2021 Negative  Negative Final   • Tricyclic Antidepressants Screen 02/28/2021 Negative  Negative Final   • Methadone Screen, Urine 02/28/2021 Negative  Negative Final   • Barbiturates Screen, Urine 02/28/2021 Negative  Negative Final   • Oxycodone Screen, Urine 02/28/2021 Negative  Negative Final   • Propoxyphene Screen 02/28/2021 Negative  Negative Final   • Buprenorphine, Screen, Urine 02/28/2021 Negative  Negative Final   • Magnesium 02/27/2021 2.3  1.6 - 2.6 mg/dL Final   • WBC 02/27/2021 8.67  3.40 - 10.80 10*3/mm3 Final   • RBC 02/27/2021 5.38  4.14 - 5.80 10*6/mm3 Final   • Hemoglobin 02/27/2021 14.9  13.0 - 17.7 g/dL Final   • Hematocrit 02/27/2021 44.1  37.5 - 51.0 % Final   • MCV 02/27/2021 82.0  79.0 - 97.0 fL Final   • MCH 02/27/2021 27.7  26.6 - 33.0 pg Final   • MCHC  02/27/2021 33.8  31.5 - 35.7 g/dL Final   • RDW 02/27/2021 13.9  12.3 - 15.4 % Final   • RDW-SD 02/27/2021 40.4  37.0 - 54.0 fl Final   • MPV 02/27/2021 11.6  6.0 - 12.0 fL Final   • Platelets 02/27/2021 227  140 - 450 10*3/mm3 Final   • Neutrophil % 02/27/2021 71.8  42.7 - 76.0 % Final   • Lymphocyte % 02/27/2021 18.7* 19.6 - 45.3 % Final   • Monocyte % 02/27/2021 8.5  5.0 - 12.0 % Final   • Eosinophil % 02/27/2021 0.3  0.3 - 6.2 % Final   • Basophil % 02/27/2021 0.5  0.0 - 1.5 % Final   • Immature Grans % 02/27/2021 0.2  0.0 - 0.5 % Final   • Neutrophils, Absolute 02/27/2021 6.22  1.70 - 7.00 10*3/mm3 Final   • Lymphocytes, Absolute 02/27/2021 1.62  0.70 - 3.10 10*3/mm3 Final   • Monocytes, Absolute 02/27/2021 0.74  0.10 - 0.90 10*3/mm3 Final   • Eosinophils, Absolute 02/27/2021 0.03  0.00 - 0.40 10*3/mm3 Final   • Basophils, Absolute 02/27/2021 0.04  0.00 - 0.20 10*3/mm3 Final   • Immature Grans, Absolute 02/27/2021 0.02  0.00 - 0.05 10*3/mm3 Final   • nRBC 02/27/2021 0.0  0.0 - 0.2 /100 WBC Final   • Extra Tube 02/27/2021 hold for add-on   Final    Auto resulted   • Extra Tube 02/27/2021 Hold for add-ons.   Final    Auto resulted.   • RBC, UA 02/28/2021 0-2* None Seen /HPF Final   • WBC, UA 02/28/2021 0-2  None Seen, 0-2, 3-5 /HPF Final   • Bacteria, UA 02/28/2021 None Seen  None Seen /HPF Final   • Squamous Epithelial Cells, UA 02/28/2021 None Seen  None Seen, 0-2 /HPF Final   • Hyaline Casts, UA 02/28/2021 3-6  None Seen /LPF Final   • Methodology 02/28/2021 Automated Microscopy   Final   • COVID19 02/28/2021 Not Detected  Not Detected - Ref. Range Final   • Influenza A PCR 02/28/2021 Not Detected  Not Detected Final   • Influenza B PCR 02/28/2021 Not Detected  Not Detected Final   • Troponin T 02/28/2021 <0.010  0.000 - 0.030 ng/mL Final   • Extra Tube 02/28/2021 hold for add-on   Final    Auto resulted   • Glucose 02/28/2021 232* 70 - 130 mg/dL Final    RN NotifiedOperator: [id number] FEROZ ELRITAMeter  ID: [id number]   • Glucose 02/28/2021 244* 70 - 130 mg/dL Final    RN NotifiedOperator: [id number] SHAKA ROCARONNIEMeter ID: [id number]   • BH CV STRESS PROTOCOL 1 03/01/2021 Pharmacologic   Final   • Stage 1 03/01/2021 1   Final   • Duration Min Stage 1 03/01/2021 0   Final   • Duration Sec Stage 1 03/01/2021 10   Final   • Stress Dose Regadenoson Stage 1 03/01/2021 0.4   Final   • Stress Comments Stage 1 03/01/2021 10 sec bolus injection   Final   • Target HR (85%) 03/01/2021 143  bpm Final   • Max. Pred. HR (100%) 03/01/2021 168  bpm Final   • HR Stage 1 03/01/2021 119   Final   • BP Stage 1 03/01/2021 101/80   Final   • Baseline HR 03/01/2021 89  bpm Final   • Baseline BP 03/01/2021 149/105  mmHg Final   • Peak HR 03/01/2021 123  bpm Final   • Percent Max Pred HR 03/01/2021 73.21  % Final   • Percent Target HR 03/01/2021 86  % Final   • Peak BP 03/01/2021 149/105  mmHg Final   • Recovery HR 03/01/2021 111  bpm Final   • Recovery BP 03/01/2021 114/85  mmHg Final   • Estimated workload 03/01/2021 1.0  METS Final   • Nuc Stress EF 03/01/2021 49  % Final   • Glucose 02/28/2021 185* 70 - 130 mg/dL Final    RN NotifiedOperator: [id number] SHAKA ROCAMeter ID: [id number]   • Glucose 02/28/2021 123  70 - 130 mg/dL Final    RN NotifiedOperator: [id number] RICKEY HELTONMeter ID: [id number]   • Glucose 03/01/2021 152* 70 - 130 mg/dL Final    RN NotifiedOperator: [id number] RICKEY HELTONMeter ID: [id number]   • Glucose 03/01/2021 206* 70 - 130 mg/dL Final    RN NotifiedOperator: [id number] ROSETTA MARKMeter ID: [id number]   • Glucose 03/01/2021 231* 70 - 130 mg/dL Final    RN NotifiedOperator: [id number] ROSETTA MARKMeter ID: [id number]   • Glucose 03/01/2021 186* 70 - 130 mg/dL Final    RN NotifiedOperator: [id number] RICKEY HELTONMeter ID: [id number]   • Glucose 03/02/2021 126* 65 - 99 mg/dL Final   • BUN 03/02/2021 13  6 - 20 mg/dL Final   • Creatinine 03/02/2021 1.00  0.76 - 1.27 mg/dL Final   • Sodium  03/02/2021 134* 136 - 145 mmol/L Final   • Potassium 03/02/2021 3.8  3.5 - 5.2 mmol/L Final    Slight hemolysis detected by analyzer. Results may be affected.   • Chloride 03/02/2021 98  98 - 107 mmol/L Final   • CO2 03/02/2021 26.0  22.0 - 29.0 mmol/L Final   • Calcium 03/02/2021 8.4* 8.6 - 10.5 mg/dL Final   • Total Protein 03/02/2021 6.8  6.0 - 8.5 g/dL Final   • Albumin 03/02/2021 3.30* 3.50 - 5.20 g/dL Final   • ALT (SGPT) 03/02/2021 10  1 - 41 U/L Final   • AST (SGOT) 03/02/2021 11  1 - 40 U/L Final   • Alkaline Phosphatase 03/02/2021 68  39 - 117 U/L Final   • Total Bilirubin 03/02/2021 0.5  0.0 - 1.2 mg/dL Final   • eGFR  African Amer 03/02/2021 95  >60 mL/min/1.73 Final   • Globulin 03/02/2021 3.5  gm/dL Final   • A/G Ratio 03/02/2021 0.9  g/dL Final   • BUN/Creatinine Ratio 03/02/2021 13.0  7.0 - 25.0 Final   • Anion Gap 03/02/2021 10.0  5.0 - 15.0 mmol/L Final   • Amylase 03/02/2021 57  28 - 100 U/L Final   • WBC 03/02/2021 6.83  3.40 - 10.80 10*3/mm3 Final   • RBC 03/02/2021 6.13* 4.14 - 5.80 10*6/mm3 Final   • Hemoglobin 03/02/2021 16.6  13.0 - 17.7 g/dL Final   • Hematocrit 03/02/2021 50.8  37.5 - 51.0 % Final   • MCV 03/02/2021 82.9  79.0 - 97.0 fL Final   • MCH 03/02/2021 27.1  26.6 - 33.0 pg Final   • MCHC 03/02/2021 32.7  31.5 - 35.7 g/dL Final   • RDW 03/02/2021 14.2  12.3 - 15.4 % Final   • RDW-SD 03/02/2021 41.8  37.0 - 54.0 fl Final   • MPV 03/02/2021 12.0  6.0 - 12.0 fL Final   • Platelets 03/02/2021 241  140 - 450 10*3/mm3 Final   • Neutrophil % 03/02/2021 50.8  42.7 - 76.0 % Final   • Lymphocyte % 03/02/2021 37.0  19.6 - 45.3 % Final   • Monocyte % 03/02/2021 10.2  5.0 - 12.0 % Final   • Eosinophil % 03/02/2021 0.6  0.3 - 6.2 % Final   • Basophil % 03/02/2021 0.7  0.0 - 1.5 % Final   • Immature Grans % 03/02/2021 0.7* 0.0 - 0.5 % Final   • Neutrophils, Absolute 03/02/2021 3.46  1.70 - 7.00 10*3/mm3 Final   • Lymphocytes, Absolute 03/02/2021 2.53  0.70 - 3.10 10*3/mm3 Final   • Monocytes,  Absolute 03/02/2021 0.70  0.10 - 0.90 10*3/mm3 Final   • Eosinophils, Absolute 03/02/2021 0.04  0.00 - 0.40 10*3/mm3 Final   • Basophils, Absolute 03/02/2021 0.05  0.00 - 0.20 10*3/mm3 Final   • Immature Grans, Absolute 03/02/2021 0.05  0.00 - 0.05 10*3/mm3 Final   • nRBC 03/02/2021 0.0  0.0 - 0.2 /100 WBC Final   • Glucose 03/02/2021 386* 70 - 130 mg/dL Final    RN NotifiedOperator: [id number] RICKEY SARAHMeter ID: [id number]   • Glucose 03/02/2021 118  70 - 130 mg/dL Final    RN NotifiedOperator: [id number] MADELINE VICKERYMeter ID: [id number]   • Glucose 03/02/2021 108  70 - 130 mg/dL Final    RN NotifiedOperator: [id number] MADELINE VICKERYMeter ID: [id number]     Assessment/Plan      1. Gastroesophageal reflux disease, unspecified whether esophagitis present    2. Nausea    3. Left lower quadrant abdominal pain    .       Orders placed during this encounter include:  Orders Placed This Encounter   Procedures   • Follow Anesthesia Guidelines / Standing Orders     Standing Status:   Future   • Obtain Informed Consent     Standing Status:   Future     Order Specific Question:   Informed Consent Given For     Answer:   ESOPHAGOGASTRODUODENOSCOPY and Colonoscopy       ESOPHAGOGASTRODUODENOSCOPY (N/A), COLONOSCOPY (N/A)    Review and/or summary of lab tests, radiology, procedures, medications. Review and summary of old records and obtaining of history. The risks and benefits of my recommendations, as well as other treatment options were discussed with the patient today. Questions were answered.    No orders of the defined types were placed in this encounter.      Follow-up: Return in about 4 weeks (around 4/6/2021) for Recheck, After test.          This document has been electronically signed by NATHANIEL Watson on March 9, 2021 10:47 CST           I spent 18 minutes caring for Edd on this date of service. This time includes time spent by me in the following activities:preparing for the visit, reviewing  tests, obtaining and/or reviewing a separately obtained history, performing a medically appropriate examination and/or evaluation , counseling and educating the patient/family/caregiver, ordering medications, tests, or procedures, referring and communicating with other health care professionals , documenting information in the medical record and care coordination    Results for orders placed or performed during the hospital encounter of 02/27/21   Gold Top - SST   Result Value Ref Range    Extra Tube Hold for add-ons.    COVID-19 and FLU A/B PCR - Swab, Nasopharynx    Specimen: Nasopharynx; Swab   Result Value Ref Range    COVID19 Not Detected Not Detected - Ref. Range    Influenza A PCR Not Detected Not Detected    Influenza B PCR Not Detected Not Detected   Urinalysis, Microscopic Only - Urine, Clean Catch    Specimen: Urine, Clean Catch   Result Value Ref Range    RBC, UA 0-2 (A) None Seen /HPF    WBC, UA 0-2 None Seen, 0-2, 3-5 /HPF    Bacteria, UA None Seen None Seen /HPF    Squamous Epithelial Cells, UA None Seen None Seen, 0-2 /HPF    Hyaline Casts, UA 3-6 None Seen /LPF    Methodology Automated Microscopy    Urinalysis With Microscopic If Indicated (No Culture) - Urine, Clean Catch    Specimen: Urine, Clean Catch   Result Value Ref Range    Color, UA Yellow Yellow, Straw, Dark Yellow, Peyton    Appearance, UA Clear Clear    pH, UA 7.5 5.0 - 9.0    Specific Gravity, UA 1.040 (H) 1.003 - 1.030    Glucose, UA >=1000 mg/dL (3+) (A) Negative    Ketones, UA 15 mg/dL (1+) (A) Negative    Bilirubin, UA Negative Negative    Blood, UA Negative Negative    Protein, UA 30 mg/dL (1+) (A) Negative    Leuk Esterase, UA Negative Negative    Nitrite, UA Negative Negative    Urobilinogen, UA 0.2 E.U./dL 0.2 - 1.0 E.U./dL   CBC Auto Differential    Specimen: Blood   Result Value Ref Range    WBC 6.83 3.40 - 10.80 10*3/mm3    RBC 6.13 (H) 4.14 - 5.80 10*6/mm3    Hemoglobin 16.6 13.0 - 17.7 g/dL    Hematocrit 50.8 37.5 - 51.0 %       MCV 82.9 79.0 - 97.0 fL    MCH 27.1 26.6 - 33.0 pg    MCHC 32.7 31.5 - 35.7 g/dL    RDW 14.2 12.3 - 15.4 %    RDW-SD 41.8 37.0 - 54.0 fl    MPV 12.0 6.0 - 12.0 fL    Platelets 241 140 - 450 10*3/mm3    Neutrophil % 50.8 42.7 - 76.0 %    Lymphocyte % 37.0 19.6 - 45.3 %    Monocyte % 10.2 5.0 - 12.0 %    Eosinophil % 0.6 0.3 - 6.2 %    Basophil % 0.7 0.0 - 1.5 %    Immature Grans % 0.7 (H) 0.0 - 0.5 %    Neutrophils, Absolute 3.46 1.70 - 7.00 10*3/mm3    Lymphocytes, Absolute 2.53 0.70 - 3.10 10*3/mm3    Monocytes, Absolute 0.70 0.10 - 0.90 10*3/mm3    Eosinophils, Absolute 0.04 0.00 - 0.40 10*3/mm3    Basophils, Absolute 0.05 0.00 - 0.20 10*3/mm3    Immature Grans, Absolute 0.05 0.00 - 0.05 10*3/mm3    nRBC 0.0 0.0 - 0.2 /100 WBC   CBC Auto Differential    Specimen: Blood   Result Value Ref Range    WBC 8.67 3.40 - 10.80 10*3/mm3    RBC 5.38 4.14 - 5.80 10*6/mm3    Hemoglobin 14.9 13.0 - 17.7 g/dL    Hematocrit 44.1 37.5 - 51.0 %    MCV 82.0 79.0 - 97.0 fL    MCH 27.7 26.6 - 33.0 pg    MCHC 33.8 31.5 - 35.7 g/dL    RDW 13.9 12.3 - 15.4 %    RDW-SD 40.4 37.0 - 54.0 fl    MPV 11.6 6.0 - 12.0 fL    Platelets 227 140 - 450 10*3/mm3    Neutrophil % 71.8 42.7 - 76.0 %    Lymphocyte % 18.7 (L) 19.6 - 45.3 %    Monocyte % 8.5 5.0 - 12.0 %    Eosinophil % 0.3 0.3 - 6.2 %    Basophil % 0.5 0.0 - 1.5 %    Immature Grans % 0.2 0.0 - 0.5 %    Neutrophils, Absolute 6.22 1.70 - 7.00 10*3/mm3    Lymphocytes, Absolute 1.62 0.70 - 3.10 10*3/mm3    Monocytes, Absolute 0.74 0.10 - 0.90 10*3/mm3    Eosinophils, Absolute 0.03 0.00 - 0.40 10*3/mm3    Basophils, Absolute 0.04 0.00 - 0.20 10*3/mm3    Immature Grans, Absolute 0.02 0.00 - 0.05 10*3/mm3    nRBC 0.0 0.0 - 0.2 /100 WBC   Lavender Top   Result Value Ref Range    Extra Tube hold for add-on    Light Blue Top   Result Value Ref Range    Extra Tube hold for add-on    Stress Test With Myocardial Perfusion One Day   Result Value Ref Range    BH CV STRESS PROTOCOL 1 Pharmacologic      Stage 1 1     Duration Min Stage 1 0     Duration Sec Stage 1 10     Stress Dose Regadenoson Stage 1 0.4     Stress Comments Stage 1 10 sec bolus injection     Target HR (85%) 143 bpm    Max. Pred. HR (100%) 168 bpm    HR Stage 1 119     BP Stage 1 101/80     Baseline HR 89 bpm    Baseline /105 mmHg    Peak  bpm    Percent Max Pred HR 73.21 %    Percent Target HR 86 %    Peak /105 mmHg    Recovery  bpm    Recovery /85 mmHg    Estimated workload 1.0 METS    Nuc Stress EF 49 %   Troponin    Specimen: Blood   Result Value Ref Range    Troponin T <0.010 0.000 - 0.030 ng/mL   Troponin    Specimen: Blood   Result Value Ref Range    Troponin T <0.010 0.000 - 0.030 ng/mL   Urine Drug Screen - Urine, Clean Catch    Specimen: Urine, Clean Catch   Result Value Ref Range    THC, Screen, Urine Positive (A) Negative    Phencyclidine (PCP), Urine Negative Negative    Cocaine Screen, Urine Negative Negative    Methamphetamine, Ur Negative Negative    Opiate Screen Negative Negative    Amphetamine Screen, Urine Negative Negative    Benzodiazepine Screen, Urine Negative Negative    Tricyclic Antidepressants Screen Negative Negative    Methadone Screen, Urine Negative Negative    Barbiturates Screen, Urine Negative Negative    Oxycodone Screen, Urine Negative Negative    Propoxyphene Screen Negative Negative    Buprenorphine, Screen, Urine Negative Negative     *Note: Due to a large number of results and/or encounters for the requested time period, some results have not been displayed. A complete set of results can be found in Results Review.

## 2021-03-09 NOTE — PROGRESS NOTES
Chief Complaint   Patient presents with   • Chest Pain     hospital f/u        Subjective    Edd Willingham is a 50 y.o. male. he is here today for follow-up.  50-year-old male presents for hospital follow-up he was hospitalized 2/27/2021-3/2/21 for episode of chest pain underwent cardiac evaluation who recommended GI evaluation.  States he has history of IV drug abuse and has been drug-free since February but would like to be screened for hepatitis and HIV due to reusing needles.      Chest Pain   Associated symptoms include abdominal pain and nausea. Pertinent negatives include no cough, diaphoresis, fever, shortness of breath or vomiting.   Heartburn  He complains of abdominal pain, belching, chest pain, heartburn and nausea. He reports no choking, no coughing, no dysphagia, no globus sensation, no hoarse voice or no sore throat. This is a chronic problem. The problem occurs frequently. The heartburn duration is several minutes. The heartburn wakes him from sleep. The heartburn does not limit his activity. The heartburn doesn't change with position. The symptoms are aggravated by stress. Associated symptoms include fatigue. Risk factors include smoking/tobacco exposure. He has tried a PPI (was on nexium for about ten years then changed to prilosec and still intermittent symptoms ) for the symptoms.     Plan; schedule patient for EGD and colonoscopy due to abdominal pain nausea and heartburn.        The following portions of the patient's history were reviewed and updated as appropriate:   Past Medical History:   Diagnosis Date   • Anxiety    • Depression    • Diabetes mellitus (CMS/HCC)    • GERD (gastroesophageal reflux disease)    • Heart murmur    • Low back pain      Past Surgical History:   Procedure Laterality Date   • CARDIAC CATHETERIZATION N/A 10/26/2018    Procedure: Left Heart Cath;  Surgeon: Param Mcfarland MD;  Location: [facility] CATH INVASIVE LOCATION;  Service: Cardiology   • ELBOW PROCEDURE       left   • ROTATOR CUFF REPAIR      RIGHT   • TUMOR REMOVAL      FATTY TUMOR   • WRIST SURGERY      RIGHT     Family History   Problem Relation Age of Onset   • Diabetes Mother    • Hypertension Mother    • Diabetes Father    • No Known Problems Brother    • Colon polyps Paternal Uncle        Prior to Admission medications    Medication Sig Start Date End Date Taking? Authorizing Provider   apixaban (ELIQUIS) 5 MG tablet tablet Take 1 tablet by mouth Every 12 (Twelve) Hours. 10/14/20  Yes Kimberly Hester APRN   aspirin 81 MG chewable tablet Chew 81 mg Daily.   Yes Aimee Romero MD   Cholecalciferol (vitamin D3) 125 MCG (5000 UT) capsule capsule Take 2,000 Units by mouth Daily.   Yes Aimee Romero MD   Dapagliflozin Propanediol (Farxiga) 10 MG tablet Take 10 mg by mouth Daily. 10/14/20  Yes Kimberly Hesetr APRN   digoxin (LANOXIN) 125 MCG tablet Take 125 mcg by mouth Daily.   Yes Aimee Romero MD   docusate sodium (COLACE) 100 MG capsule Take 100 mg by mouth 2 (Two) Times a Day.   Yes Aimee Romero MD   insulin aspart (novoLOG) 100 UNIT/ML injection Inject 10 Units under the skin into the appropriate area as directed 3 (Three) Times a Day With Meals. 10/14/20  Yes Kimberly Hester APRN   insulin NPH-insulin regular (NovoLIN 70/30) (70-30) 100 UNIT/ML injection Inject 30 Units under the skin into the appropriate area as directed 2 (Two) Times a Day With Meals. 10/14/20  Yes Kimberly Hester APRN   metoprolol succinate XL (TOPROL-XL) 50 MG 24 hr tablet Take 1 tablet by mouth Daily. 11/9/20  Yes Kimberly Hester APRN   omeprazole (priLOSEC) 20 MG capsule Take 1 capsule by mouth 2 (two) times a day. 3/2/21  Yes Lashay Pope MD   sacubitril-valsartan (ENTRESTO) 24-26 MG tablet Take 1 tablet by mouth 2 (Two) Times a Day. 10/14/20  Yes Kimberly Hester APRN     Allergies   Allergen Reactions   • Statins Myalgia   • Sulfa Antibiotics Unknown (See Comments)     Remembers  "mother always saying he was allergic     Social History     Socioeconomic History   • Marital status:      Spouse name: Not on file   • Number of children: Not on file   • Years of education: Not on file   • Highest education level: Not on file   Tobacco Use   • Smoking status: Current Every Day Smoker     Packs/day: 1.00     Types: Cigarettes   • Smokeless tobacco: Current User     Types: Snuff   Vaping Use   • Vaping Use: Former   Substance and Sexual Activity   • Alcohol use: Yes     Alcohol/week: 1.0 standard drinks     Types: 1 Cans of beer per week     Comment: moderate   • Drug use: Yes     Types: Marijuana, Methamphetamines     Comment: meth in the last month or so.    • Sexual activity: Defer       Review of Systems  Review of Systems   Constitutional: Positive for fatigue. Negative for activity change, appetite change, chills, diaphoresis, fever and unexpected weight change.   HENT: Negative for hoarse voice, sore throat and trouble swallowing.    Respiratory: Negative for cough, choking and shortness of breath.    Cardiovascular: Positive for chest pain.   Gastrointestinal: Positive for abdominal pain, heartburn and nausea. Negative for abdominal distention, anal bleeding, blood in stool, constipation, diarrhea, dysphagia, rectal pain and vomiting.   Musculoskeletal: Negative for arthralgias.   Skin: Negative for pallor.   Neurological: Negative for light-headedness.        /85 (BP Location: Left arm)   Pulse 96   Ht 182.9 cm (72\")   Wt 97.6 kg (215 lb 3.2 oz)   BMI 29.19 kg/m²     Objective    Physical Exam  Constitutional:       General: He is not in acute distress.     Appearance: Normal appearance. He is well-developed.   HENT:      Head: Normocephalic and atraumatic.   Neck:      Thyroid: No thyromegaly.   Pulmonary:      Effort: Pulmonary effort is normal.   Abdominal:      General: Bowel sounds are normal. There is no distension.      Palpations: Abdomen is soft. Abdomen is not " rigid.      Tenderness: There is abdominal tenderness in the left lower quadrant. There is no guarding.      Hernia: No hernia is present.   Musculoskeletal:      Cervical back: Normal range of motion and neck supple.   Lymphadenopathy:      Cervical: No cervical adenopathy.   Skin:     General: Skin is warm and dry.      Coloration: Skin is not pale.      Findings: No rash.   Neurological:      Mental Status: He is alert and oriented to person, place, and time.   Psychiatric:         Speech: Speech normal.         Behavior: Behavior is cooperative.       Admission on 02/27/2021, Discharged on 03/02/2021   Component Date Value Ref Range Status   • QT Interval 02/27/2021 398  ms Final   • QTC Interval 02/27/2021 486  ms Final   • Glucose 02/27/2021 180* 65 - 99 mg/dL Final   • BUN 02/27/2021 17  6 - 20 mg/dL Final   • Creatinine 02/27/2021 0.90  0.76 - 1.27 mg/dL Final   • Sodium 02/27/2021 134* 136 - 145 mmol/L Final   • Potassium 02/27/2021 3.8  3.5 - 5.2 mmol/L Final    Slight hemolysis detected by analyzer. Results may be affected.   • Chloride 02/27/2021 97* 98 - 107 mmol/L Final   • CO2 02/27/2021 25.0  22.0 - 29.0 mmol/L Final   • Calcium 02/27/2021 9.6  8.6 - 10.5 mg/dL Final   • Total Protein 02/27/2021 7.5  6.0 - 8.5 g/dL Final   • Albumin 02/27/2021 3.90  3.50 - 5.20 g/dL Final   • ALT (SGPT) 02/27/2021 19  1 - 41 U/L Final   • AST (SGOT) 02/27/2021 16  1 - 40 U/L Final   • Alkaline Phosphatase 02/27/2021 76  39 - 117 U/L Final   • Total Bilirubin 02/27/2021 0.3  0.0 - 1.2 mg/dL Final   • eGFR   Amer 02/27/2021 107  >60 mL/min/1.73 Final   • Globulin 02/27/2021 3.6  gm/dL Final   • A/G Ratio 02/27/2021 1.1  g/dL Final   • BUN/Creatinine Ratio 02/27/2021 18.9  7.0 - 25.0 Final   • Anion Gap 02/27/2021 12.0  5.0 - 15.0 mmol/L Final   • Lipase 02/27/2021 16  13 - 60 U/L Final   • Color, UA 02/28/2021 Yellow  Yellow, Straw, Dark Yellow, Peyton Final   • Appearance, UA 02/28/2021 Clear  Clear Final   •  pH, UA 02/28/2021 7.5  5.0 - 9.0 Final   • Specific Gravity, UA 02/28/2021 1.040* 1.003 - 1.030 Final    Result obtained by Refractometer   • Glucose, UA 02/28/2021 >=1000 mg/dL (3+)* Negative Final   • Ketones, UA 02/28/2021 15 mg/dL (1+)* Negative Final   • Bilirubin, UA 02/28/2021 Negative  Negative Final   • Blood, UA 02/28/2021 Negative  Negative Final   • Protein, UA 02/28/2021 30 mg/dL (1+)* Negative Final   • Leuk Esterase, UA 02/28/2021 Negative  Negative Final   • Nitrite, UA 02/28/2021 Negative  Negative Final   • Urobilinogen, UA 02/28/2021 0.2 E.U./dL  0.2 - 1.0 E.U./dL Final   • Troponin T 02/27/2021 <0.010  0.000 - 0.030 ng/mL Final   • Ethanol 02/27/2021 <10  0 - 10 mg/dL Final   • Ethanol % 02/27/2021 <0.010  % Final   • THC, Screen, Urine 02/28/2021 Positive* Negative Final   • Phencyclidine (PCP), Urine 02/28/2021 Negative  Negative Final   • Cocaine Screen, Urine 02/28/2021 Negative  Negative Final   • Methamphetamine, Ur 02/28/2021 Negative  Negative Final   • Opiate Screen 02/28/2021 Negative  Negative Final   • Amphetamine Screen, Urine 02/28/2021 Negative  Negative Final   • Benzodiazepine Screen, Urine 02/28/2021 Negative  Negative Final   • Tricyclic Antidepressants Screen 02/28/2021 Negative  Negative Final   • Methadone Screen, Urine 02/28/2021 Negative  Negative Final   • Barbiturates Screen, Urine 02/28/2021 Negative  Negative Final   • Oxycodone Screen, Urine 02/28/2021 Negative  Negative Final   • Propoxyphene Screen 02/28/2021 Negative  Negative Final   • Buprenorphine, Screen, Urine 02/28/2021 Negative  Negative Final   • Magnesium 02/27/2021 2.3  1.6 - 2.6 mg/dL Final   • WBC 02/27/2021 8.67  3.40 - 10.80 10*3/mm3 Final   • RBC 02/27/2021 5.38  4.14 - 5.80 10*6/mm3 Final   • Hemoglobin 02/27/2021 14.9  13.0 - 17.7 g/dL Final   • Hematocrit 02/27/2021 44.1  37.5 - 51.0 % Final   • MCV 02/27/2021 82.0  79.0 - 97.0 fL Final   • MCH 02/27/2021 27.7  26.6 - 33.0 pg Final   • MCHC  02/27/2021 33.8  31.5 - 35.7 g/dL Final   • RDW 02/27/2021 13.9  12.3 - 15.4 % Final   • RDW-SD 02/27/2021 40.4  37.0 - 54.0 fl Final   • MPV 02/27/2021 11.6  6.0 - 12.0 fL Final   • Platelets 02/27/2021 227  140 - 450 10*3/mm3 Final   • Neutrophil % 02/27/2021 71.8  42.7 - 76.0 % Final   • Lymphocyte % 02/27/2021 18.7* 19.6 - 45.3 % Final   • Monocyte % 02/27/2021 8.5  5.0 - 12.0 % Final   • Eosinophil % 02/27/2021 0.3  0.3 - 6.2 % Final   • Basophil % 02/27/2021 0.5  0.0 - 1.5 % Final   • Immature Grans % 02/27/2021 0.2  0.0 - 0.5 % Final   • Neutrophils, Absolute 02/27/2021 6.22  1.70 - 7.00 10*3/mm3 Final   • Lymphocytes, Absolute 02/27/2021 1.62  0.70 - 3.10 10*3/mm3 Final   • Monocytes, Absolute 02/27/2021 0.74  0.10 - 0.90 10*3/mm3 Final   • Eosinophils, Absolute 02/27/2021 0.03  0.00 - 0.40 10*3/mm3 Final   • Basophils, Absolute 02/27/2021 0.04  0.00 - 0.20 10*3/mm3 Final   • Immature Grans, Absolute 02/27/2021 0.02  0.00 - 0.05 10*3/mm3 Final   • nRBC 02/27/2021 0.0  0.0 - 0.2 /100 WBC Final   • Extra Tube 02/27/2021 hold for add-on   Final    Auto resulted   • Extra Tube 02/27/2021 Hold for add-ons.   Final    Auto resulted.   • RBC, UA 02/28/2021 0-2* None Seen /HPF Final   • WBC, UA 02/28/2021 0-2  None Seen, 0-2, 3-5 /HPF Final   • Bacteria, UA 02/28/2021 None Seen  None Seen /HPF Final   • Squamous Epithelial Cells, UA 02/28/2021 None Seen  None Seen, 0-2 /HPF Final   • Hyaline Casts, UA 02/28/2021 3-6  None Seen /LPF Final   • Methodology 02/28/2021 Automated Microscopy   Final   • COVID19 02/28/2021 Not Detected  Not Detected - Ref. Range Final   • Influenza A PCR 02/28/2021 Not Detected  Not Detected Final   • Influenza B PCR 02/28/2021 Not Detected  Not Detected Final   • Troponin T 02/28/2021 <0.010  0.000 - 0.030 ng/mL Final   • Extra Tube 02/28/2021 hold for add-on   Final    Auto resulted   • Glucose 02/28/2021 232* 70 - 130 mg/dL Final    RN NotifiedOperator: [id number] FEROZ ELRITAMeter  ID: [id number]   • Glucose 02/28/2021 244* 70 - 130 mg/dL Final    RN NotifiedOperator: [id number] SHAKA ROCARONNIEMeter ID: [id number]   • BH CV STRESS PROTOCOL 1 03/01/2021 Pharmacologic   Final   • Stage 1 03/01/2021 1   Final   • Duration Min Stage 1 03/01/2021 0   Final   • Duration Sec Stage 1 03/01/2021 10   Final   • Stress Dose Regadenoson Stage 1 03/01/2021 0.4   Final   • Stress Comments Stage 1 03/01/2021 10 sec bolus injection   Final   • Target HR (85%) 03/01/2021 143  bpm Final   • Max. Pred. HR (100%) 03/01/2021 168  bpm Final   • HR Stage 1 03/01/2021 119   Final   • BP Stage 1 03/01/2021 101/80   Final   • Baseline HR 03/01/2021 89  bpm Final   • Baseline BP 03/01/2021 149/105  mmHg Final   • Peak HR 03/01/2021 123  bpm Final   • Percent Max Pred HR 03/01/2021 73.21  % Final   • Percent Target HR 03/01/2021 86  % Final   • Peak BP 03/01/2021 149/105  mmHg Final   • Recovery HR 03/01/2021 111  bpm Final   • Recovery BP 03/01/2021 114/85  mmHg Final   • Estimated workload 03/01/2021 1.0  METS Final   • Nuc Stress EF 03/01/2021 49  % Final   • Glucose 02/28/2021 185* 70 - 130 mg/dL Final    RN NotifiedOperator: [id number] SHAKA ROCAMeter ID: [id number]   • Glucose 02/28/2021 123  70 - 130 mg/dL Final    RN NotifiedOperator: [id number] RICKEY HELTONMeter ID: [id number]   • Glucose 03/01/2021 152* 70 - 130 mg/dL Final    RN NotifiedOperator: [id number] RICKEY HELTONMeter ID: [id number]   • Glucose 03/01/2021 206* 70 - 130 mg/dL Final    RN NotifiedOperator: [id number] ROSETTA MARKMeter ID: [id number]   • Glucose 03/01/2021 231* 70 - 130 mg/dL Final    RN NotifiedOperator: [id number] ROSETTA MARKMeter ID: [id number]   • Glucose 03/01/2021 186* 70 - 130 mg/dL Final    RN NotifiedOperator: [id number] RICKEY HELTONMeter ID: [id number]   • Glucose 03/02/2021 126* 65 - 99 mg/dL Final   • BUN 03/02/2021 13  6 - 20 mg/dL Final   • Creatinine 03/02/2021 1.00  0.76 - 1.27 mg/dL Final   • Sodium  03/02/2021 134* 136 - 145 mmol/L Final   • Potassium 03/02/2021 3.8  3.5 - 5.2 mmol/L Final    Slight hemolysis detected by analyzer. Results may be affected.   • Chloride 03/02/2021 98  98 - 107 mmol/L Final   • CO2 03/02/2021 26.0  22.0 - 29.0 mmol/L Final   • Calcium 03/02/2021 8.4* 8.6 - 10.5 mg/dL Final   • Total Protein 03/02/2021 6.8  6.0 - 8.5 g/dL Final   • Albumin 03/02/2021 3.30* 3.50 - 5.20 g/dL Final   • ALT (SGPT) 03/02/2021 10  1 - 41 U/L Final   • AST (SGOT) 03/02/2021 11  1 - 40 U/L Final   • Alkaline Phosphatase 03/02/2021 68  39 - 117 U/L Final   • Total Bilirubin 03/02/2021 0.5  0.0 - 1.2 mg/dL Final   • eGFR  African Amer 03/02/2021 95  >60 mL/min/1.73 Final   • Globulin 03/02/2021 3.5  gm/dL Final   • A/G Ratio 03/02/2021 0.9  g/dL Final   • BUN/Creatinine Ratio 03/02/2021 13.0  7.0 - 25.0 Final   • Anion Gap 03/02/2021 10.0  5.0 - 15.0 mmol/L Final   • Amylase 03/02/2021 57  28 - 100 U/L Final   • WBC 03/02/2021 6.83  3.40 - 10.80 10*3/mm3 Final   • RBC 03/02/2021 6.13* 4.14 - 5.80 10*6/mm3 Final   • Hemoglobin 03/02/2021 16.6  13.0 - 17.7 g/dL Final   • Hematocrit 03/02/2021 50.8  37.5 - 51.0 % Final   • MCV 03/02/2021 82.9  79.0 - 97.0 fL Final   • MCH 03/02/2021 27.1  26.6 - 33.0 pg Final   • MCHC 03/02/2021 32.7  31.5 - 35.7 g/dL Final   • RDW 03/02/2021 14.2  12.3 - 15.4 % Final   • RDW-SD 03/02/2021 41.8  37.0 - 54.0 fl Final   • MPV 03/02/2021 12.0  6.0 - 12.0 fL Final   • Platelets 03/02/2021 241  140 - 450 10*3/mm3 Final   • Neutrophil % 03/02/2021 50.8  42.7 - 76.0 % Final   • Lymphocyte % 03/02/2021 37.0  19.6 - 45.3 % Final   • Monocyte % 03/02/2021 10.2  5.0 - 12.0 % Final   • Eosinophil % 03/02/2021 0.6  0.3 - 6.2 % Final   • Basophil % 03/02/2021 0.7  0.0 - 1.5 % Final   • Immature Grans % 03/02/2021 0.7* 0.0 - 0.5 % Final   • Neutrophils, Absolute 03/02/2021 3.46  1.70 - 7.00 10*3/mm3 Final   • Lymphocytes, Absolute 03/02/2021 2.53  0.70 - 3.10 10*3/mm3 Final   • Monocytes,  Absolute 03/02/2021 0.70  0.10 - 0.90 10*3/mm3 Final   • Eosinophils, Absolute 03/02/2021 0.04  0.00 - 0.40 10*3/mm3 Final   • Basophils, Absolute 03/02/2021 0.05  0.00 - 0.20 10*3/mm3 Final   • Immature Grans, Absolute 03/02/2021 0.05  0.00 - 0.05 10*3/mm3 Final   • nRBC 03/02/2021 0.0  0.0 - 0.2 /100 WBC Final   • Glucose 03/02/2021 386* 70 - 130 mg/dL Final    RN NotifiedOperator: [id number] RICKEY SARAHMeter ID: [id number]   • Glucose 03/02/2021 118  70 - 130 mg/dL Final    RN NotifiedOperator: [id number] MADELINE VICKERYMeter ID: [id number]   • Glucose 03/02/2021 108  70 - 130 mg/dL Final    RN NotifiedOperator: [id number] MADELINE VICKERYMeter ID: [id number]     Assessment/Plan      1. Gastroesophageal reflux disease, unspecified whether esophagitis present    2. Nausea    3. Left lower quadrant abdominal pain    .       Orders placed during this encounter include:  Orders Placed This Encounter   Procedures   • Follow Anesthesia Guidelines / Standing Orders     Standing Status:   Future   • Obtain Informed Consent     Standing Status:   Future     Order Specific Question:   Informed Consent Given For     Answer:   ESOPHAGOGASTRODUODENOSCOPY and Colonoscopy       ESOPHAGOGASTRODUODENOSCOPY (N/A), COLONOSCOPY (N/A)    Review and/or summary of lab tests, radiology, procedures, medications. Review and summary of old records and obtaining of history. The risks and benefits of my recommendations, as well as other treatment options were discussed with the patient today. Questions were answered.    No orders of the defined types were placed in this encounter.      Follow-up: Return in about 4 weeks (around 4/6/2021) for Recheck, After test.          This document has been electronically signed by NATHANIEL Watson on March 9, 2021 10:47 CST           I spent 18 minutes caring for Edd on this date of service. This time includes time spent by me in the following activities:preparing for the visit, reviewing  tests, obtaining and/or reviewing a separately obtained history, performing a medically appropriate examination and/or evaluation , counseling and educating the patient/family/caregiver, ordering medications, tests, or procedures, referring and communicating with other health care professionals , documenting information in the medical record and care coordination    Results for orders placed or performed during the hospital encounter of 02/27/21   Gold Top - SST   Result Value Ref Range    Extra Tube Hold for add-ons.    COVID-19 and FLU A/B PCR - Swab, Nasopharynx    Specimen: Nasopharynx; Swab   Result Value Ref Range    COVID19 Not Detected Not Detected - Ref. Range    Influenza A PCR Not Detected Not Detected    Influenza B PCR Not Detected Not Detected   Urinalysis, Microscopic Only - Urine, Clean Catch    Specimen: Urine, Clean Catch   Result Value Ref Range    RBC, UA 0-2 (A) None Seen /HPF    WBC, UA 0-2 None Seen, 0-2, 3-5 /HPF    Bacteria, UA None Seen None Seen /HPF    Squamous Epithelial Cells, UA None Seen None Seen, 0-2 /HPF    Hyaline Casts, UA 3-6 None Seen /LPF    Methodology Automated Microscopy    Urinalysis With Microscopic If Indicated (No Culture) - Urine, Clean Catch    Specimen: Urine, Clean Catch   Result Value Ref Range    Color, UA Yellow Yellow, Straw, Dark Yellow, Peyton    Appearance, UA Clear Clear    pH, UA 7.5 5.0 - 9.0    Specific Gravity, UA 1.040 (H) 1.003 - 1.030    Glucose, UA >=1000 mg/dL (3+) (A) Negative    Ketones, UA 15 mg/dL (1+) (A) Negative    Bilirubin, UA Negative Negative    Blood, UA Negative Negative    Protein, UA 30 mg/dL (1+) (A) Negative    Leuk Esterase, UA Negative Negative    Nitrite, UA Negative Negative    Urobilinogen, UA 0.2 E.U./dL 0.2 - 1.0 E.U./dL   CBC Auto Differential    Specimen: Blood   Result Value Ref Range    WBC 6.83 3.40 - 10.80 10*3/mm3    RBC 6.13 (H) 4.14 - 5.80 10*6/mm3    Hemoglobin 16.6 13.0 - 17.7 g/dL    Hematocrit 50.8 37.5 - 51.0 %     MCV 82.9 79.0 - 97.0 fL    MCH 27.1 26.6 - 33.0 pg    MCHC 32.7 31.5 - 35.7 g/dL    RDW 14.2 12.3 - 15.4 %    RDW-SD 41.8 37.0 - 54.0 fl    MPV 12.0 6.0 - 12.0 fL    Platelets 241 140 - 450 10*3/mm3    Neutrophil % 50.8 42.7 - 76.0 %    Lymphocyte % 37.0 19.6 - 45.3 %    Monocyte % 10.2 5.0 - 12.0 %    Eosinophil % 0.6 0.3 - 6.2 %    Basophil % 0.7 0.0 - 1.5 %    Immature Grans % 0.7 (H) 0.0 - 0.5 %    Neutrophils, Absolute 3.46 1.70 - 7.00 10*3/mm3    Lymphocytes, Absolute 2.53 0.70 - 3.10 10*3/mm3    Monocytes, Absolute 0.70 0.10 - 0.90 10*3/mm3    Eosinophils, Absolute 0.04 0.00 - 0.40 10*3/mm3    Basophils, Absolute 0.05 0.00 - 0.20 10*3/mm3    Immature Grans, Absolute 0.05 0.00 - 0.05 10*3/mm3    nRBC 0.0 0.0 - 0.2 /100 WBC   CBC Auto Differential    Specimen: Blood   Result Value Ref Range    WBC 8.67 3.40 - 10.80 10*3/mm3    RBC 5.38 4.14 - 5.80 10*6/mm3    Hemoglobin 14.9 13.0 - 17.7 g/dL    Hematocrit 44.1 37.5 - 51.0 %    MCV 82.0 79.0 - 97.0 fL    MCH 27.7 26.6 - 33.0 pg    MCHC 33.8 31.5 - 35.7 g/dL    RDW 13.9 12.3 - 15.4 %    RDW-SD 40.4 37.0 - 54.0 fl    MPV 11.6 6.0 - 12.0 fL    Platelets 227 140 - 450 10*3/mm3    Neutrophil % 71.8 42.7 - 76.0 %    Lymphocyte % 18.7 (L) 19.6 - 45.3 %    Monocyte % 8.5 5.0 - 12.0 %    Eosinophil % 0.3 0.3 - 6.2 %    Basophil % 0.5 0.0 - 1.5 %    Immature Grans % 0.2 0.0 - 0.5 %    Neutrophils, Absolute 6.22 1.70 - 7.00 10*3/mm3    Lymphocytes, Absolute 1.62 0.70 - 3.10 10*3/mm3    Monocytes, Absolute 0.74 0.10 - 0.90 10*3/mm3    Eosinophils, Absolute 0.03 0.00 - 0.40 10*3/mm3    Basophils, Absolute 0.04 0.00 - 0.20 10*3/mm3    Immature Grans, Absolute 0.02 0.00 - 0.05 10*3/mm3    nRBC 0.0 0.0 - 0.2 /100 WBC   Lavender Top   Result Value Ref Range    Extra Tube hold for add-on    Light Blue Top   Result Value Ref Range    Extra Tube hold for add-on    Stress Test With Myocardial Perfusion One Day   Result Value Ref Range    BH CV STRESS PROTOCOL 1 Pharmacologic      Stage 1 1     Duration Min Stage 1 0     Duration Sec Stage 1 10     Stress Dose Regadenoson Stage 1 0.4     Stress Comments Stage 1 10 sec bolus injection     Target HR (85%) 143 bpm    Max. Pred. HR (100%) 168 bpm    HR Stage 1 119     BP Stage 1 101/80     Baseline HR 89 bpm    Baseline /105 mmHg    Peak  bpm    Percent Max Pred HR 73.21 %    Percent Target HR 86 %    Peak /105 mmHg    Recovery  bpm    Recovery /85 mmHg    Estimated workload 1.0 METS    Nuc Stress EF 49 %   Troponin    Specimen: Blood   Result Value Ref Range    Troponin T <0.010 0.000 - 0.030 ng/mL   Troponin    Specimen: Blood   Result Value Ref Range    Troponin T <0.010 0.000 - 0.030 ng/mL   Urine Drug Screen - Urine, Clean Catch    Specimen: Urine, Clean Catch   Result Value Ref Range    THC, Screen, Urine Positive (A) Negative    Phencyclidine (PCP), Urine Negative Negative    Cocaine Screen, Urine Negative Negative    Methamphetamine, Ur Negative Negative    Opiate Screen Negative Negative    Amphetamine Screen, Urine Negative Negative    Benzodiazepine Screen, Urine Negative Negative    Tricyclic Antidepressants Screen Negative Negative    Methadone Screen, Urine Negative Negative    Barbiturates Screen, Urine Negative Negative    Oxycodone Screen, Urine Negative Negative    Propoxyphene Screen Negative Negative    Buprenorphine, Screen, Urine Negative Negative     *Note: Due to a large number of results and/or encounters for the requested time period, some results have not been displayed. A complete set of results can be found in Results Review.

## 2021-03-09 NOTE — PATIENT INSTRUCTIONS
Gastroesophageal Reflux Disease, Adult  Gastroesophageal reflux (KASSIE) happens when acid from the stomach flows up into the tube that connects the mouth and the stomach (esophagus). Normally, food travels down the esophagus and stays in the stomach to be digested. However, when a person has KASSIE, food and stomach acid sometimes move back up into the esophagus. If this becomes a more serious problem, the person may be diagnosed with a disease called gastroesophageal reflux disease (GERD). GERD occurs when the reflux:  · Happens often.  · Causes frequent or severe symptoms.  · Causes problems such as damage to the esophagus.  When stomach acid comes in contact with the esophagus, the acid may cause soreness (inflammation) in the esophagus. Over time, GERD may create small holes (ulcers) in the lining of the esophagus.  What are the causes?  This condition is caused by a problem with the muscle between the esophagus and the stomach (lower esophageal sphincter, or LES). Normally, the LES muscle closes after food passes through the esophagus to the stomach. When the LES is weakened or abnormal, it does not close properly, and that allows food and stomach acid to go back up into the esophagus.  The LES can be weakened by certain dietary substances, medicines, and medical conditions, including:  · Tobacco use.  · Pregnancy.  · Having a hiatal hernia.  · Alcohol use.  · Certain foods and beverages, such as coffee, chocolate, onions, and peppermint.  What increases the risk?  You are more likely to develop this condition if you:  · Have an increased body weight.  · Have a connective tissue disorder.  · Use NSAID medicines.  What are the signs or symptoms?  Symptoms of this condition include:  · Heartburn.  · Difficult or painful swallowing.  · The feeling of having a lump in the throat.  · A bitter taste in the mouth.  · Bad breath.  · Having a large amount of saliva.  · Having an upset or bloated  stomach.  · Belching.  · Chest pain. Different conditions can cause chest pain. Make sure you see your health care provider if you experience chest pain.  · Shortness of breath or wheezing.  · Ongoing (chronic) cough or a night-time cough.  · Wearing away of tooth enamel.  · Weight loss.  How is this diagnosed?  Your health care provider will take a medical history and perform a physical exam. To determine if you have mild or severe GERD, your health care provider may also monitor how you respond to treatment. You may also have tests, including:  · A test to examine your stomach and esophagus with a small camera (endoscopy).  · A test that measures the acidity level in your esophagus.  · A test that measures how much pressure is on your esophagus.  · A barium swallow or modified barium swallow test to show the shape, size, and functioning of your esophagus.  How is this treated?  The goal of treatment is to help relieve your symptoms and to prevent complications. Treatment for this condition may vary depending on how severe your symptoms are. Your health care provider may recommend:  · Changes to your diet.  · Medicine.  · Surgery.  Follow these instructions at home:  Eating and drinking    · Follow a diet as recommended by your health care provider. This may involve avoiding foods and drinks such as:  ? Coffee and tea (with or without caffeine).  ? Drinks that contain alcohol.  ? Energy drinks and sports drinks.  ? Carbonated drinks or sodas.  ? Chocolate and cocoa.  ? Peppermint and mint flavorings.  ? Garlic and onions.  ? Horseradish.  ? Spicy and acidic foods, including peppers, chili powder, webster powder, vinegar, hot sauces, and barbecue sauce.  ? Citrus fruit juices and citrus fruits, such as oranges, hugh, and limes.  ? Tomato-based foods, such as red sauce, chili, salsa, and pizza with red sauce.  ? Fried and fatty foods, such as donuts, french fries, potato chips, and high-fat dressings.  ? High-fat  meats, such as hot dogs and fatty cuts of red and white meats, such as rib eye steak, sausage, ham, and maher.  ? High-fat dairy items, such as whole milk, butter, and cream cheese.  · Eat small, frequent meals instead of large meals.  · Avoid drinking large amounts of liquid with your meals.  · Avoid eating meals during the 2-3 hours before bedtime.  · Avoid lying down right after you eat.  · Do not exercise right after you eat.  Lifestyle    · Do not use any products that contain nicotine or tobacco, such as cigarettes, e-cigarettes, and chewing tobacco. If you need help quitting, ask your health care provider.  · Try to reduce your stress by using methods such as yoga or meditation. If you need help reducing stress, ask your health care provider.  · If you are overweight, reduce your weight to an amount that is healthy for you. Ask your health care provider for guidance about a safe weight loss goal.  General instructions  · Pay attention to any changes in your symptoms.  · Take over-the-counter and prescription medicines only as told by your health care provider. Do not take aspirin, ibuprofen, or other NSAIDs unless your health care provider told you to do so.  · Wear loose-fitting clothing. Do not wear anything tight around your waist that causes pressure on your abdomen.  · Raise (elevate) the head of your bed about 6 inches (15 cm).  · Avoid bending over if this makes your symptoms worse.  · Keep all follow-up visits as told by your health care provider. This is important.  Contact a health care provider if:  · You have:  ? New symptoms.  ? Unexplained weight loss.  ? Difficulty swallowing or it hurts to swallow.  ? Wheezing or a persistent cough.  ? A hoarse voice.  · Your symptoms do not improve with treatment.  Get help right away if you:  · Have pain in your arms, neck, jaw, teeth, or back.  · Feel sweaty, dizzy, or light-headed.  · Have chest pain or shortness of breath.  · Vomit and your vomit looks  like blood or coffee grounds.  · Faint.  · Have stool that is bloody or black.  · Cannot swallow, drink, or eat.  Summary  · Gastroesophageal reflux happens when acid from the stomach flows up into the esophagus. GERD is a disease in which the reflux happens often, causes frequent or severe symptoms, or causes problems such as damage to the esophagus.  · Treatment for this condition may vary depending on how severe your symptoms are. Your health care provider may recommend diet and lifestyle changes, medicine, or surgery.  · Contact a health care provider if you have new or worsening symptoms.  · Take over-the-counter and prescription medicines only as told by your health care provider. Do not take aspirin, ibuprofen, or other NSAIDs unless your health care provider told you to do so.  · Keep all follow-up visits as told by your health care provider. This is important.  This information is not intended to replace advice given to you by your health care provider. Make sure you discuss any questions you have with your health care provider.  Document Revised: 06/26/2019 Document Reviewed: 06/26/2019  Elsevier Patient Education © 2020 [employer]

## 2021-03-09 NOTE — OUTREACH NOTE
Medical Week 2 Survey      Responses   [facility] patient discharged from?  [place]   Does the patient have one of the following disease processes/diagnoses(primary or secondary)?  Other   Week 2 attempt successful?  No   Unsuccessful attempts  Attempt 1          Althea Gomez RN

## 2021-03-11 ENCOUNTER — OFFICE VISIT (OUTPATIENT)
Dept: FAMILY MEDICINE CLINIC | Facility: CLINIC | Age: 51
End: 2021-03-11

## 2021-03-11 VITALS
DIASTOLIC BLOOD PRESSURE: 74 MMHG | WEIGHT: 217 LBS | HEIGHT: 72 IN | BODY MASS INDEX: 29.39 KG/M2 | SYSTOLIC BLOOD PRESSURE: 104 MMHG

## 2021-03-11 DIAGNOSIS — S91.001D ANKLE WOUND, RIGHT, SUBSEQUENT ENCOUNTER: ICD-10-CM

## 2021-03-11 DIAGNOSIS — Z09 HOSPITAL DISCHARGE FOLLOW-UP: ICD-10-CM

## 2021-03-11 DIAGNOSIS — R55 NEAR SYNCOPE: Primary | ICD-10-CM

## 2021-03-11 PROCEDURE — 90471 IMMUNIZATION ADMIN: CPT | Performed by: NURSE PRACTITIONER

## 2021-03-11 PROCEDURE — 90715 TDAP VACCINE 7 YRS/> IM: CPT | Performed by: NURSE PRACTITIONER

## 2021-03-11 PROCEDURE — 99213 OFFICE O/P EST LOW 20 MIN: CPT | Performed by: NURSE PRACTITIONER

## 2021-03-11 RX ORDER — CEPHALEXIN 500 MG/1
500 CAPSULE ORAL 2 TIMES DAILY
Qty: 14 CAPSULE | Refills: 0 | Status: SHIPPED | OUTPATIENT
Start: 2021-03-11 | End: 2021-03-18

## 2021-03-11 NOTE — PROGRESS NOTES
"Subjective   Edd Willingham is a 50 y.o. male. Hospital discharge follow-up.  Admitted to [facility] on February 27 after suffering multiple near syncopal episodes.  \"I think it was stress Ms. Bergeron.  I have been under a tremendous amount of stress lately but I am trying to get better.\"  Admits to using IV meth over the last several months.  \"I am trying to do better.  I am trying to get myself right.  The last time he used \"was on February 14 when I told myself I was going to love me more.\"    Has multiple ulcerations in open wounds to bilateral feet.  \"I know it is from where I did the IV meth.\"  Was seen by wound care nurse while in the hospital but was not scheduled for follow-up.    Copied from hospital chart:    Date of Admission: 2/27/2021  Date of Discharge:  3/2/2021  Primary Care Physician: Elyssa Prado, NATHANIEL     Presenting Problem/History of Present Illness:   Near syncope [R55]  Frequent PVCs [I49.3]                    Final Discharge Diagnoses:      Active Hospital Problems     Diagnosis   • **Near syncope   • Chest pain         HPI: Near syncope.  Chronicity is new.  Onset 1 to 4 weeks ago.  Frequency is intermittently.  Progression since onset is resolved.  Wound Infection  This is a recurrent problem. The current episode started more than 1 month ago. The problem occurs constantly. The problem has been gradually worsening. Pertinent negatives include no chills, coughing, diaphoresis, fatigue, fever or sore throat. Nothing aggravates the symptoms. He has tried nothing for the symptoms. The treatment provided no relief.        The following portions of the patient's history were reviewed and updated as appropriate:   Current Outpatient Medications   Medication Sig Dispense Refill   • apixaban (ELIQUIS) 5 MG tablet tablet Take 1 tablet by mouth Every 12 (Twelve) Hours. 60 tablet 11   • aspirin 81 MG chewable tablet Chew 81 mg Daily.     • Cholecalciferol (vitamin D3) 125 MCG (5000 " UT) capsule capsule Take 2,000 Units by mouth Daily.     • Dapagliflozin Propanediol (Farxiga) 10 MG tablet Take 10 mg by mouth Daily. 30 tablet 11   • digoxin (LANOXIN) 125 MCG tablet Take 125 mcg by mouth Daily.     • docusate sodium (COLACE) 100 MG capsule Take 100 mg by mouth 2 (Two) Times a Day.     • insulin aspart (novoLOG) 100 UNIT/ML injection Inject 10 Units under the skin into the appropriate area as directed 3 (Three) Times a Day With Meals. 10 mL 6   • insulin NPH-insulin regular (NovoLIN 70/30) (70-30) 100 UNIT/ML injection Inject 30 Units under the skin into the appropriate area as directed 2 (Two) Times a Day With Meals. 1 each 12   • metoprolol succinate XL (TOPROL-XL) 50 MG 24 hr tablet Take 1 tablet by mouth Daily. 90 tablet 3   • omeprazole (priLOSEC) 20 MG capsule Take 1 capsule by mouth 2 (two) times a day. 60 capsule 11   • polyethylene glycol (GoLYTELY) 236 g solution Starting at noon on day prior to procedure, drink 8 ounces every 30 minutes until all gone or stools are clear. May add flavor packet. 4000 mL 0   • sacubitril-valsartan (ENTRESTO) 24-26 MG tablet Take 1 tablet by mouth 2 (Two) Times a Day. 60 tablet 6   • cephalexin (Keflex) 500 MG capsule Take 1 capsule by mouth 2 (Two) Times a Day for 7 days. 14 capsule 0     No current facility-administered medications for this visit.     Current Outpatient Medications on File Prior to Visit   Medication Sig   • apixaban (ELIQUIS) 5 MG tablet tablet Take 1 tablet by mouth Every 12 (Twelve) Hours.   • aspirin 81 MG chewable tablet Chew 81 mg Daily.   • Cholecalciferol (vitamin D3) 125 MCG (5000 UT) capsule capsule Take 2,000 Units by mouth Daily.   • Dapagliflozin Propanediol (Farxiga) 10 MG tablet Take 10 mg by mouth Daily.   • digoxin (LANOXIN) 125 MCG tablet Take 125 mcg by mouth Daily.   • docusate sodium (COLACE) 100 MG capsule Take 100 mg by mouth 2 (Two) Times a Day.   • insulin aspart (novoLOG) 100 UNIT/ML injection Inject 10 Units  "under the skin into the appropriate area as directed 3 (Three) Times a Day With Meals.   • insulin NPH-insulin regular (NovoLIN 70/30) (70-30) 100 UNIT/ML injection Inject 30 Units under the skin into the appropriate area as directed 2 (Two) Times a Day With Meals.   • metoprolol succinate XL (TOPROL-XL) 50 MG 24 hr tablet Take 1 tablet by mouth Daily.   • omeprazole (priLOSEC) 20 MG capsule Take 1 capsule by mouth 2 (two) times a day.   • polyethylene glycol (GoLYTELY) 236 g solution Starting at noon on day prior to procedure, drink 8 ounces every 30 minutes until all gone or stools are clear. May add flavor packet.   • sacubitril-valsartan (ENTRESTO) 24-26 MG tablet Take 1 tablet by mouth 2 (Two) Times a Day.     No current facility-administered medications on file prior to visit.     He is allergic to statins and sulfa antibiotics..    Review of Systems   Constitutional: Negative for chills, diaphoresis, fatigue and fever.   HENT: Negative.  Negative for sore throat.    Eyes: Negative.    Respiratory: Negative.  Negative for cough and shortness of breath.    Cardiovascular: Negative.    Gastrointestinal: Negative.    Genitourinary: Negative.    Musculoskeletal: Negative.    Skin: Positive for wound.   Neurological: Negative.    Psychiatric/Behavioral: Negative.  Negative for confusion.       Objective    Visit Vitals  /74   Ht 182.9 cm (72\")   Wt 98.4 kg (217 lb)   BMI 29.43 kg/m²       Physical Exam  Vitals and nursing note reviewed.   Constitutional:       Appearance: He is well-developed.   HENT:      Head: Normocephalic.      Right Ear: External ear normal.      Left Ear: External ear normal.   Eyes:      Pupils: Pupils are equal, round, and reactive to light.   Cardiovascular:      Rate and Rhythm: Normal rate and regular rhythm.      Heart sounds: Normal heart sounds.   Pulmonary:      Effort: Pulmonary effort is normal.      Breath sounds: Normal breath sounds.   Abdominal:      General: Bowel sounds " are normal.      Palpations: Abdomen is soft.   Musculoskeletal:         General: Normal range of motion.      Cervical back: Normal range of motion and neck supple.        Feet:    Skin:     General: Skin is warm.      Capillary Refill: Capillary refill takes less than 2 seconds.   Neurological:      Mental Status: He is alert and oriented to person, place, and time.   Psychiatric:         Behavior: Behavior normal.         Assessment/Plan   Diagnoses and all orders for this visit:    1. Near syncope (Primary)    2. Ankle wound, right, subsequent encounter  -     Ambulatory Referral to Wound Clinic  -     cephalexin (Keflex) 500 MG capsule; Take 1 capsule by mouth 2 (Two) Times a Day for 7 days.  Dispense: 14 capsule; Refill: 0  -     Tdap Vaccine Greater Than or Equal To 6yo IM    3. Hospital discharge follow-up      Continue on current medications as previously prescribed   Referral placed to wound care will call to schedule appointment  Begin Keflex as prescribed  Educated importance of taking full dose of antibiotic therapy  Tdap vaccine updated in this office and educated on possible injection site reactions  Encouraged to cleanse area with antibacterial soap and water at a minimum of twice per day and leave open to air as much as possible  Positive encouragement provided for abstinence from IV drug use and encouraged to seek counseling for drug addiction  Discussed importance of avoiding triggers for relapse such as acquaintances he used to get high with, places he used to get high  I spent 22 minutes charting and in face to face contact with patient.  Greater than 50% of this time was spent counseling patient and discussing plan of care.  Return in about 6 weeks (around 4/22/2021) for Annual physical.        This document has been electronically signed by NATHANIEL Oliva on March 11, 2021 11:18 CST

## 2021-03-15 ENCOUNTER — READMISSION MANAGEMENT (OUTPATIENT)
Dept: CALL CENTER | Facility: HOSPITAL | Age: 51
End: 2021-03-15

## 2021-03-15 NOTE — OUTREACH NOTE
Medical Week 2 Survey      Responses   [facility] patient discharged from?  [place]   Does the patient have one of the following disease processes/diagnoses(primary or secondary)?  Other   Week 2 attempt successful?  Yes   Call start time  0859   Discharge diagnosis  Near syncope,     Chest pain   Call end time  0902   Is patient permission given to speak with other caregiver?  Yes   List who call center can speak with   - Theodora   Person spoke with today (if not patient) and relationship  Mother- Theodora    Meds reviewed with patient/caregiver?  Yes   Is the patient having any side effects they believe may be caused by any medication additions or changes?  No   Does the patient have all medications ordered at discharge?  Yes   Is the patient taking all medications as directed (includes completed medication regime)?  Yes   Does the patient have a primary care provider?   Yes   Does the patient have an appointment with their PCP within 7 days of discharge?  Yes   Has the patient kept scheduled appointments due by today?  Yes   Comments  He has seen his PCP , Chiara. He has a wound care appt on Friday.    Did the patient receive a copy of their discharge instructions?  Yes   Nursing interventions  Reviewed instructions with patient   What is the patient's perception of their health status since discharge?  Improving   Is the patient/caregiver able to teach back signs and symptoms related to disease process for when to call PCP?  Yes   Is the patient/caregiver able to teach back signs and symptoms related to disease process for when to call 911?  Yes   Is the patient/caregiver able to teach back the hierarchy of who to call/visit for symptoms/problems? PCP, Specialist, Home health nurse, Urgent Care, ED, 911  Yes   If the patient is a current smoker, are they able to teach back resources for cessation?  Not a smoker   Week 2 Call Completed?  Yes          Radha Davis RN

## 2021-03-19 ENCOUNTER — OFFICE VISIT (OUTPATIENT)
Dept: WOUND CARE | Facility: HOSPITAL | Age: 51
End: 2021-03-19

## 2021-03-19 ENCOUNTER — APPOINTMENT (OUTPATIENT)
Dept: WOUND CARE | Facility: HOSPITAL | Age: 51
End: 2021-03-19

## 2021-03-19 ENCOUNTER — OUTSIDE FACILITY SERVICE (OUTPATIENT)
Dept: PODIATRY | Facility: CLINIC | Age: 51
End: 2021-03-19

## 2021-03-19 LAB — GLUCOSE BLDC GLUCOMTR-MCNC: 350 MG/DL (ref 70–130)

## 2021-03-19 PROCEDURE — 11042 DBRDMT SUBQ TIS 1ST 20SQCM/<: CPT | Performed by: PODIATRIST

## 2021-03-19 PROCEDURE — G0463 HOSPITAL OUTPT CLINIC VISIT: HCPCS

## 2021-03-19 PROCEDURE — 82962 GLUCOSE BLOOD TEST: CPT | Performed by: PODIATRIST

## 2021-03-21 ENCOUNTER — LAB (OUTPATIENT)
Dept: LAB | Facility: HOSPITAL | Age: 51
End: 2021-03-21

## 2021-03-21 DIAGNOSIS — Z01.818 PREOP TESTING: Primary | ICD-10-CM

## 2021-03-21 PROCEDURE — U0004 COV-19 TEST NON-CDC HGH THRU: HCPCS

## 2021-03-21 PROCEDURE — C9803 HOPD COVID-19 SPEC COLLECT: HCPCS

## 2021-03-22 ENCOUNTER — READMISSION MANAGEMENT (OUTPATIENT)
Dept: CALL CENTER | Facility: HOSPITAL | Age: 51
End: 2021-03-22

## 2021-03-22 LAB — SARS-COV-2 ORF1AB RESP QL NAA+PROBE: NOT DETECTED

## 2021-03-22 NOTE — OUTREACH NOTE
Medical Week 3 Survey      Responses   [facility] patient discharged from?  [place]   Does the patient have one of the following disease processes/diagnoses(primary or secondary)?  Other   Week 3 attempt successful?  Yes   Call start time  1520   Call end time  1522   Discharge diagnosis  Near syncope,     Chest pain   Meds reviewed with patient/caregiver?  Yes   Is the patient taking all medications as directed (includes completed medication regime)?  Yes   Has the patient kept scheduled appointments due by today?  Yes   What is the patient's perception of their health status since discharge?  Improving   Additional teach back comments  Walking daily. Got rid of stressful event that was bothering him.    Week 3 Call Completed?  Yes   Graduated  Yes   Did the patient feel the follow up calls were helpful during their recovery period?  No   Was the number of calls appropriate?  Yes   Graduated/Revoked comments  Goals met   Wrap up additional comments  Kept appt.  Has meds, doing much better mentally.  Walking daily.            Marilee Wynn, RN

## 2021-03-24 ENCOUNTER — ANESTHESIA EVENT (OUTPATIENT)
Dept: GASTROENTEROLOGY | Facility: HOSPITAL | Age: 51
End: 2021-03-24

## 2021-03-24 ENCOUNTER — ANESTHESIA (OUTPATIENT)
Dept: GASTROENTEROLOGY | Facility: HOSPITAL | Age: 51
End: 2021-03-24

## 2021-03-24 ENCOUNTER — HOSPITAL ENCOUNTER (OUTPATIENT)
Facility: HOSPITAL | Age: 51
Setting detail: HOSPITAL OUTPATIENT SURGERY
Discharge: HOME | End: 2021-03-24
Attending: INTERNAL MEDICINE | Admitting: INTERNAL MEDICINE

## 2021-03-24 VITALS
DIASTOLIC BLOOD PRESSURE: 63 MMHG | HEART RATE: 67 BPM | OXYGEN SATURATION: 99 % | TEMPERATURE: 97.4 F | HEIGHT: 71 IN | RESPIRATION RATE: 18 BRPM | WEIGHT: 226 LBS | BODY MASS INDEX: 31.64 KG/M2 | SYSTOLIC BLOOD PRESSURE: 96 MMHG

## 2021-03-24 DIAGNOSIS — K21.9 GASTROESOPHAGEAL REFLUX DISEASE, UNSPECIFIED WHETHER ESOPHAGITIS PRESENT: ICD-10-CM

## 2021-03-24 DIAGNOSIS — R11.0 NAUSEA: ICD-10-CM

## 2021-03-24 DIAGNOSIS — Z12.11 ENCOUNTER FOR SCREENING FOR MALIGNANT NEOPLASM OF COLON: ICD-10-CM

## 2021-03-24 LAB
GLUCOSE BLDC GLUCOMTR-MCNC: 106 MG/DL (ref 70–130)
GLUCOSE BLDC GLUCOMTR-MCNC: 219 MG/DL (ref 70–130)

## 2021-03-24 PROCEDURE — 45380 COLONOSCOPY AND BIOPSY: CPT | Performed by: INTERNAL MEDICINE

## 2021-03-24 PROCEDURE — 45385 COLONOSCOPY W/LESION REMOVAL: CPT | Performed by: INTERNAL MEDICINE

## 2021-03-24 PROCEDURE — 43239 EGD BIOPSY SINGLE/MULTIPLE: CPT | Performed by: INTERNAL MEDICINE

## 2021-03-24 PROCEDURE — 25010000002 GLUCAGON (HUMAN RECOMBINANT) 1 MG RECONSTITUTED SOLUTION: Performed by: NURSE ANESTHETIST, CERTIFIED REGISTERED

## 2021-03-24 PROCEDURE — 82962 GLUCOSE BLOOD TEST: CPT

## 2021-03-24 PROCEDURE — 25010000002 PROPOFOL 10 MG/ML EMULSION: Performed by: NURSE ANESTHETIST, CERTIFIED REGISTERED

## 2021-03-24 RX ORDER — DEXTROSE AND SODIUM CHLORIDE 5; .45 G/100ML; G/100ML
30 INJECTION, SOLUTION INTRAVENOUS CONTINUOUS PRN
Status: DISCONTINUED | OUTPATIENT
Start: 2021-03-24 | End: 2021-03-24

## 2021-03-24 RX ORDER — SODIUM CHLORIDE 9 MG/ML
50 INJECTION, SOLUTION INTRAVENOUS CONTINUOUS
Status: DISCONTINUED | OUTPATIENT
Start: 2021-03-24 | End: 2021-03-24 | Stop reason: HOSPADM

## 2021-03-24 RX ORDER — LIDOCAINE HYDROCHLORIDE 20 MG/ML
INJECTION, SOLUTION INTRAVENOUS AS NEEDED
Status: DISCONTINUED | OUTPATIENT
Start: 2021-03-24 | End: 2021-03-24 | Stop reason: SURG

## 2021-03-24 RX ORDER — PROPOFOL 10 MG/ML
VIAL (ML) INTRAVENOUS AS NEEDED
Status: DISCONTINUED | OUTPATIENT
Start: 2021-03-24 | End: 2021-03-24 | Stop reason: SURG

## 2021-03-24 RX ADMIN — PROPOFOL 50 MG: 10 INJECTION, EMULSION INTRAVENOUS at 11:12

## 2021-03-24 RX ADMIN — PROPOFOL 30 MG: 10 INJECTION, EMULSION INTRAVENOUS at 11:32

## 2021-03-24 RX ADMIN — PROPOFOL 30 MG: 10 INJECTION, EMULSION INTRAVENOUS at 11:23

## 2021-03-24 RX ADMIN — PROPOFOL 30 MG: 10 INJECTION, EMULSION INTRAVENOUS at 11:29

## 2021-03-24 RX ADMIN — SODIUM CHLORIDE 50 ML/HR: 900 INJECTION, SOLUTION INTRAVENOUS at 10:43

## 2021-03-24 RX ADMIN — PROPOFOL 20 MG: 10 INJECTION, EMULSION INTRAVENOUS at 11:18

## 2021-03-24 RX ADMIN — PROPOFOL 20 MG: 10 INJECTION, EMULSION INTRAVENOUS at 11:26

## 2021-03-24 RX ADMIN — LIDOCAINE HYDROCHLORIDE 100 MG: 20 INJECTION, SOLUTION INTRAVENOUS at 11:09

## 2021-03-24 RX ADMIN — PROPOFOL 200 MG: 10 INJECTION, EMULSION INTRAVENOUS at 11:09

## 2021-03-24 RX ADMIN — PROPOFOL 50 MG: 10 INJECTION, EMULSION INTRAVENOUS at 11:15

## 2021-03-24 RX ADMIN — GLUCAGON HYDROCHLORIDE 0.25 MG: KIT at 11:27

## 2021-03-24 NOTE — ANESTHESIA PREPROCEDURE EVALUATION
Anesthesia Evaluation     Patient summary reviewed and Nursing notes reviewed   NPO Solid Status: > 8 hours  NPO Liquid Status: > 2 hours           Airway   Mallampati: I  TM distance: >3 FB  Neck ROM: full  No difficulty expected  Dental    (+) poor dentition    Pulmonary - normal exam   (+) shortness of breath,   Cardiovascular - normal exam    PT is on anticoagulation therapy    (+) hypertension well controlled, valvular problems/murmurs, dysrhythmias Atrial Fib,   (-) CHF      Neuro/Psych  (+) syncope, numbness, psychiatric history Anxiety and Depression,     GI/Hepatic/Renal/Endo    (+)  GERD,  diabetes mellitus type 2 poorly controlled using insulin,     Musculoskeletal (-) negative ROS    Abdominal  - normal exam   Substance History   (+) drug use      Comment: No meth use since 2/14   OB/GYN negative ob/gyn ROS         Other                        Anesthesia Plan    ASA 3     MAC     intravenous induction     Anesthetic plan, all risks, benefits, and alternatives have been provided, discussed and informed consent has been obtained with: patient.

## 2021-03-24 NOTE — ANESTHESIA POSTPROCEDURE EVALUATION
Patient: Edd Willingham    Procedure Summary     Date: 03/24/21 Room / Location: [facility] ENDOSCOPY 3 / [facility] ENDOSCOPY    Anesthesia Start: 1105 Anesthesia Stop: 1137    Procedures:       ESOPHAGOGASTRODUODENOSCOPY (N/A )      COLONOSCOPY (N/A ) Diagnosis:       Gastroesophageal reflux disease, unspecified whether esophagitis present      Encounter for screening for malignant neoplasm of colon      Nausea      (Gastroesophageal reflux disease, unspecified whether esophagitis present [K21.9])      (Encounter for screening for malignant neoplasm of colon [Z12.11])      (Nausea [R11.0])    Surgeons: Dinesh Drummond MD Provider: Greta Mijares CRNA    Anesthesia Type: MAC ASA Status: 3          Anesthesia Type: MAC    Vitals  No vitals data found for the desired time range.          Post Anesthesia Care and Evaluation    Patient location during evaluation: bedside  Patient participation: waiting for patient participation  Level of consciousness: sleepy but conscious  Pain score: 0  Pain management: adequate  Airway patency: patent  Anesthetic complications: No anesthetic complications  PONV Status: none  Cardiovascular status: acceptable  Respiratory status: acceptable  Hydration status: acceptable

## 2021-03-25 LAB
LAB AP CASE REPORT: NORMAL
PATH REPORT.FINAL DX SPEC: NORMAL

## 2021-03-26 ENCOUNTER — OUTSIDE FACILITY SERVICE (OUTPATIENT)
Dept: PODIATRY | Facility: CLINIC | Age: 51
End: 2021-03-26

## 2021-03-26 ENCOUNTER — OFFICE VISIT (OUTPATIENT)
Dept: WOUND CARE | Facility: HOSPITAL | Age: 51
End: 2021-03-26

## 2021-03-26 PROCEDURE — 11042 DBRDMT SUBQ TIS 1ST 20SQCM/<: CPT | Performed by: PODIATRIST

## 2021-03-29 ENCOUNTER — OFFICE VISIT (OUTPATIENT)
Dept: CARDIOLOGY | Facility: CLINIC | Age: 51
End: 2021-03-29

## 2021-03-29 VITALS
WEIGHT: 226 LBS | OXYGEN SATURATION: 99 % | SYSTOLIC BLOOD PRESSURE: 112 MMHG | DIASTOLIC BLOOD PRESSURE: 66 MMHG | HEART RATE: 72 BPM | HEIGHT: 71 IN | BODY MASS INDEX: 31.64 KG/M2

## 2021-03-29 DIAGNOSIS — I48.0 PAF (PAROXYSMAL ATRIAL FIBRILLATION) (HCC): ICD-10-CM

## 2021-03-29 DIAGNOSIS — R55 NEAR SYNCOPE: ICD-10-CM

## 2021-03-29 DIAGNOSIS — I50.22 CHRONIC SYSTOLIC CONGESTIVE HEART FAILURE (HCC): Primary | ICD-10-CM

## 2021-03-29 PROCEDURE — 99214 OFFICE O/P EST MOD 30 MIN: CPT | Performed by: NURSE PRACTITIONER

## 2021-03-29 RX ORDER — OMEPRAZOLE 20 MG/1
20 CAPSULE, DELAYED RELEASE ORAL 2 TIMES DAILY
Qty: 60 CAPSULE | Refills: 11 | Status: SHIPPED | OUTPATIENT
Start: 2021-03-29 | End: 2022-03-08 | Stop reason: SDUPTHER

## 2021-03-29 RX ORDER — FUROSEMIDE 20 MG/1
20 TABLET ORAL DAILY PRN
Qty: 30 TABLET | Refills: 11 | Status: SHIPPED | OUTPATIENT
Start: 2021-03-29

## 2021-03-29 RX ORDER — DIGOXIN 125 MCG
125 TABLET ORAL
Qty: 90 TABLET | Refills: 3 | Status: SHIPPED | OUTPATIENT
Start: 2021-03-29

## 2021-03-29 RX ORDER — DAPAGLIFLOZIN 10 MG/1
10 TABLET, FILM COATED ORAL DAILY
Qty: 90 TABLET | Refills: 3 | Status: SHIPPED | OUTPATIENT
Start: 2021-03-29 | End: 2022-02-28 | Stop reason: SDUPTHER

## 2021-03-29 RX ORDER — METOPROLOL SUCCINATE 50 MG/1
50 TABLET, EXTENDED RELEASE ORAL DAILY
Qty: 90 TABLET | Refills: 3 | Status: SHIPPED | OUTPATIENT
Start: 2021-03-29

## 2021-03-29 NOTE — PROGRESS NOTES
[facility] CHF CLINIC OFFICE VISIT    Subjective:     Congestive Heart Failure (chief complaint)      Congestive Heart Failure  Presents for follow-up visit. The disease course has been stable. The condition has lasted for 3 weeks. Pertinent negatives include no abdominal pain, chest pain, claudication, edema, fatigue, near-syncope, orthopnea, palpitations, shortness of breath or unexpected weight change. The symptoms have been stable. Past treatments include angiotensin receptor blockers, beta blockers, digoxin, exercise and salt and fluid restriction. The treatment provided moderate relief. Compliance with prior treatments has been good. His past medical history is significant for arrhythmia, DM and HTN. There is no history of CAD or chronic lung disease. Compliance with total regimen is 51-75%. Compliance with diet is 51-75%. Compliance with exercise is 51-75%. Compliance with medications is 0-25%.     Mr. Willingham is a 47 year old male who presented to [facility] with 2 week symptoms of orthopnea, palpitations, fatigue, dyspnea, and chest pressure. He was found to have AF with RVR, EF of 20%, and diagnosed with NICM. He diuresed well on IV Lasix and lost 26lbs. His rate was controlled with Lopressor, Amiodarone, Cardizem, and Digoxin. He does have history of DM and HTN.   Began noticing symptoms sometime in the summer. But symptoms escalated in October when he could not walk back from the store. Usually smokes less than a pack a day. Uses Dip. Has been using Meth for the past two years. Has not used in the past 2 weeks. Appears high today.     Changed Mr. Willingham to Entresto 11/13/18 and attempted to lower Lasix dose. Tolerating this change well. He has seen Dr. Mcfarland who lowered AMIO to once daily.      10/14/20: Mr. Willingham presents today following a 1.5 year absence due to incarceration. He was on most of his medications while in long-term. He remains NYHA Class I.   Complains of recent falls. Falls from standing with a tunnel  "vision. Sounds like syncope. Happened 7 times since March. Will place heart monitor. Repeat echo.   Leg cramps this morning. Sounds like a statin allergy, but he is not sure since he's only had a couple doses this month. Has dealt with cramps in hands and legs prior with self medication of magnesium, potassium, tomato juice, and bananas. Has neuropathy in his legs already.     11/9/20: Improved. Blood sugars around 200. Farxiga and 70/30 working. Small weight gain, eating well. Back to adding salt. Drinking lots of water and fluids. We discussed the balance of intake and output. Discussed medication complaince. He is making a good attempt. Mom and dad are helpful, the house he lives in with GF and her kids is not a good environment. He told me his anxiety and depression is worsening. He does admit to THC use which is helpful in controlling his anxiety and keeps him from using meth.     12/21/20: You have chosen to receive care through a telephone visit. Do you consent to use a telephone visit for your medical care today? Yes  This visit has been rescheduled as a phone visit to comply with patient safety concerns in accordance with CDC recommendations. Total time of discussion was 12 minutes.    70/30 working when he takes it. He fell asleep last night and forgot it. BS this AM was over 400. Again, he was pleaded with to take care of himself. He has a blister wound on his foot he says is healing well. He knows the risks associated with that and will present for any foul smell or non-healing area. No syncopal episodes. Increased his Toprol XL last visit due to AF and VT on holter monitor. If he has anymore syncope he will let me know, he may need ICD placement. He is taking his medications. We went over them today. When asked about drug use, he is still using THC for anxiety (which helps him a lot). He has used meth \"a little\". He was again, pleaded with to stop. He has not required diuretic. He drinks a lot of volume " and urinates well.       3/29/21: No IV drug use since February 14th. Was using IV since getting out of [facility]. He apologized today for lying about use. His anxiety is well controlled with small amounts of THC. Weight is stable. Neuropathy continues in his feet. Going to see Dr. Jeffery in [place].  Has started walking every day, over 10,000 steps.   BS was 208 this AM. Took 10 units of 70/30 this AM. Was taking the full 30 and hitting low blood sugars (80) and reduced his dose. Getting more used to blood sugars under 200 where he was feeling symptomatic from that.   Feeling better overall. In his own apartment at [address] now and not around other users.   Discussed alcohol use, he is using 1 pint of peach vodka a week and is not getting intoxicated. He is on Prilosec for gastritis.    He has been out of his Farxiga for a week. Encouraged him to get his medications in 90 day supply if that helps compliance.   Has been taking someone else's Lasix twice daily for what he feels like is leg swelling around his socks. I do not appreciate any swelling today.    Seeing [place] in wound center for diabetic ulcers on both feet/ankles. Had injected in both legs prior to these sores, but not in the immediate area.       Diet: Back to adding salt to his food. Had a salt shaker on his night stand. Update: has been doing better with sodium use. Snacking on peppers and carrots.   Fluids: Drinks water. Used to drink as much water as he could. Now he has been drinking more soda than usual. Sprite Zero. Drinks out of a 32oz cup. Buys 2L bottles.   Activity: Very active out around house. Walks most everywhere as he is without a car.      PCP: NATHANIEL Garcia  Cardiologist: [facility]. I will follow him for now and establish as needed with MD  Nephrologist: n/a  Pulmonologist: n/a    Hospitalizations:  10/25/18- 10/30/18- [facility]    Past Medical History:   Diagnosis Date   • Anxiety    • Depression    • Diabetes mellitus (CMS/[facility])     • GERD (gastroesophageal reflux disease)    • Heart murmur    • Hypertension    • Low back pain      Past Surgical History:   Procedure Laterality Date   • CARDIAC CATHETERIZATION N/A 10/26/2018    Procedure: Left Heart Cath;  Surgeon: Param Mcfarland MD;  Location: [facility] CATH INVASIVE LOCATION;  Service: Cardiology   • COLONOSCOPY N/A 3/24/2021    Procedure: COLONOSCOPY;  Surgeon: Dinesh Drummond MD;  Location: [facility] ENDOSCOPY;  Service: Gastroenterology;  Laterality: N/A;   • ELBOW PROCEDURE      left   • ENDOSCOPY N/A 3/24/2021    Procedure: ESOPHAGOGASTRODUODENOSCOPY;  Surgeon: Dinesh Drummond MD;  Location: [facility] ENDOSCOPY;  Service: Gastroenterology;  Laterality: N/A;   • ROTATOR CUFF REPAIR      RIGHT   • TUMOR REMOVAL      FATTY TUMOR   • WRIST SURGERY      RIGHT     Social History     Socioeconomic History   • Marital status:      Spouse name: Not on file   • Number of children: Not on file   • Years of education: Not on file   • Highest education level: Not on file   Tobacco Use   • Smoking status: Current Every Day Smoker     Packs/day: 1.00     Types: Cigarettes   • Smokeless tobacco: Current User     Types: Snuff   Vaping Use   • Vaping Use: Former   Substance and Sexual Activity   • Alcohol use: Yes     Alcohol/week: 1.0 standard drinks     Types: 1 Cans of beer per week     Comment: moderate   • Drug use: Yes     Types: Marijuana, Methamphetamines     Comment: meth in the last month or so.    • Sexual activity: Defer     Allergies:  Allergies   Allergen Reactions   • Statins Myalgia   • Sulfa Antibiotics Unknown (See Comments)     Remembers mother always saying he was allergic       Review of Systems   Constitutional: Negative for chills, decreased appetite, fatigue, fever, unexpected weight change and weight gain.   HENT: Negative.    Eyes: Negative.    Cardiovascular: Negative for chest pain, claudication, dyspnea on exertion, irregular heartbeat, leg swelling, near-syncope and  palpitations.   Respiratory: Negative for cough, shortness of breath and wheezing.    Endocrine: Negative.    Skin: Negative for dry skin, flushing and rash.   Musculoskeletal: Negative for falls and myalgias.   Gastrointestinal: Negative for abdominal pain, change in bowel habit and melena.   Genitourinary: Negative for frequency and hematuria.   Neurological: Negative for dizziness, light-headedness, loss of balance and weakness.   Psychiatric/Behavioral: Negative for altered mental status and memory loss. The patient is not nervous/anxious.        Current Outpatient Medications   Medication Sig Dispense Refill   • apixaban (ELIQUIS) 5 MG tablet tablet Take 1 tablet by mouth Every 12 (Twelve) Hours. 60 tablet 11   • aspirin 81 MG chewable tablet Chew 81 mg Daily.     • Cholecalciferol (vitamin D3) 125 MCG (5000 UT) capsule capsule Take 2,000 Units by mouth Daily.     • Dapagliflozin Propanediol (Farxiga) 10 MG tablet Take 10 mg by mouth Daily. 30 tablet 11   • digoxin (LANOXIN) 125 MCG tablet Take 125 mcg by mouth Daily.     • docusate sodium (COLACE) 100 MG capsule Take 100 mg by mouth 2 (Two) Times a Day.     • insulin aspart (novoLOG) 100 UNIT/ML injection Inject 10 Units under the skin into the appropriate area as directed 3 (Three) Times a Day With Meals. 10 mL 6   • insulin NPH-insulin regular (NovoLIN 70/30) (70-30) 100 UNIT/ML injection Inject 30 Units under the skin into the appropriate area as directed 2 (Two) Times a Day With Meals. 1 each 12   • metoprolol succinate XL (TOPROL-XL) 50 MG 24 hr tablet Take 1 tablet by mouth Daily. 90 tablet 3   • omeprazole (priLOSEC) 20 MG capsule Take 1 capsule by mouth 2 (two) times a day. 60 capsule 11   • sacubitril-valsartan (ENTRESTO) 24-26 MG tablet Take 1 tablet by mouth 2 (Two) Times a Day. 60 tablet 6     No current facility-administered medications for this visit.        Objective:     Vitals:    03/29/21 1018   BP: 112/66   BP Location: Left arm   Patient  "Position: Sitting   Cuff Size: Adult   Pulse: 72   SpO2: 99%   Weight: 103 kg (226 lb)   Height: 180.3 cm (71\")       Wt Readings from Last 3 Encounters:   03/29/21 103 kg (226 lb)   03/24/21 103 kg (226 lb)   03/11/21 98.4 kg (217 lb)   hospital weights:        Physical Exam  Constitutional:       General: He is not in acute distress.     Appearance: He is well-developed.   HENT:      Head: Normocephalic and atraumatic.   Neck:      Vascular: No JVD.   Cardiovascular:      Rate and Rhythm: Normal rate and regular rhythm.      Pulses: Intact distal pulses.      Heart sounds: Normal heart sounds, S1 normal and S2 normal. No murmur heard.     Pulmonary:      Effort: Pulmonary effort is normal. No respiratory distress.      Breath sounds: Normal breath sounds. No wheezing or rales.   Abdominal:      General: Bowel sounds are normal.      Palpations: Abdomen is soft.   Musculoskeletal:         General: Normal range of motion.      Cervical back: Normal range of motion.   Skin:     General: Skin is warm and dry.      Findings: No erythema.   Neurological:      Mental Status: He is alert and oriented to person, place, and time.   Psychiatric:         Behavior: Behavior normal.         Thought Content: Thought content normal.         Judgment: Judgment normal.         Cardiographics          Results for orders placed during the hospital encounter of 10/19/20    Adult Transthoracic Echo Complete W/ Cont if Necessary Per Protocol    Interpretation Summary  · Left ventricular wall thickness is consistent with concentric hypertrophy.  · Estimated left ventricular EF = 53% Left ventricular ejection fraction appears to be 51 - 55%. Left ventricular systolic function is normal.  · Left ventricular diastolic function is consistent with (grade I) impaired relaxation.  · The right ventricular cavity is mildly dilated.  · Mild aortic valve regurgitation is present.  · Trace mitral valve regurgitation is present.  · Estimated right " ventricular systolic pressure from tricuspid regurgitation is normal (<35 mmHg).  · Mild dilation of the aortic root is present (3.8 cms)    Left Ventricle Estimated EF appears to be in the range of 21 - 25%. There is left ventricular global hypokinesis noted.   The left ventricular cavity is borderline dilated. Left ventricular diastolic function is normal. There is no evidence of a left ventricular mass or thrombus present.       [facility] 10/26/18  Hemodynamic   Aortic pressure:140/96  Mean:113  Left ventricular pressure:120/32  Left ventriculogram: Not performed.         Coronaries: Right dominant system.     Left Main coronary artery: Left main coronary artery was a medium caliber vessel which was free of any disease.  The left main coronary artery bifurcated into the left anterior descending artery and circumflex artery.     Left anterior descending artery: Left anterior descending artery was a medium caliber vessel which were free of any obstructive stenosis with good GAMA-3 flow.  The diagonal branch was free of any obstructive stenosis with good GAMA-3 flow.     Circumflex artery: The circumflex artery was a medium caliber vessel with the ramus intermedius branch the first and the second obtuse marginal branch which was free of any obstructive stenosis with good GAMA-3 flow.  The circumflex artery distally was also free of any obstructive stenosis with good GAMA-3 flow     Right coronary artery: The right coronary artery was a medium to large caliber dominant vessel which were free of any obstructive stenosis with good GAMA-3 flow     Estimated Blood Loss:  Minimal         Complications:  None; patient tolerated the procedure well.      Impression   1.  Elevated left ventricular end-diastolic pressure.  2.  No evidence of any obstructive epicardial coronary artery disease.     Recommendations: Patient positive troponin was probably secondary to the atrial fibrillation.  Patient was recommended medical management  for the atrial fibrillation and to workup noncardiac etiology of chest pain.  Patient would need anticoagulation with Eliquis for the atrial fibrillation.           Param Mcfarland MD  10/26/2018  2:41 PM    CT Abdomen Pelvis With Contrast    Result Date: 2/28/2021  No acute intra-abdominal abnormality. Electronically signed by:  Flory Monson MD  2/28/2021 12:53 AM CST Workstation: [id number]    XR Chest 1 View    Result Date: 2/27/2021  No evidence of active pulmonary disease. Electronically signed by:  Abisai Navarro MD  2/27/2021 10:18 PM CST Workstation: [id number]    Lab Review     No results found for: TSH  Lab Results   Component Value Date    GLUCOSE 126 (H) 03/02/2021    BUN 13 03/02/2021    CREATININE 1.00 03/02/2021    EGFRIFAFRI 95 03/02/2021    BCR 13.0 03/02/2021    K 3.8 03/02/2021    CO2 26.0 03/02/2021    CALCIUM 8.4 (L) 03/02/2021    ALBUMIN 3.30 (L) 03/02/2021    AST 11 03/02/2021    ALT 10 03/02/2021     Lab Results   Component Value Date    WBC 6.83 03/02/2021    HGB 16.6 03/02/2021    HCT 50.8 03/02/2021    MCV 82.9 03/02/2021     03/02/2021       Lab Results   Component Value Date    TROPONINI 0.142 (C) 10/25/2018    TROPONINT <0.010 02/28/2021     Lab Results   Component Value Date    PROBNP 107.9 08/28/2020          The following portions of the patient's history were reviewed and updated as appropriate: allergies, current medications, past family history, past medical history, past social history, past surgical history and problem list.     Old records reviewed and pertinent information is included in the above objective data.     Assessment/Plan:        Diagnosis Plan   1. Chronic systolic congestive heart failure, EF improved (CMS/[facility])  NICM: EF:20% improved to 41-45%. NYHA Class I, Stage C. Patient appears euvolemic.  and in a well perfused physiologic state. Hemodynamics are acceptable  BETA-BLOCKER: Toprol XL 50mg daily.  ACE/ARB/ENTRESTO: 24/26mg BID   DIURETIC: n/a  ALDOSTERONT  ANTAGONIST: NYHA Class I-II  IMDUR/HYDRALAZINE: n/a  DIGOXIN: n/a  Fluid restriction: 2 L  Sodium restriction:2 grams  6MWT: 11/13/18: n/a  3/12/19: 516.1m  Cardiac Rehab: indicated.  ICD: improved EF  ADHF: yes  CardioMEMS:not indicated  Corvue: n/a  LVAD: not indicated at this time.    Reiterated all educational points this visit.  Continue daily weight monitoring.  Continue restricted dietary sodium intake.  Discussed patient action plan for heart failure. Contact information for this office verbalized.  Recommended avoiding NSAIDs use.  Discussed warning signs requiring additional medical attention for heart failure.   Education points repeated back by patient.            2. PAF (paroxysmal atrial fibrillation) (CMS/[facility])  Atrial fibrillation paroxysmal - 7 days or less  CHADSVASC: CHF, HTN and DM  EF is 20%. No evidence for LVH. LA size is mildly dilated.    Anticoagulation:ordered  - Eliquis 5mg BID    Rate control agents:ordered   - Toprol XL 50mg   - Digoxin 125mcg daily    - Monitor wore, lots of AF    Rhythm control agents:not indicated    No longer follows with Dr. Mcfarland        3. Syncope and collapse  No more syncopal episodes. Had AF on monitor, no sustained VT but short run.       4. Leg cramps  - absent without statin. Will add as allergy    Could be due to the fact that he took his statin this AM, but he has had cramps in the past.   He self medicates with tomato juice, potassium, and magnesium.   Definitely has neuropathy, but this may be medication side effect. We discussed the best way to protect our legs is to control our blood sugar.        5. Type 2 diabetes mellitus with diabetic polyneuropathy, with long-term current use of insulin (CMS/[facility])  He has not done a good job at all with his blood sugars. They were not controlled in assisted either.     70/30 - 30 units BID. Taking 10 units BID  Novolog- 10 units for every 100 over 200. Has not had in 3 months.   Farxiga 10mg daily  - statin  intolerance         Follow up 3 months.             This document has been electronically signed by NATHANIEL Wilson on March 29, 2021 10:45 CDT

## 2021-03-30 RX ORDER — BUPROPION HYDROCHLORIDE 200 MG/1
200 TABLET, EXTENDED RELEASE ORAL 2 TIMES DAILY
Qty: 60 TABLET | Refills: 6 | Status: SHIPPED | OUTPATIENT
Start: 2021-03-30 | End: 2021-11-12 | Stop reason: ALTCHOICE

## 2021-03-31 ENCOUNTER — OFFICE VISIT (OUTPATIENT)
Dept: GASTROENTEROLOGY | Facility: CLINIC | Age: 51
End: 2021-03-31

## 2021-03-31 VITALS
WEIGHT: 222 LBS | BODY MASS INDEX: 31.08 KG/M2 | HEART RATE: 69 BPM | SYSTOLIC BLOOD PRESSURE: 134 MMHG | DIASTOLIC BLOOD PRESSURE: 77 MMHG | HEIGHT: 71 IN

## 2021-03-31 DIAGNOSIS — D12.3 ADENOMATOUS POLYP OF TRANSVERSE COLON: ICD-10-CM

## 2021-03-31 DIAGNOSIS — K21.00 GASTROESOPHAGEAL REFLUX DISEASE WITH ESOPHAGITIS WITHOUT HEMORRHAGE: Primary | ICD-10-CM

## 2021-03-31 PROCEDURE — 99213 OFFICE O/P EST LOW 20 MIN: CPT | Performed by: NURSE PRACTITIONER

## 2021-03-31 NOTE — PROGRESS NOTES
Chief Complaint   Patient presents with   • Heartburn   • Hx Of Polyp   • Chest Pain       Subjective    Edd Willingham is a 50 y.o. male. he is here today for follow-up.  50-year-old male presents for follow-up after EGD and colonoscopy states symptoms have been well controlled denies any current chest pain or abdominal pain currently.    Heartburn  He complains of chest pain (none recently ) and heartburn (if he forgets medication ). He reports no abdominal pain, no belching, no choking, no coughing, no dysphagia, no early satiety, no globus sensation, no hoarse voice or no nausea. This is a chronic problem. The problem occurs occasionally. The problem has been waxing and waning. The heartburn duration is less than a minute. The heartburn is located in the substernum. The heartburn is of moderate intensity. Pertinent negatives include no fatigue.   Chest Pain   Pertinent negatives include no abdominal pain, cough, diaphoresis, fever, nausea, shortness of breath or vomiting.   EGD noted mildly severe esophagitis gastritis and normal duodenum.  Antrum biopsy noted reactive gastropathy negative for H. pylori, metaplasia or dysplasia.  Esophageal biopsy noted squamous mucosa with mild reactive changes otherwise normal.  Colonoscopy had unsatisfactory prep 2 small polyps removed from transverse colon several biopsies were obtained.  Polyps found to be adenomatous no high-grade dysplasia identified random colonic biopsy noted no significant abnormality.  Repeat recommended in 1 year due to prep.         The following portions of the patient's history were reviewed and updated as appropriate:   Past Medical History:   Diagnosis Date   • Anxiety    • Depression    • Diabetes mellitus (CMS/HCC)    • GERD (gastroesophageal reflux disease)    • Heart murmur    • Hypertension    • Low back pain      Past Surgical History:   Procedure Laterality Date   • CARDIAC CATHETERIZATION N/A 10/26/2018    Procedure: Left Heart  Cath;  Surgeon: Param Mcfarland MD;  Location: [facility] CATH INVASIVE LOCATION;  Service: Cardiology   • COLONOSCOPY N/A 3/24/2021    Procedure: COLONOSCOPY;  Surgeon: Dinesh Drummodn MD;  Location: [facility] ENDOSCOPY;  Service: Gastroenterology;  Laterality: N/A;   • ELBOW PROCEDURE      left   • ENDOSCOPY N/A 3/24/2021    Procedure: ESOPHAGOGASTRODUODENOSCOPY;  Surgeon: Dinesh Drummond MD;  Location: [facility] ENDOSCOPY;  Service: Gastroenterology;  Laterality: N/A;   • ROTATOR CUFF REPAIR      RIGHT   • TUMOR REMOVAL      FATTY TUMOR   • UPPER GASTROINTESTINAL ENDOSCOPY  03/2021   • WRIST SURGERY      RIGHT     Family History   Problem Relation Age of Onset   • Diabetes Mother    • Hypertension Mother    • Diabetes Father    • No Known Problems Brother    • Colon polyps Paternal Uncle        Prior to Admission medications    Medication Sig Start Date End Date Taking? Authorizing Provider   apixaban (ELIQUIS) 5 MG tablet tablet Take 1 tablet by mouth Every 12 (Twelve) Hours. 3/29/21  Yes Kimberly Hester APRN   aspirin 81 MG chewable tablet Chew 81 mg Daily.   Yes Aimee Romero MD   buPROPion SR (WELLBUTRIN SR) 200 MG 12 hr tablet Take 1 tablet by mouth 2 (Two) Times a Day. 3/30/21  Yes Kimberly Hester APRN   Cholecalciferol (vitamin D3) 125 MCG (5000 UT) capsule capsule Take 2,000 Units by mouth Daily.   Yes Aimee Romero MD   Dapagliflozin Propanediol (Farxiga) 10 MG tablet Take 10 mg by mouth Daily. 3/29/21  Yes Kimberly Hester APRN   digoxin (LANOXIN) 125 MCG tablet Take 1 tablet by mouth Daily. 3/29/21  Yes Kimberly Hester APRN   docusate sodium (COLACE) 100 MG capsule Take 100 mg by mouth 2 (Two) Times a Day.   Yes ProviderAimee MD   furosemide (LASIX) 20 MG tablet Take 1 tablet by mouth Daily As Needed (weight gain of 5lbs in 1 week). 3/29/21  Yes Kimberly Hester APRN   insulin aspart (novoLOG) 100 UNIT/ML injection Inject 10 Units under the skin into the appropriate area  as directed 3 (Three) Times a Day With Meals. 10/14/20  Yes Kimberly Hester APRN   insulin NPH-insulin regular (NovoLIN 70/30) (70-30) 100 UNIT/ML injection Inject 30 Units under the skin into the appropriate area as directed 2 (Two) Times a Day With Meals. 10/14/20  Yes Kimberly Hester APRN   metoprolol succinate XL (TOPROL-XL) 50 MG 24 hr tablet Take 1 tablet by mouth Daily. 3/29/21  Yes Kimberly Hester APRN   omeprazole (priLOSEC) 20 MG capsule Take 1 capsule by mouth 2 (two) times a day. 3/29/21  Yes Kimberly Hester APRN   sacubitril-valsartan (ENTRESTO) 24-26 MG tablet Take 1 tablet by mouth 2 (Two) Times a Day. 3/29/21  Yes Kimberly Hester APRN     Allergies   Allergen Reactions   • Statins Myalgia   • Sulfa Antibiotics Unknown (See Comments)     Remembers mother always saying he was allergic     Social History     Socioeconomic History   • Marital status:      Spouse name: Not on file   • Number of children: Not on file   • Years of education: Not on file   • Highest education level: Not on file   Tobacco Use   • Smoking status: Current Every Day Smoker     Packs/day: 1.00     Types: Cigarettes   • Smokeless tobacco: Current User     Types: Snuff   Vaping Use   • Vaping Use: Former   Substance and Sexual Activity   • Alcohol use: Yes     Alcohol/week: 1.0 standard drinks     Types: 1 Cans of beer per week     Comment: moderate   • Drug use: Not Currently     Types: Marijuana, Methamphetamines     Comment: last meth use 2/17   • Sexual activity: Defer       Review of Systems  Review of Systems   Constitutional: Negative for activity change, appetite change, chills, diaphoresis, fatigue, fever and unexpected weight change.   HENT: Negative for hoarse voice and trouble swallowing.    Respiratory: Negative for cough, choking and shortness of breath.    Cardiovascular: Positive for chest pain (none recently ).   Gastrointestinal: Positive for heartburn (if he forgets medication ). Negative  "for abdominal distention, abdominal pain, anal bleeding, blood in stool, constipation, diarrhea, dysphagia, nausea, rectal pain and vomiting.        /77   Pulse 69   Ht 180.3 cm (71\")   Wt 101 kg (222 lb)   BMI 30.96 kg/m²     Objective    Physical Exam  Constitutional:       General: He is not in acute distress.     Appearance: Normal appearance. He is well-developed.   Neck:      Thyroid: No thyroid mass or thyromegaly.   Pulmonary:      Effort: Pulmonary effort is normal.   Abdominal:      General: Bowel sounds are normal. There is no distension.      Palpations: Abdomen is soft.      Tenderness: There is no abdominal tenderness.      Hernia: No hernia is present.       Admission on 03/24/2021, Discharged on 03/24/2021   Component Date Value Ref Range Status   • Glucose 03/24/2021 219* 70 - 130 mg/dL Final    : [id number] LEXIS TRACYMeter ID: [id number]   • Case Report 03/24/2021    Final                    Value:Surgical Pathology Report                         Case: [id number]                                  Authorizing Provider:  Dinesh Drummond MD        Collected:           03/24/2021 11:15 AM          Ordering Location:     [facility]             Received:            03/24/2021 01:33 PM                                 [place] [facility]                                                     Pathologist:           Jacqueline Lai DO                                                        Specimens:   1) - Gastric, Antrum                                                                                2) - Esophagus, Distal                                                                              3) - Large Intestine, Transverse Colon, polyps                                                      4) - Large Intestine, colonic mucosa                                                      • Final Diagnosis 03/24/2021    Final                    Value:This result contains rich text " formatting which cannot be displayed here.     Assessment/Plan      1. Gastroesophageal reflux disease with esophagitis without hemorrhage    2. Adenomatous polyp of transverse colon    .   Continue PPI daily avoid gastric irritants follow standard antireflux measures.  Repeat colonoscopy in 1 year for surveillance due to adenomatous colonic polyp and unsatisfactory prep.    Orders placed during this encounter include:  No orders of the defined types were placed in this encounter.      * Surgery not found *    Review and/or summary of lab tests, radiology, procedures, medications. Review and summary of old records and obtaining of history. The risks and benefits of my recommendations, as well as other treatment options were discussed with the patient today. Questions were answered.    No orders of the defined types were placed in this encounter.      Follow-up: Return in about 1 year (around 3/31/2022).          This document has been electronically signed by NATHANIEL Watson on March 31, 2021 17:18 CDT           I spent 13 minutes caring for Edd on this date of service. This time includes time spent by me in the following activities:preparing for the visit, reviewing tests, obtaining and/or reviewing a separately obtained history, performing a medically appropriate examination and/or evaluation , counseling and educating the patient/family/caregiver, ordering medications, tests, or procedures, referring and communicating with other health care professionals , documenting information in the medical record and care coordination    Results for orders placed or performed during the hospital encounter of 03/24/21   Tissue Pathology Exam    Specimen: A: Gastric, Antrum; Tissue    B: Esophagus, Distal; Tissue    C: Large Intestine, Transverse Colon; Tissue    D: Large Intestine; Tissue   Result Value Ref Range    Case Report       Surgical Pathology Report                         Case: [id number]                                   Authorizing Provider:  Dinesh Drummond MD        Collected:           03/24/2021 11:15 AM          Ordering Location:     [facility]             Received:            03/24/2021 01:33 PM                                 [place] [facility]                                                     Pathologist:           Jacqueline Lai DO                                                        Specimens:   1) - Gastric, Antrum                                                                                2) - Esophagus, Distal                                                                              3) - Large Intestine, Transverse Colon, polyps                                                      4) - Large Intestine, colonic mucosa                                                       Final Diagnosis       SEE SCANNED REPORT       POC Glucose Once    Specimen: Blood   Result Value Ref Range    Glucose 219 (H) 70 - 130 mg/dL   Results for orders placed or performed in visit on 03/21/21   COVID-19,APTIMA PANTHER,[facility] IN-HOUSE, NP/OP SWAB IN UTM/VTM/[facility] TRANSPORT MEDIA,24 HR TAT - Swab, Nasopharynx    Specimen: Nasopharynx; Swab   Result Value Ref Range    COVID19 Not Detected Not Detected - Ref. Range   Results for orders placed or performed in visit on 03/19/21   POC Glucose    Specimen: Blood   Result Value Ref Range    Glucose 350 (H) 70 - 130 mg/dL   Results for orders placed or performed in visit on 03/09/21   HIV-1 / O / 2 Ag / Antibody 4th Generation    Specimen: Blood   Result Value Ref Range    HIV-1/ HIV-2 Non-Reactive Non-Reactive   Hepatitis panel, acute    Specimen: Blood   Result Value Ref Range    Hepatitis B Surface Ag Non-Reactive Non-Reactive    Hep A IgM Non-Reactive Non-Reactive    Hep B C IgM Non-Reactive Non-Reactive    Hepatitis C Ab Non-Reactive Non-Reactive   Results for orders placed or performed during the hospital encounter of 02/27/21   [facility]   Result Value  Ref Range    Extra Tube Hold for add-ons.    COVID-19 and FLU A/B PCR - Swab, Nasopharynx    Specimen: Nasopharynx; Swab   Result Value Ref Range    COVID19 Not Detected Not Detected - Ref. Range    Influenza A PCR Not Detected Not Detected    Influenza B PCR Not Detected Not Detected   Urinalysis, Microscopic Only - Urine, Clean Catch    Specimen: Urine, Clean Catch   Result Value Ref Range    RBC, UA 0-2 (A) None Seen /HPF    WBC, UA 0-2 None Seen, 0-2, 3-5 /HPF    Bacteria, UA None Seen None Seen /HPF    Squamous Epithelial Cells, UA None Seen None Seen, 0-2 /HPF    Hyaline Casts, UA 3-6 None Seen /LPF    Methodology Automated Microscopy    Urinalysis With Microscopic If Indicated (No Culture) - Urine, Clean Catch    Specimen: Urine, Clean Catch   Result Value Ref Range    Color, UA Yellow Yellow, Straw, Dark Yellow, Peyton    Appearance, UA Clear Clear    pH, UA 7.5 5.0 - 9.0    Specific Gravity, UA 1.040 (H) 1.003 - 1.030    Glucose, UA >=1000 mg/dL (3+) (A) Negative    Ketones, UA 15 mg/dL (1+) (A) Negative    Bilirubin, UA Negative Negative    Blood, UA Negative Negative    Protein, UA 30 mg/dL (1+) (A) Negative    Leuk Esterase, UA Negative Negative    Nitrite, UA Negative Negative    Urobilinogen, UA 0.2 E.U./dL 0.2 - 1.0 E.U./dL   CBC Auto Differential    Specimen: Blood   Result Value Ref Range    WBC 6.83 3.40 - 10.80 10*3/mm3    RBC 6.13 (H) 4.14 - 5.80 10*6/mm3    Hemoglobin 16.6 13.0 - 17.7 g/dL    Hematocrit 50.8 37.5 - 51.0 %    MCV 82.9 79.0 - 97.0 fL    MCH 27.1 26.6 - 33.0 pg    MCHC 32.7 31.5 - 35.7 g/dL    RDW 14.2 12.3 - 15.4 %    RDW-SD 41.8 37.0 - 54.0 fl    MPV 12.0 6.0 - 12.0 fL    Platelets 241 140 - 450 10*3/mm3    Neutrophil % 50.8 42.7 - 76.0 %    Lymphocyte % 37.0 19.6 - 45.3 %    Monocyte % 10.2 5.0 - 12.0 %    Eosinophil % 0.6 0.3 - 6.2 %    Basophil % 0.7 0.0 - 1.5 %    Immature Grans % 0.7 (H) 0.0 - 0.5 %    Neutrophils, Absolute 3.46 1.70 - 7.00 10*3/mm3    Lymphocytes, Absolute  2.53 0.70 - 3.10 10*3/mm3    Monocytes, Absolute 0.70 0.10 - 0.90 10*3/mm3    Eosinophils, Absolute 0.04 0.00 - 0.40 10*3/mm3    Basophils, Absolute 0.05 0.00 - 0.20 10*3/mm3    Immature Grans, Absolute 0.05 0.00 - 0.05 10*3/mm3    nRBC 0.0 0.0 - 0.2 /100 WBC   CBC Auto Differential    Specimen: Blood   Result Value Ref Range    WBC 8.67 3.40 - 10.80 10*3/mm3    RBC 5.38 4.14 - 5.80 10*6/mm3    Hemoglobin 14.9 13.0 - 17.7 g/dL    Hematocrit 44.1 37.5 - 51.0 %    MCV 82.0 79.0 - 97.0 fL    MCH 27.7 26.6 - 33.0 pg    MCHC 33.8 31.5 - 35.7 g/dL    RDW 13.9 12.3 - 15.4 %    RDW-SD 40.4 37.0 - 54.0 fl    MPV 11.6 6.0 - 12.0 fL    Platelets 227 140 - 450 10*3/mm3    Neutrophil % 71.8 42.7 - 76.0 %    Lymphocyte % 18.7 (L) 19.6 - 45.3 %    Monocyte % 8.5 5.0 - 12.0 %    Eosinophil % 0.3 0.3 - 6.2 %    Basophil % 0.5 0.0 - 1.5 %    Immature Grans % 0.2 0.0 - 0.5 %    Neutrophils, Absolute 6.22 1.70 - 7.00 10*3/mm3    Lymphocytes, Absolute 1.62 0.70 - 3.10 10*3/mm3    Monocytes, Absolute 0.74 0.10 - 0.90 10*3/mm3    Eosinophils, Absolute 0.03 0.00 - 0.40 10*3/mm3    Basophils, Absolute 0.04 0.00 - 0.20 10*3/mm3    Immature Grans, Absolute 0.02 0.00 - 0.05 10*3/mm3    nRBC 0.0 0.0 - 0.2 /100 WBC   Lavender Top   Result Value Ref Range    Extra Tube hold for add-on    Light Blue Top   Result Value Ref Range    Extra Tube hold for add-on    Stress Test With Myocardial Perfusion One Day   Result Value Ref Range    BH CV STRESS PROTOCOL 1 Pharmacologic     Stage 1 1     Duration Min Stage 1 0     Duration Sec Stage 1 10     Stress Dose Regadenoson Stage 1 0.4     Stress Comments Stage 1 10 sec bolus injection     Target HR (85%) 143 bpm    Max. Pred. HR (100%) 168 bpm    HR Stage 1 119     BP Stage 1 101/80     Baseline HR 89 bpm    Baseline /105 mmHg    Peak  bpm    Percent Max Pred HR 73.21 %    Percent Target HR 86 %    Peak /105 mmHg    Recovery  bpm    Recovery /85 mmHg    Estimated workload 1.0  METS    Nuc Stress EF 49 %     *Note: Due to a large number of results and/or encounters for the requested time period, some results have not been displayed. A complete set of results can be found in Results Review.

## 2021-03-31 NOTE — PATIENT INSTRUCTIONS
MyPlate from USDA    MyPlate is an outline of a general healthy diet based on the 2010 Dietary Guidelines for Americans, from the U.S. Department of Agriculture (USDA). It sets guidelines for how much food you should eat from each food group based on your age, sex, and level of physical activity.  What are tips for following MyPlate?  To follow MyPlate recommendations:  · Eat a wide variety of fruits and vegetables, grains, and protein foods.  · Serve smaller portions and eat less food throughout the day.  · Limit portion sizes to avoid overeating.  · Enjoy your food.  · Get at least 150 minutes of exercise every week. This is about 30 minutes each day, 5 or more days per week.  It can be difficult to have every meal look like MyPlate. Think about MyPlate as eating guidelines for an entire day, rather than each individual meal.  Fruits and vegetables  · Make half of your plate fruits and vegetables.  · Eat many different colors of fruits and vegetables each day.  · For a 2,000 calorie daily food plan, eat:  ? 2½ cups of vegetables every day.  ? 2 cups of fruit every day.  · 1 cup is equal to:  ? 1 cup raw or cooked vegetables.  ? 1 cup raw fruit.  ? 1 medium-sized orange, apple, or banana.  ? 1 cup 100% fruit or vegetable juice.  ? 2 cups raw leafy greens, such as lettuce, spinach, or kale.  ? ½ cup dried fruit.  Grains  · One fourth of your plate should be grains.  · Make at least half of the grains you eat each day whole grains.  · For a 2,000 calorie daily food plan, eat 6 oz of grains every day.  · 1 oz is equal to:  ? 1 slice bread.  ? 1 cup cereal.  ? ½ cup cooked rice, cereal, or pasta.  Protein  · One fourth of your plate should be protein.  · Eat a wide variety of protein foods, including meat, poultry, fish, eggs, beans, nuts, and tofu.  · For a 2,000 calorie daily food plan, eat 5½ oz of protein every day.  · 1 oz is equal to:  ? 1 oz meat, poultry, or fish.  ? ¼ cup cooked beans.  ? 1 egg.  ? ½ oz nuts  or seeds.  ? 1 Tbsp peanut butter.  Dairy  · Drink fat-free or low-fat (1%) milk.  · Eat or drink dairy as a side to meals.  · For a 2,000 calorie daily food plan, eat or drink 3 cups of dairy every day.  · 1 cup is equal to:  ? 1 cup milk, yogurt, cottage cheese, or soy milk (soy beverage).  ? 2 oz processed cheese.  ? 1½ oz natural cheese.  Fats, oils, salt, and sugars  · Only small amounts of oils are recommended.  · Avoid foods that are high in calories and low in nutritional value (empty calories), like foods high in fat or added sugars.  · Choose foods that are low in salt (sodium). Choose foods that have less than 140 milligrams (mg) of sodium per serving.  · Drink water instead of sugary drinks. Drink enough water each day to keep your urine pale yellow.  Where to find support  · Work with your health care provider or a nutrition specialist (dietitian) to develop a customized eating plan that is right for you.  · Download an jacy (mobile application) to help you track your daily food intake.  Where to find more information  · Go to ChooseMyPlate.gov for more information.  Summary  · MyPlate is a general guideline for healthy eating from the USDA. It is based on the 2010 Dietary Guidelines for Americans.  · In general, fruits and vegetables should take up ½ of your plate, grains should take up ¼ of your plate, and protein should take up ¼ of your plate.  This information is not intended to replace advice given to you by your health care provider. Make sure you discuss any questions you have with your health care provider.  Document Revised: 05/21/2020 Document Reviewed: 03/19/2018  Elsevier Patient Education © 2021 [employer].  BMI for Adults  What is BMI?  Body mass index (BMI) is a number that is calculated from a person's weight and height. BMI can help estimate how much of a person's weight is composed of fat. BMI does not measure body fat directly. Rather, it is an alternative to procedures that  "directly measure body fat, which can be difficult and expensive.  BMI can help identify people who may be at higher risk for certain medical problems.  What are BMI measurements used for?  BMI is used as a screening tool to identify possible weight problems. It helps determine whether a person is obese, overweight, a healthy weight, or underweight.  BMI is useful for:  · Identifying a weight problem that may be related to a medical condition or may increase the risk for medical problems.  · Promoting changes, such as changes in diet and exercise, to help reach a healthy weight. BMI screening can be repeated to see if these changes are working.  How is BMI calculated?  BMI involves measuring your weight in relation to your height. Both height and weight are measured, and the BMI is calculated from those numbers. This can be done either in English (U[place] or metric measurements. Note that charts and online BMI calculators are available to help you find your BMI quickly and easily without having to do these calculations yourself.  To calculate your BMI in English (U.S[place] measurements:    1. Measure your weight in pounds (lb).  2. Multiply the number of pounds by 703.  ? For example, for a person who weighs 180 lb, multiply that number by 703, which equals [phone],540.  3. Measure your height in inches. Then multiply that number by itself to get a measurement called \"inches squared.\"  ? For example, for a person who is 70 inches tall, the \"inches squared\" measurement is 70 inches x 70 inches, which equals 4,900 inches squared.  4. Divide the total from step 2 (number of lb x 703) by the total from step 3 (inches squared): 126,540 ÷ 4,900 = 25.8. This is your BMI.  To calculate your BMI in metric measurements:  1. Measure your weight in kilograms (kg).  2. Measure your height in meters (m). Then multiply that number by itself to get a measurement called \"meters squared.\"  ? For example, for a person who is 1.75 m tall, the " "\"meters squared\" measurement is 1.75 m x 1.75 m, which is equal to 3.1 meters squared.  3. Divide the number of kilograms (your weight) by the meters squared number. In this example: 70 ÷ 3.1 = 22.6. This is your BMI.  What do the results mean?  BMI charts are used to identify whether you are underweight, normal weight, overweight, or obese. The following guidelines will be used:  · Underweight: BMI less than 18.5.  · Normal weight: BMI between 18.5 and 24.9.  · Overweight: BMI between 25 and 29.9.  · Obese: BMI of 30 or above.  Keep these notes in mind:  · Weight includes both fat and muscle, so someone with a muscular build, such as an athlete, may have a BMI that is higher than 24.9. In cases like these, BMI is not an accurate measure of body fat.  · To determine if excess body fat is the cause of a BMI of 25 or higher, further assessments may need to be done by a health care provider.  · BMI is usually interpreted in the same way for men and women.  Where to find more information  For more information about BMI, including tools to quickly calculate your BMI, go to these websites:  · Centers for Disease Control and Prevention: www.cdc.gov  · American Heart Association: www.heart.org  · National Heart, Lung, and Blood [place]: www.nhlbi.nih.gov  Summary  · Body mass index (BMI) is a number that is calculated from a person's weight and height.  · BMI may help estimate how much of a person's weight is composed of fat. BMI can help identify those who may be at higher risk for certain medical problems.  · BMI can be measured using English measurements or metric measurements.  · BMI charts are used to identify whether you are underweight, normal weight, overweight, or obese.  This information is not intended to replace advice given to you by your health care provider. Make sure you discuss any questions you have with your health care provider.  Document Revised: 09/09/2020 Document Reviewed: 07/17/2020  [employer] " Patient Education © 2021 [employer]vier Inc.

## 2021-04-02 ENCOUNTER — OFFICE VISIT (OUTPATIENT)
Dept: WOUND CARE | Facility: HOSPITAL | Age: 51
End: 2021-04-02

## 2021-04-02 ENCOUNTER — OUTSIDE FACILITY SERVICE (OUTPATIENT)
Dept: PODIATRY | Facility: CLINIC | Age: 51
End: 2021-04-02

## 2021-04-02 PROCEDURE — 11042 DBRDMT SUBQ TIS 1ST 20SQCM/<: CPT | Performed by: PODIATRIST

## 2021-04-09 ENCOUNTER — OUTSIDE FACILITY SERVICE (OUTPATIENT)
Dept: PODIATRY | Facility: CLINIC | Age: 51
End: 2021-04-09

## 2021-04-09 ENCOUNTER — OFFICE VISIT (OUTPATIENT)
Dept: WOUND CARE | Facility: HOSPITAL | Age: 51
End: 2021-04-09

## 2021-04-09 PROCEDURE — 15271 SKIN SUB GRAFT TRNK/ARM/LEG: CPT | Performed by: PODIATRIST

## 2021-04-12 DIAGNOSIS — N52.1 ERECTILE DISORDER DUE TO MEDICAL CONDITION IN MALE: Primary | ICD-10-CM

## 2021-04-12 RX ORDER — SILDENAFIL 50 MG/1
50 TABLET, FILM COATED ORAL DAILY PRN
Qty: 6 TABLET | Refills: 6 | Status: SHIPPED | OUTPATIENT
Start: 2021-04-12 | End: 2022-05-24 | Stop reason: SDUPTHER

## 2021-04-15 ENCOUNTER — OFFICE VISIT (OUTPATIENT)
Dept: WOUND CARE | Facility: HOSPITAL | Age: 51
End: 2021-04-15

## 2021-04-23 ENCOUNTER — OFFICE VISIT (OUTPATIENT)
Dept: WOUND CARE | Facility: HOSPITAL | Age: 51
End: 2021-04-23

## 2021-04-23 ENCOUNTER — OUTSIDE FACILITY SERVICE (OUTPATIENT)
Dept: PODIATRY | Facility: CLINIC | Age: 51
End: 2021-04-23

## 2021-04-23 PROCEDURE — G0463 HOSPITAL OUTPT CLINIC VISIT: HCPCS

## 2021-04-23 PROCEDURE — 99212 OFFICE O/P EST SF 10 MIN: CPT | Performed by: PODIATRIST

## 2021-04-30 ENCOUNTER — OUTSIDE FACILITY SERVICE (OUTPATIENT)
Dept: PODIATRY | Facility: CLINIC | Age: 51
End: 2021-04-30

## 2021-04-30 ENCOUNTER — OFFICE VISIT (OUTPATIENT)
Dept: WOUND CARE | Facility: HOSPITAL | Age: 51
End: 2021-04-30

## 2021-04-30 PROCEDURE — 15271 SKIN SUB GRAFT TRNK/ARM/LEG: CPT | Performed by: PODIATRIST

## 2021-05-14 ENCOUNTER — OUTSIDE FACILITY SERVICE (OUTPATIENT)
Dept: PODIATRY | Facility: CLINIC | Age: 51
End: 2021-05-14

## 2021-05-14 ENCOUNTER — OFFICE VISIT (OUTPATIENT)
Dept: WOUND CARE | Facility: HOSPITAL | Age: 51
End: 2021-05-14

## 2021-05-14 PROCEDURE — 15271 SKIN SUB GRAFT TRNK/ARM/LEG: CPT | Performed by: PODIATRIST

## 2021-05-19 ENCOUNTER — OFFICE VISIT (OUTPATIENT)
Dept: WOUND CARE | Facility: HOSPITAL | Age: 51
End: 2021-05-19

## 2021-05-19 ENCOUNTER — APPOINTMENT (OUTPATIENT)
Dept: WOUND CARE | Facility: HOSPITAL | Age: 51
End: 2021-05-19

## 2021-05-19 PROCEDURE — G0463 HOSPITAL OUTPT CLINIC VISIT: HCPCS

## 2021-05-28 ENCOUNTER — OFFICE VISIT (OUTPATIENT)
Dept: WOUND CARE | Facility: HOSPITAL | Age: 51
End: 2021-05-28

## 2021-05-28 ENCOUNTER — OUTSIDE FACILITY SERVICE (OUTPATIENT)
Dept: PODIATRY | Facility: CLINIC | Age: 51
End: 2021-05-28

## 2021-05-28 PROCEDURE — G0463 HOSPITAL OUTPT CLINIC VISIT: HCPCS

## 2021-05-28 PROCEDURE — 99212 OFFICE O/P EST SF 10 MIN: CPT | Performed by: PODIATRIST

## 2021-06-11 ENCOUNTER — OUTSIDE FACILITY SERVICE (OUTPATIENT)
Dept: PODIATRY | Facility: CLINIC | Age: 51
End: 2021-06-11

## 2021-06-11 ENCOUNTER — OFFICE VISIT (OUTPATIENT)
Dept: WOUND CARE | Facility: HOSPITAL | Age: 51
End: 2021-06-11

## 2021-06-11 PROCEDURE — G0463 HOSPITAL OUTPT CLINIC VISIT: HCPCS

## 2021-06-11 PROCEDURE — 99212 OFFICE O/P EST SF 10 MIN: CPT | Performed by: PODIATRIST

## 2021-11-05 RX ORDER — HUMAN INSULIN 100 [USP'U]/ML
30 INJECTION, SUSPENSION SUBCUTANEOUS 2 TIMES DAILY WITH MEALS
Qty: 1 EACH | Refills: 12 | Status: SHIPPED | OUTPATIENT
Start: 2021-11-05 | End: 2022-02-25

## 2021-11-12 ENCOUNTER — OFFICE VISIT (OUTPATIENT)
Dept: CARDIOLOGY | Facility: CLINIC | Age: 51
End: 2021-11-12

## 2021-11-12 VITALS
BODY MASS INDEX: 33.71 KG/M2 | OXYGEN SATURATION: 100 % | HEIGHT: 71 IN | DIASTOLIC BLOOD PRESSURE: 74 MMHG | HEART RATE: 85 BPM | TEMPERATURE: 98.7 F | WEIGHT: 240.8 LBS | SYSTOLIC BLOOD PRESSURE: 118 MMHG

## 2021-11-12 DIAGNOSIS — I50.22 CHRONIC SYSTOLIC CONGESTIVE HEART FAILURE (HCC): Primary | ICD-10-CM

## 2021-11-12 DIAGNOSIS — Z79.4 TYPE 2 DIABETES MELLITUS WITH DIABETIC POLYNEUROPATHY, WITH LONG-TERM CURRENT USE OF INSULIN (HCC): ICD-10-CM

## 2021-11-12 DIAGNOSIS — I48.0 PAF (PAROXYSMAL ATRIAL FIBRILLATION) (HCC): ICD-10-CM

## 2021-11-12 DIAGNOSIS — E11.42 TYPE 2 DIABETES MELLITUS WITH DIABETIC POLYNEUROPATHY, WITH LONG-TERM CURRENT USE OF INSULIN (HCC): ICD-10-CM

## 2021-11-12 PROCEDURE — 99214 OFFICE O/P EST MOD 30 MIN: CPT | Performed by: NURSE PRACTITIONER

## 2021-11-12 RX ORDER — LAMOTRIGINE 100 MG/1
100 TABLET ORAL DAILY
COMMUNITY

## 2021-11-12 RX ORDER — HYDROXYZINE HYDROCHLORIDE 25 MG/1
25 TABLET, FILM COATED ORAL 3 TIMES DAILY PRN
COMMUNITY
End: 2022-05-17

## 2021-11-12 RX ORDER — FLUOXETINE HYDROCHLORIDE 20 MG/1
20 CAPSULE ORAL DAILY
COMMUNITY
End: 2022-05-17

## 2021-11-12 NOTE — PROGRESS NOTES
[facility] OFFICE VISIT    Subjective:     Congestive Heart Failure (chief complaint)      Congestive Heart Failure  Presents for follow-up visit. The disease course has been stable. The condition has lasted for 3 weeks. Pertinent negatives include no abdominal pain, chest pain, claudication, edema, fatigue, near-syncope, orthopnea, palpitations, shortness of breath or unexpected weight change. The symptoms have been stable. Past treatments include angiotensin receptor blockers, beta blockers, digoxin, exercise and salt and fluid restriction. The treatment provided moderate relief. Compliance with prior treatments has been good. His past medical history is significant for arrhythmia, DM and HTN. There is no history of CAD or chronic lung disease. Compliance with total regimen is 51-75%. Compliance with diet is 51-75%. Compliance with exercise is 51-75%. Compliance with medications is 0-25%.     Mr. Willingham is a 47 year old male who presented to [facility] with 2 week symptoms of orthopnea, palpitations, fatigue, dyspnea, and chest pressure. He was found to have AF with RVR, EF of 20%, and diagnosed with NICM. He diuresed well on IV Lasix and lost 26lbs. His rate was controlled with Lopressor, Amiodarone, Cardizem, and Digoxin. He does have history of DM and HTN.   Began noticing symptoms sometime in the summer. But symptoms escalated in October when he could not walk back from the store. Usually smokes less than a pack a day. Uses Dip. Has been using Meth for the past two years. Has not used in the past 2 weeks. Appears high today.     Changed Mr. Willingham to Entresto 11/13/18 and attempted to lower Lasix dose. Tolerating this change well. He has seen Dr. Mcfarland who lowered AMIO to once daily.      10/14/20: Mr. Willingham presents today following a 1.5 year absence due to incarceration. He was on most of his medications while in [facility]. He remains NYHA Class I.   Complains of recent falls. Falls from standing with a tunnel  "vision. Sounds like syncope. Happened 7 times since March. Will place heart monitor. Repeat echo.   Leg cramps this morning. Sounds like a statin allergy, but he is not sure since he's only had a couple doses this month. Has dealt with cramps in hands and legs prior with self medication of magnesium, potassium, tomato juice, and bananas. Has neuropathy in his legs already.     11/9/20: Improved. Blood sugars around 200. Farxiga and 70/30 working. Small weight gain, eating well. Back to adding salt. Drinking lots of water and fluids. We discussed the balance of intake and output. Discussed medication complaince. He is making a good attempt. Mom and dad are helpful, the house he lives in with GF and her kids is not a good environment. He told me his anxiety and depression is worsening. He does admit to THC use which is helpful in controlling his anxiety and keeps him from using meth.     12/21/20: You have chosen to receive care through a telephone visit. Do you consent to use a telephone visit for your medical care today? Yes  This visit has been rescheduled as a phone visit to comply with patient safety concerns in accordance with CDC recommendations. Total time of discussion was 12 minutes.    70/30 working when he takes it. He fell asleep last night and forgot it. BS this AM was over 400. Again, he was pleaded with to take care of himself. He has a blister wound on his foot he says is healing well. He knows the risks associated with that and will present for any foul smell or non-healing area. No syncopal episodes. Increased his Toprol XL last visit due to AF and VT on holter monitor. If he has anymore syncope he will let me know, he may need ICD placement. He is taking his medications. We went over them today. When asked about drug use, he is still using THC for anxiety (which helps him a lot). He has used meth \"a little\". He was again, pleaded with to stop. He has not required diuretic. He drinks a lot of volume " and urinates well.       3/29/21: No IV drug use since February 14th. Was using IV since getting out of skilled nursing. He apologized today for lying about use. His anxiety is well controlled with small amounts of THC. Weight is stable. Neuropathy continues in his feet. Going to see Dr. Jeffery in [place].  Has started walking every day, over 10,000 steps.   BS was 208 this AM. Took 10 units of 70/30 this AM. Was taking the full 30 and hitting low blood sugars (80) and reduced his dose. Getting more used to blood sugars under 200 where he was feeling symptomatic from that.   Feeling better overall. In his own apartment at [place] now and not around other users.   Discussed alcohol use, he is using 1 pint of peach vodka a week and is not getting intoxicated. He is on Prilosec for gastritis.    He has been out of his Farxiga for a week. Encouraged him to get his medications in 90 day supply if that helps compliance.   Has been taking someone else's Lasix twice daily for what he feels like is leg swelling around his socks. I do not appreciate any swelling today.    Seeing [facility] in wound center for diabetic ulcers on both feet/ankles. Had injected in both legs prior to these sores, but not in the immediate area.     11/12/21: Returns today after missing a few outpatient appointments. He is 240lbs. [facility].   He is starting new mental health medications- Fluoxetine 20mg, vistaril, Lamotrigine 25mg daily.  He is depressed, he is wanting to get his disability approved so he can collect income and help his parents with rent and buy a car. He remains sober. He continues to use tobacco. He has been more compliant with his 70/30 insulin but his A1x is still 11.3%. This is down from 15%. [facility] and [facility] in computer.         Diet: Back to adding salt to his food. Had a salt shaker on his night stand. Update: has been doing better with sodium use. Snacking on  peppers and carrots.   Fluids: Drinks water. Used to drink as much water as he could. Now he has been drinking more soda than usual. Sprite Zero. Drinks out of a 32oz cup. Buys 2L bottles.   Activity: Very active out around house. Walks most everywhere as he is without a car.      PCP: NATHANIEL Garcia  Cardiologist: [facility]. I will follow him for now and establish as needed with MD  Nephrologist: n/a  Pulmonologist: n/a    Hospitalizations:  10/25/18- 10/30/18- [facility]    Past Medical History:   Diagnosis Date   • Anxiety    • Depression    • Diabetes mellitus (HCC)    • GERD (gastroesophageal reflux disease)    • Heart murmur    • Hypertension    • Low back pain      Past Surgical History:   Procedure Laterality Date   • CARDIAC CATHETERIZATION N/A 10/26/2018    Procedure: Left Heart Cath;  Surgeon: Param Mcfarland MD;  Location: [facility] CATH INVASIVE LOCATION;  Service: Cardiology   • COLONOSCOPY N/A 3/24/2021    Procedure: COLONOSCOPY;  Surgeon: Dinesh Drummond MD;  Location: [facility] ENDOSCOPY;  Service: Gastroenterology;  Laterality: N/A;   • ELBOW PROCEDURE      left   • ENDOSCOPY N/A 3/24/2021    Procedure: ESOPHAGOGASTRODUODENOSCOPY;  Surgeon: Dinesh Drummond MD;  Location: [facility] ENDOSCOPY;  Service: Gastroenterology;  Laterality: N/A;   • ROTATOR CUFF REPAIR      RIGHT   • TUMOR REMOVAL      FATTY TUMOR   • UPPER GASTROINTESTINAL ENDOSCOPY  03/2021   • WRIST SURGERY      RIGHT     Social History     Socioeconomic History   • Marital status:    Tobacco Use   • Smoking status: Current Every Day Smoker     Packs/day: 1.00     Types: Cigarettes   • Smokeless tobacco: Current User     Types: Snuff   Vaping Use   • Vaping Use: Former   Substance and Sexual Activity   • Alcohol use: Yes     Alcohol/week: 1.0 standard drink     Types: 1 Cans of beer per week     Comment: moderate   • Drug use: Not Currently     Types: Marijuana, Methamphetamines     Comment: last meth use 2/17   • Sexual activity:  Defer     Allergies:  Allergies   Allergen Reactions   • Statins Myalgia   • Sulfa Antibiotics Unknown (See Comments)     Remembers mother always saying he was allergic       Review of Systems   Constitutional: Negative for chills, decreased appetite, fatigue, fever, unexpected weight change and weight gain.   HENT: Negative.    Eyes: Negative.    Cardiovascular: Negative for chest pain, claudication, dyspnea on exertion, irregular heartbeat, leg swelling, near-syncope and palpitations.   Respiratory: Negative for cough, shortness of breath and wheezing.    Endocrine: Negative.    Skin: Negative for dry skin, flushing and rash.   Musculoskeletal: Negative for falls and myalgias.   Gastrointestinal: Negative for abdominal pain, change in bowel habit and melena.   Genitourinary: Negative for frequency and hematuria.   Neurological: Negative for dizziness, light-headedness, loss of balance and weakness.   Psychiatric/Behavioral: Negative for altered mental status and memory loss. The patient is not nervous/anxious.        Current Outpatient Medications   Medication Sig Dispense Refill   • apixaban (ELIQUIS) 5 MG tablet tablet Take 1 tablet by mouth Every 12 (Twelve) Hours. 180 tablet 3   • buPROPion SR (WELLBUTRIN SR) 200 MG 12 hr tablet Take 1 tablet by mouth 2 (Two) Times a Day. 60 tablet 6   • Cholecalciferol (vitamin D3) 125 MCG (5000 UT) capsule capsule Take 2,000 Units by mouth Daily.     • Dapagliflozin Propanediol (Farxiga) 10 MG tablet Take 10 mg by mouth Daily. 90 tablet 3   • digoxin (LANOXIN) 125 MCG tablet Take 1 tablet by mouth Daily. 90 tablet 3   • furosemide (LASIX) 20 MG tablet Take 1 tablet by mouth Daily As Needed (weight gain of 5lbs in 1 week). 30 tablet 11   • insulin aspart (novoLOG) 100 UNIT/ML injection Inject 10 Units under the skin into the appropriate area as directed 3 (Three) Times a Day With Meals. 10 mL 6   • insulin NPH-insulin regular (NovoLIN 70/30) (70-30) 100 UNIT/ML injection  "Inject 30 Units under the skin into the appropriate area as directed 2 (Two) Times a Day With Meals. 1 each 12   • metoprolol succinate XL (TOPROL-XL) 50 MG 24 hr tablet Take 1 tablet by mouth Daily. 90 tablet 3   • omeprazole (priLOSEC) 20 MG capsule Take 1 capsule by mouth 2 (two) times a day. 60 capsule 11   • sacubitril-valsartan (ENTRESTO) 24-26 MG tablet Take 1 tablet by mouth 2 (Two) Times a Day. 180 tablet 3   • sildenafil (Viagra) 50 MG tablet Take 1 tablet by mouth Daily As Needed for Erectile Dysfunction. 6 tablet 6   • aspirin 81 MG chewable tablet Chew 81 mg Daily.     • docusate sodium (COLACE) 100 MG capsule Take 100 mg by mouth 2 (Two) Times a Day.       No current facility-administered medications for this visit.        Objective:     Vitals:    11/12/21 0907   BP: 118/74   BP Location: Left arm   Patient Position: Sitting   Cuff Size: Adult   Pulse: 85   Temp: 98.7 °F (37.1 °C)   SpO2: 100%   Weight: 109 kg (240 lb 12.8 oz)   Height: 180.3 cm (71\")       Wt Readings from Last 3 Encounters:   11/12/21 109 kg (240 lb 12.8 oz)   05/09/21 104 kg (229 lb 12.8 oz)   03/31/21 101 kg (222 lb)     Physical Exam  Constitutional:       General: He is not in acute distress.     Appearance: He is well-developed.   HENT:      Head: Normocephalic and atraumatic.   Neck:      Vascular: No JVD.   Cardiovascular:      Rate and Rhythm: Normal rate and regular rhythm.      Pulses: Intact distal pulses.      Heart sounds: Normal heart sounds, S1 normal and S2 normal. No murmur heard.      Pulmonary:      Effort: Pulmonary effort is normal. No respiratory distress.      Breath sounds: Normal breath sounds. No wheezing or rales.   Abdominal:      General: Bowel sounds are normal.      Palpations: Abdomen is soft.   Musculoskeletal:         General: Normal range of motion.      Cervical back: Normal range of motion.   Skin:     General: Skin is warm and dry.      Findings: No erythema.   Neurological:      Mental Status: " He is alert and oriented to person, place, and time.   Psychiatric:         Behavior: Behavior normal.         Thought Content: Thought content normal.         Judgment: Judgment normal.         Cardiographics          Results for orders placed during the hospital encounter of 10/19/20    Adult Transthoracic Echo Complete W/ Cont if Necessary Per Protocol    Interpretation Summary  · Left ventricular wall thickness is consistent with concentric hypertrophy.  · Estimated left ventricular EF = 53% Left ventricular ejection fraction appears to be 51 - 55%. Left ventricular systolic function is normal.  · Left ventricular diastolic function is consistent with (grade I) impaired relaxation.  · The right ventricular cavity is mildly dilated.  · Mild aortic valve regurgitation is present.  · Trace mitral valve regurgitation is present.  · Estimated right ventricular systolic pressure from tricuspid regurgitation is normal (<35 mmHg).  · Mild dilation of the aortic root is present (3.8 cms)    Left Ventricle Estimated EF appears to be in the range of 21 - 25%. There is left ventricular global hypokinesis noted.   The left ventricular cavity is borderline dilated. Left ventricular diastolic function is normal. There is no evidence of a left ventricular mass or thrombus present.       [facility] 10/26/18  Hemodynamic   Aortic pressure:140/96  Mean:113  Left ventricular pressure:120/32  Left ventriculogram: Not performed.         Coronaries: Right dominant system.     Left Main coronary artery: Left main coronary artery was a medium caliber vessel which was free of any disease.  The left main coronary artery bifurcated into the left anterior descending artery and circumflex artery.     Left anterior descending artery: Left anterior descending artery was a medium caliber vessel which were free of any obstructive stenosis with good GAMA-3 flow.  The diagonal branch was free of any obstructive stenosis with good GAMA-3  flow.     Circumflex artery: The circumflex artery was a medium caliber vessel with the ramus intermedius branch the first and the second obtuse marginal branch which was free of any obstructive stenosis with good GAMA-3 flow.  The circumflex artery distally was also free of any obstructive stenosis with good GAMA-3 flow     Right coronary artery: The right coronary artery was a medium to large caliber dominant vessel which were free of any obstructive stenosis with good GAMA-3 flow     Estimated Blood Loss:  Minimal         Complications:  None; patient tolerated the procedure well.      Impression   1.  Elevated left ventricular end-diastolic pressure.  2.  No evidence of any obstructive epicardial coronary artery disease.     Recommendations: Patient positive troponin was probably secondary to the atrial fibrillation.  Patient was recommended medical management for the atrial fibrillation and to workup noncardiac etiology of chest pain.  Patient would need anticoagulation with Eliquis for the atrial fibrillation.           Param Mcfarland MD  10/26/2018  2:41 PM    No radiology results for the last 30 days.  Lab Review     No results found for: TSH  Lab Results   Component Value Date    GLUCOSE 126 (H) 03/02/2021    BUN 13 03/02/2021    CREATININE 1.00 03/02/2021    EGFRIFAFRI 95 03/02/2021    BCR 13.0 03/02/2021    K 3.8 03/02/2021    CO2 26.0 03/02/2021    CALCIUM 8.4 (L) 03/02/2021    ALBUMIN 3.30 (L) 03/02/2021    AST 11 03/02/2021    ALT 10 03/02/2021     Lab Results   Component Value Date    WBC 6.83 03/02/2021    HGB 16.6 03/02/2021    HCT 50.8 03/02/2021    MCV 82.9 03/02/2021     03/02/2021       Lab Results   Component Value Date    TROPONINI 0.142 (C) 10/25/2018    TROPONINT <0.010 02/28/2021     Lab Results   Component Value Date    PROBNP 107.9 08/28/2020          The following portions of the patient's history were reviewed and updated as appropriate: allergies, current medications, past  family history, past medical history, past social history, past surgical history and problem list.     Old records reviewed and pertinent information is included in the above objective data.     Assessment/Plan:        Diagnosis Plan   1. Chronic systolic congestive heart failure, EF improved (CMS/[facility]AC, located within [facility])  NICM: EF:20% improved to 41-45%. NYHA Class I, Stage C. Patient appears euvolemic and in a well perfused physiologic state. Hemodynamics are acceptable  BETA-BLOCKER: Toprol XL 50mg daily.   ACE/ARB/ENTRESTO: 24/26mg BID   DIURETIC: n/a  ALDOSTERONT ANTAGONIST: NYHA Class I-II  IMDUR/HYDRALAZINE: n/a  DIGOXIN: n/a  Fluid restriction: 2 L  Sodium restriction:2 grams  6MWT: 11/13/18: n/a  3/12/19: 516.1m    ICD: improved EF  ADHF: yes  CardioMEMS:not indicated  [place]: n/a  LVAD: not indicated at this time. He is sober now, EF improved    Reiterated all educational points this visit.  Continue daily weight monitoring.  Continue restricted dietary sodium intake.  Discussed patient action plan for heart failure. Contact information for this office verbalized.  Recommended avoiding NSAIDs use.  Discussed warning signs requiring additional medical attention for heart failure.   Education points repeated back by patient.            2. PAF (paroxysmal atrial fibrillation) (CMS/[facility]AC, located within [facility])  Atrial fibrillation paroxysmal - 7 days or less  CHADSVASC: CHF, HTN and DM  EF is 41%. No evidence for LVH. LA size is mildly dilated.    Anticoagulation:ordered  - Eliquis 5mg BID    Rate control agents:ordered   - Toprol XL 50mg   - Digoxin 125mcg daily    - Monitor wore, lots of AF, PVCs    Rhythm control agents:not indicated    No longer follows with Dr. Mcfarland        3. Syncope and collapse  No more syncopal episodes. Had AF on monitor, no sustained VT but short run. Absent with EF improvement       4. Leg cramps  - absent without statin. Will add as allergy     5. Type 2 diabetes mellitus with diabetic polyneuropathy, with long-term  current use of insulin (CMS/[facility])  He has not done a good job at all with his blood sugars. They were not controlled in custodial either.   A1c was 11.3. Improved from 15.     70/30 - 30 units BID.  Novolog- 10 units for every 100 over 200.  Farxiga 10mg daily  - statin intolerance with documented myaglias         Follow up 6 months.             This document has been electronically signed by NATHANIEL Wilson on November 12, 2021 09:13 CST

## 2022-01-15 PROCEDURE — 87635 SARS-COV-2 COVID-19 AMP PRB: CPT | Performed by: FAMILY MEDICINE

## 2022-02-25 ENCOUNTER — TELEMEDICINE (OUTPATIENT)
Dept: ENDOCRINOLOGY | Facility: CLINIC | Age: 52
End: 2022-02-25

## 2022-02-25 DIAGNOSIS — I25.10 CORONARY ARTERY DISEASE INVOLVING NATIVE CORONARY ARTERY OF NATIVE HEART WITHOUT ANGINA PECTORIS: ICD-10-CM

## 2022-02-25 DIAGNOSIS — Z79.4 TYPE 2 DIABETES MELLITUS WITH HYPERGLYCEMIA, WITH LONG-TERM CURRENT USE OF INSULIN: Primary | ICD-10-CM

## 2022-02-25 DIAGNOSIS — E11.65 TYPE 2 DIABETES MELLITUS WITH HYPERGLYCEMIA, WITH LONG-TERM CURRENT USE OF INSULIN: Primary | ICD-10-CM

## 2022-02-25 PROCEDURE — 99204 OFFICE O/P NEW MOD 45 MIN: CPT | Performed by: INTERNAL MEDICINE

## 2022-02-25 RX ORDER — PROCHLORPERAZINE 25 MG/1
1 SUPPOSITORY RECTAL ONCE
Qty: 1 EACH | Refills: 1 | Status: SHIPPED | OUTPATIENT
Start: 2022-02-25 | End: 2022-03-02

## 2022-02-25 RX ORDER — PROCHLORPERAZINE 25 MG/1
SUPPOSITORY RECTAL AS NEEDED
Qty: 9 EACH | Refills: 3 | Status: SHIPPED | OUTPATIENT
Start: 2022-02-25

## 2022-02-25 RX ORDER — INSULIN GLARGINE 100 [IU]/ML
36 INJECTION, SOLUTION SUBCUTANEOUS
Qty: 15 ML | Refills: 11 | Status: SHIPPED | OUTPATIENT
Start: 2022-02-25 | End: 2022-05-17

## 2022-02-25 RX ORDER — PROCHLORPERAZINE 25 MG/1
1 SUPPOSITORY RECTAL ONCE
Qty: 1 EACH | Refills: 3 | Status: SHIPPED | OUTPATIENT
Start: 2022-02-25 | End: 2022-03-02

## 2022-02-25 RX ORDER — SEMAGLUTIDE 1.34 MG/ML
0.5 INJECTION, SOLUTION SUBCUTANEOUS WEEKLY
Qty: 1.5 ML | Refills: 11 | Status: SHIPPED | OUTPATIENT
Start: 2022-02-25

## 2022-02-25 RX ORDER — INSULIN ASPART 100 [IU]/ML
INJECTION, SOLUTION INTRAVENOUS; SUBCUTANEOUS
Qty: 30 ML | Refills: 11 | Status: SHIPPED | OUTPATIENT
Start: 2022-02-25 | End: 2022-04-25

## 2022-02-26 NOTE — PROGRESS NOTES
CC  Type 2 Diabetes                                      This was a Telehealth Encounter. Benefits and Disadvantages of a Telehealth Visit were discussed and accepted by patient. .  Patient agreed to receive service through Telehealth visit as patient is being compliant with social distancing recommendations imparted by CDC.     You have chosen to receive care through a telehealth visit.  Do you consent to use a video/audio connection for your medical care today? Yes        History of Present Illness    51 y.o. male     Diabetes Type 2    Duration - 10 years    Complications -  CHF    Present Monitoring -      Fingersticks -4 x daily      CGM -     Present Regimen -  See below  Carb Intake -   Admits to eating a lot of carbs  Exercise -   None  Symptoms -     Fatigue, weakness, weight gain   ==========================================  PE    There were no vitals taken for this visit.  AOx3  No visible goiter  Normal Respiratory Effort   No Edema    Labs    Lab Results   Component Value Date    WBC 6.83 03/02/2021    HGB 16.6 03/02/2021    HCT 50.8 03/02/2021    MCV 82.9 03/02/2021     03/02/2021     Lab Results   Component Value Date    GLUCOSE 126 (H) 03/02/2021    BUN 13 03/02/2021    CREATININE 1.00 03/02/2021    EGFRIFAFRI 95 03/02/2021    BCR 13.0 03/02/2021     (L) 03/02/2021    K 3.8 03/02/2021    CO2 26.0 03/02/2021    CALCIUM 8.4 (L) 03/02/2021    ALBUMIN 3.30 (L) 03/02/2021    AST 11 03/02/2021    ALT 10 03/02/2021       ==========================================      ICD-10-CM ICD-9-CM   1. Type 2 diabetes mellitus with hyperglycemia, with long-term current use of insulin ([facility])  E11.65 250.00    Z79.4 790.29     V58.67   2. Coronary artery disease involving native coronary artery of native heart without angina pectoris  I25.10 414.01       Diabetes Mellitus complicated w CAD ,  CHF    --------------------------------------------------------------------------------------------------------------------------------------------    Glycemic Management   Lab Results   Component Value Date    HGBA1C 9.5 (H) 10/28/2018       CGM 2 week review       Metformin     GLP 1 Therapy    Start ozempic 0.25 mg weekly x  4 weeks, then 0.5 mg weekly     SGLT2 inhibitors  farxiga 10 mg daily     Basal Insulin   Lantus 36 units at night      Prandial Insulin    Novolog instead of 8 units w meals    Novolog 1 unit per 20 above 140 was complicated to understand  So I changed to   5 units if more than 250   10 units if more than 300      Healthy meal, 60 grams     Glucagon  gvoke      Approve for dexcom - and Omnipod Dash    Criteria for Approval of CGM and/or Insulin Pump     The patient has diabetes mellitus, insulin-dependent.    Our Diabetes Department has evaluated the patient in the last six months and will continue counseling on insulin adjustment.     The patient performs blood glucose testing at least four times daily with proven glucose variability from 50 to 300 mg per dl.    The patient is administering basal insulin and prandial insulin four times per day for more than six months.    The patient uses a home blood glucose monitor to assess blood glucose at least four times daily for more than six months.    The patient requires frequent adjustment of insulin treatment regimen based on blood glucose readings.    The patient has frequent variability in blood glucose readings due to activity and variability in meal content and time.     The patient has completed a diabetes education program with us.    The patient has demonstrated the ability to self-monitor her glucose.     The patient is motivated in improving diabetes control       No orders of the defined types were placed in this encounter.               This document has been electronically signed by Garrett Gimenez MD on February 25,   20:00 CST          Patient Demographics    Patient Name   Juan Garza Legal Sex   Male    1970 [facility]    Address   [address] [place] [address] Phone   [phone] (Home) *Preferred*   [phone] (Mobile)       Emergency Contact(s)    Name Relation Home Work Mobile   Nelly Garza Mother [phone]   Rhonda Garza Father [phone]       PCP and Center    Primary Care Provider Phone Center   Elyssa Prado, APRN [phone] [facility]     Patient Employment    Status   Not Employed     Active Insurance as of 2022      [facility] OPTUM    Payor Plan Insurance Group Employer/Plan Group   [facility] OPTUM     Payor Plan Address Payor Plan Phone Number Payor Plan Fax Number Effective Dates   [address] [phone]  2020 - None Entered   [place] [id number]      Subscriber Name Subscriber Birth Date Member ID    JUAN GARZA 1970 [id number]      [facility] CC[facility] OPTUM    Payor Plan Insurance Group Employer/Plan Group   [facility] OPTUM [id number]    Payor Plan Address Payor Plan Phone Number Payor Plan Fax Number Effective Dates   [address] [phone]  2021 - 2022   [facility] [id number]      Subscriber Name Subscriber Birth Date Member ID    JUAN GARZA 1970 [id number]      [employer] - [employer]    Payor Plan Insurance Group Employer/Plan Group   ANTH MEDICAID ANTH MEDICAID [id number]    Payor Plan Address Payor Plan Phone Number Payor Plan Fax Number Effective Dates   [address] [address] [phone]  2017 - None Entered   [facility] [id number]      Subscriber Name Subscriber Birth Date Member ID    JUAN GARZA 1970 [id number]        Current Medications    apixaban (ELIQUIS) 5 MG tablet tablet   aspirin 81 MG chewable tablet   Cholecalciferol (vitamin D3) 125 MCG (5000 UT) capsule  capsule   Continuous Blood Gluc  (Dexcom G6 ) device   Continuous Blood Gluc Sensor (Dexcom G6 Sensor)   Continuous Blood Gluc Transmit (Dexcom G6 Transmitter) misc   Dapagliflozin Propanediol (Farxiga) 10 MG tablet   digoxin (LANOXIN) 125 MCG tablet   docusate sodium (COLACE) 100 MG capsule   FLUoxetine (PROzac) 20 MG capsule   furosemide (LASIX) 20 MG tablet   hydrOXYzine (ATARAX) 25 MG tablet   insulin aspart (NovoLOG FlexPen) 100 UNIT/ML solution pen-injector sc pen   Insulin Glargine (Lantus SoloStar) 100 UNIT/ML injection pen   Insulin Pen Needle (Pen Needles) 32G X 4 MM misc   lamoTRIgine (LaMICtal) 25 MG tablet   metoprolol succinate XL (TOPROL-XL) 50 MG 24 hr tablet   omeprazole (priLOSEC) 20 MG capsule   sacubitril-valsartan (ENTRESTO) 24-26 MG tablet   Semaglutide,0.25 or 0.5MG/DOS, (Ozempic, 0.25 or 0.5 MG/DOSE,) 2 MG/1.5ML solution pen-injector   sildenafil (Viagra) 50 MG tablet   insulin aspart (novoLOG) 100 UNIT/ML injection (Discontinued)   insulin NPH-insulin regular (NovoLIN 70/30) (70-30) 100 UNIT/ML injection (Discontinued)     Allergies    Statins Myalgia   Sulfa Antibiotics Unknown (See Comments)   Remembers mother always saying he was allergic     Future Appointments    Encounter Information    Provider Department [facility]   4/11/2022 9:15 AM Anna Thibodeaux APRN [facility] GASTROENTEROLOGY [facility]   5/12/2022 1:00 PM Kimberly Hester APRKELECHI [facility] CARDIOLOGY

## 2022-02-28 ENCOUNTER — SPECIALTY PHARMACY (OUTPATIENT)
Dept: ENDOCRINOLOGY | Facility: CLINIC | Age: 52
End: 2022-02-28

## 2022-02-28 RX ORDER — DAPAGLIFLOZIN 10 MG/1
10 TABLET, FILM COATED ORAL DAILY
Qty: 90 TABLET | Refills: 3 | Status: SHIPPED | OUTPATIENT
Start: 2022-02-28

## 2022-02-28 RX ORDER — INSULIN PUMP CONTROLLER
1 EACH MISCELLANEOUS
Qty: 10 EACH | Refills: 11 | Status: SHIPPED | OUTPATIENT
Start: 2022-02-28 | End: 2023-02-27

## 2022-03-01 ENCOUNTER — DOCUMENTATION (OUTPATIENT)
Dept: ENDOCRINOLOGY | Facility: CLINIC | Age: 52
End: 2022-03-01

## 2022-03-08 RX ORDER — OMEPRAZOLE 20 MG/1
20 CAPSULE, DELAYED RELEASE ORAL
Qty: 60 CAPSULE | Refills: 5 | Status: SHIPPED | OUTPATIENT
Start: 2022-03-08 | End: 2022-04-18

## 2022-03-09 ENCOUNTER — SPECIALTY PHARMACY (OUTPATIENT)
Dept: ENDOCRINOLOGY | Facility: CLINIC | Age: 52
End: 2022-03-09

## 2022-03-09 NOTE — PROGRESS NOTES
REPATHA® (evolocumab)  Medication Expectations   Why am I taking this medication? You are taking Repatha to lower your “bad” cholesterol (LDL-C). This medication can be used in adults with high blood cholesterol including primary hyperlipidemia and familial hypercholesterolemia. Repatha can also be used to reduce the risk of heart attack, stroke, and certain chest pain conditions requiring hospitalization if you have a history of cardiovascular disease.     What should I expect while on this medication? You should expect to see your cholesterol improve over time. Specifically, you should see your LDL-C decrease.    How does the medication work? Repatha works by blocking a protein called PCSK9 that contributes to high levels of bad cholesterol and it helps increase your liver's ability to remove bad cholesterol from your blood.     How long will I be on this medication for? The amount of time you will be on this medication will be determined by your doctor based on your cholesterol and/or your risk of having a cardiac event. You will most likely be on this medication or another cholesterol medication throughout your lifetime. Do not abruptly stop this medication without talking to your doctor first.    How do I take this medication? Take as directed on your prescription label. Repatha is injected under the skin (subcutaneously) of your stomach, thigh, or upper arm. This medication is usually given one or twice a month. Use a different injection site in the same body region with each dose.     Allow the solution in the pen to come to room temperature for at least 30 minutes prior to administration (allow the Pushtronex® system at least 45 minutes).    SureClick® Autoinjector  • Once you are ready to give your injection, pull the orange cap off (do not leave it off for more than 5 minutes)  • Place the yellow safety guard on your skin at a 90o angle and firmly push the pen onto the skin until it stops moving and press  the gray button, you will hear a click  • Continue holding the pen against your skin for at least 15 seconds until the window turns yellow, you may hear a second click  • Pull the pen away from your skin and discard properly    *If you are using the prefilled syringe or the once monthly Pushtronex® System for Repatha, your doctor or pharmacist will show you how to use it in more detail.   What are some possible side effects? The most common side effects of Repatha include redness, itching, swelling, or pain/tenderness at the injection site, symptoms of the common cold, flu or flu-like symptoms or back pain.    What happens if I miss a dose? If you miss a dose, take it as soon as you remember if it is within 7 days from the usual day of administration then resume your original schedule.    If it is beyond 7 days and you use the sharmaine-2-week dose, skip the missed dose and resume your normal dosing schedule.    If it is beyond 7 days and you use the once-monthly dose, inject the dose and start a new schedule based on that date.      Medication Safety   What are things I should warn my doctor immediately about? Talk to your doctor if you are pregnant, planning to become pregnant, or breastfeeding.     Stop the medication and tell your doctor or seek emergency medical help if you notice any signs/symptoms of an allergic reaction (severe rash, redness, hives, severe itching, trouble breathing, or swelling of the face, lips, or tongue). Do not take Repatha if you have an allergy to evolocumab or any of the ingredients in Repatha.     If you have a rubber or latex allergy, you should not use the Repatha SureClick® Autoinjector pen or the prefilled syringe, please notify your doctor or pharmacist.   What are things that I should be cautious of? Be cautious of any side effects from this medication. Talk to your doctor if any new ones develop or aren't getting better.   What are some medications that can interact with this one?  There are no known significant drug interactions with Repatha. Always tell your doctor or pharmacist immediately if you start taking any new medications, including over-the-counter medications, vitamins, and herbal supplements.      Medication Storage/Handling   How should I handle this medication? Do not shake or expose the pens, cartridges, or syringes to extreme heat or direct sunlight. Keep this medication out of reach of pets/children.    How does this medication need to be stored? Store unused pens, cartridges, or syringes in the refrigerator in the original cartons to protect from light. Allow medication to warm at room temperature prior to administration. If needed, Repatha may be kept at room temperature in the original carton for up to 30 days. Do not freeze.    How should I dispose of this medication? All the Repatha devices are single-dose and should be discarded in a sharps container after use.  If you do not own a sharps container, you may use a household container made of heavy-duty plastic with a tight-fitting lid that is leak resistant (e.g., heavy-duty plastic laundry detergent bottle).  If your doctor decides to stop this medication, take to your local police station for proper disposal. Some pharmacies also have take-back bins for medication drop-off.      Resources/Support   How can I remind myself to take this medication? You can download reminder apps to help you manage your refills. You may also set an alarm on your phone to remind you to take your dose.    Is financial support available?  [employer] can provide co-pay cards if you have commercial insurance or patient assistance if you have Medicare or no insurance.    Which vaccines are recommended for me? Talk to your doctor about these vaccines:  • Flu   • Coronavirus (COVID-19)   • Pneumococcal (pneumonia)   • Tdap   • Zoster (shingles)      Patient has an allergy to statins and a cardiovascular history - we have worked to get a PA approved  and will start him on repatha in coordination with Dr. Hartman.

## 2022-04-12 ENCOUNTER — SPECIALTY PHARMACY (OUTPATIENT)
Dept: ENDOCRINOLOGY | Facility: CLINIC | Age: 52
End: 2022-04-12

## 2022-04-12 NOTE — PROGRESS NOTES
Specialty Pharmacy Refill Coordination Note     Edd is a 51 y.o. male contacted today regarding refills of  repatha specialty medication(s).    Reviewed and verified with patient:         Specialty medication(s) and dose(s) confirmed: yes    Refill Questions    Flowsheet Row Most Recent Value   Changes to allergies? No   Changes to medications? No   New conditions since last clinic visit No   Unplanned office visit, urgent care, ED, or hospital admission in the last 4 weeks  No   How does patient/caregiver feel medication is working? Very good   Financial problems or insurance changes  No   If yes, describe changes in insurance or financial issues. na   How many doses of your specialty medications were missed in the last 4 weeks? 0   Why were doses missed? na   Does this patient require a clinical escalation to a pharmacist? No          Delivery Questions    Flowsheet Row Most Recent Value   Delivery method [employer]   Delivery address correct? Yes   Delivery phone number [phone]   Number of medications in delivery 1   Medication being filled and delivered repatha   Doses left of specialty medications 0   Is there any medication that is due not being filled? No   Supplies needed? No supplies needed   Cooler needed? No   Do any medications need mixed or dated? No   Additional comments $0.00   Questions or concerns for the pharmacist? No   Are any medications first time fills? No        I was able to reach the patient today and he has used both doses.  We will ship out the repatha to him tomorrow 4/13/22. No new problems to report.        Follow-up: 1 month.      Beto Arguelles  Specialty Pharmacy Technician

## 2022-04-18 ENCOUNTER — OFFICE VISIT (OUTPATIENT)
Dept: GASTROENTEROLOGY | Facility: CLINIC | Age: 52
End: 2022-04-18

## 2022-04-18 VITALS
WEIGHT: 213.2 LBS | BODY MASS INDEX: 29.85 KG/M2 | HEIGHT: 71 IN | SYSTOLIC BLOOD PRESSURE: 161 MMHG | HEART RATE: 84 BPM | DIASTOLIC BLOOD PRESSURE: 86 MMHG

## 2022-04-18 DIAGNOSIS — Z86.010 ENCOUNTER FOR COLONOSCOPY DUE TO HISTORY OF ADENOMATOUS COLONIC POLYPS: Primary | ICD-10-CM

## 2022-04-18 DIAGNOSIS — K21.00 GASTROESOPHAGEAL REFLUX DISEASE WITH ESOPHAGITIS WITHOUT HEMORRHAGE: ICD-10-CM

## 2022-04-18 DIAGNOSIS — Z12.11 ENCOUNTER FOR COLONOSCOPY DUE TO HISTORY OF ADENOMATOUS COLONIC POLYPS: Primary | ICD-10-CM

## 2022-04-18 PROCEDURE — 99213 OFFICE O/P EST LOW 20 MIN: CPT | Performed by: NURSE PRACTITIONER

## 2022-04-18 RX ORDER — OMEPRAZOLE 40 MG/1
40 CAPSULE, DELAYED RELEASE ORAL DAILY
Qty: 30 CAPSULE | Refills: 11 | Status: SHIPPED | OUTPATIENT
Start: 2022-04-18

## 2022-04-18 RX ORDER — POLYETHYLENE GLYCOL 3350, SODIUM SULFATE ANHYDROUS, SODIUM BICARBONATE, SODIUM CHLORIDE, POTASSIUM CHLORIDE 236; 22.74; 6.74; 5.86; 2.97 G/4L; G/4L; G/4L; G/4L; G/4L
4 POWDER, FOR SOLUTION ORAL ONCE
Qty: 4000 ML | Refills: 0 | Status: SHIPPED | OUTPATIENT
Start: 2022-04-18 | End: 2022-04-18

## 2022-04-18 RX ORDER — INSULIN NPH HUM/REG INSULIN HM 70-30/ML
VIAL (ML) SUBCUTANEOUS
COMMUNITY
Start: 2022-03-08 | End: 2022-04-18

## 2022-04-18 RX ORDER — DEXTROSE AND SODIUM CHLORIDE 5; .45 G/100ML; G/100ML
30 INJECTION, SOLUTION INTRAVENOUS CONTINUOUS PRN
Status: CANCELLED | OUTPATIENT
Start: 2022-05-18

## 2022-04-18 NOTE — PROGRESS NOTES
Chief Complaint   Patient presents with   • Heartburn       Subjective    Edd Willingham is a 51 y.o. male. he is here today for follow-up.    History of Present Illness  51-year-old male presents to discuss reflux and screening colonoscopy.  Reports as long as he takes his omeprazole daily reflux is well controlled denies any nausea vomiting or abdominal pain currently.  Is due for screening colonoscopy previous colonoscopy in 2021 noted poor prep 2 polyps were removed from transverse colon with repeat recommended in 1 year due to poor prep.  Patient reports he started drinking prep a week prior accidentally and did not get new prep just diluted prep the day before.  Plan; schedule patient for screening colonoscopy.       The following portions of the patient's history were reviewed and updated as appropriate:   Past Medical History:   Diagnosis Date   • Anxiety    • Depression    • Diabetes mellitus (HCC)    • GERD (gastroesophageal reflux disease)    • Heart murmur    • Hypertension    • Low back pain      Past Surgical History:   Procedure Laterality Date   • CARDIAC CATHETERIZATION N/A 10/26/2018    Procedure: Left Heart Cath;  Surgeon: Param Mcfarland MD;  Location: [facility] CATH INVASIVE LOCATION;  Service: Cardiology   • COLONOSCOPY N/A 3/24/2021    Procedure: COLONOSCOPY;  Surgeon: Dinesh Drummond MD;  Location: [facility] ENDOSCOPY;  Service: Gastroenterology;  Laterality: N/A;   • ELBOW PROCEDURE      left   • ENDOSCOPY N/A 3/24/2021    Procedure: ESOPHAGOGASTRODUODENOSCOPY;  Surgeon: Dinesh Drummond MD;  Location: [facility] ENDOSCOPY;  Service: Gastroenterology;  Laterality: N/A;   • ROTATOR CUFF REPAIR      RIGHT   • TUMOR REMOVAL      FATTY TUMOR   • UPPER GASTROINTESTINAL ENDOSCOPY  03/2021   • WRIST SURGERY      RIGHT     Family History   Problem Relation Age of Onset   • Diabetes Mother    • Hypertension Mother    • Diabetes Father    • No Known Problems Brother    • Colon polyps Paternal Uncle         Prior to Admission medications    Medication Sig Start Date End Date Taking? Authorizing Provider   apixaban (ELIQUIS) 5 MG tablet tablet Take 1 tablet by mouth Every 12 (Twelve) Hours. 3/29/21  Yes Kimberly Hester APRN   aspirin 81 MG chewable tablet Chew 81 mg Daily.   Yes Aimee Romero MD   Cholecalciferol (vitamin D3) 125 MCG (5000 UT) capsule capsule Take 2,000 Units by mouth Daily.   Yes Aimee Romero MD   Continuous Blood Gluc Sensor (Dexcom G6 Sensor) Use as directed for continuous glucose monitoring **Change sensor every 10 days** 2/25/22  Yes Garrett Marcano MD   Dapagliflozin Propanediol (Farxiga) 10 MG tablet Take 1 tablet by mouth Daily. 2/28/22  Yes Garrett Marcano MD   digoxin (LANOXIN) 125 MCG tablet Take 1 tablet by mouth Daily. 3/29/21  Yes Kimberly Hester APRN   docusate sodium (COLACE) 100 MG capsule Take 100 mg by mouth 2 (Two) Times a Day.   Yes Aimee Romero MD   Evolocumab (REPATHA) solution auto-injector SureClick injection Inject 1 mL under the skin into the appropriate area as directed Every 14 (Fourteen) Days. 3/9/22  Yes Garrett Marcano MD   FLUoxetine (PROzac) 20 MG capsule Take 20 mg by mouth Daily.   Yes Aimee Romero MD   furosemide (LASIX) 20 MG tablet Take 1 tablet by mouth Daily As Needed (weight gain of 5lbs in 1 week). 3/29/21  Yes Kimberly Hester APRN   hydrOXYzine (ATARAX) 25 MG tablet Take 25 mg by mouth 3 (Three) Times a Day As Needed for Itching.   Yes Aimee Romero MD   insulin aspart (NovoLOG FlexPen) 100 UNIT/ML solution pen-injector sc pen Inject up to 30 units under the skin into the appropriate area as directed with meals.  Patient taking differently: Inject up to 30 units under the skin into the appropriate area as directed with meals. 2/25/22  Yes Garrett Marcano MD   Insulin Disposable Pump (OmniPod Dash 5 Pack Pods) misc Change every Every 72 (Seventy-Two) Hours as  directed. 2/28/22  Yes Garrett Marcano MD   Insulin Glargine (Lantus SoloStar) 100 UNIT/ML injection pen Inject 36 Units under the skin into the appropriate area as directed every night at bedtime. 2/25/22  Yes Garrett Marcano MD   Insulin Pen Needle 32G X 4 MM misc Use as directed 4 (Four) Times a Day. 2/25/22  Yes Garrett Marcano MD   lamoTRIgine (LaMICtal) 25 MG tablet Take 25 mg by mouth Daily.   Yes Provider, MD Aimee   metoprolol succinate XL (TOPROL-XL) 50 MG 24 hr tablet Take 1 tablet by mouth Daily. 3/29/21  Yes Kimberly Hester APRN   omeprazole (priLOSEC) 20 MG capsule Take 1 capsule by mouth 2 (two) times a day. 3/8/22  Yes Kimberly Hester APRN   sacubitril-valsartan (ENTRESTO) 24-26 MG tablet Take 1 tablet by mouth 2 (Two) Times a Day. 3/29/21  Yes Kimberly Hester APRN   Semaglutide,0.25 or 0.5MG/DOS, (Ozempic, 0.25 or 0.5 MG/DOSE,) 2 MG/1.5ML solution pen-injector Inject 0.5 mg under the skin into the appropriate area as directed 1 (One) Time Per Week. 2/25/22  Yes Garrett Marcano MD   sildenafil (Viagra) 50 MG tablet Take 1 tablet by mouth Daily As Needed for Erectile Dysfunction. 4/12/21  Yes Kimberly Hester APRN   HumuLIN 70/30 (70-30) 100 UNIT/ML injection  3/8/22 4/18/22  Provider, MD Aimee     Allergies   Allergen Reactions   • Statins Myalgia   • Sulfa Antibiotics Unknown (See Comments)     Remembers mother always saying he was allergic     Social History     Socioeconomic History   • Marital status:    Tobacco Use   • Smoking status: Current Every Day Smoker     Packs/day: 1.00     Types: Cigarettes   • Smokeless tobacco: Current User     Types: Snuff   Vaping Use   • Vaping Use: Former   Substance and Sexual Activity   • Alcohol use: Yes     Alcohol/week: 1.0 standard drink     Types: 1 Cans of beer per week     Comment: moderate   • Drug use: Not Currently     Types: Marijuana, Methamphetamines     Comment: last meth use  "2/17   • Sexual activity: Defer       Review of Systems  Review of Systems   Constitutional: Negative for activity change, appetite change, chills, diaphoresis, fatigue, fever and unexpected weight change.   HENT: Negative for sore throat and trouble swallowing.    Respiratory: Negative for shortness of breath.    Gastrointestinal: Negative for abdominal distention, abdominal pain, anal bleeding, blood in stool, constipation, diarrhea, nausea, rectal pain and vomiting.   Musculoskeletal: Negative for arthralgias.   Skin: Negative for pallor.   Neurological: Negative for light-headedness.        /86 (BP Location: Left arm)   Pulse 84   Ht 180.3 cm (71\")   Wt 96.7 kg (213 lb 3.2 oz)   BMI 29.74 kg/m²     Objective    Physical Exam  Vitals reviewed.   Constitutional:       General: He is not in acute distress.     Appearance: Normal appearance. He is normal weight. He is not ill-appearing.   HENT:      Head: Normocephalic and atraumatic.   Pulmonary:      Effort: Pulmonary effort is normal.   Abdominal:      General: Abdomen is flat. Bowel sounds are normal. There is no distension.      Palpations: There is no mass.      Tenderness: There is no abdominal tenderness.   Neurological:      Mental Status: He is alert.       Admission on 01/15/2022, Discharged on 01/15/2022   Component Date Value Ref Range Status   • COVID19 01/15/2022 Detected (A) Not Detected - Ref. Range Final     Assessment/Plan      1. Encounter for colonoscopy due to history of adenomatous colonic polyps    2. Gastroesophageal reflux disease with esophagitis without hemorrhage    .       Orders placed during this encounter include:  Orders Placed This Encounter   Procedures   • Follow Anesthesia Guidelines / Standing Orders     Standing Status:   Future   • Obtain Informed Consent     Standing Status:   Future     Order Specific Question:   Informed Consent Given For     Answer:   COLONOSCOPY       COLONOSCOPY (N/A)    Review and/or summary " of lab tests, radiology, procedures, medications. Review and summary of old records and obtaining of history. The risks and benefits of my recommendations, as well as other treatment options were discussed with the patient today. Questions were answered.    New Medications Ordered This Visit   Medications   • omeprazole (priLOSEC) 40 MG capsule     Sig: Take 1 capsule by mouth Daily.     Dispense:  30 capsule     Refill:  11   • polyethylene glycol (Golytely) 236 g solution     Sig: Take 4,000 mL by mouth 1 (One) Time for 1 dose. Take as per instruction sheet for colonoscopy prep.     Dispense:  4000 mL     Refill:  0       Follow-up: Return in about 4 weeks (around 5/16/2022).          This document has been electronically signed by NATHANIEL Watson on April 18, 2022 13:18 CDT           I spent 16 minutes caring for Edd on this date of service. This time includes time spent by me in the following activities:preparing for the visit, reviewing tests, obtaining and/or reviewing a separately obtained history, performing a medically appropriate examination and/or evaluation , counseling and educating the patient/family/caregiver, ordering medications, tests, or procedures, referring and communicating with other health care professionals , documenting information in the medical record and care coordination    Results for orders placed or performed during the hospital encounter of 01/15/22   COVID-19, [facility] IN-HOUSE, NP SWAB IN TRANSPORT MEDIA 8-10 HR TAT - Swab, Anterior nasal    Specimen: Anterior nasal; Swab   Result Value Ref Range    COVID19 Detected (C) Not Detected - Ref. Range   Results for orders placed or performed during the hospital encounter of 05/09/21   POCT SARS-CoV-2 Antigen ANUSHKA    Specimen: Swab   Result Value Ref Range    SARS Antigen Not Detected     Influenza A Antigen ANUSHKA Not Detected     Influenza B Antigen ANUSHKA Not Detected     Internal Control Passed Passed    Lot Number [id number]     Expiration  Date 10/04/2022    Results for orders placed or performed during the hospital encounter of 03/24/21   Tissue Pathology Exam    Specimen: A: Gastric, Antrum; Tissue    B: Esophagus, Distal; Tissue    C: Large Intestine, Transverse Colon; Tissue    D: Large Intestine; Tissue   Result Value Ref Range    Case Report       Surgical Pathology Report                         Case: [id number]                                  Authorizing Provider:  Dinesh Drummond MD        Collected:           03/24/2021 11:15 AM          Ordering Location:     [facility]             Received:            03/24/2021 01:33 PM                                 [facility]                                                     Pathologist:           Jacqueline Lai DO                                                        Specimens:   1) - Gastric, Antrum                                                                                2) - Esophagus, Distal                                                                              3) - Large Intestine, Transverse Colon, polyps                                                      4) - Large Intestine, colonic mucosa                                                       Final Diagnosis       SEE SCANNED REPORT       POC Glucose Once    Specimen: Blood   Result Value Ref Range    Glucose 219 (H) 70 - 130 mg/dL   Results for orders placed or performed in visit on 03/21/21   COVID-19,[facility] IN-HOUSE, NP/OP SWAB IN UTM/VTM/SALINE TRANSPORT MEDIA,24 HR TAT - Swab, Nasopharynx    Specimen: Nasopharynx; Swab   Result Value Ref Range    COVID19 Not Detected Not Detected - Ref. Range   Results for orders placed or performed in visit on 03/19/21   POC Glucose    Specimen: Blood   Result Value Ref Range    Glucose 350 (H) 70 - 130 mg/dL   Results for orders placed or performed in visit on 03/09/21   HIV-1 / O / 2 Ag / Antibody 4th Generation    Specimen: Blood   Result Value Ref  Range    HIV-1/ HIV-2 Non-Reactive Non-Reactive   Hepatitis panel, acute    Specimen: Blood   Result Value Ref Range    Hepatitis B Surface Ag Non-Reactive Non-Reactive    Hep A IgM Non-Reactive Non-Reactive    Hep B C IgM Non-Reactive Non-Reactive    Hepatitis C Ab Non-Reactive Non-Reactive   Results for orders placed or performed during the hospital encounter of 02/27/21   Gold Top - SST   Result Value Ref Range    Extra Tube Hold for add-ons.    COVID-19 and FLU A/B PCR - Swab, Nasopharynx    Specimen: Nasopharynx; Swab   Result Value Ref Range    COVID19 Not Detected Not Detected - Ref. Range    Influenza A PCR Not Detected Not Detected    Influenza B PCR Not Detected Not Detected   Urinalysis, Microscopic Only - Urine, Clean Catch    Specimen: Urine, Clean Catch   Result Value Ref Range    RBC, UA 0-2 (A) None Seen /HPF    WBC, UA 0-2 None Seen, 0-2, 3-5 /HPF    Bacteria, UA None Seen None Seen /HPF    Squamous Epithelial Cells, UA None Seen None Seen, 0-2 /HPF    Hyaline Casts, UA 3-6 None Seen /LPF    Methodology Automated Microscopy    Urinalysis With Microscopic If Indicated (No Culture) - Urine, Clean Catch    Specimen: Urine, Clean Catch   Result Value Ref Range    Color, UA Yellow Yellow, Straw, Dark Yellow, Peyton    Appearance, UA Clear Clear    pH, UA 7.5 5.0 - 9.0    Specific Gravity, UA 1.040 (H) 1.003 - 1.030    Glucose, UA >=1000 mg/dL (3+) (A) Negative    Ketones, UA 15 mg/dL (1+) (A) Negative    Bilirubin, UA Negative Negative    Blood, UA Negative Negative    Protein, UA 30 mg/dL (1+) (A) Negative    Leuk Esterase, UA Negative Negative    Nitrite, UA Negative Negative    Urobilinogen, UA 0.2 E.U./dL 0.2 - 1.0 E.U./dL   CBC Auto Differential    Specimen: Blood   Result Value Ref Range    WBC 6.83 3.40 - 10.80 10*3/mm3    RBC 6.13 (H) 4.14 - 5.80 10*6/mm3    Hemoglobin 16.6 13.0 - 17.7 g/dL    Hematocrit 50.8 37.5 - 51.0 %    MCV 82.9 79.0 - 97.0 fL    MCH 27.1 26.6 - 33.0 pg    MCHC 32.7 31.5 -  35.7 g/dL    RDW 14.2 12.3 - 15.4 %    RDW-SD 41.8 37.0 - 54.0 fl    MPV 12.0 6.0 - 12.0 fL    Platelets 241 140 - 450 10*3/mm3    Neutrophil % 50.8 42.7 - 76.0 %    Lymphocyte % 37.0 19.6 - 45.3 %    Monocyte % 10.2 5.0 - 12.0 %    Eosinophil % 0.6 0.3 - 6.2 %    Basophil % 0.7 0.0 - 1.5 %    Immature Grans % 0.7 (H) 0.0 - 0.5 %    Neutrophils, Absolute 3.46 1.70 - 7.00 10*3/mm3    Lymphocytes, Absolute 2.53 0.70 - 3.10 10*3/mm3    Monocytes, Absolute 0.70 0.10 - 0.90 10*3/mm3    Eosinophils, Absolute 0.04 0.00 - 0.40 10*3/mm3    Basophils, Absolute 0.05 0.00 - 0.20 10*3/mm3    Immature Grans, Absolute 0.05 0.00 - 0.05 10*3/mm3    nRBC 0.0 0.0 - 0.2 /100 WBC   CBC Auto Differential    Specimen: Blood   Result Value Ref Range    WBC 8.67 3.40 - 10.80 10*3/mm3    RBC 5.38 4.14 - 5.80 10*6/mm3    Hemoglobin 14.9 13.0 - 17.7 g/dL    Hematocrit 44.1 37.5 - 51.0 %    MCV 82.0 79.0 - 97.0 fL    MCH 27.7 26.6 - 33.0 pg    MCHC 33.8 31.5 - 35.7 g/dL    RDW 13.9 12.3 - 15.4 %    RDW-SD 40.4 37.0 - 54.0 fl    MPV 11.6 6.0 - 12.0 fL    Platelets 227 140 - 450 10*3/mm3    Neutrophil % 71.8 42.7 - 76.0 %    Lymphocyte % 18.7 (L) 19.6 - 45.3 %    Monocyte % 8.5 5.0 - 12.0 %    Eosinophil % 0.3 0.3 - 6.2 %    Basophil % 0.5 0.0 - 1.5 %    Immature Grans % 0.2 0.0 - 0.5 %    Neutrophils, Absolute 6.22 1.70 - 7.00 10*3/mm3    Lymphocytes, Absolute 1.62 0.70 - 3.10 10*3/mm3    Monocytes, Absolute 0.74 0.10 - 0.90 10*3/mm3    Eosinophils, Absolute 0.03 0.00 - 0.40 10*3/mm3    Basophils, Absolute 0.04 0.00 - 0.20 10*3/mm3    Immature Grans, Absolute 0.02 0.00 - 0.05 10*3/mm3    nRBC 0.0 0.0 - 0.2 /100 WBC   Lavender Top   Result Value Ref Range    Extra Tube hold for add-on    Light Blue Top   Result Value Ref Range    Extra Tube hold for add-on    Stress Test With Myocardial Perfusion One Day   Result Value Ref Range    BH CV STRESS PROTOCOL 1 Pharmacologic     Stage 1 1     Duration Min Stage 1 0     Duration Sec Stage 1 10      Stress Dose Regadenoson Stage 1 0.4     Stress Comments Stage 1 10 sec bolus injection     Target HR (85%) 143 bpm    Max. Pred. HR (100%) 168 bpm    HR Stage 1 119     BP Stage 1 101/80     Baseline HR 89 bpm    Baseline /105 mmHg    Peak  bpm    Percent Max Pred HR 73.21 %    Percent Target HR 86 %    Peak /105 mmHg    Recovery  bpm    Recovery /85 mmHg    Estimated workload 1.0 METS    Nuc Stress EF 49 %     *Note: Due to a large number of results and/or encounters for the requested time period, some results have not been displayed. A complete set of results can be found in Results Review.

## 2022-04-25 ENCOUNTER — TELEPHONE (OUTPATIENT)
Dept: ENDOCRINOLOGY | Facility: CLINIC | Age: 52
End: 2022-04-25

## 2022-04-25 RX ORDER — INSULIN ASPART 100 [IU]/ML
INJECTION, SOLUTION INTRAVENOUS; SUBCUTANEOUS
Qty: 50 ML | Refills: 11 | Status: SHIPPED | OUTPATIENT
Start: 2022-04-25 | End: 2022-05-17

## 2022-04-25 NOTE — TELEPHONE ENCOUNTER
Pt no longer needs lantus and needs novolog vials instead of pens sent to [facility] outpatient pharmacy. Thank you

## 2022-05-02 ENCOUNTER — TELEPHONE (OUTPATIENT)
Dept: ENDOCRINOLOGY | Facility: CLINIC | Age: 52
End: 2022-05-02

## 2022-05-02 NOTE — TELEPHONE ENCOUNTER
Pt has recently started on the omnipod. Pt is having lows by the end of the day. He wanting to discuss decreasing his insulin. Please call pt to advise. Thank you

## 2022-05-17 ENCOUNTER — TELEPHONE (OUTPATIENT)
Dept: ENDOCRINOLOGY | Facility: CLINIC | Age: 52
End: 2022-05-17

## 2022-05-17 RX ORDER — GABAPENTIN 300 MG/1
300 CAPSULE ORAL 2 TIMES DAILY
COMMUNITY

## 2022-05-17 RX ORDER — MIRTAZAPINE 15 MG/1
15 TABLET, FILM COATED ORAL NIGHTLY
COMMUNITY

## 2022-05-17 RX ORDER — FLUOXETINE HYDROCHLORIDE 20 MG/1
40 CAPSULE ORAL DAILY
COMMUNITY

## 2022-05-17 RX ORDER — INSULIN ASPART 100 [IU]/ML
0-150 INJECTION, SOLUTION INTRAVENOUS; SUBCUTANEOUS TAKE AS DIRECTED
COMMUNITY

## 2022-05-18 ENCOUNTER — ANESTHESIA EVENT (OUTPATIENT)
Dept: GASTROENTEROLOGY | Facility: HOSPITAL | Age: 52
End: 2022-05-18

## 2022-05-18 ENCOUNTER — ANESTHESIA (OUTPATIENT)
Dept: GASTROENTEROLOGY | Facility: HOSPITAL | Age: 52
End: 2022-05-18

## 2022-05-18 ENCOUNTER — HOSPITAL ENCOUNTER (OUTPATIENT)
Facility: HOSPITAL | Age: 52
Setting detail: HOSPITAL OUTPATIENT SURGERY
Discharge: HOME | End: 2022-05-18
Attending: INTERNAL MEDICINE | Admitting: INTERNAL MEDICINE

## 2022-05-18 VITALS
TEMPERATURE: 97.3 F | DIASTOLIC BLOOD PRESSURE: 79 MMHG | WEIGHT: 212.4 LBS | OXYGEN SATURATION: 100 % | BODY MASS INDEX: 29.73 KG/M2 | RESPIRATION RATE: 17 BRPM | HEIGHT: 71 IN | HEART RATE: 62 BPM | SYSTOLIC BLOOD PRESSURE: 138 MMHG

## 2022-05-18 DIAGNOSIS — Z12.11 ENCOUNTER FOR COLONOSCOPY DUE TO HISTORY OF ADENOMATOUS COLONIC POLYPS: ICD-10-CM

## 2022-05-18 DIAGNOSIS — Z86.010 ENCOUNTER FOR COLONOSCOPY DUE TO HISTORY OF ADENOMATOUS COLONIC POLYPS: ICD-10-CM

## 2022-05-18 PROCEDURE — 25010000002 PROPOFOL 10 MG/ML EMULSION: Performed by: NURSE ANESTHETIST, CERTIFIED REGISTERED

## 2022-05-18 PROCEDURE — 25010000002 GLUCAGON (HUMAN RECOMBINANT) 1 MG RECONSTITUTED SOLUTION: Performed by: NURSE ANESTHETIST, CERTIFIED REGISTERED

## 2022-05-18 PROCEDURE — 45385 COLONOSCOPY W/LESION REMOVAL: CPT | Performed by: INTERNAL MEDICINE

## 2022-05-18 RX ORDER — DEXTROSE AND SODIUM CHLORIDE 5; .45 G/100ML; G/100ML
30 INJECTION, SOLUTION INTRAVENOUS CONTINUOUS PRN
Status: DISCONTINUED | OUTPATIENT
Start: 2022-05-18 | End: 2022-05-18 | Stop reason: HOSPADM

## 2022-05-18 RX ORDER — PROPOFOL 10 MG/ML
VIAL (ML) INTRAVENOUS AS NEEDED
Status: DISCONTINUED | OUTPATIENT
Start: 2022-05-18 | End: 2022-05-18 | Stop reason: SURG

## 2022-05-18 RX ADMIN — PROPOFOL 30 MG: 10 INJECTION, EMULSION INTRAVENOUS at 15:09

## 2022-05-18 RX ADMIN — PROPOFOL 30 MG: 10 INJECTION, EMULSION INTRAVENOUS at 15:06

## 2022-05-18 RX ADMIN — PROPOFOL 100 MG: 10 INJECTION, EMULSION INTRAVENOUS at 14:58

## 2022-05-18 RX ADMIN — PROPOFOL 50 MG: 10 INJECTION, EMULSION INTRAVENOUS at 15:03

## 2022-05-18 RX ADMIN — PROPOFOL 30 MG: 10 INJECTION, EMULSION INTRAVENOUS at 15:12

## 2022-05-18 RX ADMIN — DEXTROSE AND SODIUM CHLORIDE 30 ML/HR: 5; 450 INJECTION, SOLUTION INTRAVENOUS at 14:26

## 2022-05-18 RX ADMIN — GLUCAGON HYDROCHLORIDE 0.5 MG: KIT at 15:10

## 2022-05-18 RX ADMIN — PROPOFOL 50 MG: 10 INJECTION, EMULSION INTRAVENOUS at 15:00

## 2022-05-18 NOTE — ANESTHESIA PREPROCEDURE EVALUATION
Anesthesia Evaluation     Patient summary reviewed and Nursing notes reviewed   NPO Solid Status: > 8 hours  NPO Liquid Status: > 2 hours           Airway   Mallampati: I  TM distance: >3 FB  Neck ROM: full  No difficulty expected  Dental    (+) poor dentition    Pulmonary - normal exam   (+) shortness of breath,   Cardiovascular - normal exam    PT is on anticoagulation therapy    (+) hypertension well controlled, valvular problems/murmurs, dysrhythmias Atrial Fib,   (-) CHF      Neuro/Psych  (+) syncope, numbness, psychiatric history Anxiety and Depression,    GI/Hepatic/Renal/Endo    (+)  GERD,  diabetes mellitus type 2 poorly controlled using insulin,     Musculoskeletal (-) negative ROS    Abdominal  - normal exam   Substance History   (+) drug use      Comment: No meth use since 2/14   OB/GYN negative ob/gyn ROS         Other                          Anesthesia Plan    ASA 3     MAC     intravenous induction     Anesthetic plan, all risks, benefits, and alternatives have been provided, discussed and informed consent has been obtained with: patient.    Plan discussed with CRNA.

## 2022-05-18 NOTE — ANESTHESIA POSTPROCEDURE EVALUATION
Patient: Edd Willingham    Procedure Summary     Date: 05/18/22 Room / Location: [facility] ENDOSCOPY 1 / [facility] ENDOSCOPY    Anesthesia Start: 1456 Anesthesia Stop: 1517    Procedure: COLONOSCOPY (N/A ) Diagnosis:       Encounter for colonoscopy due to history of adenomatous colonic polyps      (Encounter for colonoscopy due to history of adenomatous colonic polyps [Z12.11, Z86.010])    Surgeons: Dinesh Drummond MD Provider: Zach Le CRNA    Anesthesia Type: MAC ASA Status: 3          Anesthesia Type: MAC    Vitals  No vitals data found for the desired time range.          Post Anesthesia Care and Evaluation    Patient location during evaluation: bedside  Patient participation: complete - patient participated  Level of consciousness: sleepy but conscious  Pain score: 0  Pain management: adequate  Airway patency: patent  Anesthetic complications: No anesthetic complications  PONV Status: none  Cardiovascular status: acceptable  Respiratory status: acceptable  Hydration status: acceptable    Comments: ---------------------------               05/18/22                      1517        ---------------------------   BP:          113/81         Pulse:         71           Resp:          18           Temp:   98.1 °F (36.7 °C)   SpO2:          99%         ---------------------------

## 2022-05-20 ENCOUNTER — SPECIALTY PHARMACY (OUTPATIENT)
Dept: PHARMACY | Facility: TELEHEALTH | Age: 52
End: 2022-05-20

## 2022-05-20 LAB — REF LAB TEST METHOD: NORMAL

## 2022-05-20 NOTE — PROGRESS NOTES
Specialty Pharmacy Refill Coordination Note     Edd is a 51 y.o. male contacted today regarding refills of  Ozempic specialty medication(s).    Reviewed and verified with patient:         Specialty medication(s) and dose(s) confirmed: yes    Refill Questions    Flowsheet Row Most Recent Value   Changes to allergies? No   Changes to medications? No   New conditions since last clinic visit No   Unplanned office visit, urgent care, ED, or hospital admission in the last 4 weeks  No   How does patient/caregiver feel medication is working? Good   Financial problems or insurance changes  No   If yes, describe changes in insurance or financial issues. N/A   Since the previous refill, were any specialty medication doses or scheduled injections missed or delayed?  No   If yes, please provide the amount 0   Why were doses missed? N/A   Does this patient require a clinical escalation to a pharmacist? No                     Follow-up: 78 day(s)     Glenys Pope, Pharmacy Technician  Specialty Pharmacy Technician

## 2022-05-24 DIAGNOSIS — N52.1 ERECTILE DISORDER DUE TO MEDICAL CONDITION IN MALE: ICD-10-CM

## 2022-05-24 RX ORDER — SILDENAFIL 50 MG/1
50 TABLET, FILM COATED ORAL DAILY PRN
Qty: 6 TABLET | Refills: 6 | Status: SHIPPED | OUTPATIENT
Start: 2022-05-24

## 2022-05-25 ENCOUNTER — OFFICE VISIT (OUTPATIENT)
Dept: GASTROENTEROLOGY | Facility: CLINIC | Age: 52
End: 2022-05-25

## 2022-05-25 VITALS
HEART RATE: 80 BPM | DIASTOLIC BLOOD PRESSURE: 81 MMHG | WEIGHT: 211.4 LBS | BODY MASS INDEX: 29.6 KG/M2 | HEIGHT: 71 IN | SYSTOLIC BLOOD PRESSURE: 104 MMHG

## 2022-05-25 DIAGNOSIS — D12.5 ADENOMATOUS POLYP OF SIGMOID COLON: ICD-10-CM

## 2022-05-25 DIAGNOSIS — K59.04 CHRONIC IDIOPATHIC CONSTIPATION: Primary | ICD-10-CM

## 2022-05-25 PROCEDURE — 99212 OFFICE O/P EST SF 10 MIN: CPT | Performed by: NURSE PRACTITIONER

## 2022-05-25 RX ORDER — DOCUSATE SODIUM 100 MG/1
100 CAPSULE, LIQUID FILLED ORAL 2 TIMES DAILY
Qty: 60 CAPSULE | Refills: 11 | Status: SHIPPED | OUTPATIENT
Start: 2022-05-25

## 2022-05-25 NOTE — PROGRESS NOTES
Chief Complaint   Patient presents with   • Heartburn       Subjective    Edd Willingham is a 51 y.o. male. he is here today for follow-up.    History of Present Illness  51-year-old male presents to discuss colonoscopy results.  States since procedures had some issue with constipation denies any melena or hematochezia.  Weight is stable.  Colonoscopy 5/18/2022 noted adequate prep normal perianal digital rectal exam hemorrhoids and a small polyp removed from the sigmoid colon.  Polyp was sessile resection retrieval was complete.  Polyp found to be tubular adenoma.  Repeat recommended in 3 years for surveillance.       The following portions of the patient's history were reviewed and updated as appropriate:   Past Medical History:   Diagnosis Date   • Anxiety    • Depression    • Diabetes mellitus (HCC)    • Elevated cholesterol    • GERD (gastroesophageal reflux disease)    • Heart murmur    • Hypertension    • Low back pain    • MI (myocardial infarction) (HCC)      Past Surgical History:   Procedure Laterality Date   • CARDIAC CATHETERIZATION N/A 10/26/2018    Procedure: Left Heart Cath;  Surgeon: Param Mcfarland MD;  Location: [facility] CATH INVASIVE LOCATION;  Service: Cardiology   • COLONOSCOPY N/A 03/24/2021    Procedure: COLONOSCOPY;  Surgeon: Dinesh Drummond MD;  Location: [facility] ENDOSCOPY;  Service: Gastroenterology;  Laterality: N/A;   • COLONOSCOPY N/A 5/18/2022    Procedure: COLONOSCOPY;  Surgeon: Dinesh Drummond MD;  Location: [facility] ENDOSCOPY;  Service: Gastroenterology;  Laterality: N/A;   • ELBOW PROCEDURE Left     left   • ENDOSCOPY N/A 03/24/2021    Procedure: ESOPHAGOGASTRODUODENOSCOPY;  Surgeon: Dinesh Drummond MD;  Location: [facility] ENDOSCOPY;  Service: Gastroenterology;  Laterality: N/A;   • ROTATOR CUFF REPAIR Right     RIGHT   • TUMOR REMOVAL      FATTY TUMOR   • UPPER GASTROINTESTINAL ENDOSCOPY  03/2021   • WRIST SURGERY Right     RIGHT     Family History   Problem Relation Age of  Onset   • Diabetes Mother    • Hypertension Mother    • Diabetes Father    • No Known Problems Brother    • Colon polyps Paternal Uncle        Prior to Admission medications    Medication Sig Start Date End Date Taking? Authorizing Provider   apixaban (ELIQUIS) 5 MG tablet tablet Take 1 tablet by mouth Every 12 (Twelve) Hours. 3/29/21   Kimberly Hester APRN   Cholecalciferol (vitamin D3) 125 MCG (5000 UT) capsule capsule Take 2,000 Units by mouth Daily.    ProviderAimee MD   Continuous Blood Gluc Sensor (Dexcom G6 Sensor) Use as directed for continuous glucose monitoring **Change sensor every 10 days** 2/25/22   Garrett Marcano MD   Dapagliflozin Propanediol (Farxiga) 10 MG tablet Take 1 tablet by mouth Daily. 2/28/22   Garrett Marcano MD   digoxin (LANOXIN) 125 MCG tablet Take 1 tablet by mouth Daily. 3/29/21   Kimberly Hester APRN   Evolocumab (REPATHA) solution auto-injector SureClick injection Inject 1 mL under the skin into the appropriate area as directed Every 14 (Fourteen) Days. 3/9/22   Garrett Marcano MD   FLUoxetine (PROzac) 20 MG capsule Take 40 mg by mouth Daily.    ProviderAimee MD   furosemide (LASIX) 20 MG tablet Take 1 tablet by mouth Daily As Needed (weight gain of 5lbs in 1 week). 3/29/21   Kimberly Hester APRN   gabapentin (NEURONTIN) 300 MG capsule Take 300 mg by mouth 2 (Two) Times a Day.    ProviderAimee MD   Insulin Aspart (novoLOG) 100 UNIT/ML injection Inject 0-150 Units under the skin into the appropriate area as directed Take As Directed.    ProviderAimee MD   Insulin Disposable Pump (OmniPod Dash 5 Pack Pods) misc Change every Every 72 (Seventy-Two) Hours as directed. 2/28/22   Garrett Marcano MD   Insulin Pen Needle 32G X 4 MM misc Use as directed 4 (Four) Times a Day. 2/25/22   Garrett Marcano MD   lamoTRIgine (LaMICtal) 100 MG tablet Take 100 mg by mouth Daily.    ProviderAimee MD    metoprolol succinate XL (TOPROL-XL) 50 MG 24 hr tablet Take 1 tablet by mouth Daily. 3/29/21   Kimberly Hester APRN   mirtazapine (REMERON) 15 MG tablet Take 15 mg by mouth Every Night.    Provider, MD Aimee   omeprazole (priLOSEC) 40 MG capsule Take 1 capsule by mouth Daily. 4/18/22   Anna Thibodeaux APRN   sacubitril-valsartan (ENTRESTO) 24-26 MG tablet Take 1 tablet by mouth 2 (Two) Times a Day. 3/29/21   Kimberly Hester APRN   Semaglutide,0.25 or 0.5MG/DOS, (Ozempic, 0.25 or 0.5 MG/DOSE,) 2 MG/1.5ML solution pen-injector Inject 0.5 mg under the skin into the appropriate area as directed 1 (One) Time Per Week. 2/25/22   Garrett Marcano MD   sildenafil (Viagra) 50 MG tablet Take 1 tablet by mouth Daily As Needed for Erectile Dysfunction. 5/24/22   Kimberly Hester APRN     Allergies   Allergen Reactions   • Statins Myalgia   • Sulfa Antibiotics Unknown (See Comments)     Remembers mother always saying he was allergic     Social History     Socioeconomic History   • Marital status:    Tobacco Use   • Smoking status: Current Every Day Smoker     Packs/day: 1.00     Types: Cigarettes   • Smokeless tobacco: Current User     Types: Snuff   Vaping Use   • Vaping Use: Former   Substance and Sexual Activity   • Alcohol use: Yes     Alcohol/week: 1.0 standard drink     Types: 1 Cans of beer per week     Comment: occasionally   • Drug use: Not Currently     Types: Marijuana, Methamphetamines   • Sexual activity: Defer       Review of Systems  Review of Systems   Constitutional: Negative for activity change, appetite change, chills, diaphoresis, fatigue, fever and unexpected weight change.   HENT: Negative for sore throat and trouble swallowing.    Respiratory: Negative for shortness of breath.    Gastrointestinal: Positive for constipation. Negative for abdominal distention, abdominal pain, anal bleeding, blood in stool, diarrhea, nausea, rectal pain and vomiting.   Musculoskeletal: Negative  "for arthralgias.   Skin: Negative for pallor.   Neurological: Negative for light-headedness.        /81 (BP Location: Left arm)   Pulse 80   Ht 180.3 cm (71\")   Wt 95.9 kg (211 lb 6.4 oz)   BMI 29.48 kg/m²     Objective    Physical Exam  Constitutional:       General: He is not in acute distress.     Appearance: Normal appearance. He is normal weight. He is not ill-appearing.   HENT:      Head: Normocephalic and atraumatic.   Pulmonary:      Effort: Pulmonary effort is normal.   Abdominal:      General: Abdomen is flat. Bowel sounds are normal. There is no distension.      Palpations: Abdomen is soft. There is no mass.      Tenderness: There is no abdominal tenderness.   Neurological:      Mental Status: He is alert.       Admission on 2022, Discharged on 2022   Component Date Value Ref Range Status   • Reference Lab Report 2022    Final                    Value:[facility]  [address] [place] [address]  Phone: [phone] or [phone]  Fax: [phone]  Beau Huang M.D., Medical Director    PATIENT NAME                                     LABORATORY NO.  1800   JUAN GARZA.                           [id number]  [id number]                                 AGE                    SEX   N              CLIENT REF #  [facility]                   51        1970           [id number]      [id number]    [place]                               REQUESTING M.D.           ATTENDING M.D.         COPY TO[address]                         [place] [address]                     DATE COLLECTED            DATE RECEIVED          DATE REPORTED  2022    DIAGNOSIS:  SIGMOID COLON POLYP:  Tubular adenoma    JBS/lisa    CLINICAL HISTORY:  Encounter for colonoscopy due to                           history of adenomatous " "colonic polyps    SPECIMENS RECEIVED:  SIGMOID COLON POLYP    MICROSCOPIC DESCRIPTION:  Tissue blocks are prepared and slides are examined microscopically on all  specimens. See diagnosis for details.    Professional interpretation rendered by Andrews Wise M.D. at [facility]  [place], [address] [place], [place] [address].    GROSS DESCRIPTION:  Specimen is received in one formalin filled container labeled \"sigmoid colon  polyp\" and consists of multiple portions of tan soft tissue measuring 0.6 x 0.6 to  0.2 cm.  The specimen is submitted entirely in 1 cassette.  BW    REVIEWED, DIAGNOSED AND ELECTRONICALLY  SIGNED BY:    Andrews Wise M.D.  CPT CODES:  71367       Assessment & Plan      1. Chronic idiopathic constipation    2. Adenomatous polyp of sigmoid colon    .   We will start patient on Colace for constipation encouraged high-fiber diet, increase physical activity as tolerated and adequate water consumption.  Repeat colonoscopy in 3 years for surveillance of adenomatous colonic polyp    Orders placed during this encounter include:  No orders of the defined types were placed in this encounter.      * Surgery not found *    Review and/or summary of lab tests, radiology, procedures, medications. Review and summary of old records and obtaining of history. The risks and benefits of my recommendations, as well as other treatment options were discussed with the patient today. Questions were answered.    New Medications Ordered This Visit   Medications   • docusate sodium (Colace) 100 MG capsule     Sig: Take 1 capsule by mouth 2 (Two) Times a Day.     Dispense:  60 capsule     Refill:  11       Follow-up: Return if symptoms worsen or fail to improve, for Recheck.          This document has been electronically signed by NATHANIEL Watson on May 25, 2022 13:44 CDT           I spent 15 minutes caring for Edd on this date of service. This time includes time spent by me in the following activities:preparing " for the visit, reviewing tests, obtaining and/or reviewing a separately obtained history, performing a medically appropriate examination and/or evaluation , counseling and educating the patient/family/caregiver, ordering medications, tests, or procedures, referring and communicating with other health care professionals , documenting information in the medical record and care coordination    Results for orders placed or performed during the hospital encounter of 22   TISSUE EXAM, [facility] ([facility])    Specimen: Large Intestine, Sigmoid Colon; Polyp   Result Value Ref Range    Reference Lab Report       [facility]  [address] [place] [address]  Phone: [phone] or [phone]  Fax: [phone]  Beau Huang M.D., Medical Director    PATIENT NAME                                     LABORATORY NO.  JUAN PATHAK.                           [id number]  [id number]                                 AGE                    SEX   SSN              CLIENT REF #  [facility]                   51        1970           [id number]      [id number]    [place]                               REQUESTING M.D.           ATTENDING M.D.         COPY TO.  [address] [place] [address]                         SERINA RAMIREZ GINA  [place] [id number]                     DATE COLLECTED            DATE RECEIVED          DATE REPORTED  2022    DIAGNOSIS:  SIGMOID COLON POLYP:  Tubular adenoma    JBS/lisa    CLINICAL HISTORY:  Encounter for colonoscopy due to  history of adenomatous colonic polyps    SPECIMENS RECEIVED:  SIGMOID COLON POLYP    MICROSCOPIC DESCRIPTION:  Tissue blocks are prepared and slides are examined microscopically on all  specimens. See diagnosis for details.    Professional interpretation rendered by Andrews Wise M.D. at [facility], [address] [place] [address] ,  "[place] [address].    GROSS DESCRIPTION:  Specimen is received in one formalin filled container labeled \"sigmoid colon  polyp\" and consists of multiple portions of tan soft tissue measuring 0.6 x 0.6 to  0.2 cm.  The specimen is submitted entirely in 1 cassette.  BW    REVIEWED, DIAGNOSED AND ELECTRONICALLY  SIGNED BY:    Andrews Wise M.D.  CPT CODES:  50145     Results for orders placed or performed during the hospital encounter of 01/15/22   COVID-19, [facility] IN-HOUSE, NP SWAB IN TRANSPORT MEDIA 8-10 HR TAT - Swab, Anterior nasal    Specimen: Anterior nasal; Swab   Result Value Ref Range    COVID19 Detected (C) Not Detected - Ref. Range   Results for orders placed or performed during the hospital encounter of 05/09/21   POCT SARS-CoV-2 Antigen ANUSHKA    Specimen: Swab   Result Value Ref Range    SARS Antigen Not Detected     Influenza A Antigen ANUSHKA Not Detected     Influenza B Antigen ANUSHKA Not Detected     Internal Control Passed Passed    Lot Number [id number]     Expiration Date 10/04/2022    Results for orders placed or performed during the hospital encounter of 03/24/21   Tissue Pathology Exam    Specimen: A: Gastric, Antrum; Tissue    B: Esophagus, Distal; Tissue    C: Large Intestine, Transverse Colon; Tissue    D: Large Intestine; Tissue   Result Value Ref Range    Case Report       Surgical Pathology Report                         Case: [id number]                                  Authorizing Provider:  Dinesh Drummond MD        Collected:           03/24/2021 11:15 AM          Ordering Location:     [facility]             Received:            03/24/2021 01:33 PM                                 [facility]                                                     Pathologist:           Jacqueline Lai DO                                                        Specimens:   1) - Gastric, Antrum                                                                                2) - Esophagus, " Distal                                                                              3) - Large Intestine, Transverse Colon, polyps                                                      4) - Large Intestine, colonic mucosa                                                       Final Diagnosis       SEE SCANNED REPORT       POC Glucose Once    Specimen: Blood   Result Value Ref Range    Glucose 219 (H) 70 - 130 mg/dL   Results for orders placed or performed in visit on 03/21/21   COVID-19,[facility] IN-HOUSE, NP/OP SWAB IN UTM/VTM/SALINE TRANSPORT MEDIA,24 HR TAT - Swab, Nasopharynx    Specimen: Nasopharynx; Swab   Result Value Ref Range    COVID19 Not Detected Not Detected - Ref. Range   Results for orders placed or performed in visit on 03/19/21   POC Glucose    Specimen: Blood   Result Value Ref Range    Glucose 350 (H) 70 - 130 mg/dL   Results for orders placed or performed in visit on 03/09/21   HIV-1 / O / 2 Ag / Antibody 4th Generation    Specimen: Blood   Result Value Ref Range    HIV-1/ HIV-2 Non-Reactive Non-Reactive   Hepatitis panel, acute    Specimen: Blood   Result Value Ref Range    Hepatitis B Surface Ag Non-Reactive Non-Reactive    Hep A IgM Non-Reactive Non-Reactive    Hep B C IgM Non-Reactive Non-Reactive    Hepatitis C Ab Non-Reactive Non-Reactive   Results for orders placed or performed during the hospital encounter of 02/27/21   Gold Top - SST   Result Value Ref Range    Extra Tube Hold for add-ons.    COVID-19 and FLU A/B PCR - Swab, Nasopharynx    Specimen: Nasopharynx; Swab   Result Value Ref Range    COVID19 Not Detected Not Detected - Ref. Range    Influenza A PCR Not Detected Not Detected    Influenza B PCR Not Detected Not Detected   Urinalysis, Microscopic Only - Urine, Clean Catch    Specimen: Urine, Clean Catch   Result Value Ref Range    RBC, UA 0-2 (A) None Seen /HPF    WBC, UA 0-2 None Seen, 0-2, 3-5 /HPF    Bacteria, UA None Seen None Seen /HPF    Squamous Epithelial Cells, UA  None Seen None Seen, 0-2 /HPF    Hyaline Casts, UA 3-6 None Seen /LPF    Methodology Automated Microscopy    Urinalysis With Microscopic If Indicated (No Culture) - Urine, Clean Catch    Specimen: Urine, Clean Catch   Result Value Ref Range    Color, UA Yellow Yellow, Straw, Dark Yellow, Peyton    Appearance, UA Clear Clear    pH, UA 7.5 5.0 - 9.0    Specific Gravity, UA 1.040 (H) 1.003 - 1.030    Glucose, UA >=1000 mg/dL (3+) (A) Negative    Ketones, UA 15 mg/dL (1+) (A) Negative    Bilirubin, UA Negative Negative    Blood, UA Negative Negative    Protein, UA 30 mg/dL (1+) (A) Negative    Leuk Esterase, UA Negative Negative    Nitrite, UA Negative Negative    Urobilinogen, UA 0.2 E.U./dL 0.2 - 1.0 E.U./dL   CBC Auto Differential    Specimen: Blood   Result Value Ref Range    WBC 6.83 3.40 - 10.80 10*3/mm3    RBC 6.13 (H) 4.14 - 5.80 10*6/mm3    Hemoglobin 16.6 13.0 - 17.7 g/dL    Hematocrit 50.8 37.5 - 51.0 %    MCV 82.9 79.0 - 97.0 fL    MCH 27.1 26.6 - 33.0 pg    MCHC 32.7 31.5 - 35.7 g/dL    RDW 14.2 12.3 - 15.4 %    RDW-SD 41.8 37.0 - 54.0 fl    MPV 12.0 6.0 - 12.0 fL    Platelets 241 140 - 450 10*3/mm3    Neutrophil % 50.8 42.7 - 76.0 %    Lymphocyte % 37.0 19.6 - 45.3 %    Monocyte % 10.2 5.0 - 12.0 %    Eosinophil % 0.6 0.3 - 6.2 %    Basophil % 0.7 0.0 - 1.5 %    Immature Grans % 0.7 (H) 0.0 - 0.5 %    Neutrophils, Absolute 3.46 1.70 - 7.00 10*3/mm3    Lymphocytes, Absolute 2.53 0.70 - 3.10 10*3/mm3    Monocytes, Absolute 0.70 0.10 - 0.90 10*3/mm3    Eosinophils, Absolute 0.04 0.00 - 0.40 10*3/mm3    Basophils, Absolute 0.05 0.00 - 0.20 10*3/mm3    Immature Grans, Absolute 0.05 0.00 - 0.05 10*3/mm3    nRBC 0.0 0.0 - 0.2 /100 WBC   CBC Auto Differential    Specimen: Blood   Result Value Ref Range    WBC 8.67 3.40 - 10.80 10*3/mm3    RBC 5.38 4.14 - 5.80 10*6/mm3    Hemoglobin 14.9 13.0 - 17.7 g/dL    Hematocrit 44.1 37.5 - 51.0 %    MCV 82.0 79.0 - 97.0 fL    MCH 27.7 26.6 - 33.0 pg    MCHC 33.8 31.5 - 35.7  g/dL    RDW 13.9 12.3 - 15.4 %    RDW-SD 40.4 37.0 - 54.0 fl    MPV 11.6 6.0 - 12.0 fL    Platelets 227 140 - 450 10*3/mm3    Neutrophil % 71.8 42.7 - 76.0 %    Lymphocyte % 18.7 (L) 19.6 - 45.3 %    Monocyte % 8.5 5.0 - 12.0 %    Eosinophil % 0.3 0.3 - 6.2 %    Basophil % 0.5 0.0 - 1.5 %    Immature Grans % 0.2 0.0 - 0.5 %    Neutrophils, Absolute 6.22 1.70 - 7.00 10*3/mm3    Lymphocytes, Absolute 1.62 0.70 - 3.10 10*3/mm3    Monocytes, Absolute 0.74 0.10 - 0.90 10*3/mm3    Eosinophils, Absolute 0.03 0.00 - 0.40 10*3/mm3    Basophils, Absolute 0.04 0.00 - 0.20 10*3/mm3    Immature Grans, Absolute 0.02 0.00 - 0.05 10*3/mm3    nRBC 0.0 0.0 - 0.2 /100 WBC   Lavender Top   Result Value Ref Range    Extra Tube hold for add-on    Light Blue Top   Result Value Ref Range    Extra Tube hold for add-on    Stress Test With Myocardial Perfusion One Day   Result Value Ref Range    BH CV STRESS PROTOCOL 1 Pharmacologic     Stage 1 1     Duration Min Stage 1 0     Duration Sec Stage 1 10     Stress Dose Regadenoson Stage 1 0.4     Stress Comments Stage 1 10 sec bolus injection     Target HR (85%) 143 bpm    Max. Pred. HR (100%) 168 bpm    HR Stage 1 119     BP Stage 1 101/80     Baseline HR 89 bpm    Baseline /105 mmHg    Peak  bpm    Percent Max Pred HR 73.21 %    Percent Target HR 86 %    Peak /105 mmHg    Recovery  bpm    Recovery /85 mmHg    Estimated workload 1.0 METS    Nuc Stress EF 49 %     *Note: Due to a large number of results and/or encounters for the requested time period, some results have not been displayed. A complete set of results can be found in Results Review.

## 2022-05-27 ENCOUNTER — TELEMEDICINE (OUTPATIENT)
Dept: ENDOCRINOLOGY | Facility: CLINIC | Age: 52
End: 2022-05-27

## 2022-05-27 DIAGNOSIS — E11.65 TYPE 2 DIABETES MELLITUS WITH HYPERGLYCEMIA, WITH LONG-TERM CURRENT USE OF INSULIN: Primary | ICD-10-CM

## 2022-05-27 DIAGNOSIS — I25.10 CORONARY ARTERY DISEASE INVOLVING NATIVE CORONARY ARTERY OF NATIVE HEART WITHOUT ANGINA PECTORIS: ICD-10-CM

## 2022-05-27 DIAGNOSIS — Z79.4 TYPE 2 DIABETES MELLITUS WITH HYPERGLYCEMIA, WITH LONG-TERM CURRENT USE OF INSULIN: Primary | ICD-10-CM

## 2022-05-27 PROCEDURE — 99214 OFFICE O/P EST MOD 30 MIN: CPT | Performed by: INTERNAL MEDICINE

## 2022-05-27 NOTE — PROGRESS NOTES
CC  Type 2 Diabetes                                      This was a Telehealth Encounter. Benefits and Disadvantages of a Telehealth Visit were discussed and accepted by patient. .  Patient agreed to receive service through Telehealth visit as patient is being compliant with social distancing recommendations imparted by CDC.     You have chosen to receive care through a telehealth visit.  Do you consent to use a video/audio connection for your medical care today? Yes        History of Present Illness    51 y.o. male     Diabetes Type 2    Duration - 10 years    Complications -  CHF    Present Monitoring -      Fingersticks -4 x daily      CGM -     Present Regimen -  See below  Carb Intake -   Admits to eating a lot of carbs  Exercise -   None  Symptoms -     Fatigue, weakness, weight gain when we met    Now feels better , some weight loss   ==========================================  PE    There were no vitals taken for this visit.  AOx3  No visible goiter  Normal Respiratory Effort   No Edema    Labs    Lab Results   Component Value Date    WBC 6.83 03/02/2021    HGB 16.6 03/02/2021    HCT 50.8 03/02/2021    MCV 82.9 03/02/2021     03/02/2021     Lab Results   Component Value Date    GLUCOSE 126 (H) 03/02/2021    BUN 13 03/02/2021    CREATININE 1.00 03/02/2021    EGFRIFAFRI 95 03/02/2021    BCR 13.0 03/02/2021     (L) 03/02/2021    K 3.8 03/02/2021    CO2 26.0 03/02/2021    CALCIUM 8.4 (L) 03/02/2021    ALBUMIN 3.30 (L) 03/02/2021    AST 11 03/02/2021    ALT 10 03/02/2021       ==========================================      ICD-10-CM ICD-9-CM   1. Type 2 diabetes mellitus with hyperglycemia, with long-term current use of insulin ([facility])  E11.65 250.00    Z79.4 790.29     V58.67   2. Coronary artery disease involving native coronary artery of native heart without angina pectoris  I25.10 414.01       Diabetes Mellitus complicated w CAD ,  CHF    --------------------------------------------------------------------------------------------------------------------------------------------    Glycemic Management   Lab Results   Component Value Date    HGBA1C 9.5 (H) 10/28/2018        Before we met  he was on lantus 36 and 8 w meals     Now     Metformin   Intolerant    GLP 1 Therapy     0.25 mg weekly - increase to 0.5 mg weekly     SGLT2 inhibitors  farxiga 10 mg daily      Omnipod DASH and DEXCOM     Basal 1.2 units per hour -- decrease to 1 unit per hour - 0.95 units per hour   If he has a low , decrease to 0.8   He needs to snack to prevent glucose from dropping but only during work     I asked him to only report carbs above 50 grams     First 50 grams = 0     Thereafter , start counting       He will have appt sharhzad Cedeño in 2 weeks   At that time we will download Dexcom and Omnipod       Glucagon  gvoke      No orders of the defined types were placed in this encounter.               This document has been electronically signed by Garrett Gimenez MD on May 27, 2022 12:10 CDT

## 2022-06-02 ENCOUNTER — TRANSCRIBE ORDERS (OUTPATIENT)
Dept: PODIATRY | Facility: CLINIC | Age: 52
End: 2022-06-02

## 2022-06-02 DIAGNOSIS — E11.40 TYPE 2 DIABETES MELLITUS WITH DIABETIC NEUROPATHY, UNSPECIFIED WHETHER LONG TERM INSULIN USE: ICD-10-CM

## 2022-06-02 DIAGNOSIS — E11.9 ENCOUNTER FOR DIABETIC FOOT EXAM: Primary | ICD-10-CM

## 2022-06-22 ENCOUNTER — OFFICE VISIT (OUTPATIENT)
Dept: PODIATRY | Facility: CLINIC | Age: 52
End: 2022-06-22

## 2022-06-22 VITALS — HEART RATE: 74 BPM | BODY MASS INDEX: 29.54 KG/M2 | OXYGEN SATURATION: 99 % | WEIGHT: 211 LBS | HEIGHT: 71 IN

## 2022-06-22 DIAGNOSIS — R09.89 DECREASED PEDAL PULSES: ICD-10-CM

## 2022-06-22 DIAGNOSIS — E11.9 ENCOUNTER FOR DIABETIC FOOT EXAM: Primary | ICD-10-CM

## 2022-06-22 PROCEDURE — 99213 OFFICE O/P EST LOW 20 MIN: CPT | Performed by: PODIATRIST

## 2022-06-22 NOTE — PROGRESS NOTES
Edd Willingham  1970  51 y.o. male  PCP: Elyssa booker last seen her about a year ago per patient  BS: 162 per patient    06/22/2022    Chief Complaint   Patient presents with   • Left Foot - Pain     Diabetic foot care   • Right Foot - Pain     Diabetic foot care       History of Present Illness    Edd Willingham is a 51 y.o.male presents to clinic today for a diabetic foot exam.       Past Medical History:   Diagnosis Date   • Anxiety    • Depression    • Diabetes mellitus (HCC)    • Elevated cholesterol    • GERD (gastroesophageal reflux disease)    • Heart murmur    • Hypertension    • Low back pain    • MI (myocardial infarction) (HCC)          Past Surgical History:   Procedure Laterality Date   • CARDIAC CATHETERIZATION N/A 10/26/2018    Procedure: Left Heart Cath;  Surgeon: Param Mcfarland MD;  Location: [facility] CATH INVASIVE LOCATION;  Service: Cardiology   • COLONOSCOPY N/A 03/24/2021    Procedure: COLONOSCOPY;  Surgeon: Dinesh Drummond MD;  Location: [facility] ENDOSCOPY;  Service: Gastroenterology;  Laterality: N/A;   • COLONOSCOPY N/A 5/18/2022    Procedure: COLONOSCOPY;  Surgeon: Dinesh Drummond MD;  Location: [facility] ENDOSCOPY;  Service: Gastroenterology;  Laterality: N/A;   • ELBOW PROCEDURE Left     left   • ENDOSCOPY N/A 03/24/2021    Procedure: ESOPHAGOGASTRODUODENOSCOPY;  Surgeon: Dinesh Drummond MD;  Location: [facility] ENDOSCOPY;  Service: Gastroenterology;  Laterality: N/A;   • ROTATOR CUFF REPAIR Right     RIGHT   • TUMOR REMOVAL      FATTY TUMOR   • UPPER GASTROINTESTINAL ENDOSCOPY  03/2021   • WRIST SURGERY Right     RIGHT         Family History   Problem Relation Age of Onset   • Diabetes Mother    • Hypertension Mother    • Diabetes Father    • No Known Problems Brother    • Colon polyps Paternal Uncle        Allergies   Allergen Reactions   • Statins Myalgia   • Sulfa Antibiotics Unknown (See Comments)     Remembers mother always saying he was allergic       Social History      Socioeconomic History   • Marital status:    Tobacco Use   • Smoking status: Current Every Day Smoker     Packs/day: 1.00     Types: Cigarettes   • Smokeless tobacco: Current User     Types: Snuff   Vaping Use   • Vaping Use: Former   Substance and Sexual Activity   • Alcohol use: Yes     Alcohol/week: 1.0 standard drink     Types: 1 Cans of beer per week     Comment: occasionally   • Drug use: Not Currently     Types: Marijuana, Methamphetamines   • Sexual activity: Defer         Current Outpatient Medications   Medication Sig Dispense Refill   • apixaban (ELIQUIS) 5 MG tablet tablet Take 1 tablet by mouth Every 12 (Twelve) Hours. 180 tablet 3   • Cholecalciferol (vitamin D3) 125 MCG (5000 UT) capsule capsule Take 2,000 Units by mouth Daily.     • Continuous Blood Gluc Sensor (Dexcom G6 Sensor) Use as directed for continuous glucose monitoring **Change sensor every 10 days** 9 each 3   • Dapagliflozin Propanediol (Farxiga) 10 MG tablet Take 1 tablet by mouth Daily. 90 tablet 3   • digoxin (LANOXIN) 125 MCG tablet Take 1 tablet by mouth Daily. 90 tablet 3   • docusate sodium (Colace) 100 MG capsule Take 1 capsule by mouth 2 (Two) Times a Day. 60 capsule 11   • Evolocumab (REPATHA) solution auto-injector SureClick injection Inject 1 mL under the skin into the appropriate area as directed Every 14 (Fourteen) Days. 2 mL 11   • FLUoxetine (PROzac) 20 MG capsule Take 40 mg by mouth Daily.     • furosemide (LASIX) 20 MG tablet Take 1 tablet by mouth Daily As Needed (weight gain of 5lbs in 1 week). 30 tablet 11   • gabapentin (NEURONTIN) 300 MG capsule Take 300 mg by mouth 2 (Two) Times a Day.     • Insulin Aspart (novoLOG) 100 UNIT/ML injection Inject 0-150 Units under the skin into the appropriate area as directed Take As Directed.     • Insulin Disposable Pump (OmniPod Dash 5 Pack Pods) misc Change every Every 72 (Seventy-Two) Hours as directed. 10 each 11   • Insulin Pen Needle 32G X 4 MM misc Use as  "directed 4 (Four) Times a Day. 120 each 11   • lamoTRIgine (LaMICtal) 100 MG tablet Take 100 mg by mouth Daily.     • metoprolol succinate XL (TOPROL-XL) 50 MG 24 hr tablet Take 1 tablet by mouth Daily. 90 tablet 3   • mirtazapine (REMERON) 15 MG tablet Take 15 mg by mouth Every Night.     • omeprazole (priLOSEC) 40 MG capsule Take 1 capsule by mouth Daily. 30 capsule 11   • sacubitril-valsartan (ENTRESTO) 24-26 MG tablet Take 1 tablet by mouth 2 (Two) Times a Day. 180 tablet 3   • Semaglutide,0.25 or 0.5MG/DOS, (Ozempic, 0.25 or 0.5 MG/DOSE,) 2 MG/1.5ML solution pen-injector Inject 0.5 mg under the skin into the appropriate area as directed 1 (One) Time Per Week. 1.5 mL 11   • sildenafil (Viagra) 50 MG tablet Take 1 tablet by mouth Daily As Needed for Erectile Dysfunction. 6 tablet 6     No current facility-administered medications for this visit.       Review of Systems      OBJECTIVE    Pulse 74   Ht 180.3 cm (71\")   Wt 95.7 kg (211 lb)   SpO2 99%   BMI 29.43 kg/m²     Physical Exam  Vitals reviewed.   Constitutional:       General: He is not in acute distress.     Appearance: He is well-developed.   HENT:      Head: Normocephalic and atraumatic.      Nose: Nose normal.   Eyes:      Conjunctiva/sclera: Conjunctivae normal.      Pupils: Pupils are equal, round, and reactive to light.   Cardiovascular:      Pulses:           Dorsalis pedis pulses are detected w/ Doppler on the right side and detected w/ Doppler on the left side.        Posterior tibial pulses are 1+ on the right side and 1+ on the left side.   Pulmonary:      Effort: Pulmonary effort is normal. No respiratory distress.      Breath sounds: No wheezing.   Musculoskeletal:      Right foot: Decreased range of motion. Deformity and prominent metatarsal heads present.      Left foot: Decreased range of motion. Deformity and prominent metatarsal heads present.   Feet:      Right foot:      Protective Sensation: 10 sites tested. 2 sites sensed.      " Skin integrity: Skin integrity normal.      Toenail Condition: Right toenails are abnormally thick.      Left foot:      Protective Sensation: 10 sites tested. 2 sites sensed.      Skin integrity: Skin integrity normal.      Toenail Condition: Left toenails are abnormally thick.   Skin:     General: Skin is warm and dry.      Capillary Refill: Capillary refill takes less than 2 seconds.   Neurological:      Mental Status: He is alert and oriented to person, place, and time.   Psychiatric:         Behavior: Behavior normal.         Thought Content: Thought content normal.              Procedures        ASSESSMENT AND PLAN    Diagnoses and all orders for this visit:    1. Encounter for diabetic foot exam (HCC) (Primary)    2. Decreased pedal pulses  -     Doppler Ankle Brachial Index Single Level CAR; Future        - A diabetic foot screening exam was performed and the patient was educated on the foot complications related to diabetes,  preventative foot care and what signs and symptoms to watch for.  Instructed to contact our office if any foot problems develop before next visit.  -Ordered noninvasive vascular studies to rule out vascular component to patient's pain.  Most likely etiology of patient's foot pain is diabetic neuropathy.  - all questions answered  - felicia 1 year or sooner if needed.           This document has been electronically signed by Daniel Canada DPM on June 22, 2022 13:20 CDT     6/22/2022  13:20 CDT

## 2022-07-05 ENCOUNTER — SPECIALTY PHARMACY (OUTPATIENT)
Dept: ENDOCRINOLOGY | Facility: CLINIC | Age: 52
End: 2022-07-05

## 2022-07-05 NOTE — PROGRESS NOTES
Specialty Pharmacy Refill Coordination Note     Edd is a 51 y.o. male contacted today regarding refills of  repatha specialty medication(s).    Reviewed and verified with patient:  Allergies         Specialty medication(s) and dose(s) confirmed: yes    No issues to report, we will mail out the medication on Thursday.                Follow-up: 3 months.      Beto Arguelles  Specialty Pharmacy Technician

## 2022-07-11 ENCOUNTER — DOCUMENTATION (OUTPATIENT)
Dept: ENDOCRINOLOGY | Facility: CLINIC | Age: 52
End: 2022-07-11

## 2022-09-26 ENCOUNTER — SPECIALTY PHARMACY (OUTPATIENT)
Dept: ONCOLOGY | Facility: CLINIC | Age: 52
End: 2022-09-26

## 2022-09-26 NOTE — PROGRESS NOTES
Specialty Pharmacy Refill Coordination Note     Edd is a 51 y.o. male was sent an email today regarding refills of ozempic, Repatha specialty medication(s).    Reviewed and verified with patient:       Specialty medication(s) and dose(s) confirmed: n/a    The phone number we have on file for Mr. Willingham has been changed. I sent him an e mail today in reference to the situation.                  Caitlin Willingham, Pharmacy Technician  Specialty Pharmacy Technician

## 2022-10-26 ENCOUNTER — SPECIALTY PHARMACY (OUTPATIENT)
Dept: ENDOCRINOLOGY | Facility: CLINIC | Age: 52
End: 2022-10-26

## 2022-11-16 ENCOUNTER — DOCUMENTATION (OUTPATIENT)
Dept: ENDOCRINOLOGY | Facility: CLINIC | Age: 52
End: 2022-11-16

## 2022-11-16 ENCOUNTER — TELEPHONE (OUTPATIENT)
Dept: ENDOCRINOLOGY | Facility: CLINIC | Age: 52
End: 2022-11-16

## 2022-11-16 NOTE — TELEPHONE ENCOUNTER
[facility] and needs to get information on what medications Mr Willingham needs to be taking.  They are currently using a low dose sliding scale only and readings are usually between 200-275.  They were not aware of his ozempic or repatha but are willing to give those if they can get that information.  Please call back [phone]     They want to verify sliding dose scale and other medications he should be taking for his diabetes.  He will be there for at least 2 months than another facility for 3 months.

## 2022-11-16 NOTE — PROGRESS NOTES
[facility] and needs to get information on what medications Mr Willingham needs to be taking.  They are currently using a low dose sliding scale only and readings are usually between 200-275.  They were not aware of his ozempic or repatha but are willing to give those if they can get that information.  Please call back [phone]    They want to verify sliding dose scale and other medications he should be taking for his diabetes.  He will be there for at least 2 months than another facility for 3 months.

## 2022-12-27 RX ORDER — INSULIN PUMP CONTROLLER
EACH MISCELLANEOUS
Qty: 30 EACH | Refills: 0 | OUTPATIENT
Start: 2022-12-27

## 2023-02-14 ENCOUNTER — DOCUMENTATION (OUTPATIENT)
Dept: ENDOCRINOLOGY | Facility: CLINIC | Age: 53
End: 2023-02-14
Payer: MEDICAID

## 2023-02-14 NOTE — PROGRESS NOTES
PA FOR OZEMPIC SUBMITTED VIA [employer].    PER CMM:  Member has an active PA on file which is expiring on 02/27/2023

## 2023-02-27 ENCOUNTER — HOSPITAL ENCOUNTER (EMERGENCY)
Facility: HOSPITAL | Age: 53
Discharge: HOME | End: 2023-02-27
Attending: EMERGENCY MEDICINE | Admitting: EMERGENCY MEDICINE
Payer: OTHER GOVERNMENT

## 2023-02-27 VITALS
TEMPERATURE: 97.9 F | WEIGHT: 224.87 LBS | RESPIRATION RATE: 16 BRPM | OXYGEN SATURATION: 100 % | SYSTOLIC BLOOD PRESSURE: 135 MMHG | DIASTOLIC BLOOD PRESSURE: 80 MMHG | HEIGHT: 71 IN | HEART RATE: 80 BPM | BODY MASS INDEX: 31.48 KG/M2

## 2023-02-27 DIAGNOSIS — R73.09 ELEVATED RANDOM BLOOD GLUCOSE LEVEL: Primary | ICD-10-CM

## 2023-02-27 LAB
ACETONE BLD QL: NEGATIVE
ALBUMIN SERPL-MCNC: 4.4 G/DL (ref 3.5–5.2)
ALBUMIN/GLOB SERPL: 1.4 G/DL
ALP SERPL-CCNC: 96 U/L (ref 39–117)
ALT SERPL W P-5'-P-CCNC: 20 U/L (ref 1–41)
ANION GAP SERPL CALCULATED.3IONS-SCNC: 8.8 MMOL/L (ref 5–15)
AST SERPL-CCNC: 15 U/L (ref 1–40)
BACTERIA UR QL AUTO: ABNORMAL /HPF
BASOPHILS # BLD AUTO: 0.04 10*3/MM3 (ref 0–0.2)
BASOPHILS NFR BLD AUTO: 0.9 % (ref 0–1.5)
BILIRUB SERPL-MCNC: 0.4 MG/DL (ref 0–1.2)
BILIRUB UR QL STRIP: NEGATIVE
BUN SERPL-MCNC: 12 MG/DL (ref 6–20)
BUN/CREAT SERPL: 13.2 (ref 7–25)
CALCIUM SPEC-SCNC: 9.5 MG/DL (ref 8.6–10.5)
CHLORIDE SERPL-SCNC: 97 MMOL/L (ref 98–107)
CLARITY UR: CLEAR
CO2 SERPL-SCNC: 27.2 MMOL/L (ref 22–29)
COLOR UR: YELLOW
CREAT SERPL-MCNC: 0.91 MG/DL (ref 0.76–1.27)
DEPRECATED RDW RBC AUTO: 39.5 FL (ref 37–54)
EGFRCR SERPLBLD CKD-EPI 2021: 100.2 ML/MIN/1.73
EOSINOPHIL # BLD AUTO: 0.09 10*3/MM3 (ref 0–0.4)
EOSINOPHIL NFR BLD AUTO: 1.9 % (ref 0.3–6.2)
ERYTHROCYTE [DISTWIDTH] IN BLOOD BY AUTOMATED COUNT: 13.1 % (ref 12.3–15.4)
GLOBULIN UR ELPH-MCNC: 3.1 GM/DL
GLUCOSE BLDC GLUCOMTR-MCNC: 192 MG/DL (ref 70–99)
GLUCOSE BLDC GLUCOMTR-MCNC: 296 MG/DL (ref 70–99)
GLUCOSE BLDC GLUCOMTR-MCNC: 452 MG/DL (ref 70–99)
GLUCOSE SERPL-MCNC: 462 MG/DL (ref 65–99)
GLUCOSE UR STRIP-MCNC: ABNORMAL MG/DL
HCT VFR BLD AUTO: 45.6 % (ref 37.5–51)
HGB BLD-MCNC: 14.8 G/DL (ref 13–17.7)
HGB UR QL STRIP.AUTO: NEGATIVE
HOLD SPECIMEN: NORMAL
HOLD SPECIMEN: NORMAL
HYALINE CASTS UR QL AUTO: ABNORMAL /LPF
IMM GRANULOCYTES # BLD AUTO: 0.02 10*3/MM3 (ref 0–0.05)
IMM GRANULOCYTES NFR BLD AUTO: 0.4 % (ref 0–0.5)
KETONES UR QL STRIP: NEGATIVE
LEUKOCYTE ESTERASE UR QL STRIP.AUTO: NEGATIVE
LYMPHOCYTES # BLD AUTO: 1.86 10*3/MM3 (ref 0.7–3.1)
LYMPHOCYTES NFR BLD AUTO: 40.2 % (ref 19.6–45.3)
MCH RBC QN AUTO: 26.9 PG (ref 26.6–33)
MCHC RBC AUTO-ENTMCNC: 32.5 G/DL (ref 31.5–35.7)
MCV RBC AUTO: 82.9 FL (ref 79–97)
MONOCYTES # BLD AUTO: 0.26 10*3/MM3 (ref 0.1–0.9)
MONOCYTES NFR BLD AUTO: 5.6 % (ref 5–12)
NEUTROPHILS NFR BLD AUTO: 2.36 10*3/MM3 (ref 1.7–7)
NEUTROPHILS NFR BLD AUTO: 51 % (ref 42.7–76)
NITRITE UR QL STRIP: NEGATIVE
NRBC BLD AUTO-RTO: 0 /100 WBC (ref 0–0.2)
PH UR STRIP.AUTO: 6 [PH] (ref 5–8)
PLATELET # BLD AUTO: 196 10*3/MM3 (ref 140–450)
PMV BLD AUTO: 11.6 FL (ref 6–12)
POTASSIUM SERPL-SCNC: 4.5 MMOL/L (ref 3.5–5.2)
PROT SERPL-MCNC: 7.5 G/DL (ref 6–8.5)
PROT UR QL STRIP: ABNORMAL
RBC # BLD AUTO: 5.5 10*6/MM3 (ref 4.14–5.8)
RBC # UR STRIP: ABNORMAL /HPF
REF LAB TEST METHOD: ABNORMAL
SODIUM SERPL-SCNC: 133 MMOL/L (ref 136–145)
SP GR UR STRIP: >1.03 (ref 1–1.03)
SQUAMOUS #/AREA URNS HPF: ABNORMAL /HPF
UROBILINOGEN UR QL STRIP: ABNORMAL
WBC # UR STRIP: ABNORMAL /HPF
WBC NRBC COR # BLD: 4.63 10*3/MM3 (ref 3.4–10.8)
WHOLE BLOOD HOLD COAG: NORMAL
WHOLE BLOOD HOLD SPECIMEN: NORMAL

## 2023-02-27 PROCEDURE — 82009 KETONE BODYS QUAL: CPT

## 2023-02-27 PROCEDURE — 81001 URINALYSIS AUTO W/SCOPE: CPT | Performed by: EMERGENCY MEDICINE

## 2023-02-27 PROCEDURE — 80053 COMPREHEN METABOLIC PANEL: CPT | Performed by: EMERGENCY MEDICINE

## 2023-02-27 PROCEDURE — 36415 COLL VENOUS BLD VENIPUNCTURE: CPT

## 2023-02-27 PROCEDURE — 82962 GLUCOSE BLOOD TEST: CPT

## 2023-02-27 PROCEDURE — 99283 EMERGENCY DEPT VISIT LOW MDM: CPT

## 2023-02-27 PROCEDURE — 96360 HYDRATION IV INFUSION INIT: CPT

## 2023-02-27 PROCEDURE — 85025 COMPLETE CBC W/AUTO DIFF WBC: CPT

## 2023-02-27 PROCEDURE — 63710000001 INSULIN REGULAR HUMAN PER 5 UNITS

## 2023-02-27 RX ORDER — SODIUM CHLORIDE 0.9 % (FLUSH) 0.9 %
10 SYRINGE (ML) INJECTION AS NEEDED
Status: DISCONTINUED | OUTPATIENT
Start: 2023-02-27 | End: 2023-02-27 | Stop reason: HOSPADM

## 2023-02-27 RX ADMIN — INSULIN HUMAN 10 UNITS: 100 INJECTION, SOLUTION PARENTERAL at 17:02

## 2023-02-27 RX ADMIN — SODIUM CHLORIDE 1000 ML: 9 INJECTION, SOLUTION INTRAVENOUS at 17:01

## 2023-03-09 ENCOUNTER — TRANSCRIBE ORDERS (OUTPATIENT)
Dept: DIABETES SERVICES | Facility: HOSPITAL | Age: 53
End: 2023-03-09
Payer: MEDICAID

## 2023-03-09 DIAGNOSIS — E11.42 TYPE 2 DIABETES MELLITUS WITH POLYNEUROPATHY: Primary | ICD-10-CM

## 2023-03-17 ENCOUNTER — NUTRITION (OUTPATIENT)
Dept: DIABETES SERVICES | Facility: HOSPITAL | Age: 53
End: 2023-03-17
Payer: MEDICAID

## 2023-03-17 DIAGNOSIS — E11.42 TYPE 2 DIABETES MELLITUS WITH POLYNEUROPATHY: Primary | ICD-10-CM

## 2023-03-17 PROCEDURE — 97802 MEDICAL NUTRITION INDIV IN: CPT | Performed by: DIETITIAN, REGISTERED

## 2023-04-07 ENCOUNTER — OFFICE VISIT (OUTPATIENT)
Dept: CARDIOLOGY | Facility: CLINIC | Age: 53
End: 2023-04-07
Payer: MEDICAID

## 2023-04-07 VITALS
DIASTOLIC BLOOD PRESSURE: 86 MMHG | HEART RATE: 80 BPM | SYSTOLIC BLOOD PRESSURE: 137 MMHG | BODY MASS INDEX: 32.48 KG/M2 | WEIGHT: 232 LBS | HEIGHT: 71 IN

## 2023-04-07 DIAGNOSIS — I10 ESSENTIAL HYPERTENSION: ICD-10-CM

## 2023-04-07 DIAGNOSIS — I48.0 PAF (PAROXYSMAL ATRIAL FIBRILLATION): ICD-10-CM

## 2023-04-07 DIAGNOSIS — R06.09 DYSPNEA ON EXERTION: Primary | ICD-10-CM

## 2023-04-07 DIAGNOSIS — E78.2 MIXED DYSLIPIDEMIA: ICD-10-CM

## 2023-04-07 DIAGNOSIS — F17.200 SMOKING: ICD-10-CM

## 2023-04-07 DIAGNOSIS — I50.22 CHRONIC HFREF (HEART FAILURE WITH REDUCED EJECTION FRACTION): ICD-10-CM

## 2023-04-07 PROCEDURE — 99204 OFFICE O/P NEW MOD 45 MIN: CPT | Performed by: INTERNAL MEDICINE

## 2023-04-07 PROCEDURE — 93000 ELECTROCARDIOGRAM COMPLETE: CPT | Performed by: INTERNAL MEDICINE

## 2023-04-07 RX ORDER — TADALAFIL 10 MG/1
10 TABLET ORAL DAILY PRN
COMMUNITY

## 2023-04-07 NOTE — PROGRESS NOTES
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  He recently moved to [facility].  He is doing well overall but endorses dyspnea with mild to moderate effort.  It has been stable for a while.  He denies chest discomfort per se.  He used to have sporadic presyncopal and syncopal episodes but has not had an issue with that recently.  He has no palpitations, orthopnea or edema.  He is compliant with his medications.  He continues to smoke but is working on quitting.    Past Medical History:   Diagnosis Date   • Anxiety    • Depression    • Diabetes mellitus    • Elevated cholesterol    • GERD (gastroesophageal reflux disease)    • Heart murmur    • Hypertension    • Low back pain    • MI (myocardial infarction)          Current Outpatient Medications:   •  apixaban (ELIQUIS) 5 MG tablet tablet, Take 1 tablet by mouth Every 12 (Twelve) Hours., Disp: 180 tablet, Rfl: 3  •  Cholecalciferol (vitamin D3) 125 MCG (5000 UT) capsule capsule, Take 2,000 Units by mouth Daily., Disp: , Rfl:   •  Dapagliflozin Propanediol (Farxiga) 10 MG tablet, Take 1 tablet by mouth Daily., Disp: 90 tablet, Rfl: 3  •  digoxin (LANOXIN) 125 MCG tablet, Take 1 tablet by mouth Daily., Disp: 90 tablet, Rfl: 3  •  docusate sodium (Colace) 100 MG capsule, Take 1 capsule by mouth 2 (Two) Times a Day., Disp: 60 capsule, Rfl: 11  •  furosemide (LASIX) 20 MG tablet, Take 1  tablet by mouth Daily As Needed (weight gain of 5lbs in 1 week)., Disp: 30 tablet, Rfl: 11  •  omeprazole (priLOSEC) 40 MG capsule, Take 1 capsule by mouth Daily., Disp: 30 capsule, Rfl: 11  •  sacubitril-valsartan (ENTRESTO) 24-26 MG tablet, Take 1 tablet by mouth 2 (Two) Times a Day., Disp: 180 tablet, Rfl: 3  •  tadalafil (Cialis) 10 MG tablet, Take 1 tablet by mouth Daily As Needed for Erectile Dysfunction., Disp: , Rfl:   •  Continuous Blood Gluc Sensor (Dexcom G6 Sensor), Use as directed for continuous glucose monitoring **Change sensor every 10 days**, Disp: 9 each, Rfl: 3  •  Evolocumab (REPATHA) solution auto-injector SureClick injection, Inject 1 mL under the skin into the appropriate area as directed Every 14 (Fourteen) Days., Disp: 2 mL, Rfl: 11  •  gabapentin (NEURONTIN) 300 MG capsule, Take 300 mg by mouth 2 (Two) Times a Day., Disp: , Rfl:   •  Insulin Aspart (novoLOG) 100 UNIT/ML injection, Inject 0-150 Units under the skin into the appropriate area as directed Take As Directed., Disp: , Rfl:   •  Insulin Pen Needle 32G X 4 MM misc, Use as directed 4 (Four) Times a Day., Disp: 120 each, Rfl: 11  •  lamoTRIgine (LaMICtal) 100 MG tablet, Take 100 mg by mouth Daily., Disp: , Rfl:   •  metoprolol succinate XL (TOPROL-XL) 50 MG 24 hr tablet, Take 1 tablet by mouth Daily., Disp: 90 tablet, Rfl: 3  •  mirtazapine (REMERON) 15 MG tablet, Take 15 mg by mouth Every Night., Disp: , Rfl:     Medications Discontinued During This Encounter   Medication Reason   • FLUoxetine (PROzac) 20 MG capsule *Therapy completed   • Semaglutide,0.25 or 0.5MG/DOS, (Ozempic, 0.25 or 0.5 MG/DOSE,) 2 MG/1.5ML solution pen-injector *Therapy completed   • sildenafil (Viagra) 50 MG tablet Alternate therapy     Allergies   Allergen Reactions   • Statins Myalgia   • Sulfa Antibiotics Unknown (See Comments)     Remembers mother always saying he was allergic        Social History     Tobacco Use   • Smoking status: Every Day      "Packs/day: 1.00     Types: Cigarettes   • Smokeless tobacco: Current     Types: Snuff   Vaping Use   • Vaping Use: Former   Substance Use Topics   • Alcohol use: Yes     Alcohol/week: 1.0 standard drink     Types: 1 Cans of beer per week     Comment: occasionally   • Drug use: Not Currently     Types: Marijuana, Methamphetamines       Family History   Problem Relation Age of Onset   • Diabetes Mother    • Hypertension Mother    • Diabetes Father    • No Known Problems Brother    • Colon polyps Paternal Uncle         Objective     /86   Pulse 80   Ht 180.3 cm (70.98\")   Wt 105 kg (232 lb)   BMI 32.37 kg/m²       Physical Exam  Constitutional:       General: Awake. Not in acute distress.     Appearance: Normal appearance.   Neck:      Vascular: No carotid bruit, hepatojugular reflux or JVD.   Cardiovascular:      Rate and Rhythm: Normal rate and regular rhythm.      Chest Wall: PMI is not displaced.      Heart sounds: Normal heart sounds, S1 normal and S2 normal. No murmur heard.   No friction rub. No gallop. No S3 or S4 sounds.    Pulmonary:      Effort: Pulmonary effort is normal.      Breath sounds: Normal breath sounds. No wheezing, rhonchi or rales.   Ext.      Right lower leg: No edema.      Left lower leg: No edema.   Skin:     General: Skin is warm and dry.      Coloration: Skin is not cyanotic.      Findings: No petechiae or rash.   Neurological:      Mental Status: Alert and oriented x 3  Psychiatric:         Behavior: Behavior is cooperative.       Result Review :     proBNP   Date Value Ref Range Status   08/28/2020 107.9 0.0 - 900.0 pg/mL Final     CMP    CMP 2/27/23   Glucose 462 (A)   BUN 12   Creatinine 0.91   eGFR 100.2   Sodium 133 (A)   Potassium 4.5   Chloride 97 (A)   Calcium 9.5   Total Protein 7.5   Albumin 4.4   Globulin 3.1   Total Bilirubin 0.4   Alkaline Phosphatase 96   AST (SGOT) 15   ALT (SGPT) 20   Albumin/Globulin Ratio 1.4   BUN/Creatinine Ratio 13.2   Anion Gap 8.8   (A) " Abnormal value            CBC w/diff    CBC w/Diff 2/27/23   WBC 4.63   RBC 5.50   Hemoglobin 14.8   Hematocrit 45.6   MCV 82.9   MCH 26.9   MCHC 32.5   RDW 13.1   Platelets 196   Neutrophil Rel % 51.0   Immature Granulocyte Rel % 0.4   Lymphocyte Rel % 40.2   Monocyte Rel % 5.6   Eosinophil Rel % 1.9   Basophil Rel % 0.9            No results found for: TSH   No results found for: FREET4   No results found for: DDIMERQUANT  Magnesium   Date Value Ref Range Status   02/27/2021 2.3 1.6 - 2.6 mg/dL Final      Digoxin   Date Value Ref Range Status   08/28/2020 <0.30 (L) 0.60 - 1.20 ng/mL Final      Lab Results   Component Value Date    TROPONINT <0.010 02/28/2021      No results found for: POCTROP(         Results for orders placed during the hospital encounter of 10/19/20    Adult Transthoracic Echo Complete W/ Cont if Necessary Per Protocol    Interpretation Summary  · Left ventricular wall thickness is consistent with concentric hypertrophy.  · Estimated left ventricular EF = 53% Left ventricular ejection fraction appears to be 51 - 55%. Left ventricular systolic function is normal.  · Left ventricular diastolic function is consistent with (grade I) impaired relaxation.  · The right ventricular cavity is mildly dilated.  · Mild aortic valve regurgitation is present.  · Trace mitral valve regurgitation is present.  · Estimated right ventricular systolic pressure from tricuspid regurgitation is normal (<35 mmHg).  · Mild dilation of the aortic root is present (3.8 cms)       ECG 12 Lead    Date/Time: 4/7/2023 10:08 AM  Performed by: Jose Pretty MD  Authorized by: Jose Pretty MD   Comparison: compared with previous ECG   Similar to previous ECG  Rhythm: sinus rhythm  BPM: 81  Conduction: left anterior fascicular block           Results for orders placed during the hospital encounter of 10/19/20    Adult Transthoracic Echo Complete W/ Cont if Necessary Per Protocol    Interpretation Summary  · Left  ventricular wall thickness is consistent with concentric hypertrophy.  · Estimated left ventricular EF = 53% Left ventricular ejection fraction appears to be 51 - 55%. Left ventricular systolic function is normal.  · Left ventricular diastolic function is consistent with (grade I) impaired relaxation.  · The right ventricular cavity is mildly dilated.  · Mild aortic valve regurgitation is present.  · Trace mitral valve regurgitation is present.  · Estimated right ventricular systolic pressure from tricuspid regurgitation is normal (<35 mmHg).  · Mild dilation of the aortic root is present (3.8 cms)     Results for orders placed during the hospital encounter of 10/25/18    Cardiac Catheterization/Vascular Study    Narrative  Cardiac Catheterization Operative Report    Edd Willingham  [id number]  10/26/2018  @PCP@    Reason for the procedure: Chest pain class III angina pectoris positive troponin      Procedure performed:    1.  Left heart catheterization with the  iliofemoral arteriogram and Angio-Seal closure device.  2.  Administration of Corvert for atrial fibrillation    Procedure Details  The risks, benefits, complications, treatment options, and expected outcomes were discussed with the patient. The patient and/or family concurred with the proposed plan, giving informed consent. Patient was brought to the cath lab after IV hydration was begun and oral premedication was given. He was further sedated with midazolam. He was prepped and draped in the usual manner.    Using 1% local lidocaine infiltration, a 6-Marshallese introducer sheath was inserted using the modified Seldinger technique in the right femoral artery without any difficulty. Using a 0.038 guidewire over a 6-Marshallese, a right Margo catheter was then advanced under fluoroscopic guidance up to the right coronary artery cusp. With minimal manipulation, the right coronary artery was cannulated and a selective angiogram was performed, using multiple  views of the right coronary artery. The right Margo catheter was exchanged for the left Margo catheter and advanced under fluoroscopic guidance up to the right coronary cusp. With minimal manipulation, the left coronary artery was cannulated and a selective angiogram of the left coronary artery was performed, using right and the left ZHU, Croatian, and PA cranial-caudal views. The 6-Sinhala pigtail catheter was then advanced under fluoroscopic guidance over a 0.038 wire up to the level of the aortic root. With minimal manipulation, the pigtail catheter was advanced into the left ventricle, and hemodynamics were performed and a left ventriculogram was performed.      Hemodynamic  Aortic pressure:140/96  Mean:113  Left ventricular pressure:120/32  Left ventriculogram: Not performed.      Coronaries: Right dominant system.    Left Main coronary artery: Left main coronary artery was a medium caliber vessel which was free of any disease.  The left main coronary artery bifurcated into the left anterior descending artery and circumflex artery.    Left anterior descending artery: Left anterior descending artery was a medium caliber vessel which were free of any obstructive stenosis with good GAMA-3 flow.  The diagonal branch was free of any obstructive stenosis with good GAMA-3 flow.    Circumflex artery: The circumflex artery was a medium caliber vessel with the ramus intermedius branch the first and the second obtuse marginal branch which was free of any obstructive stenosis with good GAMA-3 flow.  The circumflex artery distally was also free of any obstructive stenosis with good GAMA-3 flow    Right coronary artery: The right coronary artery was a medium to large caliber dominant vessel which were free of any obstructive stenosis with good GAMA-3 flow    Estimated Blood Loss:  Minimal    Complications:  None; patient tolerated the procedure well.    Impression  1.  Elevated left ventricular end-diastolic pressure.  2.  No  evidence of any obstructive epicardial coronary artery disease.    Recommendations: Patient positive troponin was probably secondary to the atrial fibrillation.  Patient was recommended medical management for the atrial fibrillation and to workup noncardiac etiology of chest pain.  Patient would need anticoagulation with Eliquis for the atrial fibrillation.      Param Mcfarland MD  10/26/2018  2:41 PM    EMR Dragon/Transcription disclaimer:  Some of this note may be an electronic transcription/translation of spoken language to printed text. The electronic translation of spoken language may permit erroneous, or at times, nonsensical words or phrases to be inadvertently transcribed; Although I have reviewed the note for such errors, some may still exist.     Results for orders placed during the hospital encounter of 02/27/21    Stress Test With Myocardial Perfusion One Day    Interpretation Summary  · Findings consistent with an equivocal ECG stress test.  · Left ventricular ejection fraction is borderline normal. (Calculated EF = 49%). She was probably secondary to the lower uptake on the rest study.  · Impressions are consistent with a low risk study.  · There is a defect noted inferior apically on both stress and resting images. It is slightly larger on the rest images most likely secondary to a lower dose given on the stress image. No significant ischemia is noted. One cannot exclude diaphragmatic attenuation.             Diagnoses and all orders for this visit:    1. Dyspnea on exertion (Primary)  -     Adult Transthoracic Echo Complete W/ Cont if Necessary Per Protocol; Future  -     Stress Test With Myocardial Perfusion One Day; Future    2. Chronic HFrEF (heart failure with reduced ejection fraction)  -     Adult Transthoracic Echo Complete W/ Cont if Necessary Per Protocol; Future  -     Stress Test With Myocardial Perfusion One Day; Future    3. PAF (paroxysmal atrial fibrillation)    4. Essential  hypertension    5. Mixed dyslipidemia    6. Smoking    Other orders  -     ECG 12 Lead      Assessment:    -Dyspnea on exertion: He has been having dyspnea on mild to moderate effort.  His exam is benign.  ECG shows normal sinus rhythm with left anterior fascicular block and possible old septal infarct.  He has history of HFrEF with normalization of LVEF on most recent echo from 2021.  Repeat echo will be done for follow-up on LVEF and valvular function.  In addition, he will be scheduled for exercise nuclear stress test to rule out myocardial ischemia which might warrant further evaluation and intervention.    -HFrEF: Most recent echo showed normalization of LVEF.  He is well compensated on today's exam.  He is on guideline directed medical therapy which will be continued.    -Paroxysmal atrial fibrillation: Currently in normal sinus rhythm.  Asymptomatic from that standpoint.  Continue metoprolol.  Continue Eliquis for stroke risk reduction.    -Essential hypertension: Well-controlled on current regimen.  Continue the same.    -Mixed dyslipidemia: Used to be on Repatha which was stopped due to insurance issues.  He had recent blood work including lipid profile by his PCP.  We will obtain and review those records.    -Smoking: He is actively working on quitting.  He was encouraged.  Long-term adverse effects of ongoing smoking were discussed with the patient.        Follow Up       Return for Return to clinic after diagnostic testing, With Emi ARMSTRONG.    Patient was given instructions and counseling regarding his condition or for health maintenance advice. Please see specific information pulled into the AVS if appropriate.

## 2023-04-10 ENCOUNTER — OFFICE VISIT (OUTPATIENT)
Dept: ORTHOPEDIC SURGERY | Facility: CLINIC | Age: 53
End: 2023-04-10
Payer: MEDICAID

## 2023-04-10 VITALS — HEIGHT: 71 IN | OXYGEN SATURATION: 96 % | WEIGHT: 232 LBS | BODY MASS INDEX: 32.48 KG/M2 | HEART RATE: 78 BPM

## 2023-04-10 DIAGNOSIS — M25.561 RIGHT KNEE PAIN, UNSPECIFIED CHRONICITY: ICD-10-CM

## 2023-04-10 DIAGNOSIS — M17.11 ARTHRITIS OF RIGHT KNEE: Primary | ICD-10-CM

## 2023-04-10 PROCEDURE — 1160F RVW MEDS BY RX/DR IN RCRD: CPT | Performed by: STUDENT IN AN ORGANIZED HEALTH CARE EDUCATION/TRAINING PROGRAM

## 2023-04-10 PROCEDURE — 99203 OFFICE O/P NEW LOW 30 MIN: CPT | Performed by: STUDENT IN AN ORGANIZED HEALTH CARE EDUCATION/TRAINING PROGRAM

## 2023-04-10 PROCEDURE — 1159F MED LIST DOCD IN RCRD: CPT | Performed by: STUDENT IN AN ORGANIZED HEALTH CARE EDUCATION/TRAINING PROGRAM

## 2023-04-10 NOTE — PROGRESS NOTES
"Chief Complaint  Pain and Initial Evaluation of the Right Knee    Subjective          Edd Willingham presents to [facility] for   History of Present Illness    Edd denies any patient's evaluation of his right knee.  Patient reports 15 years of knee pain that fluctuates with the weather.  He denies any significant injuries.  He has taken ibuprofen and Aleve as needed and has noticed some relief.  He states that he experiences swelling at times.  He denies previous physical therapy, injections, or surgeries to this knee.  He denies catching or locking.  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  He is a diabetic with neuropathy.  He reports bilateral lower extremity edema.  Patient states that he is currently in recovery.    Allergies   Allergen Reactions   • Statins Myalgia   • Sulfa Antibiotics Unknown (See Comments)     Remembers mother always saying he was allergic        Social History     Socioeconomic History   • Marital status:    Tobacco Use   • Smoking status: Every Day     Packs/day: 1.00     Years: 31.00     Pack years: 31.00     Types: Cigarettes, Pipe, Electronic Cigarette     Start date: 7/6/1987   • Smokeless tobacco: Current     Types: Snuff   Vaping Use   • Vaping Use: Former   Substance and Sexual Activity   • Alcohol use: Yes     Alcohol/week: 1.0 standard drink     Types: 1 Cans of beer per week     Comment: occasionally   • Drug use: Yes     Frequency: 5.0 times per week     Types: Amphetamines, \"Crack\" cocaine, Cocaine(coke), Marijuana, Methamphetamines   • Sexual activity: Yes     Partners: Female     Birth control/protection: Condom        I reviewed the patient's chief complaint, history of present illness, review of systems, past medical history, surgical history, family history, social history, medications, and allergy list.     REVIEW OF SYSTEMS    Constitutional: Denies fevers, chills, weight loss  Cardiovascular: Denies chest " "pain, shortness of breath  Skin: Denies rashes, acute skin changes  Neurologic: Denies headache, loss of consciousness  MSK: Right knee pain      Objective   Vital Signs:   Pulse 78   Ht 180.3 cm (71\")   Wt 105 kg (232 lb)   SpO2 96%   BMI 32.36 kg/m²     Body mass index is 32.36 kg/m².    Physical Exam    General: Alert. No acute distress.   Right lower extremity: Mild effusion.  Tenderness to the medial joint line.  Nontender to lateral joint line.  Knee extensor mechanism intact.  5 degrees shy of full extension.  Knee flexion 110.  Knee stable to varus and valgus stress.  Stable to anterior and posterior drawer.  No mechanical symptoms with Fiorella testing.  Calf soft, nontender.  Demonstrates active ankle plantarflexion and dorsiflexion.  Sensation intact over the dorsal and plantar foot.  Palpable pedal pulses.    Procedures    Imaging Results (Most Recent)     Procedure Component Value Units Date/Time    XR Knee 4+ View Right [[id number]] Resulted: 04/10/23 1707     Updated: 04/10/23 1708    Narrative:      Indications: Right knee pain    Views: Weightbearing AP, PA flexion, lateral, sunrise right knee    Findings: Mild tricompartmental degenerative changes.  No fractures noted.    Comparative Data: No comparative data available                     Assessment and Plan        XR Knee 4+ View Right    Result Date: 4/10/2023  Narrative: Indications: Right knee pain Views: Weightbearing AP, PA flexion, lateral, sunrise right knee Findings: Mild tricompartmental degenerative changes.  No fractures noted. Comparative Data: No comparative data available        Diagnoses and all orders for this visit:    1. Arthritis of right knee (Primary)    2. Right knee pain, unspecified chronicity  -     XR Knee 4+ View Right        Edd presents today for evaluation of his right knee.  X-rays reviewed with the patient today. We discussed nonoperative measures for right knee arthritis including anti-inflammatories, physical " therapy, and injections.  Patient understood and elected to proceed with home exercises.  He is instructed to continue with over-the-counter anti-inflammatories as needed.      Call or return if worsening symptoms.    Scribed for Teo Guevara MD by Jenifer Weeks PA-C  04/10/2023   14:27 EDT         Follow Up       Patient was given instructions and counseling regarding his condition or for health maintenance advice. Please see specific information pulled into the AVS if appropriate.       I have personally performed the services described in this document as scribed by the above individual and it is both accurate and complete.     Teo Guevara MD  04/10/23  17:34 EDT

## 2023-04-25 VITALS — WEIGHT: 224.87 LBS | BODY MASS INDEX: 31.48 KG/M2 | HEIGHT: 71 IN

## 2023-04-25 NOTE — PROGRESS NOTES
"  Edd Willingham is a 52 y.o. male who presents to [facility] for nutrition consult r/t diagnosis of T2DM, pt states he was diagnosed 20 years ago.  Edd Willingham is referred by NATHANIEL Gutiérrez.    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   • MI (myocardial infarction)    • Neck strain 15 years ago    Have stiffness and pain in Neck and reduce range of motion   • Rotator cuff syndrome 15 years ago       Anthropometrics    180.3 cm (71\")  102 kg (224 lb 13.9 oz)  31.36 kg/m²    Diabetes History    Diabetes History  What type of diabetes do you have?: Type 2  Length of Diabetes Diagnosis: 10 + years  Current DM knowledge: good  Do you test your blood sugar at home?: yes  Who performs the test?: self  Typical readings: fasting 160s    Education Preferences    Education Preferences  What areas of diabetes would you like to learn about?: diet information    Nutrition Information    Nutrition Information  Enter everything you can remember eating in the last 24 hours (1 day): breakfast- oatmeal w/ milk, raisins, sugar-sub; lunch- ramen noodles; dinner- canned ham w/ eggs, pancakes; beverages- 2% milk, more water, soda (rare)  What is the biggest challenge you have with your diet?: Knowledge    Education Needs    DM Education Needs  Meter: Has own  Medication: Oral, Insulin, Other injectables  Healthy Eating: RD consult, Reviewed meal plan, Basic meal plan provided  Motivation: " Engaged  Teaching Method: Explanation, Discussion, Handouts  Patient Response: Verbalized understanding    DM Goals    DM Goals  Healthy Eating - Goal: Today  Being Active - Goal: Today  Taking Medication - Goal: Today  Monitoring - Goal: Today      Medications    Current Outpatient Medications   Medication   • apixaban   • vitamin D3   • Dexcom G6 Sensor   • Farxiga   • digoxin   • docusate sodium   • Evolocumab   • furosemide   • gabapentin   • Insulin Aspart   • Insulin Pen Needle   • lamoTRIgine   • metoprolol succinate XL   • mirtazapine   • omeprazole   • sacubitril-valsartan   • tadalafil     Pt reports currently taking Ozempic, Farxiga, Lantus and Humalog for glucose mgmt.    Labs       No results found for: CHOL, TRIG, HDL, LDL    Nutrition counseling provided on carbohydrate counting, portion control, measuring and reading labels.  Discussed eating out and gave suggestions on controlling carbohydrate intake and making healthier food choices.     Meal Plan:     Total Carbohydrates per meal: 3-4 carb servings/meal, at least 3 meals/day  Lean protein with meals.  Limit added fats.  Snacks: 1 carbohydrate serving (</= 22 g) + 1 protein serving.     Daily exercise encouraged (as recommended by healthcare provider). Discussed the benefits of exercise in lowering blood glucose, blood pressure, cholesterol, stress and controlling body weight.     Advised to continue daily blood glucose monitoring to assist with understanding of factors affecting blood glucose and assist with management of diabetes.  Discussed and provided with target BG ranges.     Literature provided: Diabetes Nutrition Placemat, Choose Your Foods Booklet    Dietitian contact number provided.  Patient encouraged to call with questions or concerns.     Time spent with patient: 45 minutes    Rosie Laughlin RDN, MALLY  03/17/2023

## 2023-04-26 ENCOUNTER — OFFICE VISIT (OUTPATIENT)
Dept: PODIATRY | Facility: CLINIC | Age: 53
End: 2023-04-26
Payer: MEDICAID

## 2023-04-26 VITALS
TEMPERATURE: 98.7 F | BODY MASS INDEX: 32.62 KG/M2 | HEIGHT: 71 IN | OXYGEN SATURATION: 95 % | DIASTOLIC BLOOD PRESSURE: 74 MMHG | WEIGHT: 233 LBS | HEART RATE: 76 BPM | SYSTOLIC BLOOD PRESSURE: 116 MMHG

## 2023-04-26 DIAGNOSIS — E11.42 TYPE 2 DIABETES MELLITUS WITH DIABETIC POLYNEUROPATHY, WITH LONG-TERM CURRENT USE OF INSULIN: Primary | ICD-10-CM

## 2023-04-26 DIAGNOSIS — E11.42 DIABETIC POLYNEUROPATHY ASSOCIATED WITH TYPE 2 DIABETES MELLITUS: ICD-10-CM

## 2023-04-26 DIAGNOSIS — Z79.4 TYPE 2 DIABETES MELLITUS WITH DIABETIC POLYNEUROPATHY, WITH LONG-TERM CURRENT USE OF INSULIN: Primary | ICD-10-CM

## 2023-04-27 NOTE — PROGRESS NOTES
[facility] - PODIATRY    Today's Date: 04/27/23    Patient Name: Edd Willingham  MRN: [id number]  CSN: [id number]  PCP: Christopher Galarza MD, Last PCP Visit: Unknown last visit  Referring Provider: Christopher Galarza MD    SUBJECTIVE     Chief Complaint   Patient presents with   • Left Foot - Establish Care, Diabetes, Nail Problem   • Right Foot - Establish Care, Diabetes, Nail Problem     HPI: Edd Willingham, a 52 y.o.male, presents to clinic for a diabetic foot evaluation.    Patient states he has bilateral foot pain.  Patient states it hurts all the time.  It is worse at night.  Patient states his numbness tingling and burning.  He also has elongated and his toenails are painful in shoe gear.    Patient denies any fevers, chills, nausea, vomiting, shortness of breath, nor any other constitutional signs nor symptoms.    No other pedal complaints at this time.    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   • MI (myocardial infarction)    • Neck strain 15 years ago    Have stiffness and pain in Neck and reduce range of motion   • Rotator cuff syndrome 15 years ago     Past Surgical History:   Procedure Laterality Date   • CARDIAC CATHETERIZATION N/A 10/26/2018    Procedure: Left Heart Cath;  Surgeon: Param Mcfarland MD;  Location: [facility] CATH INVASIVE LOCATION;  Service: Cardiology   • COLONOSCOPY N/A 03/24/2021    Procedure: COLONOSCOPY;  Surgeon: Dinesh Drummond MD;  Location: [facility]  "ENDOSCOPY;  Service: Gastroenterology;  Laterality: N/A;   • COLONOSCOPY N/A 05/18/2022    Procedure: COLONOSCOPY;  Surgeon: Dinesh Drummond MD;  Location: [facility] ENDOSCOPY;  Service: Gastroenterology;  Laterality: N/A;   • ELBOW PROCEDURE Left     left   • ENDOSCOPY N/A 03/24/2021    Procedure: ESOPHAGOGASTRODUODENOSCOPY;  Surgeon: Dinesh Drummond MD;  Location: [facility] ENDOSCOPY;  Service: Gastroenterology;  Laterality: N/A;   • ROTATOR CUFF REPAIR Right     RIGHT   • SHOULDER SURGERY  8-15 years ago    Have had rotator cuff surgery on both shoulders   • TUMOR REMOVAL      FATTY TUMOR   • UPPER GASTROINTESTINAL ENDOSCOPY  03/2021   • WRIST SURGERY Right     RIGHT     Family History   Problem Relation Age of Onset   • Diabetes Mother         Insulin dependent   • Hypertension Mother    • Diabetes Father    • No Known Problems Brother    • Colon polyps Paternal Uncle      Social History     Socioeconomic History   • Marital status:    Tobacco Use   • Smoking status: Every Day     Packs/day: 1.00     Years: 31.00     Pack years: 31.00     Types: Cigarettes, Pipe, Electronic Cigarette     Start date: 7/6/1987   • Smokeless tobacco: Current     Types: Snuff   Vaping Use   • Vaping Use: Former   Substance and Sexual Activity   • Alcohol use: Yes     Alcohol/week: 1.0 standard drink     Types: 1 Cans of beer per week     Comment: occasionally   • Drug use: Yes     Frequency: 5.0 times per week     Types: Amphetamines, \"Crack\" cocaine, Cocaine(coke), Marijuana, Methamphetamines   • Sexual activity: Yes     Partners: Female     Birth control/protection: Condom     Allergies   Allergen Reactions   • Statins Myalgia   • Sulfa Antibiotics Unknown (See Comments)     Remembers mother always saying he was allergic     Current Outpatient Medications   Medication Sig Dispense Refill   • apixaban (ELIQUIS) 5 MG tablet tablet Take 1 tablet by mouth Every 12 (Twelve) Hours. 180 tablet 3   • Cholecalciferol (vitamin D3) 125 " MCG (5000 UT) capsule capsule Take 2,000 Units by mouth Daily.     • Dapagliflozin Propanediol (Farxiga) 10 MG tablet Take 1 tablet by mouth Daily. 90 tablet 3   • digoxin (LANOXIN) 125 MCG tablet Take 1 tablet by mouth Daily. 90 tablet 3   • docusate sodium (Colace) 100 MG capsule Take 1 capsule by mouth 2 (Two) Times a Day. 60 capsule 11   • furosemide (LASIX) 20 MG tablet Take 1 tablet by mouth Daily As Needed (weight gain of 5lbs in 1 week). 30 tablet 11   • Insulin Pen Needle 32G X 4 MM misc Use as directed 4 (Four) Times a Day. 120 each 11   • metoprolol succinate XL (TOPROL-XL) 50 MG 24 hr tablet Take 1 tablet by mouth Daily. 90 tablet 3   • omeprazole (priLOSEC) 40 MG capsule Take 1 capsule by mouth Daily. 30 capsule 11   • sacubitril-valsartan (ENTRESTO) 24-26 MG tablet Take 1 tablet by mouth 2 (Two) Times a Day. 180 tablet 3   • tadalafil (CIALIS) 10 MG tablet Take 1 tablet by mouth Daily As Needed for Erectile Dysfunction.     • Continuous Blood Gluc Sensor (Dexcom G6 Sensor) Use as directed for continuous glucose monitoring **Change sensor every 10 days** 9 each 3   • Evolocumab (REPATHA) solution auto-injector SureClick injection Inject 1 mL under the skin into the appropriate area as directed Every 14 (Fourteen) Days. 2 mL 11   • gabapentin (NEURONTIN) 300 MG capsule Take 1 capsule by mouth 2 (Two) Times a Day.     • Insulin Aspart (novoLOG) 100 UNIT/ML injection Inject 0-150 Units under the skin into the appropriate area as directed Take As Directed.     • lamoTRIgine (LaMICtal) 100 MG tablet Take 1 tablet by mouth Daily.     • mirtazapine (REMERON) 15 MG tablet Take 1 tablet by mouth Every Night. (Patient not taking: Reported on 4/26/2023)       No current facility-administered medications for this visit.     Review of Systems   Constitutional: Negative.    Skin:        Painful toenails.   All other systems reviewed and are negative.      OBJECTIVE     Vitals:    04/26/23 1445   BP: 116/74   Pulse:  76   Temp: 98.7 °F (37.1 °C)   SpO2: 95%       Body mass index is 32.5 kg/m².    Lab Results   Component Value Date    HGBA1C 9.5 (H) 10/28/2018       Lab Results   Component Value Date    GLUCOSE 462 (C) 02/27/2023    CALCIUM 9.5 02/27/2023     (L) 02/27/2023    K 4.5 02/27/2023    CO2 27.2 02/27/2023    CL 97 (L) 02/27/2023    BUN 12 02/27/2023    CREATININE 0.91 02/27/2023    EGFRIFAFRI 95 03/02/2021    BCR 13.2 02/27/2023    ANIONGAP 8.8 02/27/2023       Patient seen in no apparent distress.      PHYSICAL EXAM:     Foot/Ankle Exam    GENERAL  Appearance:  appears stated age  Orientation:  AAOx3  Affect:  appropriate  Gait:  unimpaired  Assistance:  independent  Right shoe gear: casual shoe  Left shoe gear: casual shoe    VASCULAR     Right Foot Vascularity   Normal vascular exam    Dorsalis pedis:  2+  Posterior tibial:  2+  Skin temperature:  warm  Edema grading:  None  CFT:  < 3 seconds  Pedal hair growth:  Present  Varicosities:  none     Left Foot Vascularity   Normal vascular exam    Dorsalis pedis:  2+  Posterior tibial:  2+  Skin temperature:  warm  Edema grading:  None  CFT:  < 3 seconds  Pedal hair growth:  Present  Varicosities:  none     NEUROLOGIC     Right Foot Neurologic   Light touch sensation: absent  Vibratory sensation: absent  Hot/Cold sensation: absent     Left Foot Neurologic   Light touch sensation: absent  Vibratory sensation: absent  Hot/Cold sensation:  absent    MUSCLE STRENGTH     Right Foot Muscle Strength   Foot dorsiflexion:  4  Foot plantar flexion:  4  Foot inversion:  4  Foot eversion:  4     Left Foot Muscle Strength   Foot dorsiflexion:  4  Foot plantar flexion:  4  Foot inversion:  4  Foot eversion:  4    RANGE OF MOTION     Right Foot Range of Motion   Foot and ankle ROM within normal limits       Left Foot Range of Motion   Foot and ankle ROM within normal limits      DERMATOLOGIC      Right Foot Dermatologic   Skin  Right foot skin is intact.   Nails  1.  Positive for  elongated, onychomycosis, abnormal thickness, subungual debris, dystrophic nail and ingrown toenail.  2.  Positive for elongated, onychomycosis, abnormal thickness, subungual debris, dystrophic nail and ingrown toenail.  3.  Positive for elongated, onychomycosis, abnormal thickness, subungual debris, dystrophic nail and ingrown toenail.  4.  Positive for elongated, onychomycosis, abnormal thickness, subungual debris, dystrophic nail and ingrown toenail.  5.  Positive for elongated, onychomycosis, abnormal thickness, subungual debris, dystrophic nail and ingrown toenail.     Left Foot Dermatologic   Skin  Left foot skin is intact.   Nails  1.  Positive for elongated, onychomycosis, abnormal thickness, subungual debris, dystrophic nail and ingrown toenail.  2.  Positive for elongated, onychomycosis, abnormal thickness, subungual debris, dystrophic nail and ingrown toenail.  3.  Positive for elongated, onychomycosis, abnormal thickness, subungual debris, dystrophic nail and ingrown toenail.  4.  Positive for elongated, onychomycosis, abnormally thick, subungual debris, dystrophic nail and ingrown toenail.  5.  Positive for elongated, onychomycosis, abnormally thick, subungual debris, dystrophic nail and ingrown toenail.        ASSESSMENT/PLAN     Diagnoses and all orders for this visit:    1. Type 2 diabetes mellitus with diabetic polyneuropathy, with long-term current use of insulin (Primary)    2. Diabetic polyneuropathy associated with type 2 diabetes mellitus        Comprehensive lower extremity examination and evaluation was performed.    Discussed findings and treatment plan including risks, benefits, and treatment options with patient in detail. Patient agreed with treatment plan.    Medications and allergies reviewed.  Reviewed available blood glucose and HgB A1C lab values along with other pertinent labs.  These were discussed with the patient as to their importance of diabetic maintenance.    Diabetic foot  exam performed and documented this date, compliant with CQM required standards. Detail of findings as noted in physical exam.  Lower extremity Neurologic exam for diabetic patient performed and documented this date, compliant with PQRS required standards. Detail of findings as noted in physical exam.  Advised patient importance of good routine lower extremity hygiene. Advised patient importance of evaluating for intact skin and pain free nail borders.  Advised patient to use mirror to evaluate plantar/ soles of feet for better visualization. Advised patient monitor and phone office to be seen if any cracking to skin, open lesions, painful nail borders or if nails become elongated prior to next visit. Advised patient importance of daily cleansing of lower extremities, followed by good skin cream to maintain normal hydration of skin. Also advised patient importance of close daily monitoring of blood sugar. Advised to regulate diet and medications to maintain control of blood sugar in optimal range. Contact primary care provider if difficulties maintaining blood sugar levels.  Advised Patient of presence of Diabetes Mellitus condition.  Advised Patient risk of progression and worsening or improvement, then return of condition.  Will monitor condition for any change in future. Treat with most appropriate treatment pending status of condition.  Counseled and advised patient extensively on nature and ramifications of diabetes. Standard instructions given to patient for good diabetic foot care and maintenance. Advised importance of careful monitoring to avoid break down and complications secondary to diabetes. Advised patient importance of strict maintenance of blood sugar control. Advised patient of possible ominous results from neglect of condition, i.e.: amputation/ loss of digits, feet and legs, or even death.  Patient states understands counseling, will monitor closely, continue good hygiene and routine diabetic foot  care. Patient will contact office is questions or problems.      An After Visit Summary was printed and given to the patient at discharge, including (if requested) any available informative/educational handouts regarding diagnosis, treatment, or medications. All questions were answered to patient/family satisfaction. Should symptoms fail to improve or worsen they agree to call or return to clinic or to go to the Emergency Department. Discussed the importance of following up with any needed screening tests/labs/specialist appointments and any requested follow-up recommended by me today. Importance of maintaining follow-up discussed and patient accepts that missed appointments can delay diagnosis and potentially lead to worsening of conditions.    Return in about 3 months (around 7/26/2023)., or sooner if acute issues arise.    This document has been electronically signed by Mir Mo DPM on April 27, 2023 07:07 EDT

## 2023-05-25 ENCOUNTER — HOSPITAL ENCOUNTER (OUTPATIENT)
Dept: NUCLEAR MEDICINE | Facility: HOSPITAL | Age: 53
Discharge: HOME | End: 2023-05-25
Payer: MEDICAID

## 2023-05-25 DIAGNOSIS — R06.09 DYSPNEA ON EXERTION: ICD-10-CM

## 2023-05-25 DIAGNOSIS — I50.22 CHRONIC HFREF (HEART FAILURE WITH REDUCED EJECTION FRACTION): ICD-10-CM

## 2023-05-25 PROCEDURE — A9502 TC99M TETROFOSMIN: HCPCS | Performed by: INTERNAL MEDICINE

## 2023-05-25 PROCEDURE — 93017 CV STRESS TEST TRACING ONLY: CPT

## 2023-05-25 PROCEDURE — 25010000002 REGADENOSON 0.4 MG/5ML SOLUTION: Performed by: INTERNAL MEDICINE

## 2023-05-25 PROCEDURE — 0 TECHNETIUM TETROFOSMIN KIT: Performed by: INTERNAL MEDICINE

## 2023-05-25 PROCEDURE — 78452 HT MUSCLE IMAGE SPECT MULT: CPT

## 2023-05-25 RX ORDER — REGADENOSON 0.08 MG/ML
0.4 INJECTION, SOLUTION INTRAVENOUS
Status: COMPLETED | OUTPATIENT
Start: 2023-05-25 | End: 2023-05-25

## 2023-05-25 RX ADMIN — REGADENOSON 0.4 MG: 0.08 INJECTION, SOLUTION INTRAVENOUS at 09:30

## 2023-05-25 RX ADMIN — TETROFOSMIN 1 DOSE: 1.38 INJECTION, POWDER, LYOPHILIZED, FOR SOLUTION INTRAVENOUS at 09:30

## 2023-05-25 RX ADMIN — TETROFOSMIN 1 DOSE: 1.38 INJECTION, POWDER, LYOPHILIZED, FOR SOLUTION INTRAVENOUS at 07:46

## 2023-05-26 LAB
BH CV IMMEDIATE POST RECOVERY TECH DATA SYMPTOMS: NORMAL
BH CV IMMEDIATE POST TECH DATA BLOOD PRESSURE: NORMAL MMHG
BH CV IMMEDIATE POST TECH DATA HEART RATE: 94 BPM
BH CV IMMEDIATE POST TECH DATA OXYGEN SATS: 98 %
BH CV REST NUCLEAR ISOTOPE DOSE: 10 MCI
BH CV SIX MINUTE RECOVERY TECH DATA BLOOD PRESSURE: NORMAL
BH CV SIX MINUTE RECOVERY TECH DATA HEART RATE: 78 BPM
BH CV SIX MINUTE RECOVERY TECH DATA OXYGEN SATURATION: 97 %
BH CV SIX MINUTE RECOVERY TECH DATA SYMPTOMS: NORMAL
BH CV STRESS BP STAGE 1: NORMAL
BH CV STRESS COMMENTS STAGE 1: NORMAL
BH CV STRESS DOSE REGADENOSON STAGE 1: 0.4
BH CV STRESS DURATION MIN STAGE 1: 0
BH CV STRESS DURATION SEC STAGE 1: 10
BH CV STRESS HR STAGE 1: 77
BH CV STRESS NUCLEAR ISOTOPE DOSE: 37.6 MCI
BH CV STRESS O2 STAGE 1: 99
BH CV STRESS PROTOCOL 1: NORMAL
BH CV STRESS RECOVERY BP: NORMAL MMHG
BH CV STRESS RECOVERY HR: 78 BPM
BH CV STRESS RECOVERY O2: 97 %
BH CV STRESS STAGE 1: 1
BH CV THREE MINUTE POST TECH DATA BLOOD PRESSURE: NORMAL MMHG
BH CV THREE MINUTE POST TECH DATA HEART RATE: 86 BPM
BH CV THREE MINUTE POST TECH DATA OXYGEN SATURATION: 98 %
BH CV THREE MINUTE RECOVERY TECH DATA SYMPTOM: NORMAL
LV EF NUC BP: 40 %
MAXIMAL PREDICTED HEART RATE: 166 BPM
PERCENT MAX PREDICTED HR: 56.63 %
STRESS BASELINE BP: NORMAL MMHG
STRESS BASELINE HR: 71 BPM
STRESS O2 SAT REST: 97 %
STRESS PERCENT HR: 67 %
STRESS POST O2 SAT PEAK: 99 %
STRESS POST PEAK BP: NORMAL MMHG
STRESS POST PEAK HR: 94 BPM
STRESS TARGET HR: 141 BPM

## 2023-05-30 ENCOUNTER — TELEPHONE (OUTPATIENT)
Dept: CARDIOLOGY | Facility: CLINIC | Age: 53
End: 2023-05-30

## 2023-05-30 NOTE — TELEPHONE ENCOUNTER
----- Message from NATHANIEL Beavers sent at 5/26/2023  1:20 PM EDT -----  Notify patient the results of his stress test were unremarkable. There were no signs concerning for blockages in his heart. These tests are not 100% accurate so if he has further concerns, let us know.

## 2023-06-04 NOTE — PROGRESS NOTES
We had discussed the initiation of Wellbutrin at our office visit. He called back wanting to start this medication.     Insurance will cover 200mg BID Wellbutrin SR          This document has been electronically signed by NATHANIEL Wilson on March 30, 2021 16:25 CDT          
no

## 2023-07-26 ENCOUNTER — OFFICE VISIT (OUTPATIENT)
Dept: PODIATRY | Facility: CLINIC | Age: 53
End: 2023-07-26
Payer: MEDICAID

## 2023-07-26 VITALS
HEIGHT: 71 IN | BODY MASS INDEX: 34.58 KG/M2 | HEART RATE: 69 BPM | TEMPERATURE: 98 F | DIASTOLIC BLOOD PRESSURE: 77 MMHG | SYSTOLIC BLOOD PRESSURE: 119 MMHG | OXYGEN SATURATION: 97 % | WEIGHT: 247 LBS

## 2023-07-26 DIAGNOSIS — Z79.4 TYPE 2 DIABETES MELLITUS WITH DIABETIC POLYNEUROPATHY, WITH LONG-TERM CURRENT USE OF INSULIN: Primary | ICD-10-CM

## 2023-07-26 DIAGNOSIS — E11.9 ENCOUNTER FOR DIABETIC FOOT EXAM: ICD-10-CM

## 2023-07-26 DIAGNOSIS — E11.42 DIABETIC POLYNEUROPATHY ASSOCIATED WITH TYPE 2 DIABETES MELLITUS: ICD-10-CM

## 2023-07-26 DIAGNOSIS — E11.42 TYPE 2 DIABETES MELLITUS WITH DIABETIC POLYNEUROPATHY, WITH LONG-TERM CURRENT USE OF INSULIN: Primary | ICD-10-CM

## 2023-07-26 RX ORDER — CARIPRAZINE 3 MG/1
CAPSULE, GELATIN COATED ORAL
COMMUNITY
Start: 2023-06-27

## 2023-07-26 RX ORDER — INSULIN GLARGINE 100 [IU]/ML
INJECTION, SOLUTION SUBCUTANEOUS
COMMUNITY
Start: 2023-07-20

## 2023-07-26 RX ORDER — POTASSIUM CHLORIDE 750 MG/1
10 TABLET, EXTENDED RELEASE ORAL 2 TIMES DAILY WITH MEALS
COMMUNITY
Start: 2023-06-27

## 2023-07-26 RX ORDER — INSULIN LISPRO 100 [IU]/ML
INJECTION, SOLUTION INTRAVENOUS; SUBCUTANEOUS
COMMUNITY
Start: 2023-06-27

## 2023-07-26 RX ORDER — DULOXETINE 40 MG/1
1 CAPSULE, DELAYED RELEASE ORAL EVERY 12 HOURS SCHEDULED
COMMUNITY
Start: 2023-07-03

## 2023-07-26 RX ORDER — IBUPROFEN 600 MG/1
1 TABLET ORAL EVERY 12 HOURS SCHEDULED
COMMUNITY
Start: 2023-07-03

## 2023-07-27 NOTE — PROGRESS NOTES
[facility] - PODIATRY    Today's Date: 07/27/23    Patient Name: Edd Willingham  MRN: [id number]  CSN: [id number]  PCP: Christopher Galarza MD, Last PCP Visit: Unknown last visit  Referring Provider: No ref. provider found    SUBJECTIVE     Chief Complaint   Patient presents with    Left Foot - Follow-up, Nail Problem    Right Foot - Follow-up, Nail Problem     HPI: Edd Willingham, a 52 y.o.male, presents to clinic for a diabetic foot evaluation.    Patient states he has bilateral foot pain.  Patient states it hurts all the time.  It is worse at night.  Patient states his numbness tingling and burning.  He also has elongated and his toenails are painful in shoe gear.    Patient denies any fevers, chills, nausea, vomiting, shortness of breath, nor any other constitutional signs nor symptoms.    No other pedal complaints at this time.    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    MI (myocardial infarction)     Neck strain 15 years ago    Have stiffness and pain in Neck and reduce range of motion    Rotator cuff syndrome 15 years ago     Past Surgical History:   Procedure Laterality Date    CARDIAC CATHETERIZATION N/A 10/26/2018    Procedure: Left Heart Cath;  Surgeon: Param Mcfarland MD;  Location: [facility] CATH INVASIVE LOCATION;  Service: Cardiology    COLONOSCOPY N/A 03/24/2021    Procedure: COLONOSCOPY;  Surgeon: Dinesh Drummond MD;  Location: [facility] ENDOSCOPY;   "Service: Gastroenterology;  Laterality: N/A;    COLONOSCOPY N/A 05/18/2022    Procedure: COLONOSCOPY;  Surgeon: Dinesh Drummond MD;  Location: [facility] ENDOSCOPY;  Service: Gastroenterology;  Laterality: N/A;    ELBOW PROCEDURE Left     left    ENDOSCOPY N/A 03/24/2021    Procedure: ESOPHAGOGASTRODUODENOSCOPY;  Surgeon: Dinesh Drummond MD;  Location: [facility] ENDOSCOPY;  Service: Gastroenterology;  Laterality: N/A;    ROTATOR CUFF REPAIR Right     RIGHT    SHOULDER SURGERY  8-15 years ago    Have had rotator cuff surgery on both shoulders    TUMOR REMOVAL      FATTY TUMOR    UPPER GASTROINTESTINAL ENDOSCOPY  03/2021    WRIST SURGERY Right     RIGHT     Family History   Problem Relation Age of Onset    Diabetes Mother         Insulin dependent    Hypertension Mother     Heart disease Mother     Heart failure Mother     Diabetes Father     No Known Problems Brother     Colon polyps Paternal Uncle      Social History     Socioeconomic History    Marital status:    Tobacco Use    Smoking status: Every Day     Packs/day: 1.00     Years: 31.00     Pack years: 31.00     Types: Cigarettes, Pipe, Electronic Cigarette     Start date: 7/6/1987    Smokeless tobacco: Current     Types: Snuff   Vaping Use    Vaping Use: Former   Substance and Sexual Activity    Alcohol use: Yes     Alcohol/week: 8.0 standard drinks     Types: 5 Cans of beer, 3 Shots of liquor per week     Comment: occasionally    Drug use: Yes     Frequency: 5.0 times per week     Types: Amphetamines, \"Crack\" cocaine, Cocaine(coke), Marijuana, Methamphetamines    Sexual activity: Yes     Partners: Female     Birth control/protection: Condom     Allergies   Allergen Reactions    Statins Myalgia    Sulfa Antibiotics Unknown (See Comments) and Unknown - Low Severity     Remembers mother always saying he was allergic     Current Outpatient Medications   Medication Sig Dispense Refill    apixaban (ELIQUIS) 5 MG tablet tablet Take 1 tablet by mouth Every 12 " (Twelve) Hours. 180 tablet 3    Cholecalciferol (vitamin D3) 125 MCG (5000 UT) capsule capsule Take 2,000 Units by mouth Daily.      digoxin (LANOXIN) 125 MCG tablet Take 1 tablet by mouth Daily. 90 tablet 3    docusate sodium (Colace) 100 MG capsule Take 1 capsule by mouth 2 (Two) Times a Day. 60 capsule 11    DULoxetine HCl 40 MG capsule delayed-release particles Take 1 capsule by mouth Every 12 (Twelve) Hours.      furosemide (LASIX) 20 MG tablet Take 1 tablet by mouth Daily As Needed (weight gain of 5lbs in 1 week). 30 tablet 11    HumaLOG KwikPen 100 UNIT/ML solution pen-injector INJECT UNDER THE SKIN PER SLIDING SCALE THREE TIMES DAILY UP TO 80 UNITS DAILY      ibuprofen (ADVIL,MOTRIN) 600 MG tablet Take 1 tablet by mouth Every 12 (Twelve) Hours.      Insulin Pen Needle 32G X 4 MM misc Use as directed 4 (Four) Times a Day. 120 each 11    Lantus SoloStar 100 UNIT/ML injection pen ADMINISTER 10 UNITS UNDER THE SKIN TWICE DAILY      metoprolol succinate XL (TOPROL-XL) 50 MG 24 hr tablet Take 1 tablet by mouth Daily. 90 tablet 3    omeprazole (priLOSEC) 40 MG capsule Take 1 capsule by mouth Daily. 30 capsule 11    potassium chloride (K-DUR,KLOR-CON) 10 MEQ CR tablet Take 1 tablet by mouth 2 (Two) Times a Day With Meals.      sacubitril-valsartan (ENTRESTO) 24-26 MG tablet Take 1 tablet by mouth 2 (Two) Times a Day. 180 tablet 3    tadalafil (CIALIS) 10 MG tablet Take 1 tablet by mouth Daily As Needed for Erectile Dysfunction.      Vraylar 3 MG capsule capsule TAKE 1 CAPSULE BY MOUTH ONCE A DAY FOR 90 DAYS       No current facility-administered medications for this visit.     Review of Systems   Constitutional: Negative.    Skin:         Painful toenails.   All other systems reviewed and are negative.    OBJECTIVE     Vitals:    07/26/23 1450   BP: 119/77   Pulse: 69   Temp: 98 °F (36.7 °C)   SpO2: 97%       Body mass index is 34.45 kg/m².    Lab Results   Component Value Date    HGBA1C 9.5 (H) 10/28/2018       Lab  Results   Component Value Date    GLUCOSE 462 (C) 02/27/2023    CALCIUM 9.5 02/27/2023     (L) 02/27/2023    K 4.5 02/27/2023    CO2 27.2 02/27/2023    CL 97 (L) 02/27/2023    BUN 12 02/27/2023    CREATININE 0.91 02/27/2023    EGFRIFAFRI 95 03/02/2021    BCR 13.2 02/27/2023    ANIONGAP 8.8 02/27/2023       Patient seen in no apparent distress.      PHYSICAL EXAM:     Foot/Ankle Exam    GENERAL  Appearance:  appears stated age  Orientation:  AAOx3  Affect:  appropriate  Gait:  unimpaired  Assistance:  independent  Right shoe gear: casual shoe  Left shoe gear: casual shoe    VASCULAR     Right Foot Vascularity   Normal vascular exam    Dorsalis pedis:  2+  Posterior tibial:  2+  Skin temperature:  warm  Edema grading:  None  CFT:  < 3 seconds  Pedal hair growth:  Present  Varicosities:  none     Left Foot Vascularity   Normal vascular exam    Dorsalis pedis:  2+  Posterior tibial:  2+  Skin temperature:  warm  Edema grading:  None  CFT:  < 3 seconds  Pedal hair growth:  Present  Varicosities:  none     NEUROLOGIC     Right Foot Neurologic   Light touch sensation: absent  Vibratory sensation: absent  Hot/Cold sensation: absent     Left Foot Neurologic   Light touch sensation: absent  Vibratory sensation: absent  Hot/Cold sensation:  absent    MUSCLE STRENGTH     Right Foot Muscle Strength   Foot dorsiflexion:  4  Foot plantar flexion:  4  Foot inversion:  4  Foot eversion:  4     Left Foot Muscle Strength   Foot dorsiflexion:  4  Foot plantar flexion:  4  Foot inversion:  4  Foot eversion:  4    RANGE OF MOTION     Right Foot Range of Motion   Foot and ankle ROM within normal limits       Left Foot Range of Motion   Foot and ankle ROM within normal limits      DERMATOLOGIC      Right Foot Dermatologic   Skin  Right foot skin is intact.   Nails  1.  Positive for elongated, onychomycosis, abnormal thickness, subungual debris, dystrophic nail and ingrown toenail.  2.  Positive for elongated, onychomycosis, abnormal  thickness, subungual debris, dystrophic nail and ingrown toenail.  3.  Positive for elongated, onychomycosis, abnormal thickness, subungual debris, dystrophic nail and ingrown toenail.  4.  Positive for elongated, onychomycosis, abnormal thickness, subungual debris, dystrophic nail and ingrown toenail.  5.  Positive for elongated, onychomycosis, abnormal thickness, subungual debris, dystrophic nail and ingrown toenail.     Left Foot Dermatologic   Skin  Left foot skin is intact.   Nails  1.  Positive for elongated, onychomycosis, abnormal thickness, subungual debris, dystrophic nail and ingrown toenail.  2.  Positive for elongated, onychomycosis, abnormal thickness, subungual debris, dystrophic nail and ingrown toenail.  3.  Positive for elongated, onychomycosis, abnormal thickness, subungual debris, dystrophic nail and ingrown toenail.  4.  Positive for elongated, onychomycosis, abnormally thick, subungual debris, dystrophic nail and ingrown toenail.  5.  Positive for elongated, onychomycosis, abnormally thick, subungual debris, dystrophic nail and ingrown toenail.      ASSESSMENT/PLAN     Diagnoses and all orders for this visit:    1. Type 2 diabetes mellitus with diabetic polyneuropathy, with long-term current use of insulin (Primary)    2. Diabetic polyneuropathy associated with type 2 diabetes mellitus    3. Encounter for diabetic foot exam      Continue with current medication for the neuropathy.  Patient states is improving.  Return to clinic in 3 to 6 months or as needed    Comprehensive lower extremity examination and evaluation was performed.    Discussed findings and treatment plan including risks, benefits, and treatment options with patient in detail. Patient agreed with treatment plan.    Medications and allergies reviewed.  Reviewed available blood glucose and HgB A1C lab values along with other pertinent labs.  These were discussed with the patient as to their importance of diabetic  maintenance.    Diabetic foot exam performed and documented this date, compliant with CQM required standards. Detail of findings as noted in physical exam.  Lower extremity Neurologic exam for diabetic patient performed and documented this date, compliant with PQRS required standards. Detail of findings as noted in physical exam.  Advised patient importance of good routine lower extremity hygiene. Advised patient importance of evaluating for intact skin and pain free nail borders.  Advised patient to use mirror to evaluate plantar/ soles of feet for better visualization. Advised patient monitor and phone office to be seen if any cracking to skin, open lesions, painful nail borders or if nails become elongated prior to next visit. Advised patient importance of daily cleansing of lower extremities, followed by good skin cream to maintain normal hydration of skin. Also advised patient importance of close daily monitoring of blood sugar. Advised to regulate diet and medications to maintain control of blood sugar in optimal range. Contact primary care provider if difficulties maintaining blood sugar levels.  Advised Patient of presence of Diabetes Mellitus condition.  Advised Patient risk of progression and worsening or improvement, then return of condition.  Will monitor condition for any change in future. Treat with most appropriate treatment pending status of condition.  Counseled and advised patient extensively on nature and ramifications of diabetes. Standard instructions given to patient for good diabetic foot care and maintenance. Advised importance of careful monitoring to avoid break down and complications secondary to diabetes. Advised patient importance of strict maintenance of blood sugar control. Advised patient of possible ominous results from neglect of condition, i.e.: amputation/ loss of digits, feet and legs, or even death.  Patient states understands counseling, will monitor closely, continue good  hygiene and routine diabetic foot care. Patient will contact office is questions or problems.      An After Visit Summary was printed and given to the patient at discharge, including (if requested) any available informative/educational handouts regarding diagnosis, treatment, or medications. All questions were answered to patient/family satisfaction. Should symptoms fail to improve or worsen they agree to call or return to clinic or to go to the Emergency Department. Discussed the importance of following up with any needed screening tests/labs/specialist appointments and any requested follow-up recommended by me today. Importance of maintaining follow-up discussed and patient accepts that missed appointments can delay diagnosis and potentially lead to worsening of conditions.    No follow-ups on file., or sooner if acute issues arise.    This document has been electronically signed by Mir Mo DPM on July 27, 2023 07:14 EDT

## 2023-10-13 ENCOUNTER — HOSPITAL ENCOUNTER (EMERGENCY)
Facility: HOSPITAL | Age: 53
Discharge: HOME | End: 2023-10-13
Attending: EMERGENCY MEDICINE
Payer: MEDICAID

## 2023-10-13 VITALS
DIASTOLIC BLOOD PRESSURE: 94 MMHG | TEMPERATURE: 98.1 F | WEIGHT: 256.17 LBS | HEIGHT: 71 IN | RESPIRATION RATE: 16 BRPM | SYSTOLIC BLOOD PRESSURE: 143 MMHG | OXYGEN SATURATION: 97 % | HEART RATE: 82 BPM | BODY MASS INDEX: 35.86 KG/M2

## 2023-10-13 DIAGNOSIS — M54.2 MUSCULOSKELETAL NECK PAIN: ICD-10-CM
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
## 2023-10-13 PROCEDURE — 99282 EMERGENCY DEPT VISIT SF MDM: CPT

## 2023-10-13 PROCEDURE — 25010000002 KETOROLAC TROMETHAMINE PER 15 MG

## 2023-10-13 PROCEDURE — 25010000002 ORPHENADRINE CITRATE PER 60 MG

## 2023-10-13 PROCEDURE — 96372 THER/PROPH/DIAG INJ SC/IM: CPT

## 2023-10-13 RX ORDER — KETOROLAC TROMETHAMINE 30 MG/ML
30 INJECTION, SOLUTION INTRAMUSCULAR; INTRAVENOUS ONCE
Status: COMPLETED | OUTPATIENT
Start: 2023-10-13 | End: 2023-10-13

## 2023-10-13 RX ORDER — CYCLOBENZAPRINE HCL 10 MG
10 TABLET ORAL 3 TIMES DAILY
Qty: 20 TABLET | Refills: 0 | Status: SHIPPED | OUTPATIENT
Start: 2023-10-13

## 2023-10-13 RX ORDER — KETOROLAC TROMETHAMINE 10 MG/1
10 TABLET, FILM COATED ORAL EVERY 6 HOURS PRN
Qty: 15 TABLET | Refills: 0 | Status: SHIPPED | OUTPATIENT
Start: 2023-10-13 | End: 2023-10-13

## 2023-10-13 RX ORDER — ORPHENADRINE CITRATE 30 MG/ML
60 INJECTION INTRAMUSCULAR; INTRAVENOUS ONCE
Status: COMPLETED | OUTPATIENT
Start: 2023-10-13 | End: 2023-10-13

## 2023-10-13 RX ADMIN — ORPHENADRINE CITRATE 60 MG: 60 INJECTION INTRAMUSCULAR; INTRAVENOUS at 17:47

## 2023-10-13 RX ADMIN — KETOROLAC TROMETHAMINE 30 MG: 60 INJECTION, SOLUTION INTRAMUSCULAR at 17:47

## 2023-10-13 NOTE — DISCHARGE INSTRUCTIONS
I have sent muscle relaxers to the pharmacy, please limit use of ibuprofen due to you being on Eliquis  You can take Tylenol in addition.
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
## 2023-11-07 ENCOUNTER — TELEPHONE (OUTPATIENT)
Dept: DIABETES SERVICES | Facility: HOSPITAL | Age: 53
End: 2023-11-07

## 2023-11-07 ENCOUNTER — OFFICE VISIT (OUTPATIENT)
Dept: DIABETES SERVICES | Facility: HOSPITAL | Age: 53
End: 2023-11-07
Payer: MEDICAID

## 2023-11-07 VITALS
OXYGEN SATURATION: 96 % | WEIGHT: 250.8 LBS | SYSTOLIC BLOOD PRESSURE: 108 MMHG | HEART RATE: 74 BPM | BODY MASS INDEX: 34.98 KG/M2 | DIASTOLIC BLOOD PRESSURE: 74 MMHG

## 2023-11-07 DIAGNOSIS — E66.9 OBESITY (BMI 30-39.9): ICD-10-CM

## 2023-11-07 DIAGNOSIS — E10.65 UNCONTROLLED TYPE 1 DIABETES MELLITUS WITH HYPERGLYCEMIA: Primary | ICD-10-CM

## 2023-11-07 DIAGNOSIS — E10.22 TYPE 1 DIABETES WITH STAGE 2 CHRONIC KIDNEY DISEASE GFR 60-89: ICD-10-CM

## 2023-11-07 DIAGNOSIS — N18.2 TYPE 1 DIABETES WITH STAGE 2 CHRONIC KIDNEY DISEASE GFR 60-89: ICD-10-CM

## 2023-11-07 DIAGNOSIS — E10.40 TYPE 1 DIABETES MELLITUS WITH DIABETIC NEUROPATHY: ICD-10-CM

## 2023-11-07 LAB — GLUCOSE BLDC GLUCOMTR-MCNC: 372 MG/DL (ref 70–99)

## 2023-11-07 PROCEDURE — 1159F MED LIST DOCD IN RCRD: CPT | Performed by: NURSE PRACTITIONER

## 2023-11-07 PROCEDURE — 1160F RVW MEDS BY RX/DR IN RCRD: CPT | Performed by: NURSE PRACTITIONER

## 2023-11-07 PROCEDURE — 82948 REAGENT STRIP/BLOOD GLUCOSE: CPT | Performed by: NURSE PRACTITIONER

## 2023-11-07 PROCEDURE — 99204 OFFICE O/P NEW MOD 45 MIN: CPT | Performed by: NURSE PRACTITIONER

## 2023-11-07 PROCEDURE — G0463 HOSPITAL OUTPT CLINIC VISIT: HCPCS | Performed by: NURSE PRACTITIONER

## 2023-11-07 RX ORDER — INSULIN GLARGINE 100 [IU]/ML
18 INJECTION, SOLUTION SUBCUTANEOUS 2 TIMES DAILY
Qty: 33 ML | Refills: 1 | Status: SHIPPED | OUTPATIENT
Start: 2023-11-07 | End: 2024-05-08

## 2023-11-07 RX ORDER — BUPROPION HYDROCHLORIDE 300 MG/1
300 TABLET ORAL EVERY MORNING
COMMUNITY
Start: 2023-10-31

## 2023-11-07 NOTE — TELEPHONE ENCOUNTER
FreeStyle Scotty 3 System, Continuous Glucose Monitoring Package    Scotty 3 Sensor, change every 14 days - CGMS    Scotty 3 [place] - CGMR    Quantity  1  Prescription Details  Directions for CGM use - Use per  directions  Edit Prescription Details  Supplier  [facility]

## 2023-11-07 NOTE — PATIENT INSTRUCTIONS
Increase your Lantus from 15 units twice daily to 18 units twice daily.  Make sure to do these injections 12 hours apart.  I want you to increase by 2 units in the a.m. and 2 units in the p.m. every 3 days until your fasting blood sugar is less than 150 then remain on that dose.  For example tonight you will increase your Lantus to 18 units.  If in 3 days you notice your fasting blood sugar is staying above 150 then increase to 20 units twice daily continue this process every 3 days until your fasting blood sugar remains less than 150 then remain on that dose.    A freestyle Scotty 3 and reader was ordered and will be mailed to your home.

## 2023-11-07 NOTE — PROGRESS NOTES
Chief Complaint  Diabetes (New patient, no devices )    Referred By: Christopher Galarza MD    Subjective          Edd Willingham presents to [facility] for diabetes medication management    History of Present Illness    Visit type:  to establish care  Diabetes type:  Type 1  Age at time of dx/Year of dx/Number of years: Reports he was diagnosed with type 1 diabetes about 15 years ago  Family History of Diabetes: Mother and father  Current diabetes status/concerns/issues: He was referred to our clinic by his primary care provider for management of his diabetes.Reports his blood sugar has been running high. States he is checking his blood sugar 5-6 times per day and his blood sugar runs around 250mg/dl. Reports he increased his lantus from 10 units bid to 15 units bid.   Other current health concerns: he has a hx of alcohol and drug abuse and is living at a sober living facility called [facility].   Current Diabetes symptoms:    Polyuria: Yes     Polydipsia: Yes     Polyphagia: No   Blurred vision: Yes     Excessive fatigue: Yes    Known Diabetes complications:  Neuropathy: Numbness and Tingling; Location: Feet  Renal: Stage II mild (GFR = 60-89 mL/min)  Eyes: No current eye exam available in record; Location: N/A; Last Eye Exam:  2022 ; Location: [facility]  Amputation/Wounds: None  GI: Reflux  Cardiovascular: Hypertension, MI (History of), and Other: A Fib  ED: Patient Reported  Other: None  Hospitalizations/ED/911 secondary to DM?  No  Hypoglycemia:  None reported at this time  Hypoglycemia Symptoms:  No hypoglycemia at this time  Current Diabetes treatment:  Lantus 15 units bid, Humalog tid per sliding scale-normally 8-12 units per meal  Prior diabetes treatments:  none  Using ACEI or ARB: Yes, entresto 24-26mg one tab bid, Managed by other provider  Using Statin: Contraindication/Allergy  Blood glucose device:  Meter  Blood glucose monitoring frequency:   5-6  "times day  Blood glucose range/average:   average 250mg/dl  Glucose Source: Patient Reported  Dietary behavior:  \"Eat what I want\"/No diet plan, Number of meals each day - 3; Number of snacks each day - grazes throughout the day on snacks  Activity/Exercise:  None  Last Foot Exam:   2 months ago  Diabetes Education Hx: Diabetes Nutrition Counseling  Social Determinants of Health: Food insecurities; he is limited on what he can eat due to refrigerator space so he buys cereal and noodles things that do no have to be transported, Housing; lives at [facility], and Transportation; [employer].    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    MI (myocardial infarction)     Neck strain 15 years ago    Have stiffness and pain in Neck and reduce range of motion    Rotator cuff syndrome 15 years ago     Past Surgical History:   Procedure Laterality Date    CARDIAC CATHETERIZATION N/A 10/26/2018    Procedure: Left Heart Cath;  Surgeon: Param Mcfarland MD;  Location: [facility] CATH INVASIVE LOCATION;  Service: Cardiology    COLONOSCOPY N/A 03/24/2021    Procedure: COLONOSCOPY;  Surgeon: Dinesh Drummond MD;  Location: [facility] ENDOSCOPY;  Service: Gastroenterology;  Laterality: N/A;    COLONOSCOPY N/A 05/18/2022    Procedure: COLONOSCOPY;  Surgeon: Dinesh Drummond MD;  Location: [facility] ENDOSCOPY;  Service: Gastroenterology;  Laterality: N/A;    ELBOW PROCEDURE Left     left    ENDOSCOPY N/A " 03/24/2021    Procedure: ESOPHAGOGASTRODUODENOSCOPY;  Surgeon: Dinesh Drummond MD;  Location: [facility] ENDOSCOPY;  Service: Gastroenterology;  Laterality: N/A;    ROTATOR CUFF REPAIR Right     RIGHT    SHOULDER SURGERY  8-15 years ago    Have had rotator cuff surgery on both shoulders    TUMOR REMOVAL      FATTY TUMOR    UPPER GASTROINTESTINAL ENDOSCOPY  03/2021    WRIST SURGERY Right     RIGHT     Family History   Problem Relation Age of Onset    Diabetes Mother         Insulin dependent    Hypertension Mother     Heart disease Mother     Heart failure Mother     Diabetes Father     No Known Problems Brother     Colon polyps Paternal Uncle      Social History     Socioeconomic History    Marital status:    Tobacco Use    Smoking status: Every Day     Packs/day: 0.50     Years: 31.00     Additional pack years: 0.00     Total pack years: 15.50     Types: Cigarettes, Pipe, Electronic Cigarette     Start date: 7/6/1987    Smokeless tobacco: Current     Types: Snuff   Vaping Use    Vaping Use: Former   Substance and Sexual Activity    Alcohol use: Not Currently     Comment: he has not drank for 1 year    Drug use: Not Currently     Comment: he has been clean from drugs for 1 year    Sexual activity: Yes     Partners: Female     Birth control/protection: Condom     Allergies   Allergen Reactions    Statins Myalgia    Sulfa Antibiotics Unknown (See Comments) and Unknown - Low Severity     Remembers mother always saying he was allergic       Current Outpatient Medications:     apixaban (ELIQUIS) 5 MG tablet tablet, Take 1 tablet by mouth Every 12 (Twelve) Hours., Disp: 180 tablet, Rfl: 3    buPROPion XL (WELLBUTRIN XL) 300 MG 24 hr tablet, Take 1 tablet by mouth Every Morning., Disp: , Rfl:     Cholecalciferol (vitamin D3) 125 MCG (5000 UT) capsule capsule, Take 2,000 Units by mouth Daily., Disp: , Rfl:     cyclobenzaprine (FLEXERIL) 10 MG tablet, Take 1 tablet by mouth 3 (Three) Times a Day., Disp: 20 tablet, Rfl:  0    digoxin (LANOXIN) 125 MCG tablet, Take 1 tablet by mouth Daily., Disp: 90 tablet, Rfl: 3    docusate sodium (Colace) 100 MG capsule, Take 1 capsule by mouth 2 (Two) Times a Day., Disp: 60 capsule, Rfl: 11    DULoxetine HCl 40 MG capsule delayed-release particles, Take 1 capsule by mouth Every 12 (Twelve) Hours., Disp: , Rfl:     furosemide (LASIX) 20 MG tablet, Take 1 tablet by mouth Daily As Needed (weight gain of 5lbs in 1 week)., Disp: 30 tablet, Rfl: 11    HumaLOG KwikPen 100 UNIT/ML solution pen-injector, INJECT UNDER THE SKIN PER SLIDING SCALE THREE TIMES DAILY UP TO 80 UNITS DAILY, Disp: , Rfl:     ibuprofen (ADVIL,MOTRIN) 600 MG tablet, Take 1 tablet by mouth Every 12 (Twelve) Hours., Disp: , Rfl:     Insulin Pen Needle 32G X 4 MM misc, Use as directed 4 (Four) Times a Day., Disp: 120 each, Rfl: 11    Lantus SoloStar 100 UNIT/ML injection pen, Inject 18 Units under the skin into the appropriate area as directed 2 (Two) Times a Day for 183 days., Disp: 33 mL, Rfl: 1    metoprolol succinate XL (TOPROL-XL) 50 MG 24 hr tablet, Take 1 tablet by mouth Daily., Disp: 90 tablet, Rfl: 3    omeprazole (priLOSEC) 40 MG capsule, Take 1 capsule by mouth Daily., Disp: 30 capsule, Rfl: 11    potassium chloride (K-DUR,KLOR-CON) 10 MEQ CR tablet, Take 1 tablet by mouth 2 (Two) Times a Day With Meals., Disp: , Rfl:     sacubitril-valsartan (ENTRESTO) 24-26 MG tablet, Take 1 tablet by mouth 2 (Two) Times a Day., Disp: 180 tablet, Rfl: 3    tadalafil (CIALIS) 10 MG tablet, Take 1 tablet by mouth Daily As Needed for Erectile Dysfunction., Disp: , Rfl:     Vraylar 3 MG capsule capsule, TAKE 1 CAPSULE BY MOUTH ONCE A DAY FOR 90 DAYS, Disp: , Rfl:     Objective     Vitals:    11/07/23 0911   BP: 108/74   BP Location: Right arm   Patient Position: Sitting   Cuff Size: Large Adult   Pulse: 74   SpO2: 96%   Weight: 114 kg (250 lb 12.8 oz)     Body mass index is 34.98 kg/m².    Physical Exam  Constitutional:       Appearance:  Normal appearance. He is obese.      Comments: Severe Obesity with Comorbidity ( BMI 35 - 39.9) Pt Current BMI = 34.98 with comorbidities of HTN, hyperlipidemia, MI and DM    HENT:      Head: Normocephalic and atraumatic.      Right Ear: External ear normal.      Left Ear: External ear normal.      Nose: Nose normal.   Eyes:      Extraocular Movements: Extraocular movements intact.      Conjunctiva/sclera: Conjunctivae normal.   Pulmonary:      Effort: Pulmonary effort is normal.   Musculoskeletal:         General: Normal range of motion.      Cervical back: Normal range of motion.   Skin:     General: Skin is warm and dry.   Neurological:      General: No focal deficit present.      Mental Status: He is alert and oriented to person, place, and time. Mental status is at baseline.   Psychiatric:         Mood and Affect: Mood normal.         Behavior: Behavior normal.         Thought Content: Thought content normal.         Judgment: Judgment normal.             Result Review :   The following data was reviewed by: NATHANIEL Fierro on 11/07/2023:          A1c collected on 10/2/23  is 11.7%, indicating Uncontrolled Type I diabetes.      Glucose   Date Value Ref Range Status   11/07/2023 372 (H) 70 - 99 mg/dL Final     Comment:     Serial Number: [id number]:  [id number]     Point of care glucose in the office today is  elevated to 372mg/dl.     Creatinine   Date Value Ref Range Status   02/27/2023 0.91 0.76 - 1.27 mg/dL Final   03/02/2021 1.00 0.76 - 1.27 mg/dL Final     eGFR   Date Value Ref Range Status   02/27/2023 100.2 >60.0 mL/min/1.73 Final     Labs collected on 2/27/23 show Normal values                Assessment: Patient was referred to our clinic by his primary care provider for management of his diabetes.  Reports he was diagnosed with type 1 diabetes about 15 years ago.  States he was initially started on metformin but then found out that he had type 1 diabetes so that was discontinued and  he has been on Lantus and Humalog since that time.  Reports his glucose is running an average of 250 mg/dL.  States he is checking his glucose level 5-6 times daily.  Reports he increased his Lantus from 10 units twice daily to 15 units twice daily due to hyperglycemia but is blood sugar continues to be uncontrolled.  States he has never been in controlled status with his diabetes.  Reports he does not follow a diabetic diet.  Admits he is currently living at a sober living house so he is limited on what foods he can prepare due to limited space.  States he eats mostly cereal and Ramen noodles.  Reports he has met with a dietitian many years ago.  He just defers the services today.  His A1c on 10/2/2023 was 11.7% indicating uncontrolled type 1 diabetes.      Diagnoses and all orders for this visit:    1. Uncontrolled type 1 diabetes mellitus with hyperglycemia (Primary)  -     C-Peptide; Future  -     Glucose, Fasting; Future  -     Lantus SoloStar 100 UNIT/ML injection pen; Inject 18 Units under the skin into the appropriate area as directed 2 (Two) Times a Day for 183 days.  Dispense: 33 mL; Refill: 1    2. Obesity (BMI 30-39.9)    3. Type 1 diabetes mellitus with diabetic neuropathy    4. Type 1 diabetes with stage 2 chronic kidney disease GFR 60-89    Other orders  -     POC Glucose        Plan: Increased his dose of Lantus to 18 units twice daily.  Instructed patient make sure he is doing these injections 12 hours apart.  Instructed patient to increase by 2 units in the a.m. and p.m. every 3 days until his fasting blood sugar is less than 150 then he is to remain on high dose.  A francesco 3 and reader was ordered and will be mailed to his home for closer monitoring of his glucose levels.  A c-peptide and fasting glucose was ordered. Scheduled a 1 month follow-up.    The patient will monitor his blood glucose levels per CGM.  If he develops problematic hyperglycemia or hypoglycemia or adverse drug reactions, he  will contact the office for further instructions.        Follow Up     Return in about 4 weeks (around 12/5/2023) for CGM Follow Up, Medication Mgmt.    Patient was given instructions and counseling regarding his condition or for health maintenance advice. Please see specific information pulled into the AVS if appropriate.     Yolanda Hess, APRN  11/07/2023      Dictated Utilizing Dragon Dictation.  Please note that portions of this note were completed with a voice recognition program.  Part of this note may be an electronic transcription/translation of spoken language to printed text using the Dragon Dictation System.

## 2023-12-05 NOTE — PROGRESS NOTES
"Chief Complaint  Diabetes (Follow up, no devices )    Referred By: Christopher Galarza MD    Subjective          Edd Willingham presents to [facility] for diabetes medication management    History of Present Illness    Visit type:  follow-up  Diabetes type:  Type 1  Current diabetes status/concerns/issues:  Reports he increased his Lantus to 25 units but he began to get low on the insulin so he stopped tapering up. States his blood sugar is running 190-200 fasting and 230-240 nonfasting. States he did not receive the francesco 3 or reader.   Other health concerns: none  Current Diabetes symptoms:    Polyuria: Yes but decreased   Polydipsia: Yes but decreased   Polyphagia: Yes but out of boredom   Blurred vision: No   Excessive fatigue: No  Known Diabetes complications:  Neuropathy: Numbness and Tingling; Location: Feet  Renal: Stage II mild (GFR = 60-89 mL/min)  Eyes: No current eye exam available in record; Location: N/A; Last Eye Exam:  2022 ; Location: [facility]  Amputation/Wounds: None  GI: Reflux  Cardiovascular: Hypertension, MI (History of), and Other: A Fib  ED: Patient Reported  Other: None  Hypoglycemia:  None reported at this time  Hypoglycemia Symptoms:  No hypoglycemia at this time  Current diabetes treatment:   Lantus 25 units bid, Humalog tid per sliding scale-normally 8-12 units per meal   Blood glucose device:  Meter  Blood glucose monitoring frequency:   5-6 times daily  Blood glucose range/average:  190-200 fasting and 230-240 nonfasting  Glucose Source: Patient Reported  Dietary behavior:  \"Eat what I want\"/No diet plan, Number of meals each day - 3; Number of snacks each day - grazes throughout the day on snacks  Activity/Exercise:  None    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    Atrial fibrillation 10/25-10-    CTS (carpal tunnel syndrome) 10 years ago    Drove over the road " trucks shifting gears only steering wheel    Depression     Diabetes mellitus     Elevated cholesterol     GERD (gastroesophageal reflux disease)     Heart murmur     Hypertension     Knee sprain 1985    Knee swelling 15 years ago    Low back pain     Low back strain 1992    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 CATH INVASIVE LOCATION;  Service: Cardiology    COLONOSCOPY N/A 03/24/2021    Procedure: COLONOSCOPY;  Surgeon: Dinesh Drummond MD;  Location: [facility] ENDOSCOPY;  Service: Gastroenterology;  Laterality: N/A;    COLONOSCOPY N/A 05/18/2022    Procedure: COLONOSCOPY;  Surgeon: Dinesh Drummond MD;  Location: [facility] ENDOSCOPY;  Service: Gastroenterology;  Laterality: N/A;    ELBOW PROCEDURE Left     left    ENDOSCOPY N/A 03/24/2021    Procedure: ESOPHAGOGASTRODUODENOSCOPY;  Surgeon: Dinesh Drummond MD;  Location: [facility] ENDOSCOPY;  Service: Gastroenterology;  Laterality: N/A;    ROTATOR CUFF REPAIR Right     RIGHT    SHOULDER SURGERY  8-15 years ago    Have had rotator cuff surgery on both shoulders    TUMOR REMOVAL      FATTY TUMOR    UPPER GASTROINTESTINAL ENDOSCOPY  03/2021    WRIST SURGERY Right     RIGHT     Family History   Problem Relation Age of Onset    Diabetes Mother         Insulin dependent    Hypertension Mother     Heart disease Mother     Heart failure Mother     Diabetes Father     No Known Problems Brother     Colon polyps Paternal Uncle      Social History     Socioeconomic History    Marital status:    Tobacco Use    Smoking status: Every Day     Packs/day: 0.50     Years: 31.00     Additional pack years: 0.00      Total pack years: 15.50     Types: Cigarettes, Pipe, Electronic Cigarette     Start date: 7/6/1987    Smokeless tobacco: Current     Types: Snuff   Vaping Use    Vaping Use: Former   Substance and Sexual Activity    Alcohol use: Not Currently     Comment: he has not drank for 1 year    Drug use: Not Currently     Comment: he has been clean from drugs for 1 year    Sexual activity: Yes     Partners: Female     Birth control/protection: Condom     Allergies   Allergen Reactions    Statins Myalgia    Sulfa Antibiotics Unknown (See Comments) and Unknown - Low Severity     Remembers mother always saying he was allergic       Current Outpatient Medications:     apixaban (ELIQUIS) 5 MG tablet tablet, Take 1 tablet by mouth Every 12 (Twelve) Hours., Disp: 180 tablet, Rfl: 3    buPROPion XL (WELLBUTRIN XL) 300 MG 24 hr tablet, Take 1 tablet by mouth Every Morning., Disp: , Rfl:     Cholecalciferol (vitamin D3) 125 MCG (5000 UT) capsule capsule, Take 2,000 Units by mouth Daily., Disp: , Rfl:     cyclobenzaprine (FLEXERIL) 10 MG tablet, Take 1 tablet by mouth 3 (Three) Times a Day., Disp: 20 tablet, Rfl: 0    digoxin (LANOXIN) 125 MCG tablet, Take 1 tablet by mouth Daily., Disp: 90 tablet, Rfl: 3    docusate sodium (Colace) 100 MG capsule, Take 1 capsule by mouth 2 (Two) Times a Day., Disp: 60 capsule, Rfl: 11    DULoxetine HCl 40 MG capsule delayed-release particles, Take 1 capsule by mouth Every 12 (Twelve) Hours., Disp: , Rfl:     furosemide (LASIX) 20 MG tablet, Take 1 tablet by mouth Daily As Needed (weight gain of 5lbs in 1 week)., Disp: 30 tablet, Rfl: 11    HumaLOG KwikPen 100 UNIT/ML solution pen-injector, INJECT UNDER THE SKIN PER SLIDING SCALE THREE TIMES DAILY UP TO 80 UNITS DAILY, Disp: , Rfl:     ibuprofen (ADVIL,MOTRIN) 600 MG tablet, Take 1 tablet by mouth Every 12 (Twelve) Hours., Disp: , Rfl:     Insulin Pen Needle 32G X 4 MM misc, Use as directed 4 (Four) Times a Day., Disp: 120 each, Rfl: 11    Lantus  "SoloStar 100 UNIT/ML injection pen, Inject 28 Units under the skin into the appropriate area as directed 2 (Two) Times a Day for 182 days., Disp: 51 mL, Rfl: 1    metoprolol succinate XL (TOPROL-XL) 50 MG 24 hr tablet, Take 1 tablet by mouth Daily., Disp: 90 tablet, Rfl: 3    omeprazole (priLOSEC) 40 MG capsule, Take 1 capsule by mouth Daily., Disp: 30 capsule, Rfl: 11    OneTouch Ultra test strip, 1 each by Other route 4 (Four) Times a Day. as directed, Disp: , Rfl:     potassium chloride (K-DUR,KLOR-CON) 10 MEQ CR tablet, Take 1 tablet by mouth 2 (Two) Times a Day With Meals., Disp: , Rfl:     sacubitril-valsartan (ENTRESTO) 24-26 MG tablet, Take 1 tablet by mouth 2 (Two) Times a Day., Disp: 180 tablet, Rfl: 3    tadalafil (CIALIS) 10 MG tablet, Take 1 tablet by mouth Daily As Needed for Erectile Dysfunction., Disp: , Rfl:     Vraylar 3 MG capsule capsule, TAKE 1 CAPSULE BY MOUTH ONCE A DAY FOR 90 DAYS, Disp: , Rfl:     Objective     Vitals:    12/06/23 0835   BP: 114/80   BP Location: Right arm   Patient Position: Sitting   Cuff Size: Large Adult   Pulse: 76   SpO2: 99%   Weight: 118 kg (260 lb 12.8 oz)   Height: 180.3 cm (71\")     Body mass index is 36.37 kg/m².    Physical Exam  Constitutional:       Appearance: Normal appearance. He is normal weight. He is obese.      Comments: Obesity (BMI 30 - 39.9) Pt Current BMI = 36.37     HENT:      Head: Normocephalic and atraumatic.      Right Ear: External ear normal.      Left Ear: External ear normal.      Nose: Nose normal.   Eyes:      Extraocular Movements: Extraocular movements intact.      Conjunctiva/sclera: Conjunctivae normal.   Pulmonary:      Effort: Pulmonary effort is normal.   Musculoskeletal:         General: Normal range of motion.      Cervical back: Normal range of motion.   Skin:     General: Skin is warm and dry.   Neurological:      General: No focal deficit present.      Mental Status: He is alert and oriented to person, place, and time. Mental " status is at baseline.   Psychiatric:         Mood and Affect: Mood normal.         Behavior: Behavior normal.         Thought Content: Thought content normal.         Judgment: Judgment normal.             Result Review :   The following data was reviewed by: NATHANIEL Fierro on 12/06/2023:    Most Recent A1C          12/6/2023    08:42   HGBA1C Most Recent   Hemoglobin A1C 11.9        A1C Last 3 Results          12/6/2023    08:42   HGBA1C Last 3 Results   Hemoglobin A1C 11.9      A1c collected in the office today is 11.9%, indicating Uncontrolled Type I diabetes.  This result is up from the prior result of 11.7% collected on 10/2/2023         Glucose   Date Value Ref Range Status   12/06/2023 254 (H) 70 - 99 mg/dL Final     Comment:     Serial Number: [id number]:  [id number]     Point of care glucose in the office today is  elevated at 254 mg/dL.    Creatinine   Date Value Ref Range Status   02/27/2023 0.91 0.76 - 1.27 mg/dL Final   03/02/2021 1.00 0.76 - 1.27 mg/dL Final     eGFR   Date Value Ref Range Status   02/27/2023 100.2 >60.0 mL/min/1.73 Final     Labs collected on 2/27/2023 show Normal values              Assessment: Patient is here today for 1 month follow-up on his diabetes.  Last visit he was placed on an insulin taper.  His dose of Lantus was increased to 18 units twice daily and he was to increase by 2 units in the a.m. and 2 units in the p.m. every 2 days until his fasting blood sugar was less than 150 then he was to remain on that dose.  He reports he tapered up to 25 units twice daily however he reports he was run out of insulin so he stopped tapering up.  Admits his fasting blood sugar is running 190 to 200 mg/dL and his nonfasting glucose is running to 230 to 240 mg/dL.  Unfortunately has been unable to receive his francesco 3 and reader for closer monitoring of his glucose levels.  We were able to contact [employer] and they have been trying to reach out to him to mail this device.   Patient was given the number to call so the device can be mailed.  Mr. Willingham admits he has not had his labs drawn that were ordered last visit to include a C-peptide and fasting glucose.  Admits in the past he was on metformin for his diabetes and then he was transferred to insulin so he thought he was type I diabetic.  Admits he did have another physician tell him he was type I diabetic however he has never had a C-peptide to confirm.  Reports cardiology had him on Farxiga for short period then it was discontinued for an unknown reason.      Diagnoses and all orders for this visit:    1. Uncontrolled type 1 diabetes mellitus with hyperglycemia (Primary)  -     Lantus SoloStar 100 UNIT/ML injection pen; Inject 28 Units under the skin into the appropriate area as directed 2 (Two) Times a Day for 182 days.  Dispense: 51 mL; Refill: 1  -     Lipid Panel; Future  -     Microalbumin / Creatinine Urine Ratio - Urine, Clean Catch; Future  -     Ambulatory Referral to [facility]    2. Type 2 diabetes mellitus with diabetic polyneuropathy, with long-term current use of insulin  -     POC Glycosylated Hemoglobin (Hb A1C)    3. Type 1 diabetes mellitus with diabetic neuropathy    4. Obesity (BMI 30-39.9)    5. Type 1 diabetes with stage 2 chronic kidney disease GFR 60-89    Other orders  -     POC Glucose        Plan: Increased his dose of Lantus to 28 units twice daily.  Instructed patient to increase by 2 units in the a.m. and 2 units in the p.m. every 3 days until his fasting blood sugars less than 150 then he is to remain on that dose.  A refill for Lantus was sent as well.  Instructed patient to contact the office when he receives his francesco and reader to schedule an appointment with the diabetic nurse educator for teaching on this device. Ordered a lipid and cmp in addition to his C-peptide and fasting glucose.  Instructed patient he will need to be fasting for these labs.  If he is a type II would  like to restart his Farxiga 10 mg once daily.  I will reach out to cardiology to see why this was discontinued.  Scheduled a 1 month follow-up and we will review his CGM at that time.    The patient will monitor his blood glucose levels per CGM.  If he develops problematic hyperglycemia or hypoglycemia or adverse drug reactions, he will contact the office for further instructions.        Follow Up     No follow-ups on file.    Patient was given instructions and counseling regarding his condition or for health maintenance advice. Please see specific information pulled into the AVS if appropriate.     Yolanda Hess, APRN  12/06/2023      Dictated Utilizing Dragon Dictation.  Please note that portions of this note were completed with a voice recognition program.  Part of this note may be an electronic transcription/translation of spoken language to printed text using the Dragon Dictation System.

## 2023-12-06 ENCOUNTER — OFFICE VISIT (OUTPATIENT)
Dept: DIABETES SERVICES | Facility: HOSPITAL | Age: 53
End: 2023-12-06
Payer: MEDICAID

## 2023-12-06 VITALS
HEIGHT: 71 IN | SYSTOLIC BLOOD PRESSURE: 114 MMHG | WEIGHT: 260.8 LBS | DIASTOLIC BLOOD PRESSURE: 80 MMHG | BODY MASS INDEX: 36.51 KG/M2 | HEART RATE: 76 BPM | OXYGEN SATURATION: 99 %

## 2023-12-06 DIAGNOSIS — E10.40 TYPE 1 DIABETES MELLITUS WITH DIABETIC NEUROPATHY: ICD-10-CM

## 2023-12-06 DIAGNOSIS — Z79.4 TYPE 2 DIABETES MELLITUS WITH DIABETIC POLYNEUROPATHY, WITH LONG-TERM CURRENT USE OF INSULIN: ICD-10-CM

## 2023-12-06 DIAGNOSIS — E66.9 OBESITY (BMI 30-39.9): ICD-10-CM

## 2023-12-06 DIAGNOSIS — E11.42 TYPE 2 DIABETES MELLITUS WITH DIABETIC POLYNEUROPATHY, WITH LONG-TERM CURRENT USE OF INSULIN: ICD-10-CM

## 2023-12-06 DIAGNOSIS — E10.22 TYPE 1 DIABETES WITH STAGE 2 CHRONIC KIDNEY DISEASE GFR 60-89: ICD-10-CM

## 2023-12-06 DIAGNOSIS — N18.2 TYPE 1 DIABETES WITH STAGE 2 CHRONIC KIDNEY DISEASE GFR 60-89: ICD-10-CM

## 2023-12-06 DIAGNOSIS — E10.65 UNCONTROLLED TYPE 1 DIABETES MELLITUS WITH HYPERGLYCEMIA: Primary | ICD-10-CM

## 2023-12-06 LAB
EXPIRATION DATE: ABNORMAL
GLUCOSE BLDC GLUCOMTR-MCNC: 254 MG/DL (ref 70–99)
HBA1C MFR BLD: 11.9 % (ref 4.5–5.7)
Lab: ABNORMAL

## 2023-12-06 PROCEDURE — G0463 HOSPITAL OUTPT CLINIC VISIT: HCPCS | Performed by: NURSE PRACTITIONER

## 2023-12-06 PROCEDURE — 82948 REAGENT STRIP/BLOOD GLUCOSE: CPT | Performed by: NURSE PRACTITIONER

## 2023-12-06 RX ORDER — BLOOD SUGAR DIAGNOSTIC
1 STRIP MISCELLANEOUS 4 TIMES DAILY
COMMUNITY
Start: 2023-11-08

## 2023-12-06 RX ORDER — INSULIN GLARGINE 100 [IU]/ML
28 INJECTION, SOLUTION SUBCUTANEOUS 2 TIMES DAILY
Qty: 51 ML | Refills: 1 | Status: SHIPPED | OUTPATIENT
Start: 2023-12-06 | End: 2024-06-05

## 2023-12-06 NOTE — PATIENT INSTRUCTIONS
Increase Lantus to 28 units twice daily.  I want you to increase by 2 units in the a.m. and p.m. every 3 days until his fasting blood sugar is less than 150 then he is to remain on high dose.    Call the office when he receive your francesco and reader to schedule an appoint with the diabetic nurse educator for teaching on this device.    Please have the labs drawn that were ordered and they will need to be fasting labs.  The lab will differentiate between type I and type 2 diabetes.

## 2023-12-18 ENCOUNTER — LAB (OUTPATIENT)
Dept: LAB | Facility: HOSPITAL | Age: 53
End: 2023-12-18
Payer: MEDICAID

## 2023-12-18 DIAGNOSIS — E10.65 UNCONTROLLED TYPE 1 DIABETES MELLITUS WITH HYPERGLYCEMIA: ICD-10-CM

## 2023-12-18 LAB
ALBUMIN UR-MCNC: 27.8 MG/DL
CHOLEST SERPL-MCNC: 195 MG/DL (ref 0–200)
CREAT UR-MCNC: 588.8 MG/DL
GLUCOSE P FAST SERPL-MCNC: 139 MG/DL (ref 74–106)
HDLC SERPL-MCNC: 43 MG/DL (ref 40–60)
LDLC SERPL CALC-MCNC: 135 MG/DL (ref 0–100)
LDLC/HDLC SERPL: 3.09 {RATIO}
MICROALBUMIN/CREAT UR: 47.2 MG/G (ref 0–29)
TRIGL SERPL-MCNC: 95 MG/DL (ref 0–150)
VLDLC SERPL-MCNC: 17 MG/DL (ref 5–40)

## 2023-12-18 PROCEDURE — 80061 LIPID PANEL: CPT

## 2023-12-18 PROCEDURE — 84681 ASSAY OF C-PEPTIDE: CPT

## 2023-12-18 PROCEDURE — 82043 UR ALBUMIN QUANTITATIVE: CPT

## 2023-12-18 PROCEDURE — 82947 ASSAY GLUCOSE BLOOD QUANT: CPT

## 2023-12-18 PROCEDURE — 36415 COLL VENOUS BLD VENIPUNCTURE: CPT

## 2023-12-18 PROCEDURE — 82570 ASSAY OF URINE CREATININE: CPT

## 2023-12-19 LAB — C PEPTIDE SERPL-MCNC: 1.1 NG/ML (ref 1.1–4.4)

## 2024-01-10 ENCOUNTER — TELEPHONE (OUTPATIENT)
Dept: CARDIOLOGY | Facility: CLINIC | Age: 54
End: 2024-01-10
Payer: MEDICAID

## 2024-01-10 DIAGNOSIS — I48.0 PAF (PAROXYSMAL ATRIAL FIBRILLATION): Primary | ICD-10-CM

## 2024-01-10 RX ORDER — DIGOXIN 250 MCG
250 TABLET ORAL
Qty: 30 TABLET | Refills: 3 | Status: SHIPPED | OUTPATIENT
Start: 2024-01-10

## 2024-01-10 NOTE — TELEPHONE ENCOUNTER
Digoxin level was subtherapeutic. Digoxin should be increased to 250mcg/daily. New prescription sent to pharmacy. He needs to repeat a digoxin level in one week after initiating new dose.

## 2024-01-10 NOTE — TELEPHONE ENCOUNTER
ABDIRAHMAN patient. Patient is aware. Lab orders placed. Patient takes his dixogin in am, encouraged to not take the morning dose the day he has labs drawn in am. Patient verbalized understanding and appreciation.    No

## 2024-01-29 ENCOUNTER — OFFICE VISIT (OUTPATIENT)
Dept: CARDIOLOGY | Facility: CLINIC | Age: 54
End: 2024-01-29
Payer: MEDICAID

## 2024-01-29 VITALS
SYSTOLIC BLOOD PRESSURE: 126 MMHG | DIASTOLIC BLOOD PRESSURE: 86 MMHG | BODY MASS INDEX: 36.96 KG/M2 | WEIGHT: 264 LBS | HEIGHT: 71 IN | HEART RATE: 74 BPM

## 2024-01-29 DIAGNOSIS — I10 ESSENTIAL HYPERTENSION: ICD-10-CM

## 2024-01-29 DIAGNOSIS — I50.22 CHRONIC HFREF (HEART FAILURE WITH REDUCED EJECTION FRACTION): Primary | ICD-10-CM

## 2024-01-29 DIAGNOSIS — I48.0 PAF (PAROXYSMAL ATRIAL FIBRILLATION): ICD-10-CM

## 2024-01-29 DIAGNOSIS — F17.200 SMOKING: ICD-10-CM

## 2024-01-29 DIAGNOSIS — E78.2 MIXED DYSLIPIDEMIA: ICD-10-CM

## 2024-01-29 PROCEDURE — 99214 OFFICE O/P EST MOD 30 MIN: CPT | Performed by: INTERNAL MEDICINE

## 2024-01-29 NOTE — PROGRESS NOTES
Chief Complaint  Dyspnea on exertion    Subjective      Patient is here for follow-up on CHF and atrial fibrillation.  He is doing quite well.  He has no complaints or concerns today.  Has no chest discomfort or dyspnea.  There is no palpitations, dizziness, presyncope or syncope.    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    MI (myocardial infarction)     Neck strain 15 years ago    Have stiffness and pain in Neck and reduce range of motion    Rotator cuff syndrome 15 years ago         Current Outpatient Medications:     apixaban (ELIQUIS) 5 MG tablet tablet, Take 1 tablet by mouth Every 12 (Twelve) Hours., Disp: 180 tablet, Rfl: 3    buPROPion XL (WELLBUTRIN XL) 300 MG 24 hr tablet, Take 1 tablet by mouth Every Morning., Disp: , Rfl:     Cholecalciferol (vitamin D3) 125 MCG (5000 UT) capsule capsule, Take 2,000 Units by mouth Daily., Disp: , Rfl:     cyclobenzaprine (FLEXERIL) 10 MG tablet, Take 1 tablet by mouth 3 (Three) Times a Day., Disp: 20 tablet, Rfl: 0    digoxin (LANOXIN) 250 MCG tablet, Take 1 tablet by mouth Daily., Disp: 30 tablet, Rfl: 3    docusate sodium (Colace) 100 MG capsule, Take 1 capsule by mouth 2 (Two) Times a Day., Disp: 60 capsule, Rfl: 11    DULoxetine HCl 40 MG capsule delayed-release particles, Take 1 capsule by mouth Every 12 (Twelve) Hours., Disp: , Rfl:     furosemide (LASIX) 20 MG tablet, Take 1 tablet by mouth Daily As Needed (weight gain of 5lbs in 1 week)., Disp:  30 tablet, Rfl: 11    HumaLOG KwikPen 100 UNIT/ML solution pen-injector, INJECT UNDER THE SKIN PER SLIDING SCALE THREE TIMES DAILY UP TO 80 UNITS DAILY, Disp: , Rfl:     ibuprofen (ADVIL,MOTRIN) 600 MG tablet, Take 1 tablet by mouth Every 12 (Twelve) Hours., Disp: , Rfl:     Insulin Pen Needle 32G X 4 MM misc, Use as directed 4 (Four) Times a Day., Disp: 120 each, Rfl: 11    Lantus SoloStar 100 UNIT/ML injection pen, Inject 28 Units under the skin into the appropriate area as directed 2 (Two) Times a Day for 182 days., Disp: 51 mL, Rfl: 1    metoprolol succinate XL (TOPROL-XL) 50 MG 24 hr tablet, Take 1 tablet by mouth Daily., Disp: 90 tablet, Rfl: 3    omeprazole (priLOSEC) 40 MG capsule, Take 1 capsule by mouth Daily., Disp: 30 capsule, Rfl: 11    OneTouch Ultra test strip, 1 each by Other route 4 (Four) Times a Day. as directed, Disp: , Rfl:     potassium chloride (K-DUR,KLOR-CON) 10 MEQ CR tablet, Take 1 tablet by mouth 2 (Two) Times a Day With Meals., Disp: , Rfl:     sacubitril-valsartan (ENTRESTO) 24-26 MG tablet, Take 1 tablet by mouth 2 (Two) Times a Day., Disp: 180 tablet, Rfl: 3    tadalafil (CIALIS) 10 MG tablet, Take 1 tablet by mouth Daily As Needed for Erectile Dysfunction., Disp: , Rfl:     Vraylar 3 MG capsule capsule, TAKE 1 CAPSULE BY MOUTH ONCE A DAY FOR 90 DAYS, Disp: , Rfl:     There are no discontinued medications.  Allergies   Allergen Reactions    Statins Myalgia    Sulfa Antibiotics Unknown (See Comments) and Unknown - Low Severity     Remembers mother always saying he was allergic        Social History     Tobacco Use    Smoking status: Every Day     Packs/day: 0.50     Years: 31.00     Additional pack years: 0.00     Total pack years: 15.50     Types: Cigarettes, Pipe, Electronic Cigarette     Start date: 7/6/1987    Smokeless tobacco: Current     Types: Snuff   Vaping Use    Vaping Use: Former   Substance Use Topics    Alcohol use: Not Currently     Comment: he has not drank for 1 year  "   Drug use: Not Currently     Comment: he has been clean from drugs for 1 year       Family History   Problem Relation Age of Onset    Diabetes Mother         Insulin dependent    Hypertension Mother     Heart disease Mother     Heart failure Mother     Diabetes Father     No Known Problems Brother     Colon polyps Paternal Uncle         Objective     /86   Pulse 74   Ht 180.3 cm (70.98\")   Wt 120 kg (264 lb)   BMI 36.84 kg/m²       Physical Exam    General Appearance:   no acute distress  Alert and oriented x3  HENT:   lips not cyanotic  Atraumatic  Neck:  No jvd   supple  Respiratory:  no respiratory distress  normal breath sounds  no rales  Cardiovascular:  no S3, no S4   no murmur  no rub  Extremities  No cyanosis  lower extremity edema: none    Skin:   warm, dry  No rashes      Result Review :     proBNP   Date Value Ref Range Status   08/28/2020 107.9 0.0 - 900.0 pg/mL Final     CMP          2/27/2023    13:56   CMP   Glucose 462    BUN 12    Creatinine 0.91    EGFR 100.2    Sodium 133    Potassium 4.5    Chloride 97    Calcium 9.5    Total Protein 7.5    Albumin 4.4    Globulin 3.1    Total Bilirubin 0.4    Alkaline Phosphatase 96    AST (SGOT) 15    ALT (SGPT) 20    Albumin/Globulin Ratio 1.4    BUN/Creatinine Ratio 13.2    Anion Gap 8.8      CBC w/diff          2/27/2023    13:56   CBC w/Diff   WBC 4.63    RBC 5.50    Hemoglobin 14.8    Hematocrit 45.6    MCV 82.9    MCH 26.9    MCHC 32.5    RDW 13.1    Platelets 196    Neutrophil Rel % 51.0    Immature Granulocyte Rel % 0.4    Lymphocyte Rel % 40.2    Monocyte Rel % 5.6    Eosinophil Rel % 1.9    Basophil Rel % 0.9       No results found for: \"TSH\"   No results found for: \"FREET4\"   No results found for: \"DDIMERQUANT\"  Magnesium   Date Value Ref Range Status   02/27/2021 2.3 1.6 - 2.6 mg/dL Final      Digoxin   Date Value Ref Range Status   08/28/2020 <0.30 (L) 0.60 - 1.20 ng/mL Final      Lab Results   Component Value Date    TROPONINT <0.010 " "02/28/2021           Lipid Panel          12/18/2023    08:36   Lipid Panel   Total Cholesterol 195    Triglycerides 95    HDL Cholesterol 43    VLDL Cholesterol 17    LDL Cholesterol  135    LDL/HDL Ratio 3.09      No results found for: \"POCTROP\"    Results for orders placed in visit on 04/17/23    Adult Transthoracic Echo Complete W/ Cont if Necessary Per Protocol    Interpretation Summary    The left ventricular cavity is borderline dilated.    Left ventricular wall thickness is consistent with mild posterior asymmetric hypertrophy.    Left ventricular systolic function is normal. Calculated left ventricular EF = 51%    The following left ventricular wall segments are hypokinetic: mid inferior and basal inferior.    Left ventricular diastolic function is consistent with (grade I) impaired relaxation.    The right ventricular cavity is mildly dilated.    Estimated right ventricular systolic pressure from tricuspid regurgitation is normal (<35 mmHg).    Mild dilation of the aortic root is present.                 Diagnoses and all orders for this visit:    1. Chronic HFrEF (heart failure with reduced ejection fraction) (Primary)    2. PAF (paroxysmal atrial fibrillation)    3. Essential hypertension    4. Mixed dyslipidemia    5. Smoking      Assessment:     -HFrEF: Most recent echo showed normalization of LVEF.  He is well compensated on today's exam.  He is on guideline directed medical therapy which will be continued.     -Paroxysmal atrial fibrillation: Currently in normal sinus rhythm.  Asymptomatic from that standpoint.  Continue metoprolol.  Continue Eliquis for stroke risk reduction.     -Essential hypertension: Well-controlled on current regimen.  Continue the same.     -Mixed dyslipidemia: Goal LDL is less than 100 mg/dL.  He is statins intolerant.  He used to be on PCSK9 inhibitor which he had stopped due to insurance issues.      Follow Up     No follow-ups on file.        Patient was given instructions " and counseling regarding his condition or for health maintenance advice. Please see specific information pulled into the AVS if appropriate.

## 2024-02-06 ENCOUNTER — OFFICE VISIT (OUTPATIENT)
Dept: DIABETES SERVICES | Facility: HOSPITAL | Age: 54
End: 2024-02-06
Payer: MEDICAID

## 2024-02-06 VITALS
SYSTOLIC BLOOD PRESSURE: 120 MMHG | DIASTOLIC BLOOD PRESSURE: 64 MMHG | BODY MASS INDEX: 36.06 KG/M2 | OXYGEN SATURATION: 100 % | WEIGHT: 258.4 LBS | HEART RATE: 75 BPM

## 2024-02-06 DIAGNOSIS — E10.65 UNCONTROLLED TYPE 1 DIABETES MELLITUS WITH HYPERGLYCEMIA: ICD-10-CM

## 2024-02-06 DIAGNOSIS — E66.9 OBESITY (BMI 30-39.9): ICD-10-CM

## 2024-02-06 DIAGNOSIS — E10.40 TYPE 1 DIABETES MELLITUS WITH DIABETIC NEUROPATHY: Primary | ICD-10-CM

## 2024-02-06 LAB — GLUCOSE BLDC GLUCOMTR-MCNC: 270 MG/DL (ref 70–99)

## 2024-02-06 PROCEDURE — 1159F MED LIST DOCD IN RCRD: CPT | Performed by: NURSE PRACTITIONER

## 2024-02-06 PROCEDURE — 82948 REAGENT STRIP/BLOOD GLUCOSE: CPT | Performed by: NURSE PRACTITIONER

## 2024-02-06 PROCEDURE — 99214 OFFICE O/P EST MOD 30 MIN: CPT | Performed by: NURSE PRACTITIONER

## 2024-02-06 PROCEDURE — G0463 HOSPITAL OUTPT CLINIC VISIT: HCPCS | Performed by: NURSE PRACTITIONER

## 2024-02-06 PROCEDURE — 1160F RVW MEDS BY RX/DR IN RCRD: CPT | Performed by: NURSE PRACTITIONER

## 2024-02-06 RX ORDER — BLOOD-GLUCOSE SENSOR
1 EACH MISCELLANEOUS
Qty: 2 EACH | Refills: 5 | Status: SHIPPED | OUTPATIENT
Start: 2024-02-06

## 2024-02-06 RX ORDER — INSULIN GLARGINE 100 [IU]/ML
28 INJECTION, SOLUTION SUBCUTANEOUS 2 TIMES DAILY
Qty: 51 ML | Refills: 1 | Status: SHIPPED | OUTPATIENT
Start: 2024-02-06 | End: 2024-08-06

## 2024-02-06 RX ORDER — BLOOD-GLUCOSE,RECEIVER,CONT
1 EACH MISCELLANEOUS ONCE
Qty: 1 EACH | Refills: 0 | Status: SHIPPED | OUTPATIENT
Start: 2024-02-06 | End: 2024-02-06

## 2024-02-06 NOTE — PATIENT INSTRUCTIONS
Increase your Lantus to 28 units twice daily.  I want you to increase by 2 units in the a.m. and 2 units in the p.m. every 3 days until your fasting blood sugar is less than 150 then you will remain on that dose.  For example today you will start the Lantus 28 units twice daily if in 3 days your fasting blood sugar before eating or drinking remains above 150 then you will increase to 30 units in the a.m. and 30 units p.m.  Continue tapering up by 2 units in the morning and 2 units in the evening every 3 days into your fasting blood sugar is less than 150 then you remain on that dose.    A francesco 3 continuous glucose meter and reader was sent to your pharmacy.  Schedule an appointment with the diabetic nurse educator for training on this device.    Labs were ordered to differentiate between type I and type 2 diabetes.  Take the lab requisitions to your primary care office and they can draw them with your labs tomorrow. These do need to be fasting labs.

## 2024-02-06 NOTE — PROGRESS NOTES
"Chief Complaint  Diabetes (Follow up, no devices )    Referred By: Christopher Galarza MD    Subjective          Edd Willingham presents to [facility] for diabetes medication management    History of Present Illness    Visit type:  follow-up  Diabetes type:  Type 1  Current diabetes status/concerns/issues:  States he did not taper his insulin up due to concerns of running out his insulin. States he is still not eating right. States he eats out of boredom. States he normally takes his Humalog after meals. Reports he did not get his Scotty   Other health concerns: No new health concerns  Current Diabetes symptoms:    Polyuria: Yes     Polydipsia: Yes     Polyphagia: No   Blurred vision: Yes     Excessive fatigue: Yes    Known Diabetes complications:  Neuropathy: Numbness and Tingling; Location: Feet  Renal: Stage II mild (GFR = 60-89 mL/min)  Eyes: No current eye exam available in record; Location: N/A; Last Eye Exam:  2022 ; Location: [facility]  Amputation/Wounds: None  GI: Reflux  Cardiovascular: Hypertension, MI (History of), and Other: A Fib  ED: Patient Reported  Other: None  Hypoglycemia:  None reported at this time  Hypoglycemia Symptoms:  No hypoglycemia at this time  Current diabetes treatment:    Lantus 285 units bid, Humalog tid per sliding scale-normally 8-16 units per meal    Blood glucose device:  Meter  Blood glucose monitoring frequency:  3  Blood glucose range/average:   fasting around 200 and nonfasting 270-300mg/dl  Glucose Source: Patient Reported  Dietary behavior:  \"Eat what I want\"/No diet plan, Number of meals each day - 3; Number of snacks each day - grazes throughout the day on snacks  Activity/Exercise:  None    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    Atrial fibrillation 10/25-10-    CTS (carpal tunnel syndrome) 10 years ago    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    Depression     Diabetes mellitus     Elevated cholesterol     GERD (gastroesophageal reflux disease)     Heart murmur     Hypertension     Knee sprain 1985    Knee swelling 15 years ago    Low back pain     Low back strain 1992    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    MI (myocardial infarction)     Neck strain 15 years ago    Have stiffness and pain in Neck and reduce range of motion    Rotator cuff syndrome 15 years ago     Past Surgical History:   Procedure Laterality Date    CARDIAC CATHETERIZATION N/A 10/26/2018    Procedure: Left Heart Cath;  Surgeon: Parma Mcfarland MD;  Location: [facility] CATH INVASIVE LOCATION;  Service: Cardiology    COLONOSCOPY N/A 03/24/2021    Procedure: COLONOSCOPY;  Surgeon: Dinesh Drummond MD;  Location: [facility] ENDOSCOPY;  Service: Gastroenterology;  Laterality: N/A;    COLONOSCOPY N/A 05/18/2022    Procedure: COLONOSCOPY;  Surgeon: Dinesh Drummond MD;  Location: [facility] ENDOSCOPY;  Service: Gastroenterology;  Laterality: N/A;    ELBOW PROCEDURE Left     left    ENDOSCOPY N/A 03/24/2021    Procedure: ESOPHAGOGASTRODUODENOSCOPY;  Surgeon: Dinesh Drummond MD;  Location: [facility] ENDOSCOPY;  Service: Gastroenterology;  Laterality: N/A;    ROTATOR CUFF REPAIR Right     RIGHT    SHOULDER SURGERY  8-15 years ago    Have had rotator cuff surgery on both shoulders    TUMOR REMOVAL      FATTY TUMOR    UPPER GASTROINTESTINAL ENDOSCOPY  03/2021    WRIST SURGERY Right     RIGHT     Family History   Problem Relation Age of Onset    Diabetes Mother         Insulin dependent    Hypertension Mother     Heart disease Mother     Heart failure Mother     Diabetes Father     No Known Problems Brother     Colon polyps Paternal Uncle      Social History     Socioeconomic History    Marital status:    Tobacco Use    Smoking status: Every Day     Packs/day: 0.50     Years: 31.00     Additional pack years: 0.00     Total pack years: 15.50      Types: Cigarettes, Pipe, Electronic Cigarette     Start date: 7/6/1987    Smokeless tobacco: Current     Types: Snuff   Vaping Use    Vaping Use: Former   Substance and Sexual Activity    Alcohol use: Not Currently     Comment: he has not drank for 1 year    Drug use: Not Currently     Comment: he has been clean from drugs for 1 year    Sexual activity: Yes     Partners: Female     Birth control/protection: Condom     Allergies   Allergen Reactions    Statins Myalgia    Sulfa Antibiotics Unknown (See Comments) and Unknown - Low Severity     Remembers mother always saying he was allergic       Current Outpatient Medications:     apixaban (ELIQUIS) 5 MG tablet tablet, Take 1 tablet by mouth Every 12 (Twelve) Hours., Disp: 180 tablet, Rfl: 3    buPROPion XL (WELLBUTRIN XL) 300 MG 24 hr tablet, Take 1 tablet by mouth Every Morning., Disp: , Rfl:     Cholecalciferol (vitamin D3) 125 MCG (5000 UT) capsule capsule, Take 2,000 Units by mouth Daily., Disp: , Rfl:     cyclobenzaprine (FLEXERIL) 10 MG tablet, Take 1 tablet by mouth 3 (Three) Times a Day., Disp: 20 tablet, Rfl: 0    digoxin (LANOXIN) 250 MCG tablet, Take 1 tablet by mouth Daily., Disp: 30 tablet, Rfl: 3    docusate sodium (Colace) 100 MG capsule, Take 1 capsule by mouth 2 (Two) Times a Day., Disp: 60 capsule, Rfl: 11    DULoxetine HCl 40 MG capsule delayed-release particles, Take 1 capsule by mouth Every 12 (Twelve) Hours., Disp: , Rfl:     furosemide (LASIX) 20 MG tablet, Take 1 tablet by mouth Daily As Needed (weight gain of 5lbs in 1 week)., Disp: 30 tablet, Rfl: 11    HumaLOG KwikPen 100 UNIT/ML solution pen-injector, INJECT UNDER THE SKIN PER SLIDING SCALE THREE TIMES DAILY UP TO 80 UNITS DAILY, Disp: , Rfl:     ibuprofen (ADVIL,MOTRIN) 600 MG tablet, Take 1 tablet by mouth Every 12 (Twelve) Hours., Disp: , Rfl:     Insulin Pen Needle 32G X 4 MM misc, Use as directed 4 (Four) Times a Day., Disp: 120 each, Rfl: 11    Lantus SoloStar 100 UNIT/ML injection  pen, Inject 28 Units under the skin into the appropriate area as directed 2 (Two) Times a Day for 182 days., Disp: 51 mL, Rfl: 1    metoprolol succinate XL (TOPROL-XL) 50 MG 24 hr tablet, Take 1 tablet by mouth Daily., Disp: 90 tablet, Rfl: 3    omeprazole (priLOSEC) 40 MG capsule, Take 1 capsule by mouth Daily., Disp: 30 capsule, Rfl: 11    OneTouch Ultra test strip, 1 each by Other route 4 (Four) Times a Day. as directed, Disp: , Rfl:     potassium chloride (K-DUR,KLOR-CON) 10 MEQ CR tablet, Take 1 tablet by mouth 2 (Two) Times a Day With Meals., Disp: , Rfl:     sacubitril-valsartan (ENTRESTO) 24-26 MG tablet, Take 1 tablet by mouth 2 (Two) Times a Day., Disp: 180 tablet, Rfl: 3    tadalafil (CIALIS) 10 MG tablet, Take 1 tablet by mouth Daily As Needed for Erectile Dysfunction., Disp: , Rfl:     Vraylar 3 MG capsule capsule, TAKE 1 CAPSULE BY MOUTH ONCE A DAY FOR 90 DAYS, Disp: , Rfl:     Continuous Blood Gluc  (FreeStyle Scotty 3 [place]) device, Use 1 each 1 (One) Time for 1 dose., Disp: 1 each, Rfl: 0    Continuous Blood Gluc Sensor (FreeStyle Scotty 3 Sensor) misc, Use 1 each Every 14 (Fourteen) Days., Disp: 2 each, Rfl: 5    Objective     Vitals:    02/06/24 0830   BP: 120/64   BP Location: Right arm   Patient Position: Sitting   Cuff Size: Adult   Pulse: 75   SpO2: 100%   Weight: 117 kg (258 lb 6.4 oz)     Body mass index is 36.06 kg/m².    Physical Exam  Constitutional:       Appearance: Normal appearance. He is obese.      Comments: Obesity (BMI 30 - 39.9) Pt Current BMI = 36.06     HENT:      Head: Normocephalic and atraumatic.      Right Ear: External ear normal.      Left Ear: External ear normal.      Nose: Nose normal.   Eyes:      Extraocular Movements: Extraocular movements intact.      Conjunctiva/sclera: Conjunctivae normal.   Pulmonary:      Effort: Pulmonary effort is normal.   Musculoskeletal:         General: Normal range of motion.      Cervical back: Normal range of motion.   Skin:      General: Skin is warm and dry.   Neurological:      General: No focal deficit present.      Mental Status: He is alert and oriented to person, place, and time. Mental status is at baseline.   Psychiatric:         Mood and Affect: Mood normal.         Behavior: Behavior normal.         Thought Content: Thought content normal.         Judgment: Judgment normal.             Result Review :   The following data was reviewed by: NATHANIEL Fierro on 02/06/2024:    Most Recent A1C          12/6/2023    08:42   HGBA1C Most Recent   Hemoglobin A1C 11.9        A1C Last 3 Results          12/6/2023    08:42   HGBA1C Last 3 Results   Hemoglobin A1C 11.9      A1c collected on 12/6/2023  is 11.9%, indicating Uncontrolled Type I diabetes.  T    Glucose   Date Value Ref Range Status   02/06/2024 270 (H) 70 - 99 mg/dL Final     Comment:     Serial Number: [id number]:  [id number]     Point of care glucose in the office today is  elevated at 270 mg/dL.    Creatinine   Date Value Ref Range Status   02/27/2023 0.91 0.76 - 1.27 mg/dL Final   03/02/2021 1.00 0.76 - 1.27 mg/dL Final     eGFR   Date Value Ref Range Status   02/27/2023 100.2 >60.0 mL/min/1.73 Final     Labs collected on 2/27/2023 show Normal values    Microalbumin, Urine   Date Value Ref Range Status   12/18/2023 27.8 mg/dL Final     Creatinine, Urine   Date Value Ref Range Status   12/18/2023 588.8 mg/dL Final     Microalbumin/Creatinine Ratio   Date Value Ref Range Status   12/18/2023 47.2 (H) 0.0 - 29.0 mg/g Final     Urine microalbuminuria collected on 12/18/2023 is positive for microalbuminuria    Total Cholesterol   Date Value Ref Range Status   12/18/2023 195 0 - 200 mg/dL Final     Triglycerides   Date Value Ref Range Status   12/18/2023 95 0 - 150 mg/dL Final     HDL Cholesterol   Date Value Ref Range Status   12/18/2023 43 40 - 60 mg/dL Final     LDL Cholesterol    Date Value Ref Range Status   12/18/2023 135 (H) 0 - 100 mg/dL Final     Lipid panel  collected on 12/18/2023 shows Hyperlipidemia            Assessment: Patient is here today for 2-month follow-up on his diabetes.  Last visit he was instructed to increase his Lantus to 28 units twice daily and he was to increase by 2 units in the a.m. and 2 units in the p.m. every 3 days until his fasting blood sugar was less than 150 then he is to remain on that dose.  Patient reports he did not increase his dose of Lantus or do the taper due to fear of running out of insulin.  Discussed with patient his insulin was refilled last visit with the increased dose so that should not have been a risk of him running out of insulin.  Patient is agreeable to taper his insulin at this time.  States he is not following a diabetic diet and he needs to work on this.  He did meet with the dietitian and was counseled on a low-carb low sugar diet.  Discussed with patient to avoid or moderate white bread, white rice, white pasta, and white potatoes and sugary foods and drinks.  Discussed options such as wheat bread, sweet potatoes, wheat Pasta or brown rice, and zero drinks or sugar free snacks with patient.  Reports he is checking his glucose levels twice daily.  States his fasting glucose is around 200 and his nonfasting is around 2 7300 mg/dL.  His A1c on 1/2/2024 was 11.7% indicating uncontrolled type 1 diabetes.      Diagnoses and all orders for this visit:    1. Type 1 diabetes mellitus with diabetic neuropathy (Primary)    2. Uncontrolled type 1 diabetes mellitus with hyperglycemia  -     Continuous Blood Gluc Sensor (FreeStyle Scotty 3 Sensor) misc; Use 1 each Every 14 (Fourteen) Days.  Dispense: 2 each; Refill: 5  -     Continuous Blood Gluc  (FreeStyle Scotty 3 [place]) device; Use 1 each 1 (One) Time for 1 dose.  Dispense: 1 each; Refill: 0  -     Lantus SoloStar 100 UNIT/ML injection pen; Inject 28 Units under the skin into the appropriate area as directed 2 (Two) Times a Day for 182 days.  Dispense: 51 mL; Refill:  1  -     Insulin Pen Needle 32G X 4 MM misc; Use as directed 4 (Four) Times a Day.  Dispense: 120 each; Refill: 11  -     IA-2 Autoantibodies; Future  -     Glutamic Acid Decarboxylase; Future  -     ZNT8 Antibodies; Future  -     C-Peptide; Future  -     Glucose, Fasting; Future  -     Insulin, Fasting (JAVIER); Future  -     Insulin Antibody; Future  -     Ambulatory Referral to [facility]    3. Obesity (BMI 30-39.9)    Other orders  -     POC Glucose        Plan: Increased his Lantus dose to 28 units twice daily.  Instructed patient to do this injection 12 hours apart.  Instructed patient to taper up by 2 units in the a.m. and 2 units in the p.m. every 3 days until his fasting blood sugars less than 150 then he is to remain on that dose.  A [employer] 3 continuous glucose monitor and reader was sent to his pharmacy for closer monitoring of his glucose levels.  Instructed patient to schedule an appointment with the diabetic nurse educator for training on this device.  Patient states he has always been diagnosed as type I diabetic.  His C-peptide was 1.1 mg/dL last check.  Ordered additional labs to differentiate between type I and type 2 diabetes.  Scheduled a 1 month follow-up and we will recheck his A1c at that time.    The patient will monitor his blood glucose levels per CGM.  If he develops problematic hyperglycemia or hypoglycemia or adverse drug reactions, he will contact the office for further instructions.        Follow Up     Return in about 4 weeks (around 3/5/2024) for CGM Follow Up, Medication Mgmt, CGM Start.    Patient was given instructions and counseling regarding his condition or for health maintenance advice. Please see specific information pulled into the AVS if appropriate.     Yolanda Hess, NATHANIEL  02/06/2024      Dictated Utilizing Dragon Dictation.  Please note that portions of this note were completed with a voice recognition program.  Part of this note may be an  electronic transcription/translation of spoken language to printed text using the Dragon Dictation System.

## 2024-02-07 ENCOUNTER — LAB (OUTPATIENT)
Dept: LAB | Facility: HOSPITAL | Age: 54
End: 2024-02-07
Payer: MEDICAID

## 2024-02-07 DIAGNOSIS — I48.0 PAF (PAROXYSMAL ATRIAL FIBRILLATION): ICD-10-CM

## 2024-02-07 DIAGNOSIS — E10.65 UNCONTROLLED TYPE 1 DIABETES MELLITUS WITH HYPERGLYCEMIA: ICD-10-CM

## 2024-02-07 LAB
DIGOXIN SERPL-MCNC: 1.1 NG/ML (ref 0.6–1.2)
GLUCOSE P FAST SERPL-MCNC: 140 MG/DL (ref 74–106)

## 2024-02-07 PROCEDURE — 86337 INSULIN ANTIBODIES: CPT

## 2024-02-07 PROCEDURE — 82947 ASSAY GLUCOSE BLOOD QUANT: CPT

## 2024-02-07 PROCEDURE — 86341 ISLET CELL ANTIBODY: CPT

## 2024-02-07 PROCEDURE — 36415 COLL VENOUS BLD VENIPUNCTURE: CPT

## 2024-02-07 PROCEDURE — 80162 ASSAY OF DIGOXIN TOTAL: CPT

## 2024-02-07 PROCEDURE — 84681 ASSAY OF C-PEPTIDE: CPT

## 2024-02-08 LAB — C PEPTIDE SERPL-MCNC: 1.3 NG/ML (ref 1.1–4.4)

## 2024-02-11 LAB — GAD65 AB SER IA-ACNC: <5 U/ML (ref 0–5)

## 2024-02-13 LAB — ZNT8 AB SERPL IA-ACNC: <15 U/ML

## 2024-02-14 ENCOUNTER — EDUCATION (OUTPATIENT)
Dept: DIABETES SERVICES | Facility: HOSPITAL | Age: 54
End: 2024-02-14
Payer: MEDICAID

## 2024-02-14 LAB
INSULIN AB SER-ACNC: <5 UU/ML
ISLET CELL512 AB SER-ACNC: <7.5 U/ML

## 2024-06-10 ENCOUNTER — OFFICE VISIT (OUTPATIENT)
Dept: PODIATRY | Facility: CLINIC | Age: 54
End: 2024-06-10
Payer: MEDICAID

## 2024-06-10 VITALS
HEART RATE: 80 BPM | TEMPERATURE: 97.5 F | OXYGEN SATURATION: 96 % | DIASTOLIC BLOOD PRESSURE: 86 MMHG | BODY MASS INDEX: 35.44 KG/M2 | SYSTOLIC BLOOD PRESSURE: 127 MMHG | WEIGHT: 254 LBS

## 2024-06-10 DIAGNOSIS — E11.42 DIABETIC POLYNEUROPATHY ASSOCIATED WITH TYPE 2 DIABETES MELLITUS: Primary | ICD-10-CM

## 2024-06-10 DIAGNOSIS — E11.65 TYPE 2 DIABETES MELLITUS WITH HYPERGLYCEMIA, UNSPECIFIED WHETHER LONG TERM INSULIN USE: ICD-10-CM

## 2024-06-10 DIAGNOSIS — B35.1 ONYCHOMYCOSIS: ICD-10-CM

## 2024-06-10 PROCEDURE — 99213 OFFICE O/P EST LOW 20 MIN: CPT | Performed by: PODIATRIST

## 2024-06-10 PROCEDURE — 11721 DEBRIDE NAIL 6 OR MORE: CPT | Performed by: PODIATRIST

## 2024-06-10 PROCEDURE — 1159F MED LIST DOCD IN RCRD: CPT | Performed by: PODIATRIST

## 2024-06-10 PROCEDURE — 1160F RVW MEDS BY RX/DR IN RCRD: CPT | Performed by: PODIATRIST

## 2024-06-10 NOTE — PROGRESS NOTES
[facility] - PODIATRY    Today's Date: 06/10/24    Patient Name: Edd Willingham  MRN: [id number]  CSN: [id number]  PCP: Christopher Galarza MD, Last PCP Visit: Unknown last visit  Referring Provider: No ref. provider found    SUBJECTIVE     Chief Complaint   Patient presents with    Left Foot - Follow-up, Nail Problem    Right Foot - Follow-up, Nail Problem     HPI: Edd Willingham, a 53 y.o.male, presents to clinic for a diabetic foot evaluation.    Patient states he has bilateral foot pain.  Patient states it hurts all the time.  It is worse at night.  Patient states his numbness tingling and burning.  He also has elongated and his toenails are painful in shoe gear.    Patient denies any fevers, chills, nausea, vomiting, shortness of breath, nor any other constitutional signs nor symptoms.    No other pedal complaints at this time.    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    MI (myocardial infarction)     Neck strain 15 years ago    Have stiffness and pain in Neck and reduce range of motion    Rotator cuff syndrome 15 years ago     Past Surgical History:   Procedure Laterality Date    CARDIAC CATHETERIZATION N/A 10/26/2018    Procedure: Left Heart Cath;  Surgeon: Param Mcfarland MD;  Location: [facility] CATH INVASIVE LOCATION;  Service: Cardiology    COLONOSCOPY N/A 03/24/2021    Procedure: COLONOSCOPY;  Surgeon: Dinesh Drummond MD;  Location: [facility] ENDOSCOPY;   Service: Gastroenterology;  Laterality: N/A;    COLONOSCOPY N/A 05/18/2022    Procedure: COLONOSCOPY;  Surgeon: Dinesh Drummond MD;  Location: [facility] ENDOSCOPY;  Service: Gastroenterology;  Laterality: N/A;    ELBOW PROCEDURE Left     left    ENDOSCOPY N/A 03/24/2021    Procedure: ESOPHAGOGASTRODUODENOSCOPY;  Surgeon: Dinesh Drummond MD;  Location: [facility] ENDOSCOPY;  Service: Gastroenterology;  Laterality: N/A;    ROTATOR CUFF REPAIR Right     RIGHT    SHOULDER SURGERY  8-15 years ago    Have had rotator cuff surgery on both shoulders    TUMOR REMOVAL      FATTY TUMOR    UPPER GASTROINTESTINAL ENDOSCOPY  03/2021    WRIST SURGERY Right     RIGHT     Family History   Problem Relation Age of Onset    Diabetes Mother         Insulin dependent    Hypertension Mother     Heart disease Mother     Heart failure Mother     Diabetes Father     No Known Problems Brother     Colon polyps Paternal Uncle      Social History     Socioeconomic History    Marital status:    Tobacco Use    Smoking status: Every Day     Current packs/day: 0.50     Average packs/day: 0.5 packs/day for 36.9 years (18.5 ttl pk-yrs)     Types: Cigarettes, Pipe, Electronic Cigarette     Start date: 7/6/1987    Smokeless tobacco: Current     Types: Snuff   Vaping Use    Vaping status: Former   Substance and Sexual Activity    Alcohol use: Not Currently     Comment: he has not drank for 1 year    Drug use: Not Currently     Comment: he has been clean from drugs for 1 year    Sexual activity: Yes     Partners: Female     Birth control/protection: Condom     Allergies   Allergen Reactions    Statins Myalgia    Sulfa Antibiotics Unknown (See Comments) and Unknown - Low Severity     Remembers mother always saying he was allergic     Current Outpatient Medications   Medication Sig Dispense Refill    apixaban (ELIQUIS) 5 MG tablet tablet Take 1 tablet by mouth Every 12 (Twelve) Hours. 180 tablet 3    buPROPion XL (WELLBUTRIN XL) 300 MG 24 hr tablet Take 1  tablet by mouth Every Morning.      Cholecalciferol (vitamin D3) 125 MCG (5000 UT) capsule capsule Take 2,000 Units by mouth Daily.      Continuous Blood Gluc Sensor (FreeStyle Scotty 3 Sensor) misc Use 1 each Every 14 (Fourteen) Days. 2 each 5    cyclobenzaprine (FLEXERIL) 10 MG tablet Take 1 tablet by mouth 3 (Three) Times a Day. 20 tablet 0    digoxin (LANOXIN) 250 MCG tablet Take 1 tablet by mouth Daily. 30 tablet 3    docusate sodium (Colace) 100 MG capsule Take 1 capsule by mouth 2 (Two) Times a Day. 60 capsule 11    DULoxetine HCl 40 MG capsule delayed-release particles Take 1 capsule by mouth Every 12 (Twelve) Hours.      furosemide (LASIX) 20 MG tablet Take 1 tablet by mouth Daily As Needed (weight gain of 5lbs in 1 week). 30 tablet 11    HumaLOG KwikPen 100 UNIT/ML solution pen-injector INJECT UNDER THE SKIN PER SLIDING SCALE THREE TIMES DAILY UP TO 80 UNITS DAILY      ibuprofen (ADVIL,MOTRIN) 600 MG tablet Take 1 tablet by mouth Every 12 (Twelve) Hours.      Insulin Pen Needle 32G X 4 MM misc Use as directed 4 (Four) Times a Day. 120 each 11    Lantus SoloStar 100 UNIT/ML injection pen Inject 28 Units under the skin into the appropriate area as directed 2 (Two) Times a Day for 182 days. 51 mL 1    metoprolol succinate XL (TOPROL-XL) 50 MG 24 hr tablet Take 1 tablet by mouth Daily. 90 tablet 3    omeprazole (priLOSEC) 40 MG capsule Take 1 capsule by mouth Daily. 30 capsule 11    OneTouch Ultra test strip 1 each by Other route 4 (Four) Times a Day. as directed      potassium chloride (K-DUR,KLOR-CON) 10 MEQ CR tablet Take 1 tablet by mouth 2 (Two) Times a Day With Meals.      sacubitril-valsartan (ENTRESTO) 24-26 MG tablet Take 1 tablet by mouth 2 (Two) Times a Day. 180 tablet 3    tadalafil (CIALIS) 10 MG tablet Take 1 tablet by mouth Daily As Needed for Erectile Dysfunction.      Vraylar 3 MG capsule capsule TAKE 1 CAPSULE BY MOUTH ONCE A DAY FOR 90 DAYS      Ibuprofen 3 %, Gabapentin 10 %, Baclofen 2 %,  lidocaine 4 %, Ketamine HCl 4 % Apply 1-2 g topically to the appropriate area as directed 3 (Three) to 4 (Four) times daily. 90 g 2     No current facility-administered medications for this visit.     Review of Systems   Constitutional: Negative.    Skin:         Painful toenails.   All other systems reviewed and are negative.      OBJECTIVE     Vitals:    06/10/24 0857   BP: 127/86   Pulse: 80   Temp: 97.5 °F (36.4 °C)   SpO2: 96%       Body mass index is 35.44 kg/m².    Lab Results   Component Value Date    HGBA1C 11.9 (A) 12/06/2023       Lab Results   Component Value Date    GLUCOSE 462 (C) 02/27/2023    CALCIUM 9.5 02/27/2023     (L) 02/27/2023    K 4.5 02/27/2023    CO2 27.2 02/27/2023    CL 97 (L) 02/27/2023    BUN 12 02/27/2023    CREATININE 0.91 02/27/2023    EGFRIFAFRI 95 03/02/2021    BCR 13.2 02/27/2023    ANIONGAP 8.8 02/27/2023       Patient seen in no apparent distress.      PHYSICAL EXAM:     Foot/Ankle Exam    GENERAL  Appearance:  appears stated age  Orientation:  AAOx3  Affect:  appropriate  Gait:  unimpaired  Assistance:  independent  Right shoe gear: casual shoe  Left shoe gear: casual shoe    VASCULAR     Right Foot Vascularity   Normal vascular exam    Dorsalis pedis:  2+  Posterior tibial:  2+  Skin temperature:  warm  Edema grading:  None  CFT:  < 3 seconds  Pedal hair growth:  Present  Varicosities:  none     Left Foot Vascularity   Normal vascular exam    Dorsalis pedis:  2+  Posterior tibial:  2+  Skin temperature:  warm  Edema grading:  None  CFT:  < 3 seconds  Pedal hair growth:  Present  Varicosities:  none     NEUROLOGIC     Right Foot Neurologic   Light touch sensation: absent  Vibratory sensation: absent  Hot/Cold sensation: absent  Protective Sensation using [place] Monofilament:   Sites intact: 2  Sites tested: 10     Left Foot Neurologic   Light touch sensation: absent  Vibratory sensation: absent  Hot/Cold sensation:  absent  Protective Sensation using  [place]-Char Monofilament:   Sites intact: 2  Sites tested: 10    MUSCLE STRENGTH     Right Foot Muscle Strength   Foot dorsiflexion:  4  Foot plantar flexion:  4  Foot inversion:  4  Foot eversion:  4     Left Foot Muscle Strength   Foot dorsiflexion:  4  Foot plantar flexion:  4  Foot inversion:  4  Foot eversion:  4    RANGE OF MOTION     Right Foot Range of Motion   Foot and ankle ROM within normal limits       Left Foot Range of Motion   Foot and ankle ROM within normal limits      DERMATOLOGIC      Right Foot Dermatologic   Skin  Right foot skin is intact.   Nails  1.  Positive for elongated, onychomycosis, abnormal thickness, subungual debris, dystrophic nail and ingrown toenail.  2.  Positive for elongated, onychomycosis, abnormal thickness, subungual debris, dystrophic nail and ingrown toenail.  3.  Positive for elongated, onychomycosis, abnormal thickness, subungual debris, dystrophic nail and ingrown toenail.  4.  Positive for elongated, onychomycosis, abnormal thickness, subungual debris, dystrophic nail and ingrown toenail.  5.  Positive for elongated, onychomycosis, abnormal thickness, subungual debris, dystrophic nail and ingrown toenail.  Nails comment:  Toenails 1, 2, 3, 4, and 5     Left Foot Dermatologic   Skin  Left foot skin is intact.   Nails comment:  Toenails 1, 2, 3, 4, and 5  Nails  1.  Positive for elongated, onychomycosis, abnormal thickness, subungual debris, dystrophic nail and ingrown toenail.  2.  Positive for elongated, onychomycosis, abnormal thickness, subungual debris, dystrophic nail and ingrown toenail.  3.  Positive for elongated, onychomycosis, abnormal thickness, subungual debris, dystrophic nail and ingrown toenail.  4.  Positive for elongated, onychomycosis, abnormally thick, subungual debris, dystrophic nail and ingrown toenail.  5.  Positive for elongated, onychomycosis, abnormally thick, subungual debris, dystrophic nail and ingrown toenail.        ASSESSMENT/PLAN      Diagnoses and all orders for this visit:    1. Diabetic polyneuropathy associated with type 2 diabetes mellitus (Primary)  -     Ibuprofen 3 %, Gabapentin 10 %, Baclofen 2 %, lidocaine 4 %, Ketamine HCl 4 %; Apply 1-2 g topically to the appropriate area as directed 3 (Three) to 4 (Four) times daily.  Dispense: 90 g; Refill: 2    2. Type 2 diabetes mellitus with hyperglycemia, unspecified whether long term insulin use    3. Onychomycosis      Continue with current medication for the neuropathy.  Patient states is improving.  Return to clinic in 3 to 6 months or as needed    Comprehensive lower extremity examination and evaluation was performed.    Discussed findings and treatment plan including risks, benefits, and treatment options with patient in detail. Patient agreed with treatment plan.    Toenails 1, 2, 3, 4, 5 on Right and 1, 2, 3, 4, 5 on Left were debrided with nail nippers then filed with a [employer] nail lamine.  Patient tolerated procedure well without complications.    Rx:  Topical, compounded, neuropathy medication was written; see attached prescription.    Medications and allergies reviewed.  Reviewed available blood glucose and HgB A1C lab values along with other pertinent labs.  These were discussed with the patient as to their importance of diabetic maintenance.    Diabetic foot exam performed and documented this date, compliant with CQM required standards. Detail of findings as noted in physical exam.  Lower extremity Neurologic exam for diabetic patient performed and documented this date, compliant with PQRS required standards. Detail of findings as noted in physical exam.  Advised patient importance of good routine lower extremity hygiene. Advised patient importance of evaluating for intact skin and pain free nail borders.  Advised patient to use mirror to evaluate plantar/ soles of feet for better visualization. Advised patient monitor and phone office to be seen if any cracking to skin, open  lesions, painful nail borders or if nails become elongated prior to next visit. Advised patient importance of daily cleansing of lower extremities, followed by good skin cream to maintain normal hydration of skin. Also advised patient importance of close daily monitoring of blood sugar. Advised to regulate diet and medications to maintain control of blood sugar in optimal range. Contact primary care provider if difficulties maintaining blood sugar levels.  Advised Patient of presence of Diabetes Mellitus condition.  Advised Patient risk of progression and worsening or improvement, then return of condition.  Will monitor condition for any change in future. Treat with most appropriate treatment pending status of condition.  Counseled and advised patient extensively on nature and ramifications of diabetes. Standard instructions given to patient for good diabetic foot care and maintenance. Advised importance of careful monitoring to avoid break down and complications secondary to diabetes. Advised patient importance of strict maintenance of blood sugar control. Advised patient of possible ominous results from neglect of condition, i.e.: amputation/ loss of digits, feet and legs, or even death.  Patient states understands counseling, will monitor closely, continue good hygiene and routine diabetic foot care. Patient will contact office is questions or problems.      An After Visit Summary was printed and given to the patient at discharge, including (if requested) any available informative/educational handouts regarding diagnosis, treatment, or medications. All questions were answered to patient/family satisfaction. Should symptoms fail to improve or worsen they agree to call or return to clinic or to go to the Emergency Department. Discussed the importance of following up with any needed screening tests/labs/specialist appointments and any requested follow-up recommended by me today. Importance of maintaining follow-up  discussed and patient accepts that missed appointments can delay diagnosis and potentially lead to worsening of conditions.    No follow-ups on file., or sooner if acute issues arise.    This document has been electronically signed by Mir Mo DPM on Nora 10, 2024 09:31 EDT

## 2024-07-23 ENCOUNTER — PREP FOR SURGERY (OUTPATIENT)
Dept: OTHER | Facility: HOSPITAL | Age: 54
End: 2024-07-23
Payer: MEDICAID

## 2024-07-23 ENCOUNTER — OFFICE VISIT (OUTPATIENT)
Dept: CARDIOLOGY | Facility: CLINIC | Age: 54
End: 2024-07-23
Payer: MEDICAID

## 2024-07-23 VITALS
WEIGHT: 255.8 LBS | HEIGHT: 71 IN | HEART RATE: 73 BPM | DIASTOLIC BLOOD PRESSURE: 98 MMHG | SYSTOLIC BLOOD PRESSURE: 155 MMHG | BODY MASS INDEX: 35.81 KG/M2

## 2024-07-23 DIAGNOSIS — E78.2 MIXED DYSLIPIDEMIA: ICD-10-CM

## 2024-07-23 DIAGNOSIS — Z78.9 STATIN INTOLERANCE: ICD-10-CM

## 2024-07-23 DIAGNOSIS — Z79.4 TYPE 2 DIABETES MELLITUS WITH DIABETIC POLYNEUROPATHY, WITH LONG-TERM CURRENT USE OF INSULIN: ICD-10-CM

## 2024-07-23 DIAGNOSIS — E78.5 HYPERLIPIDEMIA LDL GOAL <70: Primary | ICD-10-CM

## 2024-07-23 DIAGNOSIS — I10 ESSENTIAL HYPERTENSION: ICD-10-CM

## 2024-07-23 DIAGNOSIS — E11.42 TYPE 2 DIABETES MELLITUS WITH DIABETIC POLYNEUROPATHY, WITH LONG-TERM CURRENT USE OF INSULIN: ICD-10-CM

## 2024-07-23 DIAGNOSIS — I50.22 CHRONIC HFREF (HEART FAILURE WITH REDUCED EJECTION FRACTION): Primary | ICD-10-CM

## 2024-07-23 DIAGNOSIS — I48.0 PAF (PAROXYSMAL ATRIAL FIBRILLATION): ICD-10-CM

## 2024-07-23 PROCEDURE — 1160F RVW MEDS BY RX/DR IN RCRD: CPT

## 2024-07-23 PROCEDURE — 1159F MED LIST DOCD IN RCRD: CPT

## 2024-07-23 PROCEDURE — 99214 OFFICE O/P EST MOD 30 MIN: CPT

## 2024-07-23 RX ORDER — DOXEPIN HYDROCHLORIDE 10 MG/1
10 CAPSULE ORAL
COMMUNITY
Start: 2024-04-01

## 2024-07-23 RX ORDER — CETIRIZINE HYDROCHLORIDE 10 MG/1
1 TABLET ORAL DAILY
COMMUNITY
Start: 2024-06-24

## 2024-07-23 RX ORDER — MIRTAZAPINE 7.5 MG/1
7.5 TABLET, FILM COATED ORAL NIGHTLY
COMMUNITY

## 2024-07-23 NOTE — PROGRESS NOTES
Chief Complaint  Chronic HFrEF (heart failure with reduced ejection fraction) and Follow-up (6 mo f/u. )    Subjective        History of Present Illness  Edd Willingham presents to [facility] CARDIOLOGY for follow up.   History of Present Illness    Patient is a 53-year-old male with past medical history outlined below, significant for diabetes, hypertension, CHF, atrial fibrillation who presents for routine follow-up.  He has no complaints or concerns today.  He denies any chest discomfort, dyspnea, palpitations, syncope or presyncope.    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    MI (myocardial infarction)     Neck strain 15 years ago    Have stiffness and pain in Neck and reduce range of motion    Rotator cuff syndrome 15 years ago       ALLERGY  Allergies   Allergen Reactions    Statins Myalgia    Sulfa Antibiotics Unknown (See Comments) and Unknown - Low Severity     Remembers mother always saying he was allergic        Past Surgical History:   Procedure Laterality Date    CARDIAC CATHETERIZATION N/A 10/26/2018    Procedure: Left Heart Cath;  Surgeon: Param Mcfarland MD;  Location: [facility] CATH INVASIVE LOCATION;  Service: Cardiology    COLONOSCOPY N/A 03/24/2021    Procedure: COLONOSCOPY;  Surgeon: Dinesh Drummond MD;  Location: [facility] ENDOSCOPY;  Service: Gastroenterology;  Laterality: N/A;    COLONOSCOPY N/A 05/18/2022    Procedure: COLONOSCOPY;  Surgeon: Bhanu  Dinesh COWART MD;  Location: [facility] ENDOSCOPY;  Service: Gastroenterology;  Laterality: N/A;    ELBOW PROCEDURE Left     left    ENDOSCOPY N/A 2021    Procedure: ESOPHAGOGASTRODUODENOSCOPY;  Surgeon: Dinesh Drummond MD;  Location: [facility] ENDOSCOPY;  Service: Gastroenterology;  Laterality: N/A;    ROTATOR CUFF REPAIR Right     RIGHT    SHOULDER SURGERY  8-15 years ago    Have had rotator cuff surgery on both shoulders    TUMOR REMOVAL      FATTY TUMOR    UPPER GASTROINTESTINAL ENDOSCOPY  2021    WRIST SURGERY Right     RIGHT        Social History     Socioeconomic History    Marital status:    Tobacco Use    Smoking status: Former     Current packs/day: 0.00     Average packs/day: 0.5 packs/day for 36.4 years (18.2 ttl pk-yrs)     Types: Cigarettes, Pipe, Electronic Cigarette     Start date: 1987     Quit date: 2023     Years since quittin.6     Passive exposure: Past    Smokeless tobacco: Former     Types: Snuff   Vaping Use    Vaping status: Every Day    Substances: Nicotine    Devices: Disposable   Substance and Sexual Activity    Alcohol use: Not Currently     Comment: he has not drank for 1 year    Drug use: Not Currently     Comment: he has been clean from drugs for 1 year    Sexual activity: Yes     Partners: Female     Birth control/protection: Condom       Family History   Problem Relation Age of Onset    Diabetes Mother         Insulin dependent    Hypertension Mother     Heart disease Mother     Heart failure Mother     Diabetes Father     No Known Problems Brother     Colon polyps Paternal Uncle         Current Outpatient Medications on File Prior to Visit   Medication Sig    apixaban (ELIQUIS) 5 MG tablet tablet Take 1 tablet by mouth Every 12 (Twelve) Hours.    buPROPion XL (WELLBUTRIN XL) 300 MG 24 hr tablet Take 1 tablet by mouth Every Morning.    cetirizine (zyrTEC) 10 MG tablet Take 1 tablet by mouth Daily.    Cholecalciferol (vitamin D3) 125 MCG (5000 UT) capsule capsule  Take 1 capsule by mouth Daily.    Continuous Blood Gluc Sensor (FreeStyle Scotty 3 Sensor) misc Use 1 each Every 14 (Fourteen) Days.    digoxin (LANOXIN) 250 MCG tablet Take 1 tablet by mouth Daily.    docusate sodium (Colace) 100 MG capsule Take 1 capsule by mouth 2 (Two) Times a Day.    doxepin (SINEquan) 10 MG capsule Take 1 capsule by mouth every night at bedtime.    DULoxetine HCl 40 MG capsule delayed-release particles Take 1 capsule by mouth Every 12 (Twelve) Hours.    furosemide (LASIX) 20 MG tablet Take 1 tablet by mouth Daily As Needed (weight gain of 5lbs in 1 week).    HumaLOG KwikPen 100 UNIT/ML solution pen-injector INJECT UNDER THE SKIN PER SLIDING SCALE THREE TIMES DAILY UP TO 80 UNITS DAILY    ibuprofen (ADVIL,MOTRIN) 600 MG tablet Take 1 tablet by mouth Every 12 (Twelve) Hours.    Ibuprofen 3 %, Gabapentin 10 %, Baclofen 2 %, lidocaine 4 %, Ketamine HCl 4 % Apply 1-2 g topically to the appropriate area as directed 3 (Three) to 4 (Four) times daily.    Insulin Pen Needle 32G X 4 MM misc Use as directed 4 (Four) Times a Day.    Lantus SoloStar 100 UNIT/ML injection pen Inject 28 Units under the skin into the appropriate area as directed 2 (Two) Times a Day for 182 days.    metoprolol succinate XL (TOPROL-XL) 50 MG 24 hr tablet Take 1 tablet by mouth Daily.    mirtazapine (REMERON) 7.5 MG tablet Take 1 tablet by mouth Every Night.    omeprazole (priLOSEC) 40 MG capsule Take 1 capsule by mouth Daily.    OneTouch Ultra test strip 1 each by Other route 4 (Four) Times a Day. as directed    potassium chloride (K-DUR,KLOR-CON) 10 MEQ CR tablet Take 1 tablet by mouth 2 (Two) Times a Day With Meals.    sacubitril-valsartan (ENTRESTO) 24-26 MG tablet Take 1 tablet by mouth 2 (Two) Times a Day.    [DISCONTINUED] cyclobenzaprine (FLEXERIL) 10 MG tablet Take 1 tablet by mouth 3 (Three) Times a Day. (Patient not taking: Reported on 7/23/2024)    [DISCONTINUED] tadalafil (CIALIS) 10 MG tablet Take 1 tablet by mouth  "Daily As Needed for Erectile Dysfunction. (Patient not taking: Reported on 7/23/2024)    [DISCONTINUED] Vraylar 3 MG capsule capsule TAKE 1 CAPSULE BY MOUTH ONCE A DAY FOR 90 DAYS (Patient not taking: Reported on 7/23/2024)     No current facility-administered medications on file prior to visit.       Objective   Vitals:    07/23/24 1003   BP: 155/98   Pulse: 73   Weight: 116 kg (255 lb 12.8 oz)   Height: 180.3 cm (70.98\")       Physical Exam  Constitutional:       General: He is awake. He is not in acute distress.     Appearance: Normal appearance.   HENT:      Head: Normocephalic.      Nose: Nose normal. No congestion.   Eyes:      Extraocular Movements: Extraocular movements intact.      Conjunctiva/sclera: Conjunctivae normal.      Pupils: Pupils are equal, round, and reactive to light.   Neck:      Thyroid: No thyromegaly.      Vascular: No JVD.   Cardiovascular:      Rate and Rhythm: Normal rate and regular rhythm.      Chest Wall: PMI is not displaced.      Pulses: Normal pulses.      Heart sounds: Normal heart sounds, S1 normal and S2 normal. No murmur heard.     No friction rub. No gallop. No S3 or S4 sounds.   Pulmonary:      Effort: Pulmonary effort is normal.      Breath sounds: Normal breath sounds. No wheezing, rhonchi or rales.   Abdominal:      General: Bowel sounds are normal.      Palpations: Abdomen is soft.      Tenderness: There is no abdominal tenderness.   Musculoskeletal:      Cervical back: No tenderness.      Right lower leg: No edema.      Left lower leg: No edema.   Lymphadenopathy:      Cervical: No cervical adenopathy.   Skin:     General: Skin is warm and dry.      Capillary Refill: Capillary refill takes less than 2 seconds.      Coloration: Skin is not cyanotic.      Findings: No petechiae or rash.      Nails: There is no clubbing.   Neurological:      Mental Status: He is alert.   Psychiatric:         Mood and Affect: Mood normal.         Behavior: Behavior is cooperative. "           Result Review     The following data was reviewed by NATHANIEL Granados on 07/23/24.           Lipid Panel          12/18/2023    08:36   Lipid Panel   Total Cholesterol 195    Triglycerides 95    HDL Cholesterol 43    VLDL Cholesterol 17    LDL Cholesterol  135    LDL/HDL Ratio 3.09        Results for orders placed in visit on 04/17/23    Adult Transthoracic Echo Complete W/ Cont if Necessary Per Protocol    Interpretation Summary    The left ventricular cavity is borderline dilated.    Left ventricular wall thickness is consistent with mild posterior asymmetric hypertrophy.    Left ventricular systolic function is normal. Calculated left ventricular EF = 51%    The following left ventricular wall segments are hypokinetic: mid inferior and basal inferior.    Left ventricular diastolic function is consistent with (grade I) impaired relaxation.    The right ventricular cavity is mildly dilated.    Estimated right ventricular systolic pressure from tricuspid regurgitation is normal (<35 mmHg).    Mild dilation of the aortic root is present.    Results for orders placed during the hospital encounter of 05/25/23    Stress Test With Myocardial Perfusion One Day    Interpretation Summary    Left ventricular ejection fraction is moderately reduced (Calculated EF = 40%).    Abnormal LV wall motion consistent with global hypokinesis.  Left ventricular dilatation is suspected    Myocardial perfusion imaging indicates a normal myocardial perfusion study with no evidence of ischemia.    Impressions are consistent with an intermediate risk study.    Findings consistent with a normal ECG stress test.    === Results for orders placed during the hospital encounter of 10/14/20 ===    MOBILE CARDIAC OUTPATIENT TELEMETRY    - Interpretation Summary -  · He had at least 5 episodes of atrial fibrillation heart rates up to 169 bpm with several episodes of wide-complex tachycardia with atrial fibrillation that most likely  represents Natalie's Phenomenon (Abberantly conducted atrial fibrillation with long short periodicity and right bundle branch morphology) though one cannot totally however exclude ventricular tachycardia.. There was one 16 beat and one 17 beat episode of this along with shorter ones..  · None of his symptoms were associated with these.  · The symptoms he had for associated primarily with sinus tachycardia and a PVC.  · An abnormal monitor study.          Procedures    Assessment & Plan  Diagnoses and all orders for this visit:    1. Chronic HFrEF (heart failure with reduced ejection fraction) (Primary)  -     Digoxin Level; Future    2. PAF (paroxysmal atrial fibrillation)    3. Essential hypertension    4. Mixed dyslipidemia    5. Statin intolerance      Assessment & Plan      1.  Most recent echocardiogram shows normalization of LVEF.  Continue current medications.  Patient is well compensated on exam.  Will check a digoxin level.  2.  Currently in normal sinus rhythm.  Asymptomatic from that standpoint.  Continue metoprolol and Eliquis.  3.  Blood pressure is well-controlled.  Continue current regimen.  4.  Goal LDL is less than 100.  He is intolerant to statins.  He used to be on PCSK9 inhibitor which she stopped due to insurance issues.  We will try to get Leqvio approved.    The medical services provided during this encounter are part of ongoing care related to this patient's single serious condition or complex condition.      Follow Up   Return in about 6 months (around 1/23/2025) for With .    Patient was given instructions and counseling regarding his condition or for health maintenance advice. Please see specific information pulled into the AVS if appropriate.         Emi Cool, APRN  07/23/24  11:32 EDT    Dictated Utilizing Dragon Dictation

## 2024-08-06 ENCOUNTER — HOSPITAL ENCOUNTER (OUTPATIENT)
Dept: INFUSION THERAPY | Facility: HOSPITAL | Age: 54
Discharge: HOME | End: 2024-08-06
Payer: MEDICAID

## 2024-08-06 VITALS
WEIGHT: 249.34 LBS | OXYGEN SATURATION: 100 % | DIASTOLIC BLOOD PRESSURE: 68 MMHG | BODY MASS INDEX: 33.77 KG/M2 | SYSTOLIC BLOOD PRESSURE: 127 MMHG | RESPIRATION RATE: 20 BRPM | HEIGHT: 72 IN | HEART RATE: 57 BPM | TEMPERATURE: 98.1 F

## 2024-08-06 DIAGNOSIS — Z78.9 STATIN INTOLERANCE: ICD-10-CM

## 2024-08-06 DIAGNOSIS — E11.42 TYPE 2 DIABETES MELLITUS WITH DIABETIC POLYNEUROPATHY, WITH LONG-TERM CURRENT USE OF INSULIN: ICD-10-CM

## 2024-08-06 DIAGNOSIS — Z79.4 TYPE 2 DIABETES MELLITUS WITH DIABETIC POLYNEUROPATHY, WITH LONG-TERM CURRENT USE OF INSULIN: ICD-10-CM

## 2024-08-06 DIAGNOSIS — E78.5 HYPERLIPIDEMIA LDL GOAL <70: Primary | ICD-10-CM

## 2024-08-06 PROCEDURE — 25010000002 INCLISIRAN SODIUM 284 MG/1.5ML SOLUTION PREFILLED SYRINGE

## 2024-08-06 PROCEDURE — 96372 THER/PROPH/DIAG INJ SC/IM: CPT

## 2024-08-06 RX ADMIN — INCLISIRAN 284 MG: 284 INJECTION, SOLUTION SUBCUTANEOUS at 07:59

## 2024-09-04 ENCOUNTER — OFFICE VISIT (OUTPATIENT)
Dept: PODIATRY | Facility: CLINIC | Age: 54
End: 2024-09-04
Payer: MEDICAID

## 2024-09-04 VITALS
HEART RATE: 79 BPM | WEIGHT: 260 LBS | HEIGHT: 72 IN | TEMPERATURE: 98.4 F | OXYGEN SATURATION: 95 % | DIASTOLIC BLOOD PRESSURE: 96 MMHG | BODY MASS INDEX: 35.21 KG/M2 | SYSTOLIC BLOOD PRESSURE: 157 MMHG

## 2024-09-04 DIAGNOSIS — B35.1 ONYCHOMYCOSIS: ICD-10-CM

## 2024-09-04 DIAGNOSIS — E11.42 DIABETIC POLYNEUROPATHY ASSOCIATED WITH TYPE 2 DIABETES MELLITUS: ICD-10-CM

## 2024-09-04 DIAGNOSIS — E11.65 TYPE 2 DIABETES MELLITUS WITH HYPERGLYCEMIA, UNSPECIFIED WHETHER LONG TERM INSULIN USE: Primary | ICD-10-CM

## 2024-09-04 NOTE — PROGRESS NOTES
[facility] - PODIATRY    Today's Date: 09/04/24    Patient Name: Edd Willingham  MRN: [id number]  CSN: [id number]  PCP: Christopher Galarza MD, Last PCP Visit: Unknown last visit  Referring Provider: No ref. provider found    SUBJECTIVE     Chief Complaint   Patient presents with    Left Foot - Follow-up, Nail Problem    Right Foot - Follow-up, Nail Problem     HPI: Edd Willingham, a 53 y.o.male, presents to clinic for a diabetic foot evaluation.    Patient states he has bilateral foot pain.  Patient states it hurts all the time.  It is worse at night.  Patient states his numbness tingling and burning.  He also has elongated and his toenails are painful in shoe gear.    Patient denies any fevers, chills, nausea, vomiting, shortness of breath, nor any other constitutional signs nor symptoms.    No other pedal complaints at this time.    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    MI (myocardial infarction)     Neck strain 15 years ago    Have stiffness and pain in Neck and reduce range of motion    Rotator cuff syndrome 15 years ago     Past Surgical History:   Procedure Laterality Date    CARDIAC CATHETERIZATION N/A 10/26/2018    Procedure: Left Heart Cath;  Surgeon: Param Mcfarland MD;  Location: [facility] CATH INVASIVE LOCATION;  Service: Cardiology    COLONOSCOPY N/A 03/24/2021    Procedure: COLONOSCOPY;  Surgeon: Dinesh Drummond MD;  Location: [facility] ENDOSCOPY;   Service: Gastroenterology;  Laterality: N/A;    COLONOSCOPY N/A 2022    Procedure: COLONOSCOPY;  Surgeon: Dinesh Drummond MD;  Location: [facility] ENDOSCOPY;  Service: Gastroenterology;  Laterality: N/A;    ELBOW PROCEDURE Left     left    ENDOSCOPY N/A 2021    Procedure: ESOPHAGOGASTRODUODENOSCOPY;  Surgeon: Dinesh Drummond MD;  Location: [facility] ENDOSCOPY;  Service: Gastroenterology;  Laterality: N/A;    ROTATOR CUFF REPAIR Right     RIGHT    SHOULDER SURGERY  8-15 years ago    Have had rotator cuff surgery on both shoulders    TUMOR REMOVAL      FATTY TUMOR    UPPER GASTROINTESTINAL ENDOSCOPY  2021    WRIST SURGERY Right     RIGHT     Family History   Problem Relation Age of Onset    Diabetes Mother         Insulin dependent    Hypertension Mother     Heart disease Mother     Heart failure Mother     Diabetes Father     No Known Problems Brother     Colon polyps Paternal Uncle      Social History     Socioeconomic History    Marital status:    Tobacco Use    Smoking status: Former     Current packs/day: 0.00     Average packs/day: 0.5 packs/day for 36.4 years (18.2 ttl pk-yrs)     Types: Cigarettes, Pipe, Electronic Cigarette     Start date: 1987     Quit date: 2023     Years since quittin.7     Passive exposure: Past    Smokeless tobacco: Former     Types: Snuff   Vaping Use    Vaping status: Every Day    Substances: Nicotine    Devices: Disposable   Substance and Sexual Activity    Alcohol use: Not Currently     Comment: he has not drank for 1 year    Drug use: Not Currently     Comment: he has been clean from drugs for 1 year    Sexual activity: Yes     Partners: Female     Birth control/protection: Condom     Allergies   Allergen Reactions    Statins Myalgia    Sulfa Antibiotics Unknown (See Comments) and Unknown - Low Severity     Remembers mother always saying he was allergic     Current Outpatient Medications   Medication Sig Dispense Refill    apixaban (ELIQUIS) 5 MG  tablet tablet Take 1 tablet by mouth Every 12 (Twelve) Hours. 180 tablet 3    buPROPion XL (WELLBUTRIN XL) 300 MG 24 hr tablet Take 1 tablet by mouth Every Morning.      cetirizine (zyrTEC) 10 MG tablet Take 1 tablet by mouth Daily.      Cholecalciferol (vitamin D3) 125 MCG (5000 UT) capsule capsule Take 1 capsule by mouth Daily.      Continuous Blood Gluc Sensor (FreeStyle Scotty 3 Sensor) misc Use 1 each Every 14 (Fourteen) Days. 2 each 5    digoxin (LANOXIN) 250 MCG tablet Take 1 tablet by mouth Daily. 30 tablet 3    docusate sodium (Colace) 100 MG capsule Take 1 capsule by mouth 2 (Two) Times a Day. 60 capsule 11    doxepin (SINEquan) 10 MG capsule Take 1 capsule by mouth every night at bedtime.      DULoxetine HCl 40 MG capsule delayed-release particles Take 1 capsule by mouth Every 12 (Twelve) Hours.      furosemide (LASIX) 20 MG tablet Take 1 tablet by mouth Daily As Needed (weight gain of 5lbs in 1 week). 30 tablet 11    HumaLOG KwikPen 100 UNIT/ML solution pen-injector INJECT UNDER THE SKIN PER SLIDING SCALE THREE TIMES DAILY UP TO 80 UNITS DAILY      ibuprofen (ADVIL,MOTRIN) 600 MG tablet Take 1 tablet by mouth Every 12 (Twelve) Hours.      Ibuprofen 3 %, Gabapentin 10 %, Baclofen 2 %, lidocaine 4 %, Ketamine HCl 4 % Apply 1-2 g topically to the appropriate area as directed 3 (Three) to 4 (Four) times daily. 90 g 2    Insulin Pen Needle 32G X 4 MM misc Use as directed 4 (Four) Times a Day. 120 each 11    metoprolol succinate XL (TOPROL-XL) 50 MG 24 hr tablet Take 1 tablet by mouth Daily. 90 tablet 3    mirtazapine (REMERON) 7.5 MG tablet Take 1 tablet by mouth Every Night.      omeprazole (priLOSEC) 40 MG capsule Take 1 capsule by mouth Daily. 30 capsule 11    OneTouch Ultra test strip 1 each by Other route 4 (Four) Times a Day. as directed      potassium chloride (K-DUR,KLOR-CON) 10 MEQ CR tablet Take 1 tablet by mouth 2 (Two) Times a Day With Meals.      sacubitril-valsartan (ENTRESTO) 24-26 MG tablet  Take 1 tablet by mouth 2 (Two) Times a Day. 180 tablet 3    Lantus SoloStar 100 UNIT/ML injection pen Inject 28 Units under the skin into the appropriate area as directed 2 (Two) Times a Day for 182 days. 51 mL 1     No current facility-administered medications for this visit.     Review of Systems   Constitutional: Negative.    Skin:         Painful toenails.   All other systems reviewed and are negative.      OBJECTIVE     Vitals:    09/04/24 0852   BP: 157/96   Pulse: 79   Temp: 98.4 °F (36.9 °C)   SpO2: 95%       Body mass index is 35.26 kg/m².    Lab Results   Component Value Date    HGBA1C 11.9 (A) 12/06/2023       Lab Results   Component Value Date    GLUCOSE 462 (C) 02/27/2023    CALCIUM 9.5 02/27/2023     (L) 02/27/2023    K 4.5 02/27/2023    CO2 27.2 02/27/2023    CL 97 (L) 02/27/2023    BUN 12 02/27/2023    CREATININE 0.91 02/27/2023    EGFRIFAFRI 95 03/02/2021    BCR 13.2 02/27/2023    ANIONGAP 8.8 02/27/2023       Patient seen in no apparent distress.      PHYSICAL EXAM:     Foot/Ankle Exam    GENERAL  Appearance:  appears stated age  Orientation:  AAOx3  Affect:  appropriate  Gait:  unimpaired  Assistance:  independent  Right shoe gear: casual shoe  Left shoe gear: casual shoe    VASCULAR     Right Foot Vascularity   Normal vascular exam    Dorsalis pedis:  2+  Posterior tibial:  2+  Skin temperature:  warm  Edema grading:  None  CFT:  < 3 seconds  Pedal hair growth:  Present  Varicosities:  none     Left Foot Vascularity   Normal vascular exam    Dorsalis pedis:  2+  Posterior tibial:  2+  Skin temperature:  warm  Edema grading:  None  CFT:  < 3 seconds  Pedal hair growth:  Present  Varicosities:  none     NEUROLOGIC     Right Foot Neurologic   Light touch sensation: absent  Vibratory sensation: absent  Hot/Cold sensation: absent  Protective Sensation using [place]-Char Monofilament:   Sites intact: 2  Sites tested: 10     Left Foot Neurologic   Light touch sensation: absent  Vibratory  sensation: absent  Hot/Cold sensation:  absent  Protective Sensation using [place]-Char Monofilament:   Sites intact: 2  Sites tested: 10    MUSCLE STRENGTH     Right Foot Muscle Strength   Foot dorsiflexion:  4  Foot plantar flexion:  4  Foot inversion:  4  Foot eversion:  4     Left Foot Muscle Strength   Foot dorsiflexion:  4  Foot plantar flexion:  4  Foot inversion:  4  Foot eversion:  4    RANGE OF MOTION     Right Foot Range of Motion   Foot and ankle ROM within normal limits       Left Foot Range of Motion   Foot and ankle ROM within normal limits      DERMATOLOGIC      Right Foot Dermatologic   Skin  Right foot skin is intact.   Nails  1.  Positive for elongated, onychomycosis, abnormal thickness, subungual debris, dystrophic nail and ingrown toenail.  2.  Positive for elongated, onychomycosis, abnormal thickness, subungual debris, dystrophic nail and ingrown toenail.  3.  Positive for elongated, onychomycosis, abnormal thickness, subungual debris, dystrophic nail and ingrown toenail.  4.  Positive for elongated, onychomycosis, abnormal thickness, subungual debris, dystrophic nail and ingrown toenail.  5.  Positive for elongated, onychomycosis, abnormal thickness, subungual debris, dystrophic nail and ingrown toenail.  Nails comment:  Toenails 1, 2, 3, 4, and 5     Left Foot Dermatologic   Skin  Left foot skin is intact.   Nails comment:  Toenails 1, 2, 3, 4, and 5  Nails  1.  Positive for elongated, onychomycosis, abnormal thickness, subungual debris, dystrophic nail and ingrown toenail.  2.  Positive for elongated, onychomycosis, abnormal thickness, subungual debris, dystrophic nail and ingrown toenail.  3.  Positive for elongated, onychomycosis, abnormal thickness, subungual debris, dystrophic nail and ingrown toenail.  4.  Positive for elongated, onychomycosis, abnormally thick, subungual debris, dystrophic nail and ingrown toenail.  5.  Positive for elongated, onychomycosis, abnormally thick,  subungual debris, dystrophic nail and ingrown toenail.        ASSESSMENT/PLAN     Diagnoses and all orders for this visit:    1. Type 2 diabetes mellitus with hyperglycemia, unspecified whether long term insulin use (Primary)    2. Onychomycosis    3. Diabetic polyneuropathy associated with type 2 diabetes mellitus        Continue with current medication for the neuropathy.  Patient states is improving.  Return to clinic in 3 to 6 months or as needed    Comprehensive lower extremity examination and evaluation was performed.    Discussed findings and treatment plan including risks, benefits, and treatment options with patient in detail. Patient agreed with treatment plan.    Toenails 1, 2, 3, 4, 5 on Right and 1, 2, 3, 4, 5 on Left were debrided with nail nippers then filed with a [employer] nail lamine.  Patient tolerated procedure well without complications.    Rx:  Topical, compounded, neuropathy medication was written; see attached prescription.    Medications and allergies reviewed.  Reviewed available blood glucose and HgB A1C lab values along with other pertinent labs.  These were discussed with the patient as to their importance of diabetic maintenance.    Diabetic foot exam performed and documented this date, compliant with CQM required standards. Detail of findings as noted in physical exam.  Lower extremity Neurologic exam for diabetic patient performed and documented this date, compliant with PQRS required standards. Detail of findings as noted in physical exam.  Advised patient importance of good routine lower extremity hygiene. Advised patient importance of evaluating for intact skin and pain free nail borders.  Advised patient to use mirror to evaluate plantar/ soles of feet for better visualization. Advised patient monitor and phone office to be seen if any cracking to skin, open lesions, painful nail borders or if nails become elongated prior to next visit. Advised patient importance of daily cleansing  of lower extremities, followed by good skin cream to maintain normal hydration of skin. Also advised patient importance of close daily monitoring of blood sugar. Advised to regulate diet and medications to maintain control of blood sugar in optimal range. Contact primary care provider if difficulties maintaining blood sugar levels.  Advised Patient of presence of Diabetes Mellitus condition.  Advised Patient risk of progression and worsening or improvement, then return of condition.  Will monitor condition for any change in future. Treat with most appropriate treatment pending status of condition.  Counseled and advised patient extensively on nature and ramifications of diabetes. Standard instructions given to patient for good diabetic foot care and maintenance. Advised importance of careful monitoring to avoid break down and complications secondary to diabetes. Advised patient importance of strict maintenance of blood sugar control. Advised patient of possible ominous results from neglect of condition, i.e.: amputation/ loss of digits, feet and legs, or even death.  Patient states understands counseling, will monitor closely, continue good hygiene and routine diabetic foot care. Patient will contact office is questions or problems.      An After Visit Summary was printed and given to the patient at discharge, including (if requested) any available informative/educational handouts regarding diagnosis, treatment, or medications. All questions were answered to patient/family satisfaction. Should symptoms fail to improve or worsen they agree to call or return to clinic or to go to the Emergency Department. Discussed the importance of following up with any needed screening tests/labs/specialist appointments and any requested follow-up recommended by me today. Importance of maintaining follow-up discussed and patient accepts that missed appointments can delay diagnosis and potentially lead to worsening of  conditions.    No follow-ups on file., or sooner if acute issues arise.    This document has been electronically signed by Mir Mo DPM on September 4, 2024 09:12 EDT

## 2024-10-22 ENCOUNTER — LAB (OUTPATIENT)
Dept: LAB | Facility: HOSPITAL | Age: 54
End: 2024-10-22
Payer: MEDICAID

## 2024-10-22 DIAGNOSIS — I50.22 CHRONIC HFREF (HEART FAILURE WITH REDUCED EJECTION FRACTION): ICD-10-CM

## 2024-10-22 DIAGNOSIS — E78.5 HYPERLIPIDEMIA LDL GOAL <70: ICD-10-CM

## 2024-10-22 DIAGNOSIS — E10.65 UNCONTROLLED TYPE 1 DIABETES MELLITUS WITH HYPERGLYCEMIA: ICD-10-CM

## 2024-10-22 DIAGNOSIS — I48.0 PAF (PAROXYSMAL ATRIAL FIBRILLATION): ICD-10-CM

## 2024-10-22 DIAGNOSIS — Z79.4 TYPE 2 DIABETES MELLITUS WITH DIABETIC POLYNEUROPATHY, WITH LONG-TERM CURRENT USE OF INSULIN: ICD-10-CM

## 2024-10-22 DIAGNOSIS — Z78.9 STATIN INTOLERANCE: ICD-10-CM

## 2024-10-22 DIAGNOSIS — E11.42 TYPE 2 DIABETES MELLITUS WITH DIABETIC POLYNEUROPATHY, WITH LONG-TERM CURRENT USE OF INSULIN: ICD-10-CM

## 2024-10-22 LAB
CHOLEST SERPL-MCNC: 152 MG/DL (ref 0–200)
DIGOXIN SERPL-MCNC: 0.4 NG/ML (ref 0.6–1.2)
HDLC SERPL-MCNC: 45 MG/DL (ref 40–60)
LDLC SERPL CALC-MCNC: 86 MG/DL (ref 0–100)
LDLC/HDLC SERPL: 1.87 {RATIO}
TRIGL SERPL-MCNC: 115 MG/DL (ref 0–150)
VLDLC SERPL-MCNC: 21 MG/DL (ref 5–40)

## 2024-10-22 PROCEDURE — 80162 ASSAY OF DIGOXIN TOTAL: CPT

## 2024-10-22 PROCEDURE — 36415 COLL VENOUS BLD VENIPUNCTURE: CPT

## 2024-10-22 PROCEDURE — 83525 ASSAY OF INSULIN: CPT

## 2024-10-22 PROCEDURE — 80061 LIPID PANEL: CPT

## 2024-10-23 LAB — INSULIN SERPL-ACNC: 3.8 UIU/ML (ref 2.6–24.9)

## 2024-11-04 ENCOUNTER — HOSPITAL ENCOUNTER (OUTPATIENT)
Dept: INFUSION THERAPY | Facility: HOSPITAL | Age: 54
Discharge: HOME | End: 2024-11-04
Payer: MEDICAID

## 2024-11-04 VITALS
WEIGHT: 271.61 LBS | TEMPERATURE: 98.3 F | DIASTOLIC BLOOD PRESSURE: 103 MMHG | BODY MASS INDEX: 36.79 KG/M2 | HEART RATE: 73 BPM | OXYGEN SATURATION: 99 % | RESPIRATION RATE: 18 BRPM | HEIGHT: 72 IN | SYSTOLIC BLOOD PRESSURE: 155 MMHG

## 2024-11-04 DIAGNOSIS — E78.5 HYPERLIPIDEMIA LDL GOAL <70: Primary | ICD-10-CM

## 2024-11-04 DIAGNOSIS — Z78.9 STATIN INTOLERANCE: ICD-10-CM

## 2024-11-04 DIAGNOSIS — E11.42 TYPE 2 DIABETES MELLITUS WITH DIABETIC POLYNEUROPATHY, WITH LONG-TERM CURRENT USE OF INSULIN: ICD-10-CM

## 2024-11-04 DIAGNOSIS — Z79.4 TYPE 2 DIABETES MELLITUS WITH DIABETIC POLYNEUROPATHY, WITH LONG-TERM CURRENT USE OF INSULIN: ICD-10-CM

## 2024-11-04 PROCEDURE — 25010000002 INCLISIRAN SODIUM 284 MG/1.5ML SOLUTION PREFILLED SYRINGE

## 2024-11-04 PROCEDURE — 96372 THER/PROPH/DIAG INJ SC/IM: CPT

## 2024-11-04 RX ADMIN — INCLISIRAN 284 MG: 284 INJECTION, SOLUTION SUBCUTANEOUS at 08:12

## 2024-11-27 ENCOUNTER — OFFICE VISIT (OUTPATIENT)
Dept: PODIATRY | Facility: CLINIC | Age: 54
End: 2024-11-27
Payer: MEDICAID

## 2024-11-27 VITALS
BODY MASS INDEX: 36.44 KG/M2 | WEIGHT: 269 LBS | DIASTOLIC BLOOD PRESSURE: 89 MMHG | SYSTOLIC BLOOD PRESSURE: 125 MMHG | HEART RATE: 64 BPM | OXYGEN SATURATION: 100 % | HEIGHT: 72 IN

## 2024-11-27 DIAGNOSIS — E11.65 TYPE 2 DIABETES MELLITUS WITH HYPERGLYCEMIA, UNSPECIFIED WHETHER LONG TERM INSULIN USE: Primary | ICD-10-CM

## 2024-11-27 DIAGNOSIS — E11.42 DIABETIC POLYNEUROPATHY ASSOCIATED WITH TYPE 2 DIABETES MELLITUS: ICD-10-CM

## 2024-11-27 DIAGNOSIS — B35.1 ONYCHOMYCOSIS: ICD-10-CM

## 2024-11-27 PROCEDURE — 1159F MED LIST DOCD IN RCRD: CPT | Performed by: PODIATRIST

## 2024-11-27 PROCEDURE — 1160F RVW MEDS BY RX/DR IN RCRD: CPT | Performed by: PODIATRIST

## 2024-11-27 PROCEDURE — 99213 OFFICE O/P EST LOW 20 MIN: CPT | Performed by: PODIATRIST

## 2024-11-27 PROCEDURE — 11721 DEBRIDE NAIL 6 OR MORE: CPT | Performed by: PODIATRIST

## 2024-12-02 NOTE — PROGRESS NOTES
[facility] - PODIATRY    Today's Date: 12/02/24    Patient Name: Edd Willingham  MRN: [id number]  CSN: [id number]  PCP: Christopher Galarza MD, Last PCP Visit: Unknown last visit  Referring Provider: No ref. provider found    SUBJECTIVE     Chief Complaint   Patient presents with    Left Foot - Follow-up, Nail Problem, Diabetes     Recent blood sugar    115     Right Foot - Follow-up, Nail Problem, Diabetes     HPI: Edd Willingham, a 54 y.o.male, presents to clinic for a diabetic foot evaluation.    Patient states he has bilateral foot pain.  Patient states it hurts all the time.  It is worse at night.  Patient states his numbness tingling and burning.  He also has elongated and his toenails are painful in shoe gear.    Patient denies any fevers, chills, nausea, vomiting, shortness of breath, nor any other constitutional signs nor symptoms.    No other pedal complaints at this time.    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    MI (myocardial infarction)     Neck strain 15 years ago    Have stiffness and pain in Neck and reduce range of motion    Rotator cuff syndrome 15 years ago     Past Surgical History:   Procedure Laterality Date    CARDIAC CATHETERIZATION N/A 10/26/2018    Procedure: Left Heart Cath;  Surgeon: Param Mcfarland MD;  Location: [facility] CATH INVASIVE LOCATION;  Service: Cardiology    COLONOSCOPY N/A 03/24/2021    Procedure: COLONOSCOPY;  Surgeon:  Dinesh Drummond MD;  Location: [facility] ENDOSCOPY;  Service: Gastroenterology;  Laterality: N/A;    COLONOSCOPY N/A 2022    Procedure: COLONOSCOPY;  Surgeon: Dinesh Drummond MD;  Location: [facility] ENDOSCOPY;  Service: Gastroenterology;  Laterality: N/A;    ELBOW PROCEDURE Left     left    ENDOSCOPY N/A 2021    Procedure: ESOPHAGOGASTRODUODENOSCOPY;  Surgeon: Dinesh Drummond MD;  Location: [facility] ENDOSCOPY;  Service: Gastroenterology;  Laterality: N/A;    ROTATOR CUFF REPAIR Right     RIGHT    SHOULDER SURGERY  8-15 years ago    Have had rotator cuff surgery on both shoulders    TUMOR REMOVAL      FATTY TUMOR    UPPER GASTROINTESTINAL ENDOSCOPY  2021    WRIST SURGERY Right     RIGHT     Family History   Problem Relation Age of Onset    Diabetes Mother         Insulin dependent    Hypertension Mother     Heart disease Mother     Heart failure Mother     Diabetes Father     No Known Problems Brother     Colon polyps Paternal Uncle      Social History     Socioeconomic History    Marital status:    Tobacco Use    Smoking status: Former     Current packs/day: 0.00     Average packs/day: 0.5 packs/day for 36.4 years (18.2 ttl pk-yrs)     Types: Cigarettes, Pipe, Electronic Cigarette     Start date: 1987     Quit date: 2023     Years since quittin.0     Passive exposure: Past    Smokeless tobacco: Former     Types: Snuff   Vaping Use    Vaping status: Every Day    Substances: Nicotine    Devices: Disposable   Substance and Sexual Activity    Alcohol use: Not Currently     Comment: he has not drank for 1 year    Drug use: Not Currently     Comment: he has been clean from drugs for 1 year    Sexual activity: Yes     Partners: Female     Birth control/protection: Condom     Allergies   Allergen Reactions    Statins Myalgia    Sulfa Antibiotics Unknown (See Comments) and Unknown - Low Severity     Remembers mother always saying he was allergic     Current Outpatient Medications   Medication  Sig Dispense Refill    apixaban (ELIQUIS) 5 MG tablet tablet Take 1 tablet by mouth Every 12 (Twelve) Hours. 180 tablet 3    buPROPion XL (WELLBUTRIN XL) 300 MG 24 hr tablet Take 1 tablet by mouth Every Morning.      cetirizine (zyrTEC) 10 MG tablet Take 1 tablet by mouth Daily.      Cholecalciferol (vitamin D3) 125 MCG (5000 UT) capsule capsule Take 1 capsule by mouth Daily.      Continuous Blood Gluc Sensor (FreeStyle Scotty 3 Sensor) misc Use 1 each Every 14 (Fourteen) Days. 2 each 5    digoxin (LANOXIN) 250 MCG tablet Take 1 tablet by mouth Daily. 30 tablet 3    docusate sodium (Colace) 100 MG capsule Take 1 capsule by mouth 2 (Two) Times a Day. 60 capsule 11    doxepin (SINEquan) 10 MG capsule Take 1 capsule by mouth every night at bedtime.      DULoxetine HCl 40 MG capsule delayed-release particles Take 1 capsule by mouth Every 12 (Twelve) Hours.      furosemide (LASIX) 20 MG tablet Take 1 tablet by mouth Daily As Needed (weight gain of 5lbs in 1 week). 30 tablet 11    HumaLOG KwikPen 100 UNIT/ML solution pen-injector INJECT UNDER THE SKIN PER SLIDING SCALE THREE TIMES DAILY UP TO 80 UNITS DAILY      ibuprofen (ADVIL,MOTRIN) 600 MG tablet Take 1 tablet by mouth Every 12 (Twelve) Hours.      Ibuprofen 3 %, Gabapentin 10 %, Baclofen 2 %, lidocaine 4 %, Ketamine HCl 4 % Apply 1-2 g topically to the appropriate area as directed 3 (Three) to 4 (Four) times daily. 90 g 2    Insulin Pen Needle 32G X 4 MM misc Use as directed 4 (Four) Times a Day. 120 each 11    metoprolol succinate XL (TOPROL-XL) 50 MG 24 hr tablet Take 1 tablet by mouth Daily. 90 tablet 3    mirtazapine (REMERON) 7.5 MG tablet Take 1 tablet by mouth Every Night.      omeprazole (priLOSEC) 40 MG capsule Take 1 capsule by mouth Daily. 30 capsule 11    OneTouch Ultra test strip 1 each by Other route 4 (Four) Times a Day. as directed      potassium chloride (K-DUR,KLOR-CON) 10 MEQ CR tablet Take 1 tablet by mouth 2 (Two) Times a Day With Meals.       sacubitril-valsartan (ENTRESTO) 24-26 MG tablet Take 1 tablet by mouth 2 (Two) Times a Day. 180 tablet 3    Lantus SoloStar 100 UNIT/ML injection pen Inject 28 Units under the skin into the appropriate area as directed 2 (Two) Times a Day for 182 days. 51 mL 1     No current facility-administered medications for this visit.     Review of Systems   Constitutional: Negative.    Skin:         Painful toenails.   All other systems reviewed and are negative.      OBJECTIVE     Vitals:    11/27/24 0928   BP: 125/89   Pulse: 64   SpO2: 100%       Body mass index is 36.48 kg/m².    Lab Results   Component Value Date    HGBA1C 11.9 (A) 12/06/2023       Lab Results   Component Value Date    GLUCOSE 462 (C) 02/27/2023    CALCIUM 9.5 02/27/2023     (L) 02/27/2023    K 4.5 02/27/2023    CO2 27.2 02/27/2023    CL 97 (L) 02/27/2023    BUN 12 02/27/2023    CREATININE 0.91 02/27/2023    EGFRIFAFRI 95 03/02/2021    BCR 13.2 02/27/2023    ANIONGAP 8.8 02/27/2023       Patient seen in no apparent distress.      PHYSICAL EXAM:     Foot/Ankle Exam    GENERAL  Appearance:  appears stated age  Orientation:  AAOx3  Affect:  appropriate  Gait:  unimpaired  Assistance:  independent  Right shoe gear: casual shoe  Left shoe gear: casual shoe    VASCULAR     Right Foot Vascularity   Normal vascular exam    Dorsalis pedis:  2+  Posterior tibial:  2+  Skin temperature:  warm  Edema grading:  None  CFT:  < 3 seconds  Pedal hair growth:  Present  Varicosities:  none     Left Foot Vascularity   Normal vascular exam    Dorsalis pedis:  2+  Posterior tibial:  2+  Skin temperature:  warm  Edema grading:  None  CFT:  < 3 seconds  Pedal hair growth:  Present  Varicosities:  none     NEUROLOGIC     Right Foot Neurologic   Light touch sensation: absent  Vibratory sensation: absent  Hot/Cold sensation: absent  Protective Sensation using [place]-Char Monofilament:   Sites intact: 2  Sites tested: 10     Left Foot Neurologic   Light touch sensation:  absent  Vibratory sensation: absent  Hot/Cold sensation:  absent  Protective Sensation using [place]-Char Monofilament:   Sites intact: 2  Sites tested: 10    MUSCLE STRENGTH     Right Foot Muscle Strength   Foot dorsiflexion:  4  Foot plantar flexion:  4  Foot inversion:  4  Foot eversion:  4     Left Foot Muscle Strength   Foot dorsiflexion:  4  Foot plantar flexion:  4  Foot inversion:  4  Foot eversion:  4    RANGE OF MOTION     Right Foot Range of Motion   Foot and ankle ROM within normal limits       Left Foot Range of Motion   Foot and ankle ROM within normal limits      DERMATOLOGIC      Right Foot Dermatologic   Skin  Right foot skin is intact.   Nails  1.  Positive for elongated, onychomycosis, abnormal thickness, subungual debris, dystrophic nail and ingrown toenail.  2.  Positive for elongated, onychomycosis, abnormal thickness, subungual debris, dystrophic nail and ingrown toenail.  3.  Positive for elongated, onychomycosis, abnormal thickness, subungual debris, dystrophic nail and ingrown toenail.  4.  Positive for elongated, onychomycosis, abnormal thickness, subungual debris, dystrophic nail and ingrown toenail.  5.  Positive for elongated, onychomycosis, abnormal thickness, subungual debris, dystrophic nail and ingrown toenail.  Nails comment:  Toenails 1, 2, 3, 4, and 5     Left Foot Dermatologic   Skin  Left foot skin is intact.   Nails comment:  Toenails 1, 2, 3, 4, and 5  Nails  1.  Positive for elongated, onychomycosis, abnormal thickness, subungual debris, dystrophic nail and ingrown toenail.  2.  Positive for elongated, onychomycosis, abnormal thickness, subungual debris, dystrophic nail and ingrown toenail.  3.  Positive for elongated, onychomycosis, abnormal thickness, subungual debris, dystrophic nail and ingrown toenail.  4.  Positive for elongated, onychomycosis, abnormally thick, subungual debris, dystrophic nail and ingrown toenail.  5.  Positive for elongated, onychomycosis,  abnormally thick, subungual debris, dystrophic nail and ingrown toenail.        ASSESSMENT/PLAN     Diagnoses and all orders for this visit:    1. Type 2 diabetes mellitus with hyperglycemia, unspecified whether long term insulin use (Primary)    2. Onychomycosis    3. Diabetic polyneuropathy associated with type 2 diabetes mellitus        Continue with current medication for the neuropathy.  Patient states is improving.  Return to clinic in 3 to 6 months or as needed    Comprehensive lower extremity examination and evaluation was performed.    Discussed findings and treatment plan including risks, benefits, and treatment options with patient in detail. Patient agreed with treatment plan.    Toenails 1, 2, 3, 4, 5 on Right and 1, 2, 3, 4, 5 on Left were debrided with nail nippers then filed with a [employer] nail lamine.  Patient tolerated procedure well without complications.    Rx:  Topical, compounded, neuropathy medication was written; see attached prescription.    Medications and allergies reviewed.  Reviewed available blood glucose and HgB A1C lab values along with other pertinent labs.  These were discussed with the patient as to their importance of diabetic maintenance.    Diabetic foot exam performed and documented this date, compliant with CQM required standards. Detail of findings as noted in physical exam.  Lower extremity Neurologic exam for diabetic patient performed and documented this date, compliant with PQRS required standards. Detail of findings as noted in physical exam.  Advised patient importance of good routine lower extremity hygiene. Advised patient importance of evaluating for intact skin and pain free nail borders.  Advised patient to use mirror to evaluate plantar/ soles of feet for better visualization. Advised patient monitor and phone office to be seen if any cracking to skin, open lesions, painful nail borders or if nails become elongated prior to next visit. Advised patient importance of  daily cleansing of lower extremities, followed by good skin cream to maintain normal hydration of skin. Also advised patient importance of close daily monitoring of blood sugar. Advised to regulate diet and medications to maintain control of blood sugar in optimal range. Contact primary care provider if difficulties maintaining blood sugar levels.  Advised Patient of presence of Diabetes Mellitus condition.  Advised Patient risk of progression and worsening or improvement, then return of condition.  Will monitor condition for any change in future. Treat with most appropriate treatment pending status of condition.  Counseled and advised patient extensively on nature and ramifications of diabetes. Standard instructions given to patient for good diabetic foot care and maintenance. Advised importance of careful monitoring to avoid break down and complications secondary to diabetes. Advised patient importance of strict maintenance of blood sugar control. Advised patient of possible ominous results from neglect of condition, i.e.: amputation/ loss of digits, feet and legs, or even death.  Patient states understands counseling, will monitor closely, continue good hygiene and routine diabetic foot care. Patient will contact office is questions or problems.      An After Visit Summary was printed and given to the patient at discharge, including (if requested) any available informative/educational handouts regarding diagnosis, treatment, or medications. All questions were answered to patient/family satisfaction. Should symptoms fail to improve or worsen they agree to call or return to clinic or to go to the Emergency Department. Discussed the importance of following up with any needed screening tests/labs/specialist appointments and any requested follow-up recommended by me today. Importance of maintaining follow-up discussed and patient accepts that missed appointments can delay diagnosis and potentially lead to worsening of  conditions.    No follow-ups on file., or sooner if acute issues arise.    This document has been electronically signed by Mir Mo DPM on December 2, 2024 12:50 EST

## 2025-01-16 ENCOUNTER — OFFICE VISIT (OUTPATIENT)
Dept: CARDIOLOGY | Facility: CLINIC | Age: 55
End: 2025-01-16
Payer: MEDICAID

## 2025-01-16 ENCOUNTER — TELEPHONE (OUTPATIENT)
Dept: CARDIOLOGY | Facility: CLINIC | Age: 55
End: 2025-01-16

## 2025-01-16 VITALS
HEIGHT: 72 IN | BODY MASS INDEX: 35.7 KG/M2 | WEIGHT: 263.6 LBS | HEART RATE: 61 BPM | DIASTOLIC BLOOD PRESSURE: 74 MMHG | SYSTOLIC BLOOD PRESSURE: 110 MMHG

## 2025-01-16 DIAGNOSIS — E10.65 UNCONTROLLED TYPE 1 DIABETES MELLITUS WITH HYPERGLYCEMIA: ICD-10-CM

## 2025-01-16 DIAGNOSIS — I50.22 CHRONIC HFREF (HEART FAILURE WITH REDUCED EJECTION FRACTION): Primary | ICD-10-CM

## 2025-01-16 RX ORDER — DAPAGLIFLOZIN 10 MG/1
10 TABLET, FILM COATED ORAL DAILY
Qty: 60 TABLET | Refills: 5 | Status: SHIPPED | OUTPATIENT
Start: 2025-01-16

## 2025-01-16 RX ORDER — INSULIN GLARGINE 100 [IU]/ML
40 INJECTION, SOLUTION SUBCUTANEOUS NIGHTLY
Qty: 73 ML | Refills: 0 | Status: SHIPPED | OUTPATIENT
Start: 2025-01-16 | End: 2025-07-17

## 2025-01-16 RX ORDER — SPIRONOLACTONE 25 MG/1
25 TABLET ORAL DAILY
Qty: 60 TABLET | Refills: 5 | Status: SHIPPED | OUTPATIENT
Start: 2025-01-16

## 2025-01-16 RX ORDER — ARIPIPRAZOLE 5 MG/1
1 TABLET ORAL DAILY
COMMUNITY
Start: 2024-12-16

## 2025-01-16 RX ORDER — INSULIN LISPRO 100 [IU]/ML
20 INJECTION, SOLUTION INTRAVENOUS; SUBCUTANEOUS
COMMUNITY

## 2025-01-16 NOTE — TELEPHONE ENCOUNTER
[facility] received a fax that requires your attention. The document has been indexed to the patient’s chart for your review.      Reason for sending: EXTERNAL MEDICAL RECORD NOTIFICATION     Documents Description: DAPAGLIFLOZIN [id number]     Name of Sender: [place]     Date Indexed: 1.16.25

## 2025-01-16 NOTE — PROGRESS NOTES
Interventional Cardiology Patient Visit Follow Up Note      History of Presenting Illness:  History of Present Illness  Mr. Willingham is a 54-year-old -American male who has a past medical history of heart failure with recovered EF, HTN, & paroxysmal atrial fibrillation who is presenting today for follow-up    He he was diagnosed with congestive heart in 10/2018.  Following the ER visit due to dyspnea and left-sided chest pain while mowing his lawn.  His coronary angiogram was unremarkable for coronary artery disease.  He has a history of methamphetamine abuse however he has been sober for 2 years.  His initial EF was noted to be 20% which recovered to greater than 50% after initiation of guideline directed medical therapy.    Today, he states that he experiences dyspnea following light exertion, such as carrying a 40-count pack of water from his van to his room. He also reports shortness of breath during personal hygiene activities, including washing his feet and calves, accompanied by dizziness. His ability to ascend 10 to 12 steps is not significantly compromised, although he requires a slower pace. He is uncertain about his capacity to walk a mile but recalls an instance where he was able to walk approximately 5 miles when his van got stuck. He does not experience shortness of breath at night. He reports no current leg swelling, a symptom that was more prevalent in the past.     He has discontinued the use of diuretics and reports frequent urination, approximately 30 times daily, including waking up every 1.5 to 2 hours at night. He consumes soda but abstains from coffee. His blood pressure readings have been slightly elevated recently. He has not previously been prescribed spironolactone.     He has a history of diabetes He has previously been prescribed Farxiga to aid in glucose control but was subsequently taken off this medication due to suspicion that he has type 1 diabetes however he has type 2  diabetes.    He requests a refill of his ibuprofen prescription, which he uses intermittently for knee and shoulder inflammation.    SOCIAL HISTORY  The patient has been clean from methamphetamine for 2 years. He vapes and used to smoke cigarettes. He is trying to quit vaping.    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    MI (myocardial infarction)     Neck strain 15 years ago    Have stiffness and pain in Neck and reduce range of motion    Rotator cuff syndrome 15 years ago         Current Outpatient Medications:     apixaban (ELIQUIS) 5 MG tablet tablet, Take 1 tablet by mouth Every 12 (Twelve) Hours., Disp: 180 tablet, Rfl: 3    ARIPiprazole (ABILIFY) 5 MG tablet, Take 1 tablet by mouth Daily., Disp: , Rfl:     buPROPion XL (WELLBUTRIN XL) 300 MG 24 hr tablet, Take 1 tablet by mouth Every Morning., Disp: , Rfl:     cetirizine (zyrTEC) 10 MG tablet, Take 1 tablet by mouth Daily., Disp: , Rfl:     Cholecalciferol (vitamin D3) 125 MCG (5000 UT) capsule capsule, Take 1 capsule by mouth Daily., Disp: , Rfl:     Continuous Blood Gluc Sensor (FreeStyle Scotty 3 Sensor) misc, Use 1 each Every 14 (Fourteen) Days., Disp: 2 each, Rfl: 5    digoxin (LANOXIN) 250 MCG tablet, Take 1 tablet by mouth Daily., Disp: 30 tablet, Rfl: 3    docusate sodium (Colace) 100 MG capsule, Take 1 capsule by  mouth 2 (Two) Times a Day., Disp: 60 capsule, Rfl: 11    doxepin (SINEquan) 10 MG capsule, Take 1 capsule by mouth every night at bedtime., Disp: , Rfl:     DULoxetine HCl 40 MG capsule delayed-release particles, Take 1 capsule by mouth Every 12 (Twelve) Hours., Disp: , Rfl:     furosemide (LASIX) 20 MG tablet, Take 1 tablet by mouth Daily As Needed (weight gain of 5lbs in 1 week)., Disp: 30 tablet, Rfl: 11    ibuprofen (ADVIL,MOTRIN) 600 MG tablet, Take 1 tablet by mouth Every 12 (Twelve) Hours., Disp: , Rfl:     Ibuprofen 3 %, Gabapentin 10 %, Baclofen 2 %, lidocaine 4 %, Ketamine HCl 4 %, Apply 1-2 g topically to the appropriate area as directed 3 (Three) to 4 (Four) times daily., Disp: 90 g, Rfl: 2    Insulin Pen Needle 32G X 4 MM misc, Use as directed 4 (Four) Times a Day., Disp: 120 each, Rfl: 11    metoprolol succinate XL (TOPROL-XL) 50 MG 24 hr tablet, Take 1 tablet by mouth Daily., Disp: 90 tablet, Rfl: 3    mirtazapine (REMERON) 7.5 MG tablet, Take 1 tablet by mouth Every Night., Disp: , Rfl:     omeprazole (priLOSEC) 40 MG capsule, Take 1 capsule by mouth Daily., Disp: 30 capsule, Rfl: 11    OneTouch Ultra test strip, 1 each by Other route 4 (Four) Times a Day. as directed, Disp: , Rfl:     potassium chloride (K-DUR,KLOR-CON) 10 MEQ CR tablet, Take 1 tablet by mouth 2 (Two) Times a Day With Meals., Disp: , Rfl:     sacubitril-valsartan (ENTRESTO) 24-26 MG tablet, Take 1 tablet by mouth 2 (Two) Times a Day., Disp: 180 tablet, Rfl: 3    HumaLOG KwikPen 100 UNIT/ML solution pen-injector, INJECT UNDER THE SKIN PER SLIDING SCALE THREE TIMES DAILY UP TO 80 UNITS DAILY (Patient not taking: Reported on 1/16/2025), Disp: , Rfl:     Lantus SoloStar 100 UNIT/ML injection pen, Inject 28 Units under the skin into the appropriate area as directed 2 (Two) Times a Day for 182 days., Disp: 51 mL, Rfl: 1  Current outpatient and discharge medications have been reconciled for the patient.  Reviewed by: Yomi Claudio MD      There are no discontinued medications.    Allergies   Allergen Reactions    Statins Myalgia    Sulfa Antibiotics Unknown (See Comments) and Unknown - Low Severity     Remembers mother always saying he was allergic        Social History     Tobacco Use    Smoking status: Former     Current packs/day: 0.00     Average packs/day: 0.5 packs/day for 36.4 years (18.2 ttl pk-yrs)     Types: Cigarettes, Pipe, Electronic Cigarette     Start date: 1987     Quit date: 2023     Years since quittin.1     Passive exposure: Past    Smokeless tobacco: Former     Types: Snuff   Vaping Use    Vaping status: Every Day    Substances: Nicotine    Devices: Disposable   Substance Use Topics    Alcohol use: Not Currently     Comment: he has not drank for 1 year    Drug use: Not Currently     Comment: he has been clean from drugs for 1 year       Family History   Problem Relation Age of Onset    Diabetes Mother         Insulin dependent    Hypertension Mother     Heart disease Mother     Heart failure Mother     Diabetes Father     No Known Problems Brother     Colon polyps Paternal Uncle           Objective   There were no vitals taken for this visit.    Wt Readings from Last 3 Encounters:   24 122 kg (269 lb)   24 123 kg (271 lb 9.7 oz)   24 118 kg (260 lb)     BP Readings from Last 3 Encounters:   24 125/89   24 (!) 155/103   24 157/96       Physical Exam  Constitutional:  Awake. Not in acute distress. Normal appearance.   Neck: No carotid bruit, hepatojugular reflux or JVD.   Cardiovascular:      Rate and Rhythm: Normal rate and regular rhythm.      Chest Wall: PMI is not displaced.      Heart sounds: Normal heart sounds, S1 normal and S2 normal. No murmur heard.       No friction rub. No gallop. No S3 or S4 sounds.    Pulmonary: Pulmonary effort is normal. Normal breath sounds. No wheezing, rhonchi or rales.   Extremities:  Bilateral edema is noted.   Skin: Warm and dry. Non cyanotic,  "No petechiae or rash.   Neurological: Alert and oriented x 3  Psychiatric:  Behavior is cooperative.       Result Review :   The following data was reviewed by Yomi Claudio MD on 01/16/2025   proBNP   Date Value Ref Range Status   08/28/2020 107.9 0.0 - 900.0 pg/mL Final          No results found for: \"TSH\"   No results found for: \"FREET4\"   No results found for: \"DDIMERQUANT\"  Magnesium   Date Value Ref Range Status   02/27/2021 2.3 1.6 - 2.6 mg/dL Final      Digoxin   Date Value Ref Range Status   10/22/2024 0.40 (L) 0.60 - 1.20 ng/mL Final      Lab Results   Component Value Date    TROPONINT <0.010 02/28/2021      No results found for: \"POCTROP\"         Lipid Panel          10/22/2024    11:26   Lipid Panel   Total Cholesterol 152    Triglycerides 115    HDL Cholesterol 45    VLDL Cholesterol 21    LDL Cholesterol  86    LDL/HDL Ratio 1.87      Results for orders placed in visit on 04/17/23    Adult Transthoracic Echo Complete W/ Cont if Necessary Per Protocol    Interpretation Summary    The left ventricular cavity is borderline dilated.    Left ventricular wall thickness is consistent with mild posterior asymmetric hypertrophy.    Left ventricular systolic function is normal. Calculated left ventricular EF = 51%    The following left ventricular wall segments are hypokinetic: mid inferior and basal inferior.    Left ventricular diastolic function is consistent with (grade I) impaired relaxation.    The right ventricular cavity is mildly dilated.    Estimated right ventricular systolic pressure from tricuspid regurgitation is normal (<35 mmHg).    Mild dilation of the aortic root is present.     Results for orders placed in visit on 04/17/23    Adult Transthoracic Echo Complete W/ Cont if Necessary Per Protocol    Interpretation Summary    The left ventricular cavity is borderline dilated.    Left ventricular wall thickness is consistent with mild posterior asymmetric hypertrophy.    Left ventricular systolic " function is normal. Calculated left ventricular EF = 51%    The following left ventricular wall segments are hypokinetic: mid inferior and basal inferior.    Left ventricular diastolic function is consistent with (grade I) impaired relaxation.    The right ventricular cavity is mildly dilated.    Estimated right ventricular systolic pressure from tricuspid regurgitation is normal (<35 mmHg).    Mild dilation of the aortic root is present.     Results for orders placed during the hospital encounter of 10/25/18    Cardiac Catheterization/Vascular Study    Narrative  Cardiac Catheterization Operative Report    Edd Willingham  [id number]  10/26/2018  @PCP@    Reason for the procedure: Chest pain class III angina pectoris positive troponin      Procedure performed:    1.  Left heart catheterization with the  iliofemoral arteriogram and Angio-Seal closure device.  2.  Administration of Corvert for atrial fibrillation    Procedure Details  The risks, benefits, complications, treatment options, and expected outcomes were discussed with the patient. The patient and/or family concurred with the proposed plan, giving informed consent. Patient was brought to the cath lab after IV hydration was begun and oral premedication was given. He was further sedated with midazolam. He was prepped and draped in the usual manner.    Using 1% local lidocaine infiltration, a 6-Burkinan introducer sheath was inserted using the modified Seldinger technique in the right femoral artery without any difficulty. Using a 0.038 guidewire over a 6-Burkinan, a right Margo catheter was then advanced under fluoroscopic guidance up to the right coronary artery cusp. With minimal manipulation, the right coronary artery was cannulated and a selective angiogram was performed, using multiple views of the right coronary artery. The right Margo catheter was exchanged for the left Margo catheter and advanced under fluoroscopic guidance up to the right  coronary cusp. With minimal manipulation, the left coronary artery was cannulated and a selective angiogram of the left coronary artery was performed, using right and the left ZHU, Kyrgyz, and PA cranial-caudal views. The 6-Cypriot pigtail catheter was then advanced under fluoroscopic guidance over a 0.038 wire up to the level of the aortic root. With minimal manipulation, the pigtail catheter was advanced into the left ventricle, and hemodynamics were performed and a left ventriculogram was performed.      Hemodynamic  Aortic pressure:140/96  Mean:113  Left ventricular pressure:120/32  Left ventriculogram: Not performed.      Coronaries: Right dominant system.    Left Main coronary artery: Left main coronary artery was a medium caliber vessel which was free of any disease.  The left main coronary artery bifurcated into the left anterior descending artery and circumflex artery.    Left anterior descending artery: Left anterior descending artery was a medium caliber vessel which were free of any obstructive stenosis with good GAMA-3 flow.  The diagonal branch was free of any obstructive stenosis with good GAMA-3 flow.    Circumflex artery: The circumflex artery was a medium caliber vessel with the ramus intermedius branch the first and the second obtuse marginal branch which was free of any obstructive stenosis with good GAMA-3 flow.  The circumflex artery distally was also free of any obstructive stenosis with good GAMA-3 flow    Right coronary artery: The right coronary artery was a medium to large caliber dominant vessel which were free of any obstructive stenosis with good GAMA-3 flow    Estimated Blood Loss:  Minimal    Complications:  None; patient tolerated the procedure well.    Impression  1.  Elevated left ventricular end-diastolic pressure.  2.  No evidence of any obstructive epicardial coronary artery disease.    Recommendations: Patient positive troponin was probably secondary to the atrial fibrillation.   Patient was recommended medical management for the atrial fibrillation and to workup noncardiac etiology of chest pain.  Patient would need anticoagulation with Eliquis for the atrial fibrillation.      Param Mcfarland MD  10/26/2018  2:41 PM    EMR Dragon/Transcription disclaimer:  Some of this note may be an electronic transcription/translation of spoken language to printed text. The electronic translation of spoken language may permit erroneous, or at times, nonsensical words or phrases to be inadvertently transcribed; Although I have reviewed the note for such errors, some may still exist.     Results for orders placed during the hospital encounter of 05/25/23    Stress Test With Myocardial Perfusion One Day    Interpretation Summary    Left ventricular ejection fraction is moderately reduced (Calculated EF = 40%).    Abnormal LV wall motion consistent with global hypokinesis.  Left ventricular dilatation is suspected    Myocardial perfusion imaging indicates a normal myocardial perfusion study with no evidence of ischemia.    Impressions are consistent with an intermediate risk study.    Findings consistent with a normal ECG stress test.       The 10-year ASCVD risk score (Arthur GAMA, et al., 2019) is: 19%    Values used to calculate the score:      Age: 54 years      Sex: Male      Is Non- : Yes      Diabetic: Yes      Tobacco smoker: No      Systolic Blood Pressure: 125 mmHg      Is BP treated: Yes      HDL Cholesterol: 45 mg/dL      Total Cholesterol: 152 mg/dL          No diagnosis found.    There are no diagnoses linked to this encounter.          Assessment & Plan  1.  Heart failure with recovered ejection fraction, NYHA class II ACC stage C  He has a history of congestive heart failure, first recognized in October 2018. Initial heart function was down to 20%, but with medications, it has recovered to near normal.   He continues to have bendopnea, shortness of breath on moderate  exertion, such as carrying a 40-count pack of water.   His current GDMT entails Entresto 24/26 mg twice daily and Toprol-XL. He has run out of his Lasix prescription and is not currently taking it.   Digoxin will be discontinued.  He is advised to take Lasix 40 mg daily in the morning for the next 5 days to manage fluid retention and then stop. After 5 days, he should take Lasix only on an as-needed basis, specifically if his weight increases by 2 pounds.   Starting Farxiga 10 mg today as it will serve as to assist in optimal glycemic control, renal protection and prevention of LV remodeling.  Spironolactone will be prescribed to support him while on Lasix and to prevent potassium levels from dropping too low. He is advised to monitor his weight and blood pressure daily. If he Continues to experience shortness of breath despite daily Lasix, he will be enrolled in cardiac rehabilitation therapy.    2.  Paroxysmal atrial fibrillation.  He has a history of atrial fibrillation and is currently taking Toprol-XL. Digoxin will be discontinued for rate control.  Continue Eliquis 5 mg p.o. twice daily.  No adverse effect is reported.  In subsequent visit we will estimate A-fib burden by enrolling in cardiac rhythm monitor study.  Patient's [id number] score is 3      3. Diabetes mellitus.  He has type 2 diabetes. Farxiga will be prescribed to help manage his blood sugar levels. He is advised to reduce his intake of caffeinated beverages, particularly soda, to help control his diabetes and reduce nocturia.    4. Nocturia.  He reports frequent urination, approximately 30 times daily, including waking up every 1.5 to 2 hours at night. This is likely exacerbated by his intake of caffeinated beverages, particularly soda. He is advised to reduce his soda intake to help manage this symptom.    5. Medication management.  He requests a refill for ibuprofen for occasional knee and shoulder inflammation. He is advised against using  ibuprofen due to potential kidney damage and interference with other medications. Instead, he is recommended to use Tylenol, up to 1000 mg every 8 hours, as needed.    6.  Dyslipidemia  Due to diabetes, patient needs to be on a moderate intensity statin.  However patient has been statin intolerant.  Patient is not able to afford Repatha due to insurance issues.  Will try bempedoic acid in subsequent visit if needed most recent lipid panel reflects optimally controlled LDL levels and triglyceride levels without lipid-lowering therapy.    Follow-up  The patient will follow up in 4 weeks.      I have seen and examined the patient. I spent 30 min caring for the patient on this date of service. This time includes time spent by me in the following activities as necessary: face to face encounter, preparing for the visit, reviewing tests, specialists records and previous visits, obtaining and/or reviewing a separately obtained history, performing a medically appropriate exam and/or evaluation, counseling and educating the patient, family, caregiver, referring and/or communicating with other healthcare professionals, documenting information in the medical record, independently interpreting results and communicating that information with the patient, family, caregiver, and developing a medically appropriate treatment plan with consideration of other conditions, medications, and treatments. More than 50 % time was spent in counselling and patient education.       Follow Up     No follow-ups on file.      Yomi Claudio MD  Interventional Cardiology  01/16/2025  09:08 EST      Patient was given instructions and counseling regarding his condition or for health maintenance advice. Please see specific information pulled into the AVS if appropriate.     Please note that portions of this document were completed using a voice recognition program.     Patient or patient representative verbalized consent for the use of Ambient Listening  during the visit with  Yomi Claudio MD for chart documentation. 1/16/2025  18:05 EST

## 2025-01-17 ENCOUNTER — TELEPHONE (OUTPATIENT)
Dept: CARDIOLOGY | Facility: CLINIC | Age: 55
End: 2025-01-17
Payer: MEDICAID

## 2025-01-17 NOTE — TELEPHONE ENCOUNTER
[facility] received a fax that requires your attention. The document has been indexed to the patient’s chart for your review.      Reason for sending: EXTERNAL MEDICAL RECORD NOTIFICATION     Documents Description: DAPAGLIFLOZIN [id number]    Name of Sender: [employer]     Date Indexed: 1.16.25

## 2025-01-23 ENCOUNTER — SPECIALTY PHARMACY (OUTPATIENT)
Facility: HOSPITAL | Age: 55
End: 2025-01-23
Payer: MEDICAID

## 2025-01-23 DIAGNOSIS — I50.22 CHRONIC HFREF (HEART FAILURE WITH REDUCED EJECTION FRACTION): ICD-10-CM

## 2025-01-23 RX ORDER — DAPAGLIFLOZIN 10 MG/1
10 TABLET, FILM COATED ORAL DAILY
Qty: 90 TABLET | Refills: 3 | Status: SHIPPED | OUTPATIENT
Start: 2025-01-23

## 2025-01-23 NOTE — PROGRESS NOTES
Specialty Pharmacy Patient Management Program  Cardiology Initial Assessment     Edd Willingham was referred by a Cardiology provider to the Cardiology Patient Management program offered by [facility] for Heart Failure on 01/23/25.  An initial outreach was conducted, including assessment of therapy appropriateness and specialty medication education for Farxiga. The patient was introduced to services offered by [facility], including: regular assessments, refill coordination, mail order delivery options, prior authorization maintenance, and financial assistance programs as applicable. The patient was also provided with contact information for the pharmacy team.     Insurance Coverage & Financial Support  [place] Medicaid     Relevant Past Medical History and Comorbidities  Relevant medical history and concomitant health conditions were discussed with the patient. The patient's chart has been reviewed for relevant past medical history and comorbid conditions and updated as necessary.  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    MI (myocardial infarction)     Neck strain 15 years ago    Have stiffness and pain in Neck and reduce range of motion    Rotator cuff syndrome 15 years ago     Social History     Socioeconomic History    Marital status:    Tobacco Use    Smoking status: Former     Current packs/day: 0.00     Average packs/day: 0.5  packs/day for 36.4 years (18.2 ttl pk-yrs)     Types: Cigarettes, Pipe, Electronic Cigarette     Start date: 1987     Quit date: 2023     Years since quittin.1     Passive exposure: Past    Smokeless tobacco: Former     Types: Snuff   Vaping Use    Vaping status: Every Day    Substances: Nicotine    Devices: Disposable   Substance and Sexual Activity    Alcohol use: Not Currently     Comment: he has not drank for 1 year    Drug use: Not Currently     Comment: he has been clean from drugs for 1 year    Sexual activity: Yes     Partners: Female     Birth control/protection: Condom       Problem list reviewed by Ciara Nunez RPH on 2025 at 11:25 AM    Allergies  Known allergies and reactions were discussed with the patient. The patient's chart has been reviewed for  allergy information and updated as necessary.   Allergies   Allergen Reactions    Statins Myalgia    Sulfa Antibiotics Unknown (See Comments) and Unknown - Low Severity     Remembers mother always saying he was allergic       Allergies reviewed by Ciara Nunez RPH on 2025 at 11:25 AM    Relevant Laboratory Values  Relevant laboratory values were discussed with the patient. The following specialty medication dose adjustment(s) are recommended: Farxiga    Lab Results   Component Value Date    GLUCOSE 462 (C) 2023    CALCIUM 9.5 2023     (L) 2023    K 4.5 2023    CO2 27.2 2023    CL 97 (L) 2023    BUN 12 2023    CREATININE 0.91 2023    EGFRIFAFRI 95 2021    BCR 13.2 2023    ANIONGAP 8.8 2023     Lab Results   Component Value Date    CHOL 152 10/22/2024    TRIG 115 10/22/2024    HDL 45 10/22/2024    LDL 86 10/22/2024         Current Medication List  This medication list has been reviewed with the patient and evaluated for any interactions or necessary modifications/recommendations, and updated to include all prescription medications, OTC  medications, and supplements the patient is currently taking.  This list reflects what is contained in the patient's profile, which has also been marked as reviewed to communicate to other providers it is the most up to date version of the patient's current medication therapy.     Current Outpatient Medications:     apixaban (ELIQUIS) 5 MG tablet tablet, Take 1 tablet by mouth Every 12 (Twelve) Hours., Disp: 180 tablet, Rfl: 3    ARIPiprazole (ABILIFY) 5 MG tablet, Take 1 tablet by mouth Daily., Disp: , Rfl:     buPROPion XL (WELLBUTRIN XL) 300 MG 24 hr tablet, Take 1 tablet by mouth Every Morning., Disp: , Rfl:     cetirizine (zyrTEC) 10 MG tablet, Take 1 tablet by mouth Daily., Disp: , Rfl:     Cholecalciferol (vitamin D3) 125 MCG (5000 UT) capsule capsule, Take 1 capsule by mouth Daily., Disp: , Rfl:     Continuous Blood Gluc Sensor (FreeStyle Scotty 3 Sensor) misc, Use 1 each Every 14 (Fourteen) Days., Disp: 2 each, Rfl: 5    dapagliflozin Propanediol 10 MG tablet, Take 10 mg by mouth Daily., Disp: 60 tablet, Rfl: 5    docusate sodium (Colace) 100 MG capsule, Take 1 capsule by mouth 2 (Two) Times a Day., Disp: 60 capsule, Rfl: 11    doxepin (SINEquan) 10 MG capsule, Take 1 capsule by mouth every night at bedtime., Disp: , Rfl:     DULoxetine HCl 40 MG capsule delayed-release particles, Take 1 capsule by mouth Every 12 (Twelve) Hours., Disp: , Rfl:     furosemide (LASIX) 20 MG tablet, Take 1 tablet by mouth Daily As Needed (weight gain of 5lbs in 1 week)., Disp: 30 tablet, Rfl: 11    Ibuprofen 3 %, Gabapentin 10 %, Baclofen 2 %, lidocaine 4 %, Ketamine HCl 4 %, Apply 1-2 g topically to the appropriate area as directed 3 (Three) to 4 (Four) times daily., Disp: 90 g, Rfl: 2    Insulin Lispro (humaLOG) 100 UNIT/ML injection, Inject 20 Units under the skin into the appropriate area as directed 3 (Three) Times a Day Before Meals. Depending on A1c adds extra units, Disp: , Rfl:     Insulin Pen Needle 32G X 4 MM misc,  Use as directed 4 (Four) Times a Day., Disp: 120 each, Rfl: 11    Lantus SoloStar 100 UNIT/ML injection pen, Inject 40 Units under the skin into the appropriate area as directed Every Night for 182 days., Disp: 73 mL, Rfl: 0    metoprolol succinate XL (TOPROL-XL) 50 MG 24 hr tablet, Take 1 tablet by mouth Daily., Disp: 90 tablet, Rfl: 3    mirtazapine (REMERON) 7.5 MG tablet, Take 1 tablet by mouth Every Night., Disp: , Rfl:     omeprazole (priLOSEC) 40 MG capsule, Take 1 capsule by mouth Daily., Disp: 30 capsule, Rfl: 11    OneTouch Ultra test strip, 1 each by Other route 4 (Four) Times a Day. as directed, Disp: , Rfl:     sacubitril-valsartan (ENTRESTO) 24-26 MG tablet, Take 1 tablet by mouth 2 (Two) Times a Day., Disp: 180 tablet, Rfl: 3    spironolactone (ALDACTONE) 25 MG tablet, Take 1 tablet by mouth Daily., Disp: 60 tablet, Rfl: 5    Medicines reviewed by Ciara Nunez [facility] on 1/23/2025 at 11:25 AM    Drug Interactions  None    Initial Education Provided for Specialty Medication  The patient has been provided with the following education and any applicable administration techniques (i.e. self-injection) have been demonstrated for the therapies indicated. All questions and concerns have been addressed prior to the patient receiving the medication, and the patient has verbalized comprehension of the education and any materials provided. Additional patient education shall be provided and documented upon request by the patient, provider, or payer.    Farxiga® (dapagliflozin)  Medication Expectations   Why am I taking this medication? You could be taking this medication for several reasons:  To lower blood sugar because you have type 2 diabetes  To reduce your risk of death from heart attack or stroke if you have heart disease and type 2 diabetes  To reduce your risk of death or hospitalization for heart failure  To reduce your risk of further kidney damage, death, or hospitalization if you have  chronic kidney disease   What should I expect while on this medication? You should expect to see your blood sugar and A1c decrease over time. You may also see a decrease in your blood pressure and it can help some people lose weight.     How does the medication work? Farxiga works by helping to remove some sugar that the body doesn't need through urination.    How long will I be on this medication for? The amount of time you will be on this medication will be determined by your doctor based on blood sugar and A1c control. You will most likely be on this medication or another diabetes medication throughout your lifetime. Do not abruptly stop this medication without talking to your doctor first.    How do I take this medication? Take as directed on your prescription label. This medication is usually taken in the morning and can be given with or without food.    What are some possible side effects? You may notice increased urination, especially when you first start Farxiga. Stuffy or runny nose can also occur. The most common side effects are urinary tract infections and yeast infections and are more commonly seen in females. Talk with your doctor if you notice white or yellow vaginal discharge, vaginal itching or odor of if you notice redness, itching, pain, or swelling of the penis and/or bad-smelling discharge from the penis.    What happens if I miss a dose? If you miss a dose, take it as soon as you remember. If it is close to your next dose, skip it (do not take 2 doses at once)     Medication Safety   What are things I should warn my doctor immediately about? Tell your doctor if you have kidney disease, liver disease, heart failure, pancreas problems, or history of frequent genital yeast or urinary tract infections. Tell your doctor if you are on a low-salt diet, if you drink alcohol, or if you are having surgery. Talk to your doctor if you are pregnant, planning to become pregnant, or breastfeeding. Also tell  your doctor if you notice any signs/symptoms of an allergic reaction (rash, hives, difficulty breathing, etc.).   What are things that I should be cautious of? Be cautious of any side effects from this medication. Talk to your doctor if any new ones develop or aren't getting better.   What are some medications that can interact with this one? Some medications that interact include diuretics (water pills) and other medications that may also lower your blood sugar such as insulins and glipizide/glimepiride/glyburide. Your doctor may reduce the dose of these medications when you start Farxiga to minimize low blood sugars. Always tell your doctor or pharmacist immediately if you start taking any new medications, including over-the-counter medications, vitamins, and herbal supplements.      Medication Storage/Handling   How should I handle this medication? Keep this medication out of reach of pets/children in tightly sealed container.   How does this medication need to be stored? Store at room temperature and keep dry (don't keep in bathroom or other room with moisture)   How should I dispose of this medication? There should not be a need to dispose of this medication unless your provider decides to change the dose or therapy. If that is the case, take to your local police station for proper disposal. Some pharmacies also have take-back bins for medication drop-off.      Resources/Support   How can I remind myself to take this medication? You can download reminder apps to help you manage your refills. You may also set an alarm on your phone to remind you. The pharmacy carries pill boxes that you can place next to an area you pass everyday (such as where you place your car keys or where you charge your phone)   Is financial support available?  [employer] can provide co-pay cards if you have commercial insurance or patient assistance if you have Medicare or no insurance.    Which vaccines are recommended for me? Talk to  your doctor about these vaccines: Flu, Coronavirus (COVID-19), Pneumococcal (pneumonia), Tdap, Hepatitis B,  Zoster (shingles)          Adherence and Self-Administration  Adherence related to the patient's specialty therapy was discussed with the patient. The Adherence segment of this outreach has been reviewed and updated.     Is there a concern with patient's ability to self administer the medication correctly and without issue?: No  Were any potential barriers to adherence identified during the initial assessment or patient education?: No  Are there any concerns regarding the patient's understanding of the importance of medication adherence?: No  Methods for Supporting Patient Adherence and/or Self-Administration: None    Open Medication Therapy Problems  No medication therapy recommendations to display    Goals of Therapy  Goals related to the patient's specialty therapy were discussed with the patient. The Patient Goals segment of this outreach has been reviewed and updated.   Goals Addressed Today        Specialty Pharmacy General Goal      Decrease hospitalizations due to HF from baseline   and  A1C < 7 %     Lab Results   Component Value Date    HGBA1C 11.9 (A) 12/06/2023    HGBA1C 9.5 (H) 10/28/2018    HGBA1C 9.5 (H) 10/27/2018                 Reassessment Plan & Follow-Up  1. Medication Therapy Changes: Farxiga 10mg by mouth once daily  2. Related Plans, Therapy Recommendations, or Therapy Problems to Be Addressed: None  3. Pharmacist to perform regular assessments no more than (6) months from the previous assessment.  4. Care Coordinator to set up future refill outreaches, coordinate prescription delivery, and escalate clinical questions to pharmacist.  5. Welcome information and patient satisfaction survey to be sent by specialty pharmacy team with patient's initial fill.    Attestation  Therapeutic appropriateness: Appropriate   I attest the patient was actively involved in and has agreed to the above  plan of care. If the prescribed therapy is at any point deemed not appropriate based on the current or future assessments, a consultation will be initiated with the patient's specialty care provider to determine the best course of action. The revised plan of therapy will be documented along with any required assessments and/or additional patient education provided.     Ciara Browning, [place]  Clinical Specialty Pharmacist, Cardiology  1/23/2025  11:31 EST

## 2025-02-03 ENCOUNTER — SPECIALTY PHARMACY (OUTPATIENT)
Dept: CARDIOLOGY | Facility: CLINIC | Age: 55
End: 2025-02-03
Payer: MEDICAID

## 2025-03-12 ENCOUNTER — OFFICE VISIT (OUTPATIENT)
Dept: PODIATRY | Facility: CLINIC | Age: 55
End: 2025-03-12
Payer: MEDICAID

## 2025-03-12 VITALS
SYSTOLIC BLOOD PRESSURE: 127 MMHG | OXYGEN SATURATION: 97 % | TEMPERATURE: 96.7 F | BODY MASS INDEX: 36.03 KG/M2 | WEIGHT: 266 LBS | DIASTOLIC BLOOD PRESSURE: 50 MMHG | HEART RATE: 52 BPM | HEIGHT: 72 IN

## 2025-03-12 DIAGNOSIS — E11.65 TYPE 2 DIABETES MELLITUS WITH HYPERGLYCEMIA, UNSPECIFIED WHETHER LONG TERM INSULIN USE: Primary | ICD-10-CM

## 2025-03-12 DIAGNOSIS — B35.1 ONYCHOMYCOSIS: ICD-10-CM

## 2025-03-12 DIAGNOSIS — E11.42 DIABETIC POLYNEUROPATHY ASSOCIATED WITH TYPE 2 DIABETES MELLITUS: ICD-10-CM

## 2025-03-13 NOTE — PROGRESS NOTES
[facility] - PODIATRY    Today's Date: 03/13/25    Patient Name: Edd Willingham  MRN: [id number]  CSN: [id number]  PCP: Christopher Galarza MD, Last PCP Visit: Unknown last visit  Referring Provider: No ref. provider found    SUBJECTIVE     Chief Complaint   Patient presents with    Left Foot - Follow-up, Nail Problem, Diabetes    Right Foot - Follow-up, Nail Problem, Diabetes     HPI: Edd Willingham, a 54 y.o.male, presents to clinic for a diabetic foot evaluation.    Patient states he has bilateral foot pain.  Patient states it hurts all the time.  It is worse at night.  Patient states his numbness tingling and burning.  He also has elongated and his toenails are painful in shoe gear.    Patient denies any fevers, chills, nausea, vomiting, shortness of breath, nor any other constitutional signs nor symptoms.    No other pedal complaints at this time.    History of Present Illness  The patient presents for evaluation of diabetes.    He reports a slight decrease in his blood glucose levels, although they remain elevated. He is scheduled to resume using the OmniPod next month. He is not experiencing any new onset of foot pain.       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    Depression     Diabetes mellitus     Difficulty walking 8 years ago    Elevated cholesterol     GERD (gastroesophageal reflux disease)     Heart murmur     Hypertension     Ingrown toenail 10 12 years ago    Knee sprain 1985    Knee swelling 15 years ago    Low back pain     Low back strain 1992    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    MI (myocardial infarction)     Neck strain 15 years ago    Have stiffness and pain  in Neck and reduce range of motion    Neuropathy in diabetes 10 to 12 years ago    Rotator cuff syndrome 15 years ago    Substance abuse adult life    have used various drugs most of my adult life     Past Surgical History:   Procedure Laterality Date    CARDIAC CATHETERIZATION N/A 10/26/2018    Procedure: Left Heart Cath;  Surgeon: Param Mcfarland MD;  Location: [facility] CATH INVASIVE LOCATION;  Service: Cardiology    COLONOSCOPY N/A 2021    Procedure: COLONOSCOPY;  Surgeon: Dinesh Drummond MD;  Location: [facility] ENDOSCOPY;  Service: Gastroenterology;  Laterality: N/A;    COLONOSCOPY N/A 2022    Procedure: COLONOSCOPY;  Surgeon: Dinesh Drummond MD;  Location: [facility] ENDOSCOPY;  Service: Gastroenterology;  Laterality: N/A;    ELBOW PROCEDURE Left     left    ENDOSCOPY N/A 2021    Procedure: ESOPHAGOGASTRODUODENOSCOPY;  Surgeon: Dinesh Drummond MD;  Location: [facility] ENDOSCOPY;  Service: Gastroenterology;  Laterality: N/A;    ROTATOR CUFF REPAIR Right     RIGHT    SHOULDER SURGERY  8-15 years ago    Have had rotator cuff surgery on both shoulders    TUMOR REMOVAL      FATTY TUMOR    UPPER GASTROINTESTINAL ENDOSCOPY  2021    WRIST SURGERY Right     RIGHT     Family History   Problem Relation Age of Onset    Diabetes Mother         Insulin dependent    Hypertension Mother     Heart disease Mother     Heart failure Mother     Thyroid disease Mother         Has thyroid problems    Diabetes Father     No Known Problems Brother     Colon polyps Paternal Uncle      Social History     Socioeconomic History    Marital status:    Tobacco Use    Smoking status: Former     Current packs/day: 0.00     Average packs/day: 0.5 packs/day for 36.4 years (18.2 ttl pk-yrs)     Types: Cigarettes, Pipe, Electronic Cigarette     Start date: 1987     Quit date: 2023     Years since quittin.2     Passive exposure: Past    Smokeless tobacco: Former     Types: Snuff   Vaping Use    Vaping status: Every  Day    Substances: Nicotine    Devices: Disposable   Substance and Sexual Activity    Alcohol use: Not Currently     Comment: he has not drank for 1 year    Drug use: Not Currently     Comment: he has been clean from drugs for 1 year    Sexual activity: Yes     Partners: Female     Birth control/protection: Condom     Allergies   Allergen Reactions    Statins Myalgia    Sulfa Antibiotics Unknown (See Comments) and Unknown - Low Severity     Remembers mother always saying he was allergic     Current Outpatient Medications   Medication Sig Dispense Refill    apixaban (ELIQUIS) 5 MG tablet tablet Take 1 tablet by mouth Every 12 (Twelve) Hours. 180 tablet 3    ARIPiprazole (ABILIFY) 5 MG tablet Take 1 tablet by mouth Daily.      buPROPion XL (WELLBUTRIN XL) 300 MG 24 hr tablet Take 1 tablet by mouth Every Morning.      cetirizine (zyrTEC) 10 MG tablet Take 1 tablet by mouth Daily.      Cholecalciferol (vitamin D3) 125 MCG (5000 UT) capsule capsule Take 1 capsule by mouth Daily.      Continuous Blood Gluc Sensor (FreeStyle Scotty 3 Sensor) misc Use 1 each Every 14 (Fourteen) Days. 2 each 5    dapagliflozin Propanediol 10 MG tablet Take 10 mg by mouth Daily. 90 tablet 3    docusate sodium (Colace) 100 MG capsule Take 1 capsule by mouth 2 (Two) Times a Day. 60 capsule 11    doxepin (SINEquan) 10 MG capsule Take 1 capsule by mouth every night at bedtime.      DULoxetine HCl 40 MG capsule delayed-release particles Take 1 capsule by mouth Every 12 (Twelve) Hours.      furosemide (LASIX) 20 MG tablet Take 1 tablet by mouth Daily As Needed (weight gain of 5lbs in 1 week). 30 tablet 11    Ibuprofen 3 %, Gabapentin 10 %, Baclofen 2 %, lidocaine 4 %, Ketamine HCl 4 % Apply 1-2 g topically to the appropriate area as directed 3 (Three) to 4 (Four) times daily. 90 g 2    Insulin Lispro (humaLOG) 100 UNIT/ML injection Inject 20 Units under the skin into the appropriate area as directed 3 (Three) Times a Day Before Meals. Depending on  A1c adds extra units      Insulin Pen Needle 32G X 4 MM misc Use as directed 4 (Four) Times a Day. 120 each 11    Lantus SoloStar 100 UNIT/ML injection pen Inject 40 Units under the skin into the appropriate area as directed Every Night for 182 days. 73 mL 0    metoprolol succinate XL (TOPROL-XL) 50 MG 24 hr tablet Take 1 tablet by mouth Daily. 90 tablet 3    mirtazapine (REMERON) 7.5 MG tablet Take 1 tablet by mouth Every Night.      omeprazole (priLOSEC) 40 MG capsule Take 1 capsule by mouth Daily. 30 capsule 11    OneTouch Ultra test strip 1 each by Other route 4 (Four) Times a Day. as directed      sacubitril-valsartan (ENTRESTO) 24-26 MG tablet Take 1 tablet by mouth 2 (Two) Times a Day. 180 tablet 3    spironolactone (ALDACTONE) 25 MG tablet Take 1 tablet by mouth Daily. 60 tablet 5     No current facility-administered medications for this visit.     Review of Systems   Constitutional: Negative.    Skin:         Painful toenails.   All other systems reviewed and are negative.      OBJECTIVE     Vitals:    03/12/25 0850   BP: 127/50   Pulse: 52   Temp: 96.7 °F (35.9 °C)   SpO2: 97%       Body mass index is 36.08 kg/m².    Lab Results   Component Value Date    HGBA1C 11.9 (A) 12/06/2023       Lab Results   Component Value Date    GLUCOSE 462 (C) 02/27/2023    CALCIUM 9.5 02/27/2023     (L) 02/27/2023    K 4.5 02/27/2023    CO2 27.2 02/27/2023    CL 97 (L) 02/27/2023    BUN 12 02/27/2023    CREATININE 0.91 02/27/2023    EGFRIFAFRI 95 03/02/2021    BCR 13.2 02/27/2023    ANIONGAP 8.8 02/27/2023       Patient seen in no apparent distress.      PHYSICAL EXAM:     Foot/Ankle Exam    GENERAL  Appearance:  appears stated age  Orientation:  AAOx3  Affect:  appropriate  Gait:  unimpaired  Assistance:  independent  Right shoe gear: casual shoe  Left shoe gear: casual shoe    VASCULAR     Right Foot Vascularity   Normal vascular exam    Dorsalis pedis:  2+  Posterior tibial:  2+  Skin temperature:  warm  Edema  grading:  None  CFT:  < 3 seconds  Pedal hair growth:  Present  Varicosities:  none     Left Foot Vascularity   Normal vascular exam    Dorsalis pedis:  2+  Posterior tibial:  2+  Skin temperature:  warm  Edema grading:  None  CFT:  < 3 seconds  Pedal hair growth:  Present  Varicosities:  none     NEUROLOGIC     Right Foot Neurologic   Light touch sensation: absent  Vibratory sensation: absent  Hot/Cold sensation: absent  Protective Sensation using [place] Monofilament:   Sites intact: 2  Sites tested: 10     Left Foot Neurologic   Light touch sensation: absent  Vibratory sensation: absent  Hot/Cold sensation:  absent  Protective Sensation using [place] Monofilament:   Sites intact: 2  Sites tested: 10    MUSCLE STRENGTH     Right Foot Muscle Strength   Foot dorsiflexion:  4  Foot plantar flexion:  4  Foot inversion:  4  Foot eversion:  4     Left Foot Muscle Strength   Foot dorsiflexion:  4  Foot plantar flexion:  4  Foot inversion:  4  Foot eversion:  4    RANGE OF MOTION     Right Foot Range of Motion   Foot and ankle ROM within normal limits       Left Foot Range of Motion   Foot and ankle ROM within normal limits      DERMATOLOGIC      Right Foot Dermatologic   Skin  Right foot skin is intact.   Nails  1.  Positive for elongated, onychomycosis, abnormal thickness, subungual debris, dystrophic nail and ingrown toenail.  2.  Positive for elongated, onychomycosis, abnormal thickness, subungual debris, dystrophic nail and ingrown toenail.  3.  Positive for elongated, onychomycosis, abnormal thickness, subungual debris, dystrophic nail and ingrown toenail.  4.  Positive for elongated, onychomycosis, abnormal thickness, subungual debris, dystrophic nail and ingrown toenail.  5.  Positive for elongated, onychomycosis, abnormal thickness, subungual debris, dystrophic nail and ingrown toenail.  Nails comment:  Toenails 1, 2, 3, 4, and 5     Left Foot Dermatologic   Skin  Left foot skin is intact.    Nails comment:  Toenails 1, 2, 3, 4, and 5  Nails  1.  Positive for elongated, onychomycosis, abnormal thickness, subungual debris, dystrophic nail and ingrown toenail.  2.  Positive for elongated, onychomycosis, abnormal thickness, subungual debris, dystrophic nail and ingrown toenail.  3.  Positive for elongated, onychomycosis, abnormal thickness, subungual debris, dystrophic nail and ingrown toenail.  4.  Positive for elongated, onychomycosis, abnormally thick, subungual debris, dystrophic nail and ingrown toenail.  5.  Positive for elongated, onychomycosis, abnormally thick, subungual debris, dystrophic nail and ingrown toenail.        ASSESSMENT/PLAN     Diagnoses and all orders for this visit:    1. Type 2 diabetes mellitus with hyperglycemia, unspecified whether long term insulin use (Primary)    2. Onychomycosis    3. Diabetic polyneuropathy associated with type 2 diabetes mellitus          Continue with current medication for the neuropathy.  Patient states is improving.  Return to clinic in 3 to 6 months or as needed    Comprehensive lower extremity examination and evaluation was performed.    Discussed findings and treatment plan including risks, benefits, and treatment options with patient in detail. Patient agreed with treatment plan.    Toenails 1, 2, 3, 4, 5 on Right and 1, 2, 3, 4, 5 on Left were debrided with nail nippers then filed with a Dremel nail lamine.  Patient tolerated procedure well without complications.    Rx:  Topical, compounded, neuropathy medication was written; see attached prescription.    Medications and allergies reviewed.  Reviewed available blood glucose and HgB A1C lab values along with other pertinent labs.  These were discussed with the patient as to their importance of diabetic maintenance.    Diabetic foot exam performed and documented this date, compliant with CQM required standards. Detail of findings as noted in physical exam.  Lower extremity Neurologic exam for  diabetic patient performed and documented this date, compliant with PQRS required standards. Detail of findings as noted in physical exam.  Advised patient importance of good routine lower extremity hygiene. Advised patient importance of evaluating for intact skin and pain free nail borders.  Advised patient to use mirror to evaluate plantar/ soles of feet for better visualization. Advised patient monitor and phone office to be seen if any cracking to skin, open lesions, painful nail borders or if nails become elongated prior to next visit. Advised patient importance of daily cleansing of lower extremities, followed by good skin cream to maintain normal hydration of skin. Also advised patient importance of close daily monitoring of blood sugar. Advised to regulate diet and medications to maintain control of blood sugar in optimal range. Contact primary care provider if difficulties maintaining blood sugar levels.  Advised Patient of presence of Diabetes Mellitus condition.  Advised Patient risk of progression and worsening or improvement, then return of condition.  Will monitor condition for any change in future. Treat with most appropriate treatment pending status of condition.  Counseled and advised patient extensively on nature and ramifications of diabetes. Standard instructions given to patient for good diabetic foot care and maintenance. Advised importance of careful monitoring to avoid break down and complications secondary to diabetes. Advised patient importance of strict maintenance of blood sugar control. Advised patient of possible ominous results from neglect of condition, i.e.: amputation/ loss of digits, feet and legs, or even death.  Patient states understands counseling, will monitor closely, continue good hygiene and routine diabetic foot care. Patient will contact office is questions or problems.      An After Visit Summary was printed and given to the patient at discharge, including (if requested)  any available informative/educational handouts regarding diagnosis, treatment, or medications. All questions were answered to patient/family satisfaction. Should symptoms fail to improve or worsen they agree to call or return to clinic or to go to the Emergency Department. Discussed the importance of following up with any needed screening tests/labs/specialist appointments and any requested follow-up recommended by me today. Importance of maintaining follow-up discussed and patient accepts that missed appointments can delay diagnosis and potentially lead to worsening of conditions.    Return in about 3 months (around 6/12/2025)., or sooner if acute issues arise.    This document has been electronically signed by Mir Mo DPM on March 13, 2025 07:06 EDT

## 2025-03-18 ENCOUNTER — SPECIALTY PHARMACY (OUTPATIENT)
Dept: CARDIOLOGY | Facility: CLINIC | Age: 55
End: 2025-03-18
Payer: MEDICAID

## 2025-03-18 ENCOUNTER — OFFICE VISIT (OUTPATIENT)
Dept: CARDIOLOGY | Facility: CLINIC | Age: 55
End: 2025-03-18
Payer: MEDICAID

## 2025-03-18 ENCOUNTER — SPECIALTY PHARMACY (OUTPATIENT)
Facility: HOSPITAL | Age: 55
End: 2025-03-18
Payer: MEDICAID

## 2025-03-18 VITALS
SYSTOLIC BLOOD PRESSURE: 111 MMHG | DIASTOLIC BLOOD PRESSURE: 85 MMHG | HEIGHT: 72 IN | HEART RATE: 51 BPM | WEIGHT: 265 LBS | BODY MASS INDEX: 35.89 KG/M2

## 2025-03-18 DIAGNOSIS — I48.0 PAF (PAROXYSMAL ATRIAL FIBRILLATION): Primary | ICD-10-CM

## 2025-03-18 DIAGNOSIS — I50.22 CHRONIC SYSTOLIC CONGESTIVE HEART FAILURE: ICD-10-CM

## 2025-03-18 DIAGNOSIS — E78.5 HYPERLIPIDEMIA LDL GOAL <70: ICD-10-CM

## 2025-03-18 DIAGNOSIS — Z78.9 STATIN INTOLERANCE: ICD-10-CM

## 2025-03-18 PROBLEM — R25.2 LEG CRAMPS: Status: RESOLVED | Noted: 2020-10-14 | Resolved: 2025-03-18

## 2025-03-18 PROBLEM — R06.00 DYSPNEA: Status: RESOLVED | Noted: 2018-10-25 | Resolved: 2025-03-18

## 2025-03-18 PROBLEM — R01.1 HEART MURMUR: Status: RESOLVED | Noted: 2018-10-25 | Resolved: 2025-03-18

## 2025-03-18 PROBLEM — R55 NEAR SYNCOPE: Status: RESOLVED | Noted: 2021-02-28 | Resolved: 2025-03-18

## 2025-03-18 PROBLEM — R55 SYNCOPE AND COLLAPSE: Status: RESOLVED | Noted: 2020-10-14 | Resolved: 2025-03-18

## 2025-03-18 PROBLEM — R07.9 CHEST PAIN: Status: RESOLVED | Noted: 2018-10-25 | Resolved: 2025-03-18

## 2025-03-18 PROBLEM — R11.0 NAUSEA: Status: RESOLVED | Noted: 2021-03-09 | Resolved: 2025-03-18

## 2025-03-18 PROCEDURE — 99214 OFFICE O/P EST MOD 30 MIN: CPT | Performed by: NURSE PRACTITIONER

## 2025-03-18 PROCEDURE — 1160F RVW MEDS BY RX/DR IN RCRD: CPT | Performed by: NURSE PRACTITIONER

## 2025-03-18 PROCEDURE — 1159F MED LIST DOCD IN RCRD: CPT | Performed by: NURSE PRACTITIONER

## 2025-03-18 RX ORDER — SACUBITRIL AND VALSARTAN 24; 26 MG/1; MG/1
1 TABLET, FILM COATED ORAL 2 TIMES DAILY
Qty: 180 TABLET | Refills: 3 | Status: SHIPPED | OUTPATIENT
Start: 2025-03-18

## 2025-03-18 RX ORDER — SPIRONOLACTONE 25 MG/1
25 TABLET ORAL DAILY
Qty: 90 TABLET | Refills: 3 | Status: SHIPPED | OUTPATIENT
Start: 2025-03-18

## 2025-03-18 RX ORDER — METOPROLOL SUCCINATE 50 MG/1
50 TABLET, EXTENDED RELEASE ORAL DAILY
Qty: 90 TABLET | Refills: 3 | Status: SHIPPED | OUTPATIENT
Start: 2025-03-18

## 2025-03-18 RX ORDER — FUROSEMIDE 20 MG/1
20 TABLET ORAL DAILY PRN
Qty: 30 TABLET | Refills: 3 | Status: SHIPPED | OUTPATIENT
Start: 2025-03-18

## 2025-03-18 NOTE — PROGRESS NOTES
Specialty Pharmacy Patient Management Program  Refill Outreach     Edd was contacted today regarding refills of their medication(s).        Delivery Questions      Flowsheet Row Most Recent Value   Delivery method [employer]   Delivery address verified with patient/caregiver? Yes   Delivery address Home   Number of medications in delivery 1   Medication(s) being filled and delivered Evolocumab (REPATHA)   Doses left of specialty medications 0 New Start   Copay verified? Yes   Copay amount $0.00   Copay form of payment No copayment ($0)   Delivery Date Selection 03/19/25   Signature Required Yes                 Follow-up: 21 day(s)     Nba Garcia, Pharmacy Technician  3/18/2025  11:55 EDT

## 2025-03-18 NOTE — PROGRESS NOTES
Specialty Pharmacy Patient Management Program  Cardiology Medication Addition Assessment     Edd Willingham was referred by a Cardiology provider to the Cardiology Patient Management Program offered by [facility] for Hyperlipidemia. This assessment was conducted as a result of a specialty medication addition or substitution. The patient was previously introduced to services offered by [facility], including: regular assessments, refill coordination, mail order delivery options, prior authorization maintenance, and financial assistance programs as applicable. The patient was also provided with contact information for the pharmacy team.      An initial outreach was conducted, including assessment of therapy appropriateness and specialty medication education for Repatha.     A follow-up outreach was conducted, including assessment of continued therapy appropriateness, medication adherence, and side effect incidence and management for Farxiga.    Insurance Coverage & Financial Support  [place] Medicaid     Relevant Past Medical History and Comorbidities  Relevant medical history and concomitant health conditions were discussed with the patient. The patient's chart has been reviewed for relevant past medical history and comorbid conditions and updated as necessary.  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    Depression     Diabetes mellitus     Difficulty walking 8 years ago    Elevated cholesterol     GERD (gastroesophageal reflux disease)     Heart murmur     Hypertension     Ingrown toenail 10 12 years ago    Knee sprain 1985    Knee swelling 15 years ago    Low back pain     Low back strain 1992    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    MI (myocardial infarction)     Neck strain 15 years ago    Have stiffness and pain in Neck and reduce range of motion    Neuropathy in diabetes 10 to 12 years ago    Rotator cuff syndrome 15 years ago    Substance abuse adult life    have used various drugs most of my adult life     Social History     Socioeconomic History    Marital status:    Tobacco Use    Smoking status: Former     Current packs/day: 0.00     Average packs/day: 0.5 packs/day for 36.4 years (18.2 ttl pk-yrs)     Types: Cigarettes, Pipe, Electronic Cigarette     Start date: 1987     Quit date: 2023     Years since quittin.2     Passive exposure: Past    Smokeless tobacco: Former     Types: Snuff   Vaping Use    Vaping status: Every Day    Substances: Nicotine    Devices: Disposable   Substance and Sexual Activity    Alcohol use: Not Currently     Comment: he has not drank for 1 year    Drug use: Not Currently     Comment: he has been clean from drugs for 1 year    Sexual activity: Yes     Partners: Female     Birth control/protection: Condom       Problem list reviewed by Ciara Nunez RPH on 3/18/2025 at 11:47 AM    Hospitalizations and Urgent Care Since Last Assessment  ED Visits, Admissions, or Hospitalizations: None  Urgent Office Visits: None    Allergies  Known allergies and reactions were discussed with the patient. The patient's chart has been reviewed for  allergy information and updated as necessary.   Allergies   Allergen Reactions    Statins Myalgia    Sulfa Antibiotics Unknown (See Comments) and Unknown - Low Severity     Remembers mother always saying he was allergic       Allergies reviewed by Ciara Nunez RPH on 3/18/2025 at 11:46 AM    Relevant Laboratory Values  Relevant laboratory values were discussed with the patient. The following specialty medication dose adjustment(s) are recommended: Repatha    Lab  Results   Component Value Date    GLUCOSE 462 (C) 02/27/2023    CALCIUM 9.5 02/27/2023     (L) 02/27/2023    K 4.5 02/27/2023    CO2 27.2 02/27/2023    CL 97 (L) 02/27/2023    BUN 12 02/27/2023    CREATININE 0.91 02/27/2023    EGFRIFAFRI 95 03/02/2021    BCR 13.2 02/27/2023    ANIONGAP 8.8 02/27/2023     Lab Results   Component Value Date    CHOL 152 10/22/2024    TRIG 115 10/22/2024    HDL 45 10/22/2024    LDL 86 10/22/2024         Current Medication List  This medication list has been reviewed with the patient and evaluated for any interactions or necessary modifications/recommendations, and updated to include all prescription medications, OTC medications, and supplements the patient is currently taking.  This list reflects what is contained in the patient's profile, which has also been marked as reviewed to communicate to other providers it is the most up to date version of the patient's current medication therapy.     Current Outpatient Medications:     Evolocumab (REPATHA) solution auto-injector SureClick injection, Inject 1 mL under the skin into the appropriate area as directed Every 14 (Fourteen) Days., Disp: 6 mL, Rfl: 3    apixaban (ELIQUIS) 5 MG tablet tablet, Take 1 tablet by mouth Every 12 (Twelve) Hours., Disp: 180 tablet, Rfl: 3    ARIPiprazole (ABILIFY) 5 MG tablet, Take 1 tablet by mouth Daily., Disp: , Rfl:     buPROPion XL (WELLBUTRIN XL) 300 MG 24 hr tablet, Take 1 tablet by mouth Every Morning., Disp: , Rfl:     cetirizine (zyrTEC) 10 MG tablet, Take 1 tablet by mouth Daily., Disp: , Rfl:     Cholecalciferol (vitamin D3) 125 MCG (5000 UT) capsule capsule, Take 1 capsule by mouth Daily., Disp: , Rfl:     dapagliflozin Propanediol 10 MG tablet, Take 10 mg by mouth Daily., Disp: 90 tablet, Rfl: 3    doxepin (SINEquan) 10 MG capsule, Take 1 capsule by mouth every night at bedtime., Disp: , Rfl:     DULoxetine HCl 40 MG capsule delayed-release particles, Take 1 capsule by mouth Every 12  (Twelve) Hours., Disp: , Rfl:     furosemide (LASIX) 20 MG tablet, Take 1 tablet by mouth Daily As Needed (weight gain of 5lbs in 1 week)., Disp: 30 tablet, Rfl: 3    Insulin Lispro (humaLOG) 100 UNIT/ML injection, Inject 20 Units under the skin into the appropriate area as directed 3 (Three) Times a Day Before Meals. Depending on A1c adds extra units, Disp: , Rfl:     Insulin Pen Needle 32G X 4 MM misc, Use as directed 4 (Four) Times a Day., Disp: 120 each, Rfl: 11    Lantus SoloStar 100 UNIT/ML injection pen, Inject 40 Units under the skin into the appropriate area as directed Every Night for 182 days., Disp: 73 mL, Rfl: 0    metoprolol succinate XL (TOPROL-XL) 50 MG 24 hr tablet, Take 1 tablet by mouth Daily., Disp: 90 tablet, Rfl: 3    mirtazapine (REMERON) 7.5 MG tablet, Take 1 tablet by mouth Every Night., Disp: , Rfl:     OneTouch Ultra test strip, 1 each by Other route 4 (Four) Times a Day. as directed, Disp: , Rfl:     sacubitril-valsartan (ENTRESTO) 24-26 MG tablet, Take 1 tablet by mouth 2 (Two) Times a Day., Disp: 180 tablet, Rfl: 3    spironolactone (ALDACTONE) 25 MG tablet, Take 1 tablet by mouth Daily., Disp: 90 tablet, Rfl: 3    Medicines reviewed by Ciara Nunez, [facility] on 3/18/2025 at 11:46 AM    Drug Interactions  None    Recommended Medications Assessment  Aspirin: Not Taking Currently  Statin: Other (Specify): Statin intolerant  ACEi/ARB: Other (Specify): Entresto    Initial Education Provided for Specialty Medication  The patient has been provided with the following education and any applicable administration techniques (i.e. self-injection) have been demonstrated for the therapies indicated. All questions and concerns have been addressed prior to the patient receiving the medication, and the patient has verbalized comprehension of the education and any materials provided. Additional patient education shall be provided and documented upon request by the patient, provider, or  payer.    REPATHA® (evolocumab)  Medication Expectations   Why am I taking this medication? You are taking Repatha to lower your “bad” cholesterol (LDL-C). This medication can be used in adults with high blood cholesterol including primary hyperlipidemia and familial hypercholesterolemia.    What should I expect while on this medication? You should expect to see your cholesterol improve over time. Specifically, you should see your LDL-C decrease.    How does the medication work? Repatha works by blocking a protein called PCSK9 that contributes to high levels of bad cholesterol. It helps increase your liver's ability to remove bad cholesterol from your blood.     How long will I be on this medication for? The amount of time you will be on this medication will be determined by your doctor based on your cholesterol and/or your risk of having a cardiac event. You will most likely be on this medication or another cholesterol medication throughout your lifetime. Do not abruptly stop this medication without talking to your doctor first.    How do I take this medication? Take as directed on your prescription label. Repatha is injected under the skin (subcutaneously) of your stomach, thigh, or upper arm. This medication is usually given one or twice a month.   What are some possible side effects? The most common side effects of Repatha include redness, itching, swelling, or pain/tenderness at the injection site, symptoms of the common cold, flu or flu-like symptoms or back pain.    What happens if I miss a dose? If you miss a dose, take it as soon as you remember if it is within 7 days from the usual day of administration then resume your original schedule. If it is beyond 7 days and you use the sharmaine-2-week dose, skip the missed dose and resume your normal dosing schedule.If it is beyond 7 days and you use the once-monthly dose, inject the dose and start a new schedule based on that date.      Medication Safety   What are  things I should warn my doctor immediately about? Talk to your doctor if you are pregnant, planning to become pregnant, or breastfeeding. Stop the medication and tell your doctor or seek emergency medical help if you notice any signs/symptoms of an allergic reaction (severe rash, redness, hives, severe itching, trouble breathing, or swelling of the face, lips, or tongue). If you have a rubber or latex allergy, you should not use the Repatha SureClick® Autoinjector pen or the prefilled syringe, please notify your doctor or pharmacist.   What are things that I should be cautious of? Be cautious of any side effects from this medication. Talk to your doctor if any new ones develop or aren't getting better.   What are some medications that can interact with this one? There are no known significant drug interactions with Repatha. Always tell your doctor or pharmacist immediately if you start taking any new medications, including over-the-counter medications, vitamins, and herbal supplements.      Medication Storage/Handling   How should I handle this medication? Do not shake or expose the pens, cartridges, or syringes to extreme heat or direct sunlight. Keep this medication out of reach of pets/children. Allow medication to warm at room temperature prior to administration.   How does this medication need to be stored? Store unused pens, cartridges, or syringes in the refrigerator in the original cartons to protect from light. If needed, Repatha may be kept at room temperature in the original carton for up to 30 days. Do not freeze.    How should I dispose of this medication? All the Repatha devices are single-dose and should be discarded in a sharps container after use. If your doctor decides to stop this medication, take to your local police station for proper disposal. Some pharmacies also have take-back bins for medication drop-off.      Resources/Support   How can I remind myself to take this medication? You can  download reminder apps to help you manage your refills. You may also set an alarm on your phone to remind you to take your dose.    Is financial support available?  [facility] can provide co-pay cards if you have commercial insurance or patient assistance if you have Medicare or no insurance.    Which vaccines are recommended for me? Talk to your doctor about these vaccines: Flu, Coronavirus (COVID-19), Pneumococcal (pneumonia), Tdap, Hepatitis B, Zoster (shingles)       Adherence, Self-Administration, and Current Therapy Problems  Adherence related to the patient's specialty therapy was discussed with the patient. The Adherence segment of this outreach has been reviewed and updated.     Is there a concern with patient's ability to self administer the medication correctly and without issue?: No  Were any potential barriers to adherence identified during the initial assessment or patient education?: No  Are there any concerns regarding the patient's understanding of the importance of medication adherence?: No    Additional Barriers to Patient Self-Administration: None  Methods for Supporting Patient Self-Administration: None    Open Medication Therapy Problems  No medication therapy recommendations to display    Adverse Drug Reactions  Medication tolerability: Tolerating with no to minimal ADRs  Medication plan: Continue therapy with normal follow-up  Plan for ADR Management: None    Goals of Therapy  Goals related to the patient's specialty therapy were discussed with the patient. The Patient Goals segment of this outreach has been reviewed and updated.   Goals Addressed Today        Specialty Pharmacy General Goal      LDL Goal < 70 mg/dL    Lab Results   Component Value Date    LDL 86 10/22/2024     (H) 12/18/2023                   Quality of Life Assessment   Quality of Life related to the patient's enrollment in the patient management program and services provided was discussed with the patient. The QOL  segment of this outreach has been reviewed and updated.    Quality of Life Improvement Scale: 8-Moderately better    Reassessment Plan & Follow-Up  1. Medication Therapy Changes: Repatha 140mg SUBQ every 14 days  2. Related Plans, Therapy Recommendations, or Therapy Problems to Be Addressed: None  3. Pharmacist to perform regular assessments no more than (6) months from the previous assessment.  4. Care Coordinator to set up future refill outreaches, coordinate prescription delivery, and escalate clinical questions to pharmacist.    Attestation  Therapeutic appropriateness: Appropriate   I attest the patient was actively involved in and has agreed to the above plan of care. If the prescribed therapy is at any point deemed not appropriate based on the current or future assessments, a consultation will be initiated with the patient's specialty care provider to determine the best course of action. The revised plan of therapy will be documented along with any required assessments and/or additional patient education provided.     Ciara Browning, FadiD  Clinical Specialty Pharmacist, Cardiology  3/18/2025  11:47 EDT

## 2025-03-18 NOTE — PROGRESS NOTES
Chief Complaint  Follow-up, Heart Murmur, Congestive Heart Failure, Atrial Fibrillation, and Hyperlipidemia    Subjective            History of Present Illness  Edd Willingham is a 54-year-old male patient of Dr. Chi that I have been asked to see in the office for follow-up today.  History of Present Illness  The patient presents for follow-up of atrial fibrillation, heart failure, and cholesterol management.    He reports satisfactory tolerance to his current medication regimen. He is on Eliquis 5 mg twice daily for blood thinning due to atrial fibrillation. He is also on metoprolol 50 mg daily to regulate heart rate and blood pressure.    He is on Farxiga 10 mg daily for glycemic control and heart muscle function preservation. He takes Lasix 20 mg as needed, approximately 3 times a week. He is also on Entresto 24-26 mg twice daily and spironolactone 25 mg daily for heart failure and blood pressure management.    He has a history of statin intolerance, having experienced adverse effects with previous statin use. He is not currently on any cholesterol-lowering medication but has implemented minor dietary modifications.    ALLERGIES  The patient is STATIN intolerant.    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    Depression     Diabetes mellitus     Difficulty walking 8 years ago    Elevated cholesterol     GERD (gastroesophageal reflux disease)     Heart murmur     Hypertension     Ingrown toenail 10 12 years ago    Knee sprain 1985    Knee swelling 15 years ago    Low back pain     Low back strain 1992    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    MI (myocardial infarction)     Neck strain 15 years ago    Have stiffness and pain in Neck and reduce range of motion    Neuropathy in diabetes 10 to 12 years ago    Rotator cuff syndrome 15 years ago    Substance abuse adult life    have used various drugs most of my adult life         ALLERGY  Allergies   Allergen Reactions    Statins Myalgia    Sulfa Antibiotics Unknown (See Comments) and Unknown - Low Severity     Remembers mother always saying he was allergic          SURGICALHX  Past Surgical History:   Procedure Laterality Date    CARDIAC CATHETERIZATION N/A 10/26/2018    Procedure: Left Heart Cath;  Surgeon: Param Mcfarland MD;  Location: [facility] CATH INVASIVE LOCATION;  Service: Cardiology    COLONOSCOPY N/A 2021    Procedure: COLONOSCOPY;  Surgeon: Dinesh Drummond MD;  Location: [facility] ENDOSCOPY;  Service: Gastroenterology;  Laterality: N/A;    COLONOSCOPY N/A 2022    Procedure: COLONOSCOPY;  Surgeon: Dinesh Drummond MD;  Location: [facility] ENDOSCOPY;  Service: Gastroenterology;  Laterality: N/A;    ELBOW PROCEDURE Left     left    ENDOSCOPY N/A 2021    Procedure: ESOPHAGOGASTRODUODENOSCOPY;  Surgeon: Dinesh Drummond MD;  Location: [facility] ENDOSCOPY;  Service: Gastroenterology;  Laterality: N/A;    ROTATOR CUFF REPAIR Right     RIGHT    SHOULDER SURGERY  8-15 years ago    Have had rotator cuff surgery on both shoulders    TUMOR REMOVAL      FATTY TUMOR    UPPER GASTROINTESTINAL ENDOSCOPY  2021    WRIST SURGERY Right     RIGHT          SOC  Social History     Socioeconomic History    Marital status:    Tobacco Use    Smoking status: Former     Current packs/day: 0.00     Average packs/day: 0.5 packs/day for 36.4 years (18.2 ttl pk-yrs)     Types: Cigarettes, Pipe, Electronic Cigarette     Start date: 1987     Quit date: 2023     Years since quittin.2     Passive exposure: Past    Smokeless tobacco: Former     Types: Snuff   Vaping Use    Vaping  status: Every Day    Substances: Nicotine    Devices: Disposable   Substance and Sexual Activity    Alcohol use: Not Currently     Comment: he has not drank for 1 year    Drug use: Not Currently     Comment: he has been clean from drugs for 1 year    Sexual activity: Yes     Partners: Female     Birth control/protection: Condom         FAMHX  Family History   Problem Relation Age of Onset    Diabetes Mother         Insulin dependent    Hypertension Mother     Heart disease Mother     Heart failure Mother     Thyroid disease Mother         Has thyroid problems    Diabetes Father     No Known Problems Brother     Colon polyps Paternal Uncle           MEDSIGONLY  Current Outpatient Medications on File Prior to Visit   Medication Sig    apixaban (ELIQUIS) 5 MG tablet tablet Take 1 tablet by mouth Every 12 (Twelve) Hours.    ARIPiprazole (ABILIFY) 5 MG tablet Take 1 tablet by mouth Daily.    buPROPion XL (WELLBUTRIN XL) 300 MG 24 hr tablet Take 1 tablet by mouth Every Morning.    cetirizine (zyrTEC) 10 MG tablet Take 1 tablet by mouth Daily.    Cholecalciferol (vitamin D3) 125 MCG (5000 UT) capsule capsule Take 1 capsule by mouth Daily.    dapagliflozin Propanediol 10 MG tablet Take 10 mg by mouth Daily.    doxepin (SINEquan) 10 MG capsule Take 1 capsule by mouth every night at bedtime.    DULoxetine HCl 40 MG capsule delayed-release particles Take 1 capsule by mouth Every 12 (Twelve) Hours.    furosemide (LASIX) 20 MG tablet Take 1 tablet by mouth Daily As Needed (weight gain of 5lbs in 1 week).    Insulin Lispro (humaLOG) 100 UNIT/ML injection Inject 20 Units under the skin into the appropriate area as directed 3 (Three) Times a Day Before Meals. Depending on A1c adds extra units    Insulin Pen Needle 32G X 4 MM misc Use as directed 4 (Four) Times a Day.    Lantus SoloStar 100 UNIT/ML injection pen Inject 40 Units under the skin into the appropriate area as directed Every Night for 182 days.    metoprolol succinate XL  "(TOPROL-XL) 50 MG 24 hr tablet Take 1 tablet by mouth Daily.    mirtazapine (REMERON) 7.5 MG tablet Take 1 tablet by mouth Every Night.    OneTouch Ultra test strip 1 each by Other route 4 (Four) Times a Day. as directed    sacubitril-valsartan (ENTRESTO) 24-26 MG tablet Take 1 tablet by mouth 2 (Two) Times a Day.    spironolactone (ALDACTONE) 25 MG tablet Take 1 tablet by mouth Daily.    [DISCONTINUED] omeprazole (priLOSEC) 40 MG capsule Take 1 capsule by mouth Daily.    [DISCONTINUED] Continuous Blood Gluc Sensor (FreeStyle Scotty 3 Sensor) misc Use 1 each Every 14 (Fourteen) Days.    [DISCONTINUED] docusate sodium (Colace) 100 MG capsule Take 1 capsule by mouth 2 (Two) Times a Day.    [DISCONTINUED] Ibuprofen 3 %, Gabapentin 10 %, Baclofen 2 %, lidocaine 4 %, Ketamine HCl 4 % Apply 1-2 g topically to the appropriate area as directed 3 (Three) to 4 (Four) times daily.     No current facility-administered medications on file prior to visit.         Objective   /85   Pulse 51   Ht 182.9 cm (72.01\")   Wt 120 kg (265 lb)   BMI 35.93 kg/m²       Physical Exam  Constitutional:       Appearance: He is obese.   HENT:      Head: Normocephalic.   Neck:      Vascular: No carotid bruit.   Cardiovascular:      Rate and Rhythm: Regular rhythm. Bradycardia present.      Pulses: Normal pulses.      Heart sounds: Normal heart sounds. No murmur heard.  Pulmonary:      Effort: Pulmonary effort is normal.      Breath sounds: Normal breath sounds.   Musculoskeletal:      Cervical back: Neck supple.      Right lower leg: No edema.      Left lower leg: No edema.   Skin:     General: Skin is dry.   Neurological:      Mental Status: He is alert and oriented to person, place, and time.   Psychiatric:         Behavior: Behavior normal.       Result Review :   The following data was reviewed by: NATHANIEL Reis on 03/18/2025:  proBNP   Date Value Ref Range Status   08/28/2020 107.9 0.0 - 900.0 pg/mL Final     No results found " "for: \"TSH\"   No results found for: \"FREET4\"   No results found for: \"DDIMERQUANT\"  Magnesium   Date Value Ref Range Status   02/27/2021 2.3 1.6 - 2.6 mg/dL Final      Digoxin   Date Value Ref Range Status   10/22/2024 0.40 (L) 0.60 - 1.20 ng/mL Final      Lab Results   Component Value Date    TROPONINT <0.010 02/28/2021           Lipid Panel          10/22/2024    11:26   Lipid Panel   Total Cholesterol 152    Triglycerides 115    HDL Cholesterol 45    VLDL Cholesterol 21    LDL Cholesterol  86    LDL/HDL Ratio 1.87        Results for orders placed in visit on 04/17/23    Adult Transthoracic Echo Complete W/ Cont if Necessary Per Protocol    Interpretation Summary    The left ventricular cavity is borderline dilated.    Left ventricular wall thickness is consistent with mild posterior asymmetric hypertrophy.    Left ventricular systolic function is normal. Calculated left ventricular EF = 51%    The following left ventricular wall segments are hypokinetic: mid inferior and basal inferior.    Left ventricular diastolic function is consistent with (grade I) impaired relaxation.    The right ventricular cavity is mildly dilated.    Estimated right ventricular systolic pressure from tricuspid regurgitation is normal (<35 mmHg).    Mild dilation of the aortic root is present.           Assessment and Plan    Diagnoses and all orders for this visit:    1. PAF (paroxysmal atrial fibrillation) (Primary)    2. Chronic systolic congestive heart failure    3. Hyperlipidemia LDL goal <70    4. Statin intolerance      Assessment & Plan  1. Atrial Fibrillation.  He is currently taking Eliquis 5 mg twice daily for anticoagulation and metoprolol 50 mg daily for heart rate control. His blood pressure and heart rate are stable. Refills for metoprolol have been provided. A repeat echocardiogram has been ordered to monitor heart function. If he remains stable and tolerates medications well, follow-up visits may be extended to every 6 " months after the next appointment.    2. Heart Failure.  He is on Farxiga 10 mg daily, Entresto 24/26 mg twice daily, spironolactone 25 mg daily, and Lasix 20 mg as needed (approximately three times a week). His heart failure is well-managed with these medications. Refills for Entresto and Lasix have been provided. Laboratory tests have been ordered to be conducted in 3 months to monitor kidney function and electrolyte levels. He should continue his current medication regimen and follow up with labs in 3 months.    3. Cholesterol Management.  He is statin intolerant and has had issues with insurance coverage for Repatha and Nexletol. His LDL cholesterol level was 135 a year ago and decreased to 86 four months ago, likely due to dietary changes. A prior authorization for Repatha will be initiated to manage his cholesterol levels. Laboratory tests have been ordered to be conducted in 3 months to monitor lipid levels.    4. Back Pain.  He reports experiencing back pain for the past month, mostly at night. Differential diagnoses include a pulled muscle, kidney stone, or sciatic nerve pain. He is advised to consult his primary care physician for further evaluation, which may include imaging studies or physical therapy.    Follow-up  The patient will follow up in 3 months.      Follow Up   Return in about 3 months (around 6/18/2025) for Follow up with Dr Claudio.    Patient was given instructions and counseling regarding his condition or for health maintenance advice. Please see specific information pulled into the AVS if appropriate.     Edd Willingham  reports that he quit smoking about 15 months ago. His smoking use included cigarettes, pipe, and electronic cigarette. He started smoking about 37 years ago. He has a 18.2 pack-year smoking history. He has been exposed to tobacco smoke. He has quit using smokeless tobacco.  His smokeless tobacco use included snuff.         Patient or patient representative verbalized  consent for the use of Ambient Listening during the visit with  NATHANIEL Reis for chart documentation. 3/18/2025  10:39 EDT    NATHANIEL Reis  03/18/25  09:26 EDT    Dictated Utilizing Dragon Dictation

## 2025-03-31 ENCOUNTER — TRANSCRIBE ORDERS (OUTPATIENT)
Dept: ADMINISTRATIVE | Facility: HOSPITAL | Age: 55
End: 2025-03-31
Payer: MEDICAID

## 2025-03-31 ENCOUNTER — LAB (OUTPATIENT)
Facility: HOSPITAL | Age: 55
End: 2025-03-31
Payer: MEDICAID

## 2025-03-31 DIAGNOSIS — E11.9 DIABETES MELLITUS WITHOUT COMPLICATION: Primary | ICD-10-CM

## 2025-03-31 DIAGNOSIS — E11.9 DIABETES MELLITUS WITHOUT COMPLICATION: ICD-10-CM

## 2025-03-31 LAB — HBA1C MFR BLD: 11.2 % (ref 4.8–5.6)

## 2025-03-31 PROCEDURE — 36415 COLL VENOUS BLD VENIPUNCTURE: CPT

## 2025-03-31 PROCEDURE — 83036 HEMOGLOBIN GLYCOSYLATED A1C: CPT

## 2025-04-01 ENCOUNTER — HOSPITAL ENCOUNTER (OUTPATIENT)
Facility: HOSPITAL | Age: 55
Discharge: HOME | End: 2025-04-01
Admitting: NURSE PRACTITIONER
Payer: MEDICAID

## 2025-04-01 DIAGNOSIS — I50.22 CHRONIC SYSTOLIC CONGESTIVE HEART FAILURE: ICD-10-CM

## 2025-04-01 PROCEDURE — 93306 TTE W/DOPPLER COMPLETE: CPT

## 2025-04-03 LAB
AORTIC DIMENSIONLESS INDEX: 0.57 (DI)
ASCENDING AORTA: 3.4 CM
AV MEAN PRESS GRAD SYS DOP V1V2: 3.7 MMHG
AV VMAX SYS DOP: 125 CM/SEC
BH CV ECHO MEAS - AI P1/2T: 503 MSEC
BH CV ECHO MEAS - AO MAX PG: 6.3 MMHG
BH CV ECHO MEAS - AO ROOT DIAM: 3.2 CM
BH CV ECHO MEAS - AO V2 VTI: 18.4 CM
BH CV ECHO MEAS - AVA(I,D): 2.37 CM2
BH CV ECHO MEAS - EF(MOD-SP2): 26.8 %
BH CV ECHO MEAS - EF(MOD-SP4): 25.8 %
BH CV ECHO MEAS - FS: 8 %
BH CV ECHO MEAS - IVS/LVPW: 1.08 CM
BH CV ECHO MEAS - IVSD: 1.4 CM
BH CV ECHO MEAS - LA DIMENSION: 5.2 CM
BH CV ECHO MEAS - LV MAX PG: 1.44 MMHG
BH CV ECHO MEAS - LV MEAN PG: 0.9 MMHG
BH CV ECHO MEAS - LV V1 MAX: 60 CM/SEC
BH CV ECHO MEAS - LV V1 VTI: 10.5 CM
BH CV ECHO MEAS - LVIDD: 5.2 CM
BH CV ECHO MEAS - LVIDS: 4.8 CM
BH CV ECHO MEAS - LVOT AREA: 4.2 CM2
BH CV ECHO MEAS - LVOT DIAM: 2.3 CM
BH CV ECHO MEAS - LVPWD: 1.3 CM
BH CV ECHO MEAS - RAP SYSTOLE: 3 MMHG
BH CV ECHO MEAS - RVDD: 4.2 CM
BH CV ECHO MEAS - RVSP: 31 MMHG
BH CV ECHO MEAS - SV(LVOT): 43.6 ML
BH CV ECHO MEAS - SVI(LVOT): 18.2 ML/M2
BH CV ECHO MEAS - TAPSE (>1.6): 2.15 CM
BH CV ECHO MEAS - TR MAX PG: 28.1 MMHG
BH CV ECHO MEAS - TR MAX VEL: 265 CM/SEC
IVRT: 53 MS
LEFT ATRIUM VOLUME INDEX: 42.3 ML/M2
LV EF 2D ECHO EST: 35 %

## 2025-04-04 ENCOUNTER — RESULTS FOLLOW-UP (OUTPATIENT)
Dept: CARDIOLOGY | Facility: CLINIC | Age: 55
End: 2025-04-04
Payer: MEDICAID

## 2025-04-04 DIAGNOSIS — I50.22 CHRONIC SYSTOLIC CONGESTIVE HEART FAILURE: Primary | ICD-10-CM

## 2025-04-04 DIAGNOSIS — I50.22 CHRONIC HFREF (HEART FAILURE WITH REDUCED EJECTION FRACTION): ICD-10-CM

## 2025-04-04 RX ORDER — SACUBITRIL AND VALSARTAN 49; 51 MG/1; MG/1
1 TABLET, FILM COATED ORAL 2 TIMES DAILY
Qty: 180 TABLET | Refills: 3 | Status: SHIPPED | OUTPATIENT
Start: 2025-04-04

## 2025-04-10 ENCOUNTER — SPECIALTY PHARMACY (OUTPATIENT)
Dept: CARDIOLOGY | Facility: CLINIC | Age: 55
End: 2025-04-10
Payer: MEDICAID

## 2025-04-10 NOTE — PROGRESS NOTES
Specialty Pharmacy Patient Management Program  Refill Outreach     Edd was contacted today regarding refills of their medication(s).    Refill Questions      Flowsheet Row Most Recent Value   Changes to allergies? No   Changes to medications? No   New conditions or infections since last clinic visit No   Unplanned office visit, urgent care, ED, or hospital admission in the last 4 weeks  No   How does patient/caregiver feel medication is working? Good   Financial problems or insurance changes  No   Since the previous refill, were any specialty medication doses or scheduled injections missed or delayed?  No   Does this patient require a clinical escalation to a pharmacist? No            Delivery Questions      Flowsheet Row Most Recent Value   Delivery method  at Pharmacy   Delivery address verified with patient/caregiver? Yes  [ at Pharmacy]   Delivery address --  [ at Pharmacy]   Number of medications in delivery 2   Medication(s) being filled and delivered Evolocumab (REPATHA), Dapagliflozin Propanediol   Doses left of specialty medications Farxiga 7 days Repatha 0   Copay verified? Yes   Copay amount $0.00   Copay form of payment No copayment ($0)   Delivery Date Selection 04/10/25  [ at Pharmacy]   Signature Required Yes   Do you consent to receive electronic handouts?  No                 Follow-up: 82 day(s)     Nba Garcia, Pharmacy Technician  4/10/2025  10:02 EDT

## 2025-04-15 ENCOUNTER — TRANSCRIBE ORDERS (OUTPATIENT)
Dept: ADMINISTRATIVE | Facility: HOSPITAL | Age: 55
End: 2025-04-15
Payer: MEDICAID

## 2025-04-15 ENCOUNTER — HOSPITAL ENCOUNTER (OUTPATIENT)
Facility: HOSPITAL | Age: 55
Discharge: HOME | End: 2025-04-15
Admitting: INTERNAL MEDICINE
Payer: MEDICAID

## 2025-04-15 DIAGNOSIS — R31.9 HEMATURIA, UNSPECIFIED TYPE: ICD-10-CM

## 2025-04-15 DIAGNOSIS — R31.9 HEMATURIA, UNSPECIFIED TYPE: Primary | ICD-10-CM

## 2025-04-15 PROCEDURE — 74018 RADEX ABDOMEN 1 VIEW: CPT

## 2025-04-20 ENCOUNTER — APPOINTMENT (OUTPATIENT)
Dept: GENERAL RADIOLOGY | Facility: HOSPITAL | Age: 55
End: 2025-04-20
Payer: MEDICAID

## 2025-04-20 ENCOUNTER — HOSPITAL ENCOUNTER (EMERGENCY)
Facility: HOSPITAL | Age: 55
Discharge: HOME | End: 2025-04-20
Attending: EMERGENCY MEDICINE | Admitting: EMERGENCY MEDICINE
Payer: MEDICAID

## 2025-04-20 VITALS
DIASTOLIC BLOOD PRESSURE: 79 MMHG | TEMPERATURE: 98 F | BODY MASS INDEX: 38.43 KG/M2 | RESPIRATION RATE: 18 BRPM | SYSTOLIC BLOOD PRESSURE: 118 MMHG | HEART RATE: 103 BPM | OXYGEN SATURATION: 97 % | WEIGHT: 283.73 LBS | HEIGHT: 72 IN

## 2025-04-20 DIAGNOSIS — I50.9 CONGESTIVE HEART FAILURE, UNSPECIFIED HF CHRONICITY, UNSPECIFIED HEART FAILURE TYPE: Primary | ICD-10-CM

## 2025-04-20 LAB
ALBUMIN SERPL-MCNC: 3.7 G/DL (ref 3.5–5.2)
ALBUMIN/GLOB SERPL: 1.2 G/DL
ALP SERPL-CCNC: 69 U/L (ref 39–117)
ALT SERPL W P-5'-P-CCNC: 23 U/L (ref 1–41)
ANION GAP SERPL CALCULATED.3IONS-SCNC: 9 MMOL/L (ref 5–15)
ANISOCYTOSIS BLD QL: NORMAL
AST SERPL-CCNC: 27 U/L (ref 1–40)
BASOPHILS # BLD AUTO: 0.04 10*3/MM3 (ref 0–0.2)
BASOPHILS NFR BLD AUTO: 0.6 % (ref 0–1.5)
BILIRUB SERPL-MCNC: 0.3 MG/DL (ref 0–1.2)
BUN SERPL-MCNC: 9 MG/DL (ref 6–20)
BUN/CREAT SERPL: 8 (ref 7–25)
CALCIUM SPEC-SCNC: 8.7 MG/DL (ref 8.6–10.5)
CHLORIDE SERPL-SCNC: 104 MMOL/L (ref 98–107)
CO2 SERPL-SCNC: 28 MMOL/L (ref 22–29)
CREAT SERPL-MCNC: 1.13 MG/DL (ref 0.76–1.27)
DEPRECATED RDW RBC AUTO: 51 FL (ref 37–54)
EGFRCR SERPLBLD CKD-EPI 2021: 76.3 ML/MIN/1.73
EOSINOPHIL # BLD AUTO: 0.08 10*3/MM3 (ref 0–0.4)
EOSINOPHIL NFR BLD AUTO: 1.3 % (ref 0.3–6.2)
ERYTHROCYTE [DISTWIDTH] IN BLOOD BY AUTOMATED COUNT: 16.1 % (ref 12.3–15.4)
FLUAV RNA RESP QL NAA+PROBE: NOT DETECTED
FLUBV RNA RESP QL NAA+PROBE: NOT DETECTED
GEN 5 1HR TROPONIN T REFLEX: 54 NG/L
GLOBULIN UR ELPH-MCNC: 3.2 GM/DL
GLUCOSE SERPL-MCNC: 194 MG/DL (ref 65–99)
HCT VFR BLD AUTO: 48.3 % (ref 37.5–51)
HGB BLD-MCNC: 15 G/DL (ref 13–17.7)
HOLD SPECIMEN: NORMAL
IMM GRANULOCYTES # BLD AUTO: 0.02 10*3/MM3 (ref 0–0.05)
IMM GRANULOCYTES NFR BLD AUTO: 0.3 % (ref 0–0.5)
LARGE PLATELETS: NORMAL
LYMPHOCYTES # BLD AUTO: 1.81 10*3/MM3 (ref 0.7–3.1)
LYMPHOCYTES NFR BLD AUTO: 28.3 % (ref 19.6–45.3)
MCH RBC QN AUTO: 27.3 PG (ref 26.6–33)
MCHC RBC AUTO-ENTMCNC: 31.1 G/DL (ref 31.5–35.7)
MCV RBC AUTO: 87.8 FL (ref 79–97)
MONOCYTES # BLD AUTO: 0.44 10*3/MM3 (ref 0.1–0.9)
MONOCYTES NFR BLD AUTO: 6.9 % (ref 5–12)
NEUTROPHILS NFR BLD AUTO: 4.01 10*3/MM3 (ref 1.7–7)
NEUTROPHILS NFR BLD AUTO: 62.6 % (ref 42.7–76)
NRBC BLD AUTO-RTO: 0 /100 WBC (ref 0–0.2)
NT-PROBNP SERPL-MCNC: 5640 PG/ML (ref 0–900)
PLATELET # BLD AUTO: 210 10*3/MM3 (ref 140–450)
PMV BLD AUTO: 12.2 FL (ref 6–12)
POTASSIUM SERPL-SCNC: 4 MMOL/L (ref 3.5–5.2)
PROT SERPL-MCNC: 6.9 G/DL (ref 6–8.5)
QT INTERVAL: 371 MS
QTC INTERVAL: 517 MS
RBC # BLD AUTO: 5.5 10*6/MM3 (ref 4.14–5.8)
RSV RNA RESP QL NAA+PROBE: NOT DETECTED
SARS-COV-2 RNA RESP QL NAA+PROBE: NOT DETECTED
SMALL PLATELETS BLD QL SMEAR: ADEQUATE
SODIUM SERPL-SCNC: 141 MMOL/L (ref 136–145)
TROPONIN T % DELTA: -5
TROPONIN T NUMERIC DELTA: -3 NG/L
TROPONIN T SERPL HS-MCNC: 57 NG/L
WBC MORPH BLD: NORMAL
WBC NRBC COR # BLD AUTO: 6.4 10*3/MM3 (ref 3.4–10.8)
WHOLE BLOOD HOLD COAG: NORMAL
WHOLE BLOOD HOLD SPECIMEN: NORMAL

## 2025-04-20 PROCEDURE — 83880 ASSAY OF NATRIURETIC PEPTIDE: CPT | Performed by: EMERGENCY MEDICINE

## 2025-04-20 PROCEDURE — 71045 X-RAY EXAM CHEST 1 VIEW: CPT

## 2025-04-20 PROCEDURE — 85025 COMPLETE CBC W/AUTO DIFF WBC: CPT | Performed by: EMERGENCY MEDICINE

## 2025-04-20 PROCEDURE — 85007 BL SMEAR W/DIFF WBC COUNT: CPT | Performed by: EMERGENCY MEDICINE

## 2025-04-20 PROCEDURE — 93005 ELECTROCARDIOGRAM TRACING: CPT | Performed by: EMERGENCY MEDICINE

## 2025-04-20 PROCEDURE — 99284 EMERGENCY DEPT VISIT MOD MDM: CPT

## 2025-04-20 PROCEDURE — 36415 COLL VENOUS BLD VENIPUNCTURE: CPT

## 2025-04-20 PROCEDURE — 84484 ASSAY OF TROPONIN QUANT: CPT | Performed by: EMERGENCY MEDICINE

## 2025-04-20 PROCEDURE — 25010000002 FUROSEMIDE PER 20 MG: Performed by: EMERGENCY MEDICINE

## 2025-04-20 PROCEDURE — 87637 SARSCOV2&INF A&B&RSV AMP PRB: CPT | Performed by: EMERGENCY MEDICINE

## 2025-04-20 PROCEDURE — 96374 THER/PROPH/DIAG INJ IV PUSH: CPT

## 2025-04-20 PROCEDURE — 80053 COMPREHEN METABOLIC PANEL: CPT | Performed by: EMERGENCY MEDICINE

## 2025-04-20 RX ORDER — SODIUM CHLORIDE 0.9 % (FLUSH) 0.9 %
10 SYRINGE (ML) INJECTION AS NEEDED
Status: DISCONTINUED | OUTPATIENT
Start: 2025-04-20 | End: 2025-04-20 | Stop reason: HOSPADM

## 2025-04-20 RX ORDER — FUROSEMIDE 40 MG/1
40 TABLET ORAL DAILY
Qty: 15 TABLET | Refills: 0 | Status: SHIPPED | OUTPATIENT
Start: 2025-04-20

## 2025-04-20 RX ORDER — FUROSEMIDE 10 MG/ML
40 INJECTION INTRAMUSCULAR; INTRAVENOUS ONCE
Status: COMPLETED | OUTPATIENT
Start: 2025-04-20 | End: 2025-04-20

## 2025-04-20 RX ADMIN — FUROSEMIDE 40 MG: 10 INJECTION, SOLUTION INTRAMUSCULAR; INTRAVENOUS at 13:14

## 2025-04-20 NOTE — ED NOTES
53 Y/O male pt came in through triage for C/O SOB since Thursday. Pt states he was watching TV Thursday when he felt like he could not catch his breath and his breathing was felt shallow. Pt States that he becomes more short of breath upon exertion and when he lays down flat. He has to prop himself up with pillows at night so he can breath. Pt denies any CP, fever or chills. No edema noted to lower extremities. No Oxygen use at home. Skins are normal warm and dry. Lungs are slightly diminished upon auscultation. Pt placed on CRM. Pt is afib at 112 and tachypneic at 26. No distress noted at this time.IV established. Labs and covid sent to lab. EKG complete. Will continue to monitor for changes. Waiting for provider at this time. CP

## 2025-04-20 NOTE — ED PROVIDER NOTES
Time: 11:42 AM EDT  Date of encounter:  4/20/2025  Independent Historian/Clinical History and Information was obtained by:   Patient    History is limited by: N/A    Chief Complaint: Dyspnea      History of Present Illness:  Patient is a 54 y.o. year old male who presents to the emergency department for evaluation of dyspnea that is worse when lying flat.  Patient has no fever or chills.  Patient does report slight cough.  Patient has no nausea, vomiting, or diarrhea.  Patient denies leg pain.  Patient denies chest pain.  Patient also reports worsening dyspnea on exertion.      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    Depression     Diabetes mellitus     Difficulty walking 8 years ago    Elevated cholesterol     GERD (gastroesophageal reflux disease)     Heart murmur     Hypertension     Ingrown toenail 10 12 years ago    Knee sprain 1985    Knee swelling 15 years ago    Low back pain     Low back strain 1992    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    MI (myocardial infarction)     Neck strain 15 years ago    Have stiffness and pain in Neck and reduce range of motion    Neuropathy in diabetes 10 to 12 years ago    Rotator cuff syndrome 15 years ago    Substance abuse adult life    have used various drugs most of my adult life     Past Surgical History:   Procedure Laterality Date    CARDIAC CATHETERIZATION N/A 10/26/2018    Procedure: Left Heart Cath;   Surgeon: Param Mcfarland MD;  Location: [facility] CATH INVASIVE LOCATION;  Service: Cardiology    COLONOSCOPY N/A 03/24/2021    Procedure: COLONOSCOPY;  Surgeon: Dinesh Drummond MD;  Location: [facility] ENDOSCOPY;  Service: Gastroenterology;  Laterality: N/A;    COLONOSCOPY N/A 05/18/2022    Procedure: COLONOSCOPY;  Surgeon: Dinesh Drummond MD;  Location: [facility] ENDOSCOPY;  Service: Gastroenterology;  Laterality: N/A;    ELBOW PROCEDURE Left     left    ENDOSCOPY N/A 03/24/2021    Procedure: ESOPHAGOGASTRODUODENOSCOPY;  Surgeon: Dinesh Drummond MD;  Location: [facility] ENDOSCOPY;  Service: Gastroenterology;  Laterality: N/A;    ROTATOR CUFF REPAIR Right     RIGHT    SHOULDER SURGERY  8-15 years ago    Have had rotator cuff surgery on both shoulders    TUMOR REMOVAL      FATTY TUMOR    UPPER GASTROINTESTINAL ENDOSCOPY  03/2021    WRIST SURGERY Right     RIGHT     Family History   Problem Relation Age of Onset    Diabetes Mother         Insulin dependent    Hypertension Mother     Heart disease Mother     Heart failure Mother     Thyroid disease Mother         Has thyroid problems    Diabetes Father     No Known Problems Brother     Colon polyps Paternal Uncle        Home Medications:  Prior to Admission medications    Medication Sig Start Date End Date Taking? Authorizing Provider   apixaban (ELIQUIS) 5 MG tablet tablet Take 1 tablet by mouth Every 12 (Twelve) Hours. 3/18/25  Yes Joanie Michel APRN   Insulin Lispro (humaLOG) 100 UNIT/ML injection Inject 20 Units under the skin into the appropriate area as directed 3 (Three) Times a Day Before Meals. Depending on A1c adds extra units   Yes Provider, MD Aimee   Lantus SoloStar 100 UNIT/ML injection pen Inject 40 Units under the skin into the appropriate area as directed Every Night for 182 days. 1/16/25 7/17/25 Yes Yomi Claudio MD   ARIPiprazole (ABILIFY) 5 MG tablet Take 1 tablet by mouth Daily. 12/16/24   ProviderAimee MD   buPROPion XL  (WELLBUTRIN XL) 300 MG 24 hr tablet Take 1 tablet by mouth Every Morning. 10/31/23   Aimee Romero MD   cetirizine (zyrTEC) 10 MG tablet Take 1 tablet by mouth Daily. 6/24/24   Aimee Romero MD   Cholecalciferol (vitamin D3) 125 MCG (5000 UT) capsule capsule Take 1 capsule by mouth Daily.    Aimee Romero MD   dapagliflozin Propanediol 10 MG tablet Take 10 mg by mouth Daily. 1/23/25   Yomi Claudio MD   doxepin (SINEquan) 10 MG capsule Take 1 capsule by mouth every night at bedtime. 4/1/24   Aimee Romero MD   DULoxetine HCl 40 MG capsule delayed-release particles Take 1 capsule by mouth Every 12 (Twelve) Hours. 7/3/23   Aimee Romero MD   Evolocumab (REPATHA) solution auto-injector SureClick injection Inject 1 mL under the skin into the appropriate area as directed Every 14 (Fourteen) Days. 3/18/25   Joanie Michel APRN   furosemide (LASIX) 20 MG tablet Take 1 tablet by mouth Daily As Needed (weight gain of 5lbs in 1 week). 3/18/25   Joanie Michel APRN   Insulin Pen Needle 32G X 4 MM misc Use as directed 4 (Four) Times a Day. 2/6/24   Yolanda Hess APRN   metoprolol succinate XL (TOPROL-XL) 50 MG 24 hr tablet Take 1 tablet by mouth Daily. 3/18/25   Joanie Michel APRN   mirtazapine (REMERON) 7.5 MG tablet Take 1 tablet by mouth Every Night.    Aimee Romero MD   OneTouch Ultra test strip 1 each by Other route 4 (Four) Times a Day. as directed 11/8/23   Aimee Romero MD   sacubitril-valsartan (Entresto) 49-51 MG tablet Take 1 tablet by mouth 2 (Two) Times a Day. 4/4/25   Joanie Michel APRN   spironolactone (ALDACTONE) 25 MG tablet Take 1 tablet by mouth Daily. 3/18/25   Joanie Michel APRN        Social History:   Social History     Tobacco Use    Smoking status: Former     Current packs/day: 0.00     Average packs/day: 0.5 packs/day for 36.4 years (18.2 ttl pk-yrs)     Types: Cigarettes, Pipe, Electronic  "Cigarette     Start date: 1987     Quit date: 2023     Years since quittin.3     Passive exposure: Past    Smokeless tobacco: Former     Types: Snuff   Vaping Use    Vaping status: Every Day    Substances: Nicotine    Devices: Disposable   Substance Use Topics    Alcohol use: Not Currently     Comment: he has not drank for 1 year    Drug use: Not Currently     Comment: he has been clean from drugs for 1 year         Review of Systems:  Review of Systems   Constitutional:  Negative for chills and fever.   HENT:  Negative for congestion, rhinorrhea and sore throat.    Eyes:  Negative for pain and visual disturbance.   Respiratory:  Positive for shortness of breath. Negative for apnea, cough and chest tightness.    Cardiovascular:  Negative for chest pain and palpitations.   Gastrointestinal:  Negative for abdominal pain, diarrhea, nausea and vomiting.   Genitourinary:  Negative for difficulty urinating and dysuria.   Musculoskeletal:  Negative for joint swelling and myalgias.   Skin:  Negative for color change.   Neurological:  Negative for seizures and headaches.   Psychiatric/Behavioral: Negative.     All other systems reviewed and are negative.       Physical Exam:  /91 (BP Location: Left arm, Patient Position: Lying)   Pulse 100   Temp 98.2 °F (36.8 °C) (Oral)   Resp 23   Ht 182.9 cm (72.01\")   Wt 129 kg (283 lb 11.7 oz)   SpO2 98%   BMI 38.47 kg/m²     Physical Exam  Vitals and nursing note reviewed.   Constitutional:       General: He is not in acute distress.     Appearance: Normal appearance. He is not toxic-appearing.   HENT:      Head: Normocephalic and atraumatic.      Jaw: There is normal jaw occlusion.   Eyes:      General: Lids are normal.      Extraocular Movements: Extraocular movements intact.      Conjunctiva/sclera: Conjunctivae normal.      Pupils: Pupils are equal, round, and reactive to light.   Cardiovascular:      Rate and Rhythm: Normal rate and regular rhythm.      " Pulses: Normal pulses.      Heart sounds: Normal heart sounds.   Pulmonary:      Effort: Pulmonary effort is normal. No respiratory distress.      Breath sounds: Normal breath sounds. No wheezing or rhonchi.   Abdominal:      General: Abdomen is flat.      Palpations: Abdomen is soft.      Tenderness: There is no abdominal tenderness. There is no guarding or rebound.   Musculoskeletal:         General: Normal range of motion.      Cervical back: Normal range of motion and neck supple.      Right lower leg: No edema.      Left lower leg: No edema.   Skin:     General: Skin is warm and dry.   Neurological:      Mental Status: He is alert and oriented to person, place, and time. Mental status is at baseline.   Psychiatric:         Mood and Affect: Mood normal.                    Medical Decision Making:      Comorbidities that affect care:    Atrial fibrillation    External Notes reviewed:    Previous Clinic Note: Patient was last seen in clinic for atrial fibrillation      The following orders were placed and all results were independently analyzed by me:  Orders Placed This Encounter   Procedures    COVID-19, FLU A/B, RSV PCR 1 HR TAT - Swab, Nasopharynx    XR Chest 1 View    High Sensitivity Troponin T    [place] Draw    Comprehensive Metabolic Panel    BNP    CBC Auto Differential    Scan Slide    High Sensitivity Troponin T 1Hr    NPO Diet NPO Type: Strict NPO    Undress & Gown    Continuous Pulse Oximetry    Vital Signs    Inpatient Cardiology Consult    Oxygen Therapy- Nasal Cannula; Titrate 1-6 LPM Per SpO2; 90 - 95%    ECG 12 Lead Dyspnea    Insert Peripheral IV    CBC & Differential    Green Top (Gel)    Lavender Top    Gold Top - SST    Light Blue Top    Extra Tubes    Gray Top       Medications Given in the Emergency Department:  Medications   sodium chloride 0.9 % flush 10 mL (has no administration in time range)   furosemide (LASIX) injection 40 mg (40 mg Intravenous Given 4/20/25 1314)        ED  Course:         Labs:    Lab Results (last 24 hours)       Procedure Component Value Units Date/Time    High Sensitivity Troponin T [[id number]]  (Abnormal) Collected: 04/20/25 1052    Specimen: Blood Updated: 04/20/25 1139     HS Troponin T 57 ng/L     Narrative:      High Sensitive Troponin T Reference Range:  <14.0 ng/L- Negative Female for AMI  <22.0 ng/L- Negative Male for AMI  >=14 - Abnormal Female indicating possible myocardial injury.  >=22 - Abnormal Male indicating possible myocardial injury.   Clinicians would have to utilize clinical acumen, EKG, Troponin, and serial changes to determine if it is an Acute Myocardial Infarction or myocardial injury due to an underlying chronic condition.         CBC & Differential [[id number]]  (Abnormal) Collected: 04/20/25 1052    Specimen: Blood Updated: 04/20/25 1121    Narrative:      The following orders were created for panel order CBC & Differential.  Procedure                               Abnormality         Status                     ---------                               -----------         ------                     CBC Auto Differential[[id number]]        Abnormal            Final result               Scan Slide[[id number]]                                       Final result                 Please view results for these tests on the individual orders.    Comprehensive Metabolic Panel [[id number]]  (Abnormal) Collected: 04/20/25 1052    Specimen: Blood Updated: 04/20/25 1120     Glucose 194 mg/dL      BUN 9 mg/dL      Creatinine 1.13 mg/dL      Sodium 141 mmol/L      Potassium 4.0 mmol/L      Comment: Slight hemolysis detected by analyzer. Result may be falsely elevated.        Chloride 104 mmol/L      CO2 28.0 mmol/L      Calcium 8.7 mg/dL      Total Protein 6.9 g/dL      Albumin 3.7 g/dL      ALT (SGPT) 23 U/L      AST (SGOT) 27 U/L      Comment: Slight hemolysis detected by analyzer. Result may be falsely elevated.        Alkaline Phosphatase 69 U/L      Total  Bilirubin 0.3 mg/dL      Globulin 3.2 gm/dL      A/G Ratio 1.2 g/dL      BUN/Creatinine Ratio 8.0     Anion Gap 9.0 mmol/L      eGFR 76.3 mL/min/1.73     Narrative:      GFR Categories in Chronic Kidney Disease (CKD)      GFR Category          GFR (mL/min/1.73)    Interpretation  G1                     90 or greater         Normal or high (1)  G2                      60-89                Mild decrease (1)  G3a                   45-59                Mild to moderate decrease  G3b                   30-44                Moderate to severe decrease  G4                    15-29                Severe decrease  G5                    14 or less           Kidney failure          (1)In the absence of evidence of kidney disease, neither GFR category G1 or G2 fulfill the criteria for CKD.    eGFR calculation 2021 CKD-EPI creatinine equation, which does not include race as a factor    BNP [[id number]]  (Abnormal) Collected: 04/20/25 1052    Specimen: Blood Updated: 04/20/25 1117     proBNP 5,640.0 pg/mL     Narrative:      This assay is used as an aid in the diagnosis of individuals suspected of having heart failure. It can be used as an aid in the diagnosis of acute decompensated heart failure (ADHF) in patients presenting with signs and symptoms of ADHF to the emergency department (ED). In addition, NT-proBNP of <300 pg/mL indicates ADHF is not likely.    Age Range Result Interpretation  NT-proBNP Concentration (pg/mL:      <50             Positive            >450                   Gray                 300-450                    Negative             <300    50-75           Positive            >900                  Gray                300-900                  Negative            <300      >75             Positive            >1800                  Gray                300-1800                  Negative            <300    CBC Auto Differential [[id number]]  (Abnormal) Collected: 04/20/25 1052    Specimen: Blood Updated: 04/20/25  1121     WBC 6.40 10*3/mm3      RBC 5.50 10*6/mm3      Hemoglobin 15.0 g/dL      Hematocrit 48.3 %      MCV 87.8 fL      MCH 27.3 pg      MCHC 31.1 g/dL      RDW 16.1 %      RDW-SD 51.0 fl      MPV 12.2 fL      Platelets 210 10*3/mm3      Neutrophil % 62.6 %      Lymphocyte % 28.3 %      Monocyte % 6.9 %      Eosinophil % 1.3 %      Basophil % 0.6 %      Immature Grans % 0.3 %      Neutrophils, Absolute 4.01 10*3/mm3      Lymphocytes, Absolute 1.81 10*3/mm3      Monocytes, Absolute 0.44 10*3/mm3      Eosinophils, Absolute 0.08 10*3/mm3      Basophils, Absolute 0.04 10*3/mm3      Immature Grans, Absolute 0.02 10*3/mm3      nRBC 0.0 /100 WBC     Scan Slide [[id number]] Collected: 04/20/25 1052    Specimen: Blood Updated: 04/20/25 1121     Anisocytosis Slight/1+     WBC Morphology Normal     Platelet Estimate Adequate     Large Platelets Slight/1+    COVID-19, FLU A/B, RSV PCR 1 HR TAT - Swab, Nasopharynx [[id number]]  (Normal) Collected: 04/20/25 1054    Specimen: Swab from Nasopharynx Updated: 04/20/25 1153     COVID19 Not Detected     Influenza A PCR Not Detected     Influenza B PCR Not Detected     RSV, PCR Not Detected    Narrative:      Fact sheet for providers: https://www.fda.gov/media/632821/download    Fact sheet for patients: https://www.fda.gov/media/014523/download    Test performed by PCR.    High Sensitivity Troponin T 1Hr [[id number]]  (Abnormal) Collected: 04/20/25 1153    Specimen: Blood Updated: 04/20/25 1232     HS Troponin T 54 ng/L      Troponin T Numeric Delta -3 ng/L      Troponin T % Delta -5    Narrative:      High Sensitive Troponin T Reference Range:  <14.0 ng/L- Negative Female for AMI  <22.0 ng/L- Negative Male for AMI  >=14 - Abnormal Female indicating possible myocardial injury.  >=22 - Abnormal Male indicating possible myocardial injury.   Clinicians would have to utilize clinical acumen, EKG, Troponin, and serial changes to determine if it is an Acute Myocardial Infarction or myocardial  injury due to an underlying chronic condition.                  Imaging:    XR Chest 1 View  Result Date: 4/20/2025  XR CHEST 1 VW Date of Exam: 4/20/2025 11:07 AM EDT Indication: SOA Triage Protocol Comparison: None available. Findings: Normal cardiomediastinal silhouette. The lungs are clear. No pleural effusion or pneumothorax. No acute osseous findings.     Impression: No acute cardiopulmonary findings. Electronically Signed: Keshav Rizo MD  4/20/2025 11:36 AM EDT  Workstation ID: [id number]        Differential Diagnosis and Discussion:    Dyspnea: Differential diagnosis includes but is not limited to metabolic acidosis, neurological disorders, psychogenic, asthma, pneumothorax, upper airway obstruction, COPD, pneumonia, noncardiogenic pulmonary edema, interstitial lung disease, anemia, congestive heart failure, and pulmonary embolism    PROCEDURES:    Labs were collected in the emergency department and all labs were reviewed and interpreted by me.  X-ray were performed in the emergency department and all X-ray impressions were independently interpreted by me.  An EKG was performed and the EKG was interpreted by me.    ECG 12 Lead Dyspnea   Preliminary Result   HEART YTNE=666  bpm   RR Csqgonzc=423  ms   WA Interval=  ms   P Horizontal Axis=  deg   P Front Axis=  deg   QRSD Txrpxdey=505  ms   QT Pjmbwebc=208  ms   GYkA=535  ms   QRS Axis=-61  deg   T Wave Axis=126  deg   - ABNORMAL ECG -   Atrial fibrillation   Left anterior fascicular block   Anteroseptal infarct, old   Nonspecific T abnormalities, lateral leads   Prolonged QT interval   Date and Time of Study:2025-04-20 10:50:42          Procedures    MDM     Amount and/or Complexity of Data Reviewed  Decide to obtain previous medical records or to obtain history from someone other than the patient: yes       The patient´s CBC that was reviewed and interpreted by me shows no abnormalities of critical concern. Of note, there is no anemia requiring a blood  transfusion and the platelet count is acceptable.  The patient´s CMP that was reviewed and interpretted by me shows no abnormalities of critical concern. Of note, the patient´s sodium and potassium are acceptable. The patient´s liver enzymes are unremarkable. The patient´s renal function (creatinine) is preserved. The patient has a normal anion gap.  Troponins are flat.  BNP is elevated.  Chest x-ray is negative for acute edema.                Patient Care Considerations:    PERC: I used the PERC score to risk stratify the patient for PE and a CT of the chest was considered but ultimately not indicated in today's visit.      Consultants/Shared Management Plan:    Case was discussed with Dr. Landaverde who recommends increasing the patient's Lasix and have him follow-up with Dr. Judd this week.    Social Determinants of Health:    Patient is independent, reliable, and has access to care.       Disposition and Care Coordination:    Discharged: I considered escalation of care by admitting this patient to the hospital, however patient has no respiratory distress or hypoxia and is stable and suitable for discharge.    I have explained the patient´s condition, diagnoses and treatment plan based on the information available to me at this time. I have answered questions and addressed any concerns. The patient has a good  understanding of the patient´s diagnosis, condition, and treatment plan as can be expected at this point. The vital signs have been stable. The patient´s condition is stable and appropriate for discharge from the emergency department.      The patient will pursue further outpatient evaluation with the primary care physician or other designated or consulting physician as outlined in the discharge instructions. They are agreeable to this plan of care and follow-up instructions have been explained in detail. The patient has received these instructions in written format and has expressed an understanding of the  discharge instructions. The patient is aware that any significant change in condition or worsening of symptoms should prompt an immediate return to this or the closest emergency department or call to 1.  I have explained discharge medications and the need for follow up with the patient/caretakers. This was also printed in the discharge instructions. Patient was discharged with the following medications and follow up:      Medication List        Changed      furosemide 40 MG tablet  Commonly known as: LASIX  Take 1 tablet by mouth Daily.  What changed:   medication strength  how much to take  when to take this  reasons to take this               Where to Get Your Medications        These medications were sent to [id number] [place] - [address] AT [facility] [phone]  - [phone]   [address] [facility] [address]      Phone: [phone]   furosemide 40 MG tablet      Yomi Claudio MD  [address]  [address] [address]  [phone]             Final diagnoses:   Congestive heart failure, unspecified HF chronicity, unspecified heart failure type        ED Disposition       ED Disposition   Discharge    Condition   Stable    Comment   --               This medical record created using voice recognition software.             Belinda Godoy MD  04/20/25 0704

## 2025-04-20 NOTE — ED TRIAGE NOTES
Patient to ED from home with SOB, and dry cough since Thursday. Patient reports SOB is worse upon exertion, and when laying flat.   Positive history for PAF, CHF, denies COPD.

## 2025-04-24 ENCOUNTER — HOSPITAL ENCOUNTER (OUTPATIENT)
Facility: HOSPITAL | Age: 55
Discharge: HOME | End: 2025-04-24
Admitting: NURSE PRACTITIONER
Payer: MEDICAID

## 2025-04-24 DIAGNOSIS — I50.22 CHRONIC HFREF (HEART FAILURE WITH REDUCED EJECTION FRACTION): ICD-10-CM

## 2025-04-24 DIAGNOSIS — I50.22 CHRONIC SYSTOLIC CONGESTIVE HEART FAILURE: ICD-10-CM

## 2025-04-24 PROCEDURE — 78452 HT MUSCLE IMAGE SPECT MULT: CPT

## 2025-04-24 PROCEDURE — A9502 TC99M TETROFOSMIN: HCPCS | Performed by: NURSE PRACTITIONER

## 2025-04-24 PROCEDURE — 34310000005 TECHNETIUM TETROFOSMIN KIT: Performed by: NURSE PRACTITIONER

## 2025-04-24 PROCEDURE — 25010000002 REGADENOSON 0.4 MG/5ML SOLUTION: Performed by: NURSE PRACTITIONER

## 2025-04-24 PROCEDURE — 93017 CV STRESS TEST TRACING ONLY: CPT

## 2025-04-24 RX ORDER — REGADENOSON 0.08 MG/ML
0.4 INJECTION, SOLUTION INTRAVENOUS
Status: COMPLETED | OUTPATIENT
Start: 2025-04-24 | End: 2025-04-24

## 2025-04-24 RX ADMIN — TETROFOSMIN 1 DOSE: 1.38 INJECTION, POWDER, LYOPHILIZED, FOR SOLUTION INTRAVENOUS at 08:22

## 2025-04-24 RX ADMIN — TETROFOSMIN 1 DOSE: 1.38 INJECTION, POWDER, LYOPHILIZED, FOR SOLUTION INTRAVENOUS at 07:23

## 2025-04-24 RX ADMIN — REGADENOSON 0.4 MG: 0.08 INJECTION, SOLUTION INTRAVENOUS at 08:22

## 2025-04-26 LAB
BH CV IMMEDIATE POST RECOVERY TECH DATA SYMPTOMS: NORMAL
BH CV IMMEDIATE POST TECH DATA BLOOD PRESSURE: NORMAL MMHG
BH CV IMMEDIATE POST TECH DATA HEART RATE: 108 BPM
BH CV IMMEDIATE POST TECH DATA OXYGEN SATS: 95 %
BH CV REST NUCLEAR ISOTOPE DOSE: 8.8 MCI
BH CV SIX MINUTE RECOVERY TECH DATA BLOOD PRESSURE: NORMAL
BH CV SIX MINUTE RECOVERY TECH DATA HEART RATE: 100 BPM
BH CV SIX MINUTE RECOVERY TECH DATA OXYGEN SATURATION: 97 %
BH CV SIX MINUTE RECOVERY TECH DATA SYMPTOMS: NORMAL
BH CV STRESS BP STAGE 1: NORMAL
BH CV STRESS COMMENTS STAGE 1: NORMAL
BH CV STRESS DOSE REGADENOSON STAGE 1: 0.4
BH CV STRESS DURATION MIN STAGE 1: 0
BH CV STRESS DURATION SEC STAGE 1: 10
BH CV STRESS HR STAGE 1: 107
BH CV STRESS NUCLEAR ISOTOPE DOSE: 35.7 MCI
BH CV STRESS O2 STAGE 1: 90
BH CV STRESS PROTOCOL 1: NORMAL
BH CV STRESS RECOVERY BP: NORMAL MMHG
BH CV STRESS RECOVERY HR: 100 BPM
BH CV STRESS RECOVERY O2: 96 %
BH CV STRESS STAGE 1: 1
BH CV THREE MINUTE POST TECH DATA BLOOD PRESSURE: NORMAL MMHG
BH CV THREE MINUTE POST TECH DATA HEART RATE: 97 BPM
BH CV THREE MINUTE POST TECH DATA OXYGEN SATURATION: 99 %
BH CV THREE MINUTE RECOVERY TECH DATA SYMPTOM: NORMAL
MAXIMAL PREDICTED HEART RATE: 164 BPM
PERCENT MAX PREDICTED HR: 66.46 %
SPECT HRT GATED+EF W RNC IV: 15 %
STRESS BASELINE BP: NORMAL MMHG
STRESS BASELINE HR: 97 BPM
STRESS O2 SAT REST: 97 %
STRESS PERCENT HR: 78 %
STRESS POST O2 SAT PEAK: 96 %
STRESS POST PEAK BP: NORMAL MMHG
STRESS POST PEAK HR: 109 BPM
STRESS TARGET HR: 139 BPM

## 2025-04-28 ENCOUNTER — RESULTS FOLLOW-UP (OUTPATIENT)
Dept: CARDIOLOGY | Facility: CLINIC | Age: 55
End: 2025-04-28
Payer: MEDICAID

## 2025-04-28 ENCOUNTER — PREP FOR SURGERY (OUTPATIENT)
Dept: OTHER | Facility: HOSPITAL | Age: 55
End: 2025-04-28
Payer: MEDICAID

## 2025-04-28 DIAGNOSIS — I50.22 CHRONIC HFREF (HEART FAILURE WITH REDUCED EJECTION FRACTION): Primary | ICD-10-CM

## 2025-04-28 RX ORDER — SODIUM CHLORIDE 9 MG/ML
40 INJECTION, SOLUTION INTRAVENOUS AS NEEDED
OUTPATIENT
Start: 2025-04-28

## 2025-04-28 RX ORDER — SODIUM CHLORIDE 0.9 % (FLUSH) 0.9 %
10 SYRINGE (ML) INJECTION AS NEEDED
OUTPATIENT
Start: 2025-04-28

## 2025-04-28 RX ORDER — SODIUM CHLORIDE 0.9 % (FLUSH) 0.9 %
10 SYRINGE (ML) INJECTION EVERY 12 HOURS SCHEDULED
OUTPATIENT
Start: 2025-04-28

## 2025-04-28 NOTE — TELEPHONE ENCOUNTER
ABDIRAHMAN patient regarding results and recommendations. Voiced understanding.     Heart cath discussed. Patient is agreeable to proceed

## 2025-04-30 ENCOUNTER — TELEPHONE (OUTPATIENT)
Dept: CARDIOLOGY | Facility: CLINIC | Age: 55
End: 2025-04-30
Payer: MEDICAID

## 2025-04-30 ENCOUNTER — TRANSCRIBE ORDERS (OUTPATIENT)
Dept: ADMINISTRATIVE | Facility: HOSPITAL | Age: 55
End: 2025-04-30
Payer: MEDICAID

## 2025-04-30 ENCOUNTER — LAB (OUTPATIENT)
Facility: HOSPITAL | Age: 55
End: 2025-04-30
Payer: MEDICAID

## 2025-04-30 DIAGNOSIS — E11.42 DIABETIC PERIPHERAL NEUROPATHY: ICD-10-CM

## 2025-04-30 DIAGNOSIS — E11.42 DIABETIC PERIPHERAL NEUROPATHY: Primary | ICD-10-CM

## 2025-04-30 LAB
ALBUMIN UR-MCNC: 271.9 MG/DL
ANION GAP SERPL CALCULATED.3IONS-SCNC: 11.3 MMOL/L (ref 5–15)
BUN SERPL-MCNC: 12 MG/DL (ref 6–20)
BUN/CREAT SERPL: 9.1 (ref 7–25)
CALCIUM SPEC-SCNC: 9 MG/DL (ref 8.6–10.5)
CHLORIDE SERPL-SCNC: 104 MMOL/L (ref 98–107)
CO2 SERPL-SCNC: 24.7 MMOL/L (ref 22–29)
CREAT SERPL-MCNC: 1.32 MG/DL (ref 0.76–1.27)
CREAT UR-MCNC: 268.1 MG/DL
DEPRECATED RDW RBC AUTO: 49.3 FL (ref 37–54)
EGFRCR SERPLBLD CKD-EPI 2021: 63.3 ML/MIN/1.73
ERYTHROCYTE [DISTWIDTH] IN BLOOD BY AUTOMATED COUNT: 17.2 % (ref 12.3–15.4)
GLUCOSE SERPL-MCNC: 58 MG/DL (ref 65–99)
HCT VFR BLD AUTO: 55.7 % (ref 37.5–51)
HGB BLD-MCNC: 17.4 G/DL (ref 13–17.7)
INR PPP: 1.03 (ref 0.86–1.15)
MCH RBC QN AUTO: 27.1 PG (ref 26.6–33)
MCHC RBC AUTO-ENTMCNC: 31.2 G/DL (ref 31.5–35.7)
MCV RBC AUTO: 86.6 FL (ref 79–97)
MICROALBUMIN/CREAT UR: 1014.2 MG/G (ref 0–29)
PLATELET # BLD AUTO: 286 10*3/MM3 (ref 140–450)
PMV BLD AUTO: 12.2 FL (ref 6–12)
POTASSIUM SERPL-SCNC: 4.3 MMOL/L (ref 3.5–5.2)
PROTHROMBIN TIME: 13.9 SECONDS (ref 11.8–14.9)
RBC # BLD AUTO: 6.43 10*6/MM3 (ref 4.14–5.8)
SODIUM SERPL-SCNC: 140 MMOL/L (ref 136–145)
WBC NRBC COR # BLD AUTO: 6.22 10*3/MM3 (ref 3.4–10.8)

## 2025-04-30 PROCEDURE — 85610 PROTHROMBIN TIME: CPT | Performed by: NURSE PRACTITIONER

## 2025-04-30 PROCEDURE — 85027 COMPLETE CBC AUTOMATED: CPT | Performed by: NURSE PRACTITIONER

## 2025-04-30 PROCEDURE — 82570 ASSAY OF URINE CREATININE: CPT

## 2025-04-30 PROCEDURE — 80048 BASIC METABOLIC PNL TOTAL CA: CPT | Performed by: NURSE PRACTITIONER

## 2025-04-30 PROCEDURE — 82043 UR ALBUMIN QUANTITATIVE: CPT

## 2025-04-30 NOTE — TELEPHONE ENCOUNTER
I spoke to patient and gave an appointment on 05/06/25 for [facility]. Patient was instructed to have a  for the day of the procedure and to arrive at the main entrance registration area in the pavilion. Patient was educated about stent placement and informed that in the event of stent placement, the patient will be required to stay overnight for observation. Patient was instructed to hold Eliquis for 48 hours prior to procedure. Patient was instructed to continue all other medications as usual. Patient was instructed to have no solid food or milk for 6 hours prior to scheduled arrival time, clear liquids only for 6 hours prior up to 2 hours prior to schedule arrival time, NPO for 2 hours prior to scheduled arrival time. Patient was instructed to have labs completed on the morning of the procedure. Patient is agreeable with no other questions or concerns.

## 2025-05-02 ENCOUNTER — SPECIALTY PHARMACY (OUTPATIENT)
Dept: CARDIOLOGY | Facility: CLINIC | Age: 55
End: 2025-05-02
Payer: MEDICAID

## 2025-05-02 NOTE — PROGRESS NOTES
Specialty Pharmacy Patient Management Program  Refill Outreach     Edd was contacted today regarding refills of their medication(s).    Refill Questions      Flowsheet Row Most Recent Value   Changes to allergies? No   Changes to medications? No   New conditions or infections since last clinic visit No   Unplanned office visit, urgent care, ED, or hospital admission in the last 4 weeks  No   How does patient/caregiver feel medication is working? Good   Financial problems or insurance changes  No   Since the previous refill, were any specialty medication doses or scheduled injections missed or delayed?  No   Does this patient require a clinical escalation to a pharmacist? No            Delivery Questions      Flowsheet Row Most Recent Value   Delivery method  at Pharmacy   Delivery address verified with patient/caregiver? Yes   Delivery address Other (Enter below)  [ at Pharmacy]   Other address preferred  at Pharmacy   Number of medications in delivery 2   Medication(s) being filled and delivered Dapagliflozin Propanediol, Evolocumab (REPATHA)   Doses left of specialty medications Farxiga: 2 Repatha: 0   Copay verified? Yes   Copay amount $0.00   Copay form of payment No copayment ($0)   Delivery Date Selection 05/02/25  [ at Pharmacy]   Signature Required Yes   Do you consent to receive electronic handouts?  No                 Follow-up: 25 day(s)     Nba Garcia, Pharmacy Technician  5/2/2025  11:40 EDT

## 2025-05-05 NOTE — PRE-PROCEDURE INSTRUCTIONS
PATIENT INSTRUCTED TO BE:    - PATIENT INSTRUCTED NOTHING TO EAT/ NO SOLID FOOD OR MILK FOR 6 HOURS PRIOR TO SCHEDULED ARRIVAL TIME.     - MAY HAVE CLEAR LIQUIDS ONLY UP TO 2 HOURS PRIOR TO SCHEDULED ARRIVAL TIME, EXCEPT CAN HAVE SIPS OF WATER WITH MEDICATIONS PRIOR TO PROCEDURE    -  INSTRUCTED NO LOTIONS, JEWELRY, PIERCINGS, OR DEODORANT DAY OF THE PROCEDURE    - INSTRUCTED TO TAKE THE FOLLOWING MEDICATIONS THE DAY OF SURGERY: BUPROPION, DULOXETINE, DAPAGLIFLOZIN, HUMALOG INSULIN, ZYRTEC, ABILIFY, METOPROLOL, ENTRESTO, LASIX, SPIRNOLACTONE    HOLD ELIQUIS 48 HOURS PRIOR TO PROCEUDRE          - INSTRUCTED PT TO FOLLOW ANY INSTRUCTIONS GIVEN BY HIS CARDIOLOGIST.    - INFORMED PT THEY ATTEMPT RADIAL APPROACH FIRST IF UNABLE TO PERFORM CATH WITH THAT APPROACH THEY WILL DO A FEMORAL APPROACH.  ALSO, INFORMED PT THEY WILL BE ON BEDREST POSTOP.  IF THE SURGEON FEELS  AN INTERVENTION OR STENTS NEEDS TO BE PLACED, HE/SHE WILL STAY OVER NIGHT FOR OBSERVATION AND MONITORING.     - INFORMED THE PATIENT HE CAN HAVE TWO VISITORS WITH HIM THE DAY OF THE PROCEDURE    - INSTRUCTED PT TO COME TO [facility] ( [address]), CAN [employer] OR [employer] [facility]. ENTER THE PAVILION THRU MAIN ENTRANCE, TAKE ELEVATORS TO THE FIRST FLOOR, CHECK IN AT THE DESK FOR REGISTRATION, AFTER REGISTRATION PT WILL BE TAKEN THE THE PREOP AREA FOR HIS OR HER PROCEDURE.    -ARRIVAL TIME GIVEN BY SAME DAY SURGERY, YOU WILL RECEIVE A PHONE CALL BETWEEN 1PM AND 4PM, INFORMED PT IF ARRIVAL TIME CHANGES OR ADJUSTMENTS NEED TO BE MADE IN THEIR ARRIVAL TIME, HE/SHE WOULD RECEIVE A CALL.    -INSTRUCTED PT TO COME TO [facility] TO GET THEIR LABS/ EKG DONE PRIOR TO PROCEDURE      - PATIENT VERBALIZED UNDERSTANDING

## 2025-05-05 NOTE — H&P
Cardiology Consultation Note  [facility] CATH LAB          Patient Identification:  Edd Willingham      [id number]  54 y.o.        male  1970         PCP: Christopher Galarza MD      History of Present Illness:     Patient is a 54-year-old with a history of HFrEF, paroxysmal atrial fibrillation, and dyslipidemia who had been experiencing increased shortness of breath with exertion symptoms and underwent a stress test which showed new perfusion defects as well as decrease of his ejection fraction of 50%    Past History:  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    Depression     Diabetes mellitus     Difficulty walking 8 years ago    Elevated cholesterol     GERD (gastroesophageal reflux disease)     Heart murmur     Hypertension     Ingrown toenail 10 12 years ago    Knee sprain 1985    Knee swelling 15 years ago    Low back pain     Low back strain 1992    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    MI (myocardial infarction)     Neck strain 15 years ago    Have stiffness and pain in Neck and reduce range of motion    Neuropathy in diabetes 10 to 12 years ago    Rotator cuff syndrome 15 years ago    Substance abuse adult life    have used various drugs most of my adult life     Past Surgical History:   Procedure Laterality Date    CARDIAC CATHETERIZATION N/A 10/26/2018    Procedure: Left Heart Cath;  Surgeon: Param Mcfarland MD;  Location: [facility] CATH INVASIVE LOCATION;  Service: Cardiology    COLONOSCOPY N/A 03/24/2021    Procedure: COLONOSCOPY;  Surgeon: Dinesh Drummond MD;  Location: [facility] ENDOSCOPY;  Service: Gastroenterology;  Laterality: N/A;    COLONOSCOPY N/A 05/18/2022    Procedure:  COLONOSCOPY;  Surgeon: Dinesh Drummond MD;  Location: [facility] ENDOSCOPY;  Service: Gastroenterology;  Laterality: N/A;    ELBOW PROCEDURE Left     left    ENDOSCOPY N/A 2021    Procedure: ESOPHAGOGASTRODUODENOSCOPY;  Surgeon: Dinesh Drummond MD;  Location: [facility] ENDOSCOPY;  Service: Gastroenterology;  Laterality: N/A;    ROTATOR CUFF REPAIR Right     RIGHT    SHOULDER SURGERY  8-15 years ago    Have had rotator cuff surgery on both shoulders    TUMOR REMOVAL      FATTY TUMOR    UPPER GASTROINTESTINAL ENDOSCOPY  2021    WRIST SURGERY Right     RIGHT     Allergies   Allergen Reactions    Statins Myalgia    Sulfa Antibiotics Unknown (See Comments) and Unknown - Low Severity     Remembers mother always saying he was allergic     Social History     Socioeconomic History    Marital status:    Tobacco Use    Smoking status: Former     Current packs/day: 0.00     Average packs/day: 0.5 packs/day for 36.4 years (18.2 ttl pk-yrs)     Types: Cigarettes, Pipe, Electronic Cigarette     Start date: 1987     Quit date: 2023     Years since quittin.4     Passive exposure: Past    Smokeless tobacco: Former     Types: Snuff   Vaping Use    Vaping status: Every Day    Substances: Nicotine    Devices: Disposable   Substance and Sexual Activity    Alcohol use: Not Currently     Comment: he has not drank for 1 year    Drug use: Not Currently     Comment: he has been clean from drugs for 1 year    Sexual activity: Defer     Family History   Problem Relation Age of Onset    Diabetes Mother         Insulin dependent    Hypertension Mother     Heart disease Mother     Heart failure Mother     Thyroid disease Mother         Has thyroid problems    Diabetes Father     No Known Problems Brother     Colon polyps Paternal Uncle      Medications:  No current facility-administered medications for this encounter.     Physical exam:    There were no vitals taken for this visit. There is no height or weight on file to  calculate BMI.   Oxygen saturation   @FLOWAN(10::1)@ No data recorded    General Appearance:   no acute distress  HENT:   lips not cyanotic  Neck:  thyroid not enlarged  supple  Respiratory:  no respiratory distress  normal breath sounds  no rales  Cardiovascular:  no jugular venous distention  regular rhythm  apical impulse normal  S1 normal, S2 normal  no S3, no S4   no murmur  no rub, no thrill  no carotid bruit  pedal pulses normal  lower extremity edema: none    Gastrointestinal:   bowel sounds normal  non-tender  no hepatomegaly, no splenomegaly  Musculoskeletal:  no clubbing of fingers.   normocephalic, head atraumatic  Skin:   warm, dry  Neuro/Psychiatric:  judgement and insight appropriate  normal mood and affect    Cardiographics:   Results for orders placed during the hospital encounter of 04/01/25    Adult Transthoracic Echo Complete W/ Cont if Necessary Per Protocol    Interpretation Summary    Left ventricular systolic function is moderately decreased with global hypokinesis. Estimated left ventricular EF = 35%    Left ventricular wall thickness is consistent with mild concentric hypertrophy.    Left ventricular diastolic function was indeterminate due to atrial fibrillation.    There is mild aortic insufficiency.    Estimated right ventricular systolic pressure from tricuspid regurgitation is normal (<35 mmHg).      Results for orders placed during the hospital encounter of 10/25/18    Cardiac Catheterization/Vascular Study    Narrative  Cardiac Catheterization Operative Report    Edd Willingham  [id number]  10/26/2018  @PCP@    Reason for the procedure: Chest pain class III angina pectoris positive troponin      Procedure performed:    1.  Left heart catheterization with the  iliofemoral arteriogram and Angio-Seal closure device.  2.  Administration of Corvert for atrial fibrillation    Procedure Details  The risks, benefits, complications, treatment options, and expected outcomes were  discussed with the patient. The patient and/or family concurred with the proposed plan, giving informed consent. Patient was brought to the cath lab after IV hydration was begun and oral premedication was given. He was further sedated with midazolam. He was prepped and draped in the usual manner.    Using 1% local lidocaine infiltration, a 6-Russian introducer sheath was inserted using the modified Seldinger technique in the right femoral artery without any difficulty. Using a 0.038 guidewire over a 6-Russian, a right Margo catheter was then advanced under fluoroscopic guidance up to the right coronary artery cusp. With minimal manipulation, the right coronary artery was cannulated and a selective angiogram was performed, using multiple views of the right coronary artery. The right Margo catheter was exchanged for the left Margo catheter and advanced under fluoroscopic guidance up to the right coronary cusp. With minimal manipulation, the left coronary artery was cannulated and a selective angiogram of the left coronary artery was performed, using right and the left ZHU, English, and PA cranial-caudal views. The 6-Russian pigtail catheter was then advanced under fluoroscopic guidance over a 0.038 wire up to the level of the aortic root. With minimal manipulation, the pigtail catheter was advanced into the left ventricle, and hemodynamics were performed and a left ventriculogram was performed.      Hemodynamic  Aortic pressure:140/96  Mean:113  Left ventricular pressure:120/32  Left ventriculogram: Not performed.      Coronaries: Right dominant system.    Left Main coronary artery: Left main coronary artery was a medium caliber vessel which was free of any disease.  The left main coronary artery bifurcated into the left anterior descending artery and circumflex artery.    Left anterior descending artery: Left anterior descending artery was a medium caliber vessel which were free of any obstructive stenosis with good  GAMA-3 flow.  The diagonal branch was free of any obstructive stenosis with good GAMA-3 flow.    Circumflex artery: The circumflex artery was a medium caliber vessel with the ramus intermedius branch the first and the second obtuse marginal branch which was free of any obstructive stenosis with good GAMA-3 flow.  The circumflex artery distally was also free of any obstructive stenosis with good GAMA-3 flow    Right coronary artery: The right coronary artery was a medium to large caliber dominant vessel which were free of any obstructive stenosis with good GAMA-3 flow    Estimated Blood Loss:  Minimal    Complications:  None; patient tolerated the procedure well.    Impression  1.  Elevated left ventricular end-diastolic pressure.  2.  No evidence of any obstructive epicardial coronary artery disease.    Recommendations: Patient positive troponin was probably secondary to the atrial fibrillation.  Patient was recommended medical management for the atrial fibrillation and to workup noncardiac etiology of chest pain.  Patient would need anticoagulation with Eliquis for the atrial fibrillation.      Param Mcfarland MD  10/26/2018  2:41 PM    EMR Dragon/Transcription disclaimer:  Some of this note may be an electronic transcription/translation of spoken language to printed text. The electronic translation of spoken language may permit erroneous, or at times, nonsensical words or phrases to be inadvertently transcribed; Although I have reviewed the note for such errors, some may still exist.      Cardiolite (Tc-99m Sestamibi) stress test     Lab Review:       Results from last 7 days   Lab Units 04/30/25  0830   WBC 10*3/mm3 6.22   HEMOGLOBIN g/dL 17.4   HEMATOCRIT % 55.7*            Results from last 7 days   Lab Units 04/30/25  0830   SODIUM mmol/L 140   BUN mg/dL 12   CREATININE mg/dL 1.32*   GLUCOSE mg/dL 58*                      Estimated Creatinine Clearance: 86.8 mL/min (A) (by C-G formula based on SCr of 1.32  "mg/dL (H)).         Invalid input(s): \"LDLCALC\"     Results from last 7 days   Lab Units 04/30/25  0830   INR  1.03      No results found for: \"TSH\"     Lab Results   Component Value Date    HGBA1C 11.20 (H) 03/31/2025        Lab Results   Component Value Date    DIGOXIN 0.40 (L) 10/22/2024      No results found for: \"DDIMERQUAN\"       Left ventricular ejection fraction is severely reduced (Calculated EF = 15%).    Findings consistent with a normal ECG stress test.    Mild to moderate-sized anterior apiacl/septal l defects on rest and stress images primarily fixed with a mild degree of ischemic changes     Assessment:      Chronic HFrEF (heart failure with reduced ejection fraction)      Initial cardiac assessment: HFrEF patient with worsening shortness of breath symptoms as well as changes in ejection fraction along with a mild to moderate-sized anterior septal wall ischemic defect on stress images recommend proceed with left heart catheterization to see if any change in coronary atherosclerosis that could explain his worsening ejection fraction and symptoms discussed risk of bleeding, kidney dysfunction, death, stroke, and heart attack patient was agreeable      Recommendations:  1.  Ventricularization with possible PCI and stent        Thank you for allowing us to share in [facility].        Dinesh Gutiérrez MD  5/5/2025  15:04 EDT    " poor appetite

## 2025-05-06 ENCOUNTER — HOSPITAL ENCOUNTER (OUTPATIENT)
Facility: HOSPITAL | Age: 55
Setting detail: HOSPITAL OUTPATIENT SURGERY
Discharge: HOME | End: 2025-05-06
Attending: INTERNAL MEDICINE | Admitting: INTERNAL MEDICINE
Payer: MEDICAID

## 2025-05-06 ENCOUNTER — PREP FOR SURGERY (OUTPATIENT)
Dept: OTHER | Facility: HOSPITAL | Age: 55
End: 2025-05-06
Payer: MEDICAID

## 2025-05-06 VITALS
HEART RATE: 97 BPM | SYSTOLIC BLOOD PRESSURE: 107 MMHG | TEMPERATURE: 97.2 F | OXYGEN SATURATION: 97 % | BODY MASS INDEX: 36.36 KG/M2 | WEIGHT: 259.7 LBS | HEIGHT: 71 IN | DIASTOLIC BLOOD PRESSURE: 76 MMHG | RESPIRATION RATE: 16 BRPM

## 2025-05-06 DIAGNOSIS — I50.22 CHRONIC HFREF (HEART FAILURE WITH REDUCED EJECTION FRACTION): ICD-10-CM

## 2025-05-06 DIAGNOSIS — I48.0 PAF (PAROXYSMAL ATRIAL FIBRILLATION): Primary | ICD-10-CM

## 2025-05-06 DIAGNOSIS — I48.0 PAF (PAROXYSMAL ATRIAL FIBRILLATION): ICD-10-CM

## 2025-05-06 LAB
ANION GAP SERPL CALCULATED.3IONS-SCNC: 12.2 MMOL/L (ref 5–15)
ANISOCYTOSIS BLD QL: NORMAL
BASOPHILS # BLD AUTO: 0.06 10*3/MM3 (ref 0–0.2)
BASOPHILS NFR BLD AUTO: 0.9 % (ref 0–1.5)
BUN SERPL-MCNC: 17 MG/DL (ref 6–20)
BUN/CREAT SERPL: 14 (ref 7–25)
CALCIUM SPEC-SCNC: 9.2 MG/DL (ref 8.6–10.5)
CHLORIDE SERPL-SCNC: 99 MMOL/L (ref 98–107)
CO2 SERPL-SCNC: 24.8 MMOL/L (ref 22–29)
CREAT SERPL-MCNC: 1.21 MG/DL (ref 0.76–1.27)
DEPRECATED RDW RBC AUTO: 47 FL (ref 37–54)
EGFRCR SERPLBLD CKD-EPI 2021: 70.3 ML/MIN/1.73
EOSINOPHIL # BLD AUTO: 0.07 10*3/MM3 (ref 0–0.4)
EOSINOPHIL NFR BLD AUTO: 1.1 % (ref 0.3–6.2)
ERYTHROCYTE [DISTWIDTH] IN BLOOD BY AUTOMATED COUNT: 17.3 % (ref 12.3–15.4)
GLUCOSE SERPL-MCNC: 86 MG/DL (ref 65–99)
HCT VFR BLD AUTO: 58.5 % (ref 37.5–51)
HGB BLD-MCNC: 18.8 G/DL (ref 13–17.7)
IMM GRANULOCYTES # BLD AUTO: 0.02 10*3/MM3 (ref 0–0.05)
IMM GRANULOCYTES NFR BLD AUTO: 0.3 % (ref 0–0.5)
INR PPP: 1.09 (ref 0.86–1.15)
LYMPHOCYTES # BLD AUTO: 1.96 10*3/MM3 (ref 0.7–3.1)
LYMPHOCYTES NFR BLD AUTO: 29.5 % (ref 19.6–45.3)
MCH RBC QN AUTO: 27.1 PG (ref 26.6–33)
MCHC RBC AUTO-ENTMCNC: 32.1 G/DL (ref 31.5–35.7)
MCV RBC AUTO: 84.4 FL (ref 79–97)
MONOCYTES # BLD AUTO: 0.51 10*3/MM3 (ref 0.1–0.9)
MONOCYTES NFR BLD AUTO: 7.7 % (ref 5–12)
NEUTROPHILS NFR BLD AUTO: 4.02 10*3/MM3 (ref 1.7–7)
NEUTROPHILS NFR BLD AUTO: 60.5 % (ref 42.7–76)
NRBC BLD AUTO-RTO: 0 /100 WBC (ref 0–0.2)
PLATELET # BLD AUTO: 286 10*3/MM3 (ref 140–450)
PMV BLD AUTO: 10.7 FL (ref 6–12)
POTASSIUM SERPL-SCNC: 3.9 MMOL/L (ref 3.5–5.2)
PROTHROMBIN TIME: 14.6 SECONDS (ref 11.8–14.9)
QT INTERVAL: 371 MS
QT INTERVAL: 376 MS
QTC INTERVAL: 514 MS
QTC INTERVAL: 517 MS
RBC # BLD AUTO: 6.93 10*6/MM3 (ref 4.14–5.8)
SMALL PLATELETS BLD QL SMEAR: ADEQUATE
SODIUM SERPL-SCNC: 136 MMOL/L (ref 136–145)
WBC MORPH BLD: NORMAL
WBC NRBC COR # BLD AUTO: 6.64 10*3/MM3 (ref 3.4–10.8)

## 2025-05-06 PROCEDURE — 25010000002 MIDAZOLAM PER 1MG: Performed by: INTERNAL MEDICINE

## 2025-05-06 PROCEDURE — 85610 PROTHROMBIN TIME: CPT | Performed by: INTERNAL MEDICINE

## 2025-05-06 PROCEDURE — C1769 GUIDE WIRE: HCPCS | Performed by: INTERNAL MEDICINE

## 2025-05-06 PROCEDURE — 25010000002 LIDOCAINE 2% SOLUTION: Performed by: INTERNAL MEDICINE

## 2025-05-06 PROCEDURE — 80048 BASIC METABOLIC PNL TOTAL CA: CPT | Performed by: INTERNAL MEDICINE

## 2025-05-06 PROCEDURE — 93458 L HRT ARTERY/VENTRICLE ANGIO: CPT | Performed by: INTERNAL MEDICINE

## 2025-05-06 PROCEDURE — 85025 COMPLETE CBC W/AUTO DIFF WBC: CPT | Performed by: INTERNAL MEDICINE

## 2025-05-06 PROCEDURE — C1894 INTRO/SHEATH, NON-LASER: HCPCS | Performed by: INTERNAL MEDICINE

## 2025-05-06 PROCEDURE — 85007 BL SMEAR W/DIFF WBC COUNT: CPT | Performed by: INTERNAL MEDICINE

## 2025-05-06 PROCEDURE — 93005 ELECTROCARDIOGRAM TRACING: CPT | Performed by: INTERNAL MEDICINE

## 2025-05-06 PROCEDURE — 25510000001 IOPAMIDOL PER 1 ML: Performed by: INTERNAL MEDICINE

## 2025-05-06 PROCEDURE — 25010000002 HEPARIN (PORCINE) PER 1000 UNITS: Performed by: INTERNAL MEDICINE

## 2025-05-06 PROCEDURE — 25010000002 FENTANYL CITRATE (PF) 50 MCG/ML SOLUTION: Performed by: INTERNAL MEDICINE

## 2025-05-06 RX ORDER — SODIUM CHLORIDE 0.9 % (FLUSH) 0.9 %
3 SYRINGE (ML) INJECTION EVERY 12 HOURS SCHEDULED
OUTPATIENT
Start: 2025-05-06

## 2025-05-06 RX ORDER — MIRTAZAPINE 15 MG/1
7.5 TABLET, FILM COATED ORAL NIGHTLY
Status: CANCELLED | OUTPATIENT
Start: 2025-05-06

## 2025-05-06 RX ORDER — SODIUM CHLORIDE 0.9 % (FLUSH) 0.9 %
10 SYRINGE (ML) INJECTION AS NEEDED
OUTPATIENT
Start: 2025-05-06

## 2025-05-06 RX ORDER — IOPAMIDOL 755 MG/ML
INJECTION, SOLUTION INTRAVASCULAR
Status: DISCONTINUED | OUTPATIENT
Start: 2025-05-06 | End: 2025-05-06 | Stop reason: HOSPADM

## 2025-05-06 RX ORDER — BUPROPION HYDROCHLORIDE 150 MG/1
300 TABLET ORAL EVERY MORNING
Status: CANCELLED | OUTPATIENT
Start: 2025-05-07

## 2025-05-06 RX ORDER — SODIUM CHLORIDE 9 MG/ML
40 INJECTION, SOLUTION INTRAVENOUS AS NEEDED
Status: DISCONTINUED | OUTPATIENT
Start: 2025-05-06 | End: 2025-05-06 | Stop reason: HOSPADM

## 2025-05-06 RX ORDER — SODIUM CHLORIDE 0.9 % (FLUSH) 0.9 %
10 SYRINGE (ML) INJECTION AS NEEDED
Status: DISCONTINUED | OUTPATIENT
Start: 2025-05-06 | End: 2025-05-06 | Stop reason: HOSPADM

## 2025-05-06 RX ORDER — DOXEPIN HYDROCHLORIDE 10 MG/1
10 CAPSULE ORAL
Status: CANCELLED | OUTPATIENT
Start: 2025-05-06

## 2025-05-06 RX ORDER — LIDOCAINE HYDROCHLORIDE 20 MG/ML
INJECTION, SOLUTION INFILTRATION; PERINEURAL
Status: DISCONTINUED | OUTPATIENT
Start: 2025-05-06 | End: 2025-05-06 | Stop reason: HOSPADM

## 2025-05-06 RX ORDER — FENTANYL CITRATE 50 UG/ML
INJECTION, SOLUTION INTRAMUSCULAR; INTRAVENOUS
Status: DISCONTINUED | OUTPATIENT
Start: 2025-05-06 | End: 2025-05-06 | Stop reason: HOSPADM

## 2025-05-06 RX ORDER — CETIRIZINE HYDROCHLORIDE 10 MG/1
10 TABLET ORAL DAILY
Status: CANCELLED | OUTPATIENT
Start: 2025-05-06

## 2025-05-06 RX ORDER — INSULIN LISPRO 100 [IU]/ML
20 INJECTION, SOLUTION INTRAVENOUS; SUBCUTANEOUS
Status: CANCELLED | OUTPATIENT
Start: 2025-05-06

## 2025-05-06 RX ORDER — SODIUM CHLORIDE 0.9 % (FLUSH) 0.9 %
10 SYRINGE (ML) INJECTION EVERY 12 HOURS SCHEDULED
Status: DISCONTINUED | OUTPATIENT
Start: 2025-05-06 | End: 2025-05-06 | Stop reason: HOSPADM

## 2025-05-06 RX ORDER — METOPROLOL SUCCINATE 50 MG/1
50 TABLET, EXTENDED RELEASE ORAL DAILY
Status: CANCELLED | OUTPATIENT
Start: 2025-05-07

## 2025-05-06 RX ORDER — SODIUM CHLORIDE 9 MG/ML
40 INJECTION, SOLUTION INTRAVENOUS AS NEEDED
OUTPATIENT
Start: 2025-05-06

## 2025-05-06 RX ORDER — FUROSEMIDE 40 MG/1
40 TABLET ORAL DAILY
Status: CANCELLED | OUTPATIENT
Start: 2025-05-06

## 2025-05-06 RX ORDER — SPIRONOLACTONE 25 MG/1
25 TABLET ORAL DAILY
Status: CANCELLED | OUTPATIENT
Start: 2025-05-06

## 2025-05-06 RX ORDER — METOPROLOL SUCCINATE 100 MG/1
100 TABLET, EXTENDED RELEASE ORAL DAILY
Qty: 90 TABLET | Refills: 3 | Status: SHIPPED | OUTPATIENT
Start: 2025-05-06

## 2025-05-06 RX ORDER — ARIPIPRAZOLE 5 MG/1
5 TABLET ORAL DAILY
Status: CANCELLED | OUTPATIENT
Start: 2025-05-06

## 2025-05-06 RX ORDER — HEPARIN SODIUM 1000 [USP'U]/ML
INJECTION, SOLUTION INTRAVENOUS; SUBCUTANEOUS
Status: DISCONTINUED | OUTPATIENT
Start: 2025-05-06 | End: 2025-05-06 | Stop reason: HOSPADM

## 2025-05-06 RX ORDER — METOPROLOL SUCCINATE 50 MG/1
100 TABLET, EXTENDED RELEASE ORAL
Status: COMPLETED | OUTPATIENT
Start: 2025-05-06 | End: 2025-05-06

## 2025-05-06 RX ORDER — MIDAZOLAM HYDROCHLORIDE 2 MG/2ML
INJECTION, SOLUTION INTRAMUSCULAR; INTRAVENOUS
Status: DISCONTINUED | OUTPATIENT
Start: 2025-05-06 | End: 2025-05-06 | Stop reason: HOSPADM

## 2025-05-06 RX ADMIN — METOPROLOL TARTRATE 5 MG: 1 INJECTION, SOLUTION INTRAVENOUS at 16:15

## 2025-05-06 RX ADMIN — METOPROLOL SUCCINATE 100 MG: 50 TABLET, FILM COATED, EXTENDED RELEASE ORAL at 16:50

## 2025-05-06 NOTE — NURSING NOTE
Pt called friend Carlos Eduardo to pick him up as he drove himself today. This RN took pt upstairs in a wheelchair and pt and his  was well aware he was not suppose to drive due to the sedation medication he received. Pt got in a white cargo van and friend circled around to the end of the parking lot where pt got out of the van and into his blue minivan and drove himself away.

## 2025-05-06 NOTE — DISCHARGE INSTRUCTIONS
DISCHARGE INSTRUCTIONS  VASCULAR  PROCEDURES  TR BAND      For your surgery you had:  IV sedation.     You may experience dizziness, drowsiness, or light-headedness for several hours following surgery/procedure.  Do not stay alone today or tonight.  Limit your activity for 24 hours.  Resume your diet slowly.  Follow whatever special dietary instructions you may have been given by your doctor.  You should not drive or operate machinery, drink alcohol, or sign legally binding documents for 24 hours or while you are taking pain medication.    NOTIFY YOUR DOCTOR IF YOU EXPERIENCE ANY OF THE FOLLOWING:  Temperature greater than 101 degrees Fahrenheit  Shaking Chills  Redness or excessive drainage from incision  Nausea, vomiting and/or pain that is not controlled by prescribed medications  Increase in bleeding or bleeding that is excessive  Unable to urinate in 6 hours after surgery  If unable to reach your doctor, please go to the closest Emergency Room    Medications per physician instructions as indicated on After Visit Summary.    Last dose of pain medication was given at:   .    [] TR BAND DISCHARGE INSTRUCTIONS:  For 24 hours after the procedure, or as directed by your health care provider:  Do not flex or bend the affected arm.  Do not push or pull heavy objects with the affected arm.  Do not operate machinery or power tools.  Keep affected arm elevated and rested for 48 hours.  Do not apply powder or lotion to the site.  Check your incision site every day for signs of infection. Check for:  Redness, swelling, or pain.  Fluid or blood.  Warmth.  Pus or a bad smell.  May remove bandaid: in 24 hours  Avoid manipulation of the wrist for 24 hours  After the dressing is removed, clean gently with soap and water, apply new dressing.   Avoid submerging site for one week or until healed.          DISCHARGE INSTRUCTIONS  VASCULAR  PROCEDURES  TR BAND      For your surgery you had:  IV sedation.     You may experience  dizziness, drowsiness, or light-headedness for several hours following surgery/procedure.  Do not stay alone today or tonight.  Limit your activity for 24 hours.  Resume your diet slowly.  Follow whatever special dietary instructions you may have been given by your doctor.  You should not drive or operate machinery, drink alcohol, or sign legally binding documents for 24 hours or while you are taking pain medication.    NOTIFY YOUR DOCTOR IF YOU EXPERIENCE ANY OF THE FOLLOWING:  Temperature greater than 101 degrees Fahrenheit  Shaking Chills  Redness or excessive drainage from incision  Nausea, vomiting and/or pain that is not controlled by prescribed medications  Increase in bleeding or bleeding that is excessive  Unable to urinate in 6 hours after surgery  If unable to reach your doctor, please go to the closest Emergency Room    Medications per physician instructions as indicated on After Visit Summary.    Last dose of pain medication was given at:   1500 during your procedure.    [x] TR BAND DISCHARGE INSTRUCTIONS:  For 24 hours after the procedure, or as directed by your health care provider:  Do not flex or bend the affected arm.  Do not push or pull heavy objects with the affected arm.  Do not operate machinery or power tools.  Keep affected arm elevated and rested for 48 hours.  Do not apply powder or lotion to the site.  Check your incision site every day for signs of infection. Check for:  Redness, swelling, or pain.  Fluid or blood.  Warmth.  Pus or a bad smell.  May remove bandaid: in 24 hours  Avoid manipulation of the wrist for 24 hours  After the dressing is removed, clean gently with soap and water, apply new dressing.   Avoid submerging site for one week or until healed.

## 2025-05-06 NOTE — NURSING NOTE
Dr Gutiérrez speaking with pt and advised he would need to come in on the 20th for cardioversion, he also advised him to take 100 mg of metoprolol daily , he also advised someone would call him to set up the cardioversion appt.

## 2025-05-13 ENCOUNTER — TELEPHONE (OUTPATIENT)
Dept: CARDIOLOGY | Facility: CLINIC | Age: 55
End: 2025-05-13
Payer: MEDICAID

## 2025-05-13 NOTE — TELEPHONE ENCOUNTER
I spoke to patient and gave an arrival time of 7:00 on 05/20/25 for RYNE and Cardioversion. Patient was instructed to have a  for the day of the procedure and to arrive at the main entrance/registration area at the Hospital. Patient was instructed to hold all medications on the morning of the procedure. Patient was instructed to have no solid food or milk for 6 hours prior to scheduled arrival time, clear liquids only for 6 hours prior up to 2 hours prior to scheduled arrival time, NPO for 2 hours prior to scheduled arrival time. Patient is agreeable with no other questions or concerns.

## 2025-05-20 ENCOUNTER — HOSPITAL ENCOUNTER (OUTPATIENT)
Dept: CARDIOLOGY | Facility: HOSPITAL | Age: 55
Discharge: HOME | End: 2025-05-20
Payer: MEDICAID

## 2025-05-20 VITALS
DIASTOLIC BLOOD PRESSURE: 87 MMHG | RESPIRATION RATE: 16 BRPM | OXYGEN SATURATION: 100 % | HEART RATE: 84 BPM | SYSTOLIC BLOOD PRESSURE: 125 MMHG

## 2025-05-20 DIAGNOSIS — I48.0 PAF (PAROXYSMAL ATRIAL FIBRILLATION): ICD-10-CM

## 2025-05-20 PROCEDURE — 99152 MOD SED SAME PHYS/QHP 5/>YRS: CPT

## 2025-05-20 PROCEDURE — 25010000002 MIDAZOLAM PER 1MG: Performed by: INTERNAL MEDICINE

## 2025-05-20 PROCEDURE — 99153 MOD SED SAME PHYS/QHP EA: CPT

## 2025-05-20 PROCEDURE — 93312 ECHO TRANSESOPHAGEAL: CPT

## 2025-05-20 PROCEDURE — 93325 DOPPLER ECHO COLOR FLOW MAPG: CPT

## 2025-05-20 PROCEDURE — 25010000002 MORPHINE PER 10 MG: Performed by: INTERNAL MEDICINE

## 2025-05-20 PROCEDURE — 93320 DOPPLER ECHO COMPLETE: CPT

## 2025-05-20 PROCEDURE — 92960 CARDIOVERSION ELECTRIC EXT: CPT

## 2025-05-20 RX ORDER — MIDAZOLAM HYDROCHLORIDE 2 MG/2ML
INJECTION, SOLUTION INTRAMUSCULAR; INTRAVENOUS
Status: COMPLETED | OUTPATIENT
Start: 2025-05-20 | End: 2025-05-20

## 2025-05-20 RX ORDER — MORPHINE SULFATE 2 MG/ML
INJECTION, SOLUTION INTRAMUSCULAR; INTRAVENOUS
Status: DISPENSED
Start: 2025-05-20 | End: 2025-05-20

## 2025-05-20 RX ORDER — DIPHENHYDRAMINE HYDROCHLORIDE 50 MG/ML
INJECTION, SOLUTION INTRAMUSCULAR; INTRAVENOUS
Status: DISCONTINUED
Start: 2025-05-20 | End: 2025-05-20 | Stop reason: WASHOUT

## 2025-05-20 RX ORDER — MORPHINE SULFATE 2 MG/ML
INJECTION, SOLUTION INTRAMUSCULAR; INTRAVENOUS
Status: COMPLETED | OUTPATIENT
Start: 2025-05-20 | End: 2025-05-20

## 2025-05-20 RX ORDER — MIDAZOLAM HYDROCHLORIDE 2 MG/2ML
INJECTION, SOLUTION INTRAMUSCULAR; INTRAVENOUS
Status: DISPENSED
Start: 2025-05-20 | End: 2025-05-20

## 2025-05-20 RX ORDER — AMIODARONE HYDROCHLORIDE 200 MG/1
200 TABLET ORAL DAILY
Qty: 90 TABLET | Refills: 3 | Status: SHIPPED | OUTPATIENT
Start: 2025-05-20

## 2025-05-20 RX ADMIN — MIDAZOLAM HYDROCHLORIDE 4 MG: 1 INJECTION, SOLUTION INTRAMUSCULAR; INTRAVENOUS at 08:02

## 2025-05-20 RX ADMIN — MIDAZOLAM HYDROCHLORIDE 1 MG: 1 INJECTION, SOLUTION INTRAMUSCULAR; INTRAVENOUS at 08:15

## 2025-05-20 RX ADMIN — TOPICAL ANESTHETIC 1 SPRAY: 200 SPRAY DENTAL; PERIODONTAL at 08:01

## 2025-05-20 RX ADMIN — MORPHINE SULFATE 2 MG: 2 INJECTION, SOLUTION INTRAMUSCULAR; INTRAVENOUS at 08:13

## 2025-05-20 NOTE — DISCHARGE INSTRUCTIONS
Echo Lab Phone Number: [phone]    Do not eat or drink anything until 10:01AM. Begin with sips of cool water before trying hot liquid to be sure throat numbness has worn off.  A responsible adult should drive you home and stay with you today and tonight.  Do not drive a car or operate any hazardous machinery for 24 hours.  Do not drink any alcoholic beverages for 24 hours.  Do not make any legal decisions for 24 hours after sedation.  Do not engage in any strenuous activity.  Resume your medications, following prescribed instructions. Begin taking Amiodarone per Dr. Gutiérrez's instructions.  Contact your caregiver if you have questions or concerns about your care.    In the event of an emergency, call 911 or go to your nearest emergency room.    You received the following medications during your procedure: Versed, Morphine, Hurricaine spray

## 2025-05-23 LAB
BH CV ECHO SHUNT ASSESSMENT PERFORMED (HIDDEN SCRIPTING): 1
LV EF 2D ECHO EST: 35 %

## 2025-05-29 ENCOUNTER — SPECIALTY PHARMACY (OUTPATIENT)
Dept: CARDIOLOGY | Facility: CLINIC | Age: 55
End: 2025-05-29
Payer: MEDICAID

## 2025-05-29 NOTE — PROGRESS NOTES
Specialty Pharmacy Patient Management Program  Refill Outreach     Edd was contacted today regarding refills of their medication(s).    Refill Questions      Flowsheet Row Most Recent Value   Changes to allergies? No   Changes to medications? No   New conditions or infections since last clinic visit No   Unplanned office visit, urgent care, ED, or hospital admission in the last 4 weeks  No   How does patient/caregiver feel medication is working? Good   Financial problems or insurance changes  No   Since the previous refill, were any specialty medication doses or scheduled injections missed or delayed?  No   Does this patient require a clinical escalation to a pharmacist? No            Delivery Questions      Flowsheet Row Most Recent Value   Delivery method  at Pharmacy   Delivery address verified with patient/caregiver? Yes   Delivery address Other (Enter below)  [ at Pharmacy]   Other address preferred  at Pharmacy   Number of medications in delivery 2   Medication(s) being filled and delivered Evolocumab (REPATHA), Dapagliflozin Propanediol   Doses left of specialty medications Farxiga: 4 Repatha: 0   Copay verified? Yes   Copay amount $0.00   Copay form of payment No copayment ($0)   Delivery Date Selection 05/29/25   Signature Required Yes   Do you consent to receive electronic handouts?  No                 Follow-up: 25 day(s)     Nba Garcia, Pharmacy Technician  5/29/2025  08:33 EDT

## 2025-06-04 ENCOUNTER — OFFICE VISIT (OUTPATIENT)
Dept: PODIATRY | Facility: CLINIC | Age: 55
End: 2025-06-04
Payer: MEDICAID

## 2025-06-04 VITALS
BODY MASS INDEX: 36.12 KG/M2 | HEIGHT: 71 IN | OXYGEN SATURATION: 90 % | TEMPERATURE: 96.8 F | WEIGHT: 258 LBS | HEART RATE: 69 BPM

## 2025-06-04 DIAGNOSIS — B35.1 ONYCHOMYCOSIS: ICD-10-CM

## 2025-06-04 DIAGNOSIS — E11.65 TYPE 2 DIABETES MELLITUS WITH HYPERGLYCEMIA, UNSPECIFIED WHETHER LONG TERM INSULIN USE: Primary | ICD-10-CM

## 2025-06-04 DIAGNOSIS — E11.42 DIABETIC POLYNEUROPATHY ASSOCIATED WITH TYPE 2 DIABETES MELLITUS: ICD-10-CM

## 2025-06-04 DIAGNOSIS — E11.42 TYPE 2 DIABETES MELLITUS WITH DIABETIC POLYNEUROPATHY, WITH LONG-TERM CURRENT USE OF INSULIN: ICD-10-CM

## 2025-06-04 DIAGNOSIS — Z79.4 TYPE 2 DIABETES MELLITUS WITH DIABETIC POLYNEUROPATHY, WITH LONG-TERM CURRENT USE OF INSULIN: ICD-10-CM

## 2025-06-04 RX ORDER — LATANOPROST 50 UG/ML
SOLUTION/ DROPS OPHTHALMIC
COMMUNITY
Start: 2025-05-14

## 2025-06-04 RX ORDER — AMANTADINE HYDROCHLORIDE 100 MG/1
TABLET ORAL
COMMUNITY
Start: 2025-05-09

## 2025-06-04 NOTE — PROGRESS NOTES
[facility] - PODIATRY    Today's Date: 06/04/25    Patient Name: Edd Willingham  MRN: [id number]  CSN: [id number]  PCP: Christopher Galarza MD, Last PCP Visit: Unknown last visit  Referring Provider: No ref. provider found    SUBJECTIVE     Chief Complaint   Patient presents with    Left Foot - Follow-up, Nail Problem, Diabetes    Right Foot - Follow-up, Nail Problem, Diabetes     HPI: Edd Willingham, a 54 y.o.male, presents to clinic for a diabetic foot evaluation.    Patient is here for bilateral toenail pain and diabetic foot check.  Patient states that his sugars are much well-controlled now.  States his toes hurt in shoe gears and bother him at times.    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    Depression     Diabetes mellitus     Difficulty walking 8 years ago    Elevated cholesterol     GERD (gastroesophageal reflux disease)     Heart murmur     Hypertension     Ingrown toenail 10 12 years ago    Knee sprain 1985    Knee swelling 15 years ago    Low back pain     Low back strain 1992    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    MI (myocardial infarction)     Neck strain 15 years ago    Have stiffness and pain in Neck and reduce range of motion    Neuropathy in diabetes 10 to 12 years ago    Rotator cuff syndrome 15 years ago    Substance abuse adult life    have used various drugs most of my adult life     Past Surgical History:   Procedure Laterality Date    CARDIAC CATHETERIZATION N/A 10/26/2018    Procedure: Left Heart Cath;  Surgeon: Param Mcfarland MD;  Location: [facility] CATH INVASIVE LOCATION;  Service: Cardiology    CARDIAC CATHETERIZATION N/A 5/6/2025    Procedure: Left Heart  Cath;  Surgeon: Dinesh Gutiérrez MD;  Location: [facility] CATH INVASIVE LOCATION;  Service: Cardiovascular;  Laterality: N/A;    COLONOSCOPY N/A 2021    Procedure: COLONOSCOPY;  Surgeon: Dinesh Drummond MD;  Location: [facility] ENDOSCOPY;  Service: Gastroenterology;  Laterality: N/A;    COLONOSCOPY N/A 2022    Procedure: COLONOSCOPY;  Surgeon: Dinesh Drummond MD;  Location: [facility] ENDOSCOPY;  Service: Gastroenterology;  Laterality: N/A;    ELBOW PROCEDURE Left     left    ENDOSCOPY N/A 2021    Procedure: ESOPHAGOGASTRODUODENOSCOPY;  Surgeon: Dinesh Drummond MD;  Location: [facility] ENDOSCOPY;  Service: Gastroenterology;  Laterality: N/A;    ROTATOR CUFF REPAIR Right     RIGHT    SHOULDER SURGERY  8-15 years ago    Have had rotator cuff surgery on both shoulders    TUMOR REMOVAL      FATTY TUMOR    UPPER GASTROINTESTINAL ENDOSCOPY  2021    WRIST SURGERY Right     RIGHT     Family History   Problem Relation Age of Onset    Diabetes Mother         Insulin dependent    Hypertension Mother     Heart disease Mother     Heart failure Mother     Thyroid disease Mother         Has thyroid problems    Diabetes Father     No Known Problems Brother     Colon polyps Paternal Uncle     Malig Hyperthermia Neg Hx      Social History     Socioeconomic History    Marital status:    Tobacco Use    Smoking status: Former     Current packs/day: 0.00     Average packs/day: 0.5 packs/day for 36.4 years (18.2 ttl pk-yrs)     Types: Cigarettes, Pipe, Electronic Cigarette     Start date: 1987     Quit date: 2023     Years since quittin.5     Passive exposure: Past    Smokeless tobacco: Former     Types: Snuff   Vaping Use    Vaping status: Every Day    Substances: Nicotine    Devices: Disposable   Substance and Sexual Activity    Alcohol use: Not Currently     Comment: he has not drank for 1 year    Drug use: Not Currently     Comment: he has been clean from drugs for 1 year    Sexual activity: Defer      Allergies   Allergen Reactions    Statins Myalgia    Sulfa Antibiotics Unknown (See Comments) and Unknown - Low Severity     Remembers mother always saying he was allergic     Current Outpatient Medications   Medication Sig Dispense Refill    amantadine (SYMMETREL) 100 MG tablet       amiodarone (PACERONE) 200 MG tablet Take 1 tablet by mouth Daily. Take one tablet three times a day for the first week and then one tablet per day after 90 tablet 3    apixaban (ELIQUIS) 5 MG tablet tablet Take 1 tablet by mouth Every 12 (Twelve) Hours. 180 tablet 3    ARIPiprazole (ABILIFY) 5 MG tablet Take 1 tablet by mouth Daily.      buPROPion XL (WELLBUTRIN XL) 300 MG 24 hr tablet Take 1 tablet by mouth Every Morning.      cetirizine (zyrTEC) 10 MG tablet Take 1 tablet by mouth Daily.      Cholecalciferol (vitamin D3) 125 MCG (5000 UT) capsule capsule Take 1 capsule by mouth Daily.      dapagliflozin Propanediol 10 MG tablet Take 10 mg by mouth Daily. 90 tablet 3    doxepin (SINEquan) 10 MG capsule Take 1 capsule by mouth every night at bedtime.      DULoxetine HCl 40 MG capsule delayed-release particles Take 1 capsule by mouth Every 12 (Twelve) Hours.      Evolocumab (REPATHA) solution auto-injector SureClick injection Inject 1 mL under the skin into the appropriate area as directed Every 14 (Fourteen) Days. 6 mL 3    furosemide (LASIX) 40 MG tablet Take 1 tablet by mouth Daily. 15 tablet 0    Insulin Lispro (humaLOG) 100 UNIT/ML injection Inject 20 Units under the skin into the appropriate area as directed 3 (Three) Times a Day Before Meals. Depending on A1c adds extra units      Insulin Pen Needle 32G X 4 MM misc Use as directed 4 (Four) Times a Day. 120 each 11    latanoprost (XALATAN) 0.005 % ophthalmic solution instill 1 drop both eyes every night at bedtime      metoprolol succinate XL (TOPROL-XL) 100 MG 24 hr tablet Take 1 tablet by mouth Daily. 90 tablet 3    mirtazapine (REMERON) 7.5 MG tablet Take 1 tablet by mouth  Every Night.      OneTouch Ultra test strip 1 each by Other route 4 (Four) Times a Day. as directed      sacubitril-valsartan (Entresto) 49-51 MG tablet Take 1 tablet by mouth 2 (Two) Times a Day. 180 tablet 3    spironolactone (ALDACTONE) 25 MG tablet Take 1 tablet by mouth Daily. 90 tablet 3     No current facility-administered medications for this visit.     Review of Systems   Constitutional: Negative.    Skin:         Painful toenails.   All other systems reviewed and are negative.      OBJECTIVE     Vitals:    06/04/25 0854   Pulse: 69   Temp: 96.8 °F (36 °C)   SpO2: 90%       Body mass index is 35.98 kg/m².    Lab Results   Component Value Date    HGBA1C 11.20 (H) 03/31/2025       Lab Results   Component Value Date    GLUCOSE 86 05/06/2025    CALCIUM 9.2 05/06/2025     05/06/2025    K 3.9 05/06/2025    CO2 24.8 05/06/2025    CL 99 05/06/2025    BUN 17 05/06/2025    CREATININE 1.21 05/06/2025    EGFRIFAFRI 95 03/02/2021    BCR 14.0 05/06/2025    ANIONGAP 12.2 05/06/2025       Patient seen in no apparent distress.      PHYSICAL EXAM:     Foot/Ankle Exam    GENERAL  Appearance:  appears stated age  Orientation:  AAOx3  Affect:  appropriate  Gait:  unimpaired  Assistance:  independent  Right shoe gear: casual shoe  Left shoe gear: casual shoe    VASCULAR     Right Foot Vascularity   Normal vascular exam    Dorsalis pedis:  2+  Posterior tibial:  2+  Skin temperature:  warm  Edema grading:  None  CFT:  < 3 seconds  Pedal hair growth:  Present  Varicosities:  none     Left Foot Vascularity   Normal vascular exam    Dorsalis pedis:  2+  Posterior tibial:  2+  Skin temperature:  warm  Edema grading:  None  CFT:  < 3 seconds  Pedal hair growth:  Present  Varicosities:  none     NEUROLOGIC     Right Foot Neurologic   Light touch sensation: absent  Vibratory sensation: absent  Hot/Cold sensation: absent  Protective Sensation using [place]-Char Monofilament:   Sites intact: 2  Sites tested: 10     Left Foot  Neurologic   Light touch sensation: absent  Vibratory sensation: absent  Hot/Cold sensation:  absent  Protective Sensation using [place]-Char Monofilament:   Sites intact: 2  Sites tested: 10    MUSCLE STRENGTH     Right Foot Muscle Strength   Foot dorsiflexion:  4  Foot plantar flexion:  4  Foot inversion:  4  Foot eversion:  4     Left Foot Muscle Strength   Foot dorsiflexion:  4  Foot plantar flexion:  4  Foot inversion:  4  Foot eversion:  4    RANGE OF MOTION     Right Foot Range of Motion   Foot and ankle ROM within normal limits       Left Foot Range of Motion   Foot and ankle ROM within normal limits      DERMATOLOGIC      Right Foot Dermatologic   Skin  Right foot skin is intact.   Nails  1.  Positive for elongated, onychomycosis, abnormal thickness, subungual debris, dystrophic nail and ingrown toenail.  2.  Positive for elongated, onychomycosis, abnormal thickness, subungual debris, dystrophic nail and ingrown toenail.  3.  Positive for elongated, onychomycosis, abnormal thickness, subungual debris, dystrophic nail and ingrown toenail.  4.  Positive for elongated, onychomycosis, abnormal thickness, subungual debris, dystrophic nail and ingrown toenail.  5.  Positive for elongated, onychomycosis, abnormal thickness, subungual debris, dystrophic nail and ingrown toenail.  Nails comment:  Toenails 1, 2, 3, 4, and 5     Left Foot Dermatologic   Skin  Left foot skin is intact.   Nails comment:  Toenails 1, 2, 3, 4, and 5  Nails  1.  Positive for elongated, onychomycosis, abnormal thickness, subungual debris, dystrophic nail and ingrown toenail.  2.  Positive for elongated, onychomycosis, abnormal thickness, subungual debris, dystrophic nail and ingrown toenail.  3.  Positive for elongated, onychomycosis, abnormal thickness, subungual debris, dystrophic nail and ingrown toenail.  4.  Positive for elongated, onychomycosis, abnormally thick, subungual debris, dystrophic nail and ingrown toenail.  5.  Positive  for elongated, onychomycosis, abnormally thick, subungual debris, dystrophic nail and ingrown toenail.        ASSESSMENT/PLAN     Diagnoses and all orders for this visit:    1. Type 2 diabetes mellitus with hyperglycemia, unspecified whether long term insulin use (Primary)    2. Onychomycosis    3. Type 2 diabetes mellitus with diabetic polyneuropathy, with long-term current use of insulin    4. Diabetic polyneuropathy associated with type 2 diabetes mellitus              Comprehensive lower extremity examination and evaluation was performed.    Discussed findings and treatment plan including risks, benefits, and treatment options with patient in detail. Patient agreed with treatment plan.    Toenails 1, 2, 3, 4, 5 on Right and 1, 2, 3, 4, 5 on Left were debrided with nail nippers then filed with a EasyLinkmel nail lamine.  Patient tolerated procedure well without complications.    Rx:  Topical, compounded, neuropathy medication was written; see attached prescription.    Medications and allergies reviewed.  Reviewed available blood glucose and HgB A1C lab values along with other pertinent labs.  These were discussed with the patient as to their importance of diabetic maintenance.    Diabetic foot exam performed and documented this date, compliant with CQM required standards. Detail of findings as noted in physical exam.  Lower extremity Neurologic exam for diabetic patient performed and documented this date, compliant with PQRS required standards. Detail of findings as noted in physical exam.  Advised patient importance of good routine lower extremity hygiene. Advised patient importance of evaluating for intact skin and pain free nail borders.  Advised patient to use mirror to evaluate plantar/ soles of feet for better visualization. Advised patient monitor and phone office to be seen if any cracking to skin, open lesions, painful nail borders or if nails become elongated prior to next visit. Advised patient importance  of daily cleansing of lower extremities, followed by good skin cream to maintain normal hydration of skin. Also advised patient importance of close daily monitoring of blood sugar. Advised to regulate diet and medications to maintain control of blood sugar in optimal range. Contact primary care provider if difficulties maintaining blood sugar levels.  Advised Patient of presence of Diabetes Mellitus condition.  Advised Patient risk of progression and worsening or improvement, then return of condition.  Will monitor condition for any change in future. Treat with most appropriate treatment pending status of condition.  Counseled and advised patient extensively on nature and ramifications of diabetes. Standard instructions given to patient for good diabetic foot care and maintenance. Advised importance of careful monitoring to avoid break down and complications secondary to diabetes. Advised patient importance of strict maintenance of blood sugar control. Advised patient of possible ominous results from neglect of condition, i.e.: amputation/ loss of digits, feet and legs, or even death.  Patient states understands counseling, will monitor closely, continue good hygiene and routine diabetic foot care. Patient will contact office is questions or problems.      An After Visit Summary was printed and given to the patient at discharge, including (if requested) any available informative/educational handouts regarding diagnosis, treatment, or medications. All questions were answered to patient/family satisfaction. Should symptoms fail to improve or worsen they agree to call or return to clinic or to go to the Emergency Department. Discussed the importance of following up with any needed screening tests/labs/specialist appointments and any requested follow-up recommended by me today. Importance of maintaining follow-up discussed and patient accepts that missed appointments can delay diagnosis and potentially lead to worsening  of conditions.    Return in about 3 months (around 9/4/2025)., or sooner if acute issues arise.    This document has been electronically signed by Mir Mo DPM on June 4, 2025 09:08 EDT

## 2025-06-10 ENCOUNTER — TRANSCRIBE ORDERS (OUTPATIENT)
Dept: ADMINISTRATIVE | Facility: HOSPITAL | Age: 55
End: 2025-06-10
Payer: MEDICAID

## 2025-06-10 ENCOUNTER — LAB (OUTPATIENT)
Facility: HOSPITAL | Age: 55
End: 2025-06-10
Payer: MEDICAID

## 2025-06-10 DIAGNOSIS — E11.42 TYPE 2 DIABETES MELLITUS WITH POLYNEUROPATHY: ICD-10-CM

## 2025-06-10 DIAGNOSIS — Z78.9 STATIN INTOLERANCE: ICD-10-CM

## 2025-06-10 DIAGNOSIS — I50.22 CHRONIC HFREF (HEART FAILURE WITH REDUCED EJECTION FRACTION): ICD-10-CM

## 2025-06-10 DIAGNOSIS — I50.22 CHRONIC SYSTOLIC CONGESTIVE HEART FAILURE: ICD-10-CM

## 2025-06-10 DIAGNOSIS — E11.42 TYPE 2 DIABETES MELLITUS WITH POLYNEUROPATHY: Primary | ICD-10-CM

## 2025-06-10 LAB
ALBUMIN SERPL-MCNC: 3.2 G/DL (ref 3.5–5.2)
ALP SERPL-CCNC: 66 U/L (ref 39–117)
ALT SERPL W P-5'-P-CCNC: 16 U/L (ref 1–41)
ANION GAP SERPL CALCULATED.3IONS-SCNC: 15.1 MMOL/L (ref 5–15)
AST SERPL-CCNC: 23 U/L (ref 1–40)
BILIRUB CONJ SERPL-MCNC: 0.2 MG/DL (ref 0–0.3)
BILIRUB INDIRECT SERPL-MCNC: 0.6 MG/DL
BILIRUB SERPL-MCNC: 0.8 MG/DL (ref 0–1.2)
BUN SERPL-MCNC: 17 MG/DL (ref 6–20)
BUN/CREAT SERPL: 12.8 (ref 7–25)
CALCIUM SPEC-SCNC: 8.8 MG/DL (ref 8.6–10.5)
CHLORIDE SERPL-SCNC: 101 MMOL/L (ref 98–107)
CHOLEST SERPL-MCNC: 105 MG/DL (ref 0–200)
CO2 SERPL-SCNC: 19.9 MMOL/L (ref 22–29)
CREAT SERPL-MCNC: 1.33 MG/DL (ref 0.76–1.27)
EGFRCR SERPLBLD CKD-EPI 2021: 62.7 ML/MIN/1.73
GLUCOSE SERPL-MCNC: 79 MG/DL (ref 65–99)
HBA1C MFR BLD: 8.1 % (ref 4.8–5.6)
HDLC SERPL-MCNC: 46 MG/DL (ref 40–60)
HOLD SPECIMEN: NORMAL
LDLC SERPL CALC-MCNC: 42 MG/DL (ref 0–100)
LDLC/HDLC SERPL: 0.92 {RATIO}
NT-PROBNP SERPL-MCNC: 2372 PG/ML (ref 0–900)
POTASSIUM SERPL-SCNC: 3.8 MMOL/L (ref 3.5–5.2)
PROT SERPL-MCNC: 6.3 G/DL (ref 6–8.5)
SODIUM SERPL-SCNC: 136 MMOL/L (ref 136–145)
TRIGL SERPL-MCNC: 83 MG/DL (ref 0–150)
VLDLC SERPL-MCNC: 17 MG/DL (ref 5–40)

## 2025-06-10 PROCEDURE — 80048 BASIC METABOLIC PNL TOTAL CA: CPT

## 2025-06-10 PROCEDURE — 36415 COLL VENOUS BLD VENIPUNCTURE: CPT

## 2025-06-10 PROCEDURE — 83036 HEMOGLOBIN GLYCOSYLATED A1C: CPT

## 2025-06-10 PROCEDURE — 83880 ASSAY OF NATRIURETIC PEPTIDE: CPT

## 2025-06-10 PROCEDURE — 80061 LIPID PANEL: CPT

## 2025-06-10 PROCEDURE — 80076 HEPATIC FUNCTION PANEL: CPT

## 2025-06-24 ENCOUNTER — SPECIALTY PHARMACY (OUTPATIENT)
Dept: CARDIOLOGY | Facility: CLINIC | Age: 55
End: 2025-06-24
Payer: MEDICAID

## 2025-06-24 NOTE — PROGRESS NOTES
Specialty Pharmacy Patient Management Program  Refill Outreach     Edd was contacted today regarding refills of their medication(s).    Refill Questions      Flowsheet Row Most Recent Value   Changes to allergies? No   Changes to medications? No   New conditions or infections since last clinic visit No   Unplanned office visit, urgent care, ED, or hospital admission in the last 4 weeks  No   How does patient/caregiver feel medication is working? Good   Financial problems or insurance changes  No   Since the previous refill, were any specialty medication doses or scheduled injections missed or delayed?  No   Does this patient require a clinical escalation to a pharmacist? No            Delivery Questions      Flowsheet Row Most Recent Value   Delivery method  at Pharmacy   Delivery address verified with patient/caregiver? Yes   Delivery address Other (Enter below)  [ at Pharmacy]   Other address preferred  at Pharmacy   Number of medications in delivery 2   Medication(s) being filled and delivered Evolocumab (REPATHA), Dapagliflozin Propanediol   Doses left of specialty medications Repatha: 0 Farxiga: 6   Copay verified? Yes   Copay amount $0.00   Copay form of payment No copayment ($0)   Delivery Date Selection 06/24/25  [ at Pharmacy]   Signature Required Yes   Do you consent to receive electronic handouts?  No                 Follow-up: 25 day(s)     Nba Garcia, Pharmacy Technician  6/24/2025  10:49 EDT

## 2025-06-26 ENCOUNTER — OFFICE VISIT (OUTPATIENT)
Dept: CARDIOLOGY | Facility: CLINIC | Age: 55
End: 2025-06-26
Payer: MEDICAID

## 2025-06-26 VITALS
HEIGHT: 71 IN | BODY MASS INDEX: 34.89 KG/M2 | SYSTOLIC BLOOD PRESSURE: 118 MMHG | HEART RATE: 48 BPM | WEIGHT: 249.2 LBS | DIASTOLIC BLOOD PRESSURE: 76 MMHG

## 2025-06-26 DIAGNOSIS — I25.10 NONOCCLUSIVE CORONARY ATHEROSCLEROSIS OF NATIVE CORONARY ARTERY: Primary | ICD-10-CM

## 2025-06-26 DIAGNOSIS — I50.22 CHRONIC HFREF (HEART FAILURE WITH REDUCED EJECTION FRACTION): ICD-10-CM

## 2025-06-26 DIAGNOSIS — E78.5 HYPERLIPIDEMIA LDL GOAL <70: ICD-10-CM

## 2025-06-26 DIAGNOSIS — I48.0 PAF (PAROXYSMAL ATRIAL FIBRILLATION): ICD-10-CM

## 2025-06-26 PROCEDURE — 1160F RVW MEDS BY RX/DR IN RCRD: CPT | Performed by: NURSE PRACTITIONER

## 2025-06-26 PROCEDURE — 1159F MED LIST DOCD IN RCRD: CPT | Performed by: NURSE PRACTITIONER

## 2025-06-26 PROCEDURE — 99214 OFFICE O/P EST MOD 30 MIN: CPT | Performed by: NURSE PRACTITIONER

## 2025-06-26 RX ORDER — INSULIN PMP CART,AUT,G6/7,CNTR
1 EACH SUBCUTANEOUS DAILY
COMMUNITY
Start: 2025-04-21

## 2025-06-26 RX ORDER — PROMETHAZINE HYDROCHLORIDE 12.5 MG/1
12.5 TABLET ORAL AS NEEDED
COMMUNITY
Start: 2025-06-04

## 2025-06-26 NOTE — PROGRESS NOTES
Chief Complaint  Hospital Follow Up Visit    Subjective            History of Present Illness  Edd Willingham is a 54-year-old male patient who presents to the office today for follow-up.    History of Present Illness  The patient presents for a follow-up after hospitalization.    He underwent a left heart catheterization on 05/06/2025, followed by a transesophageal echocardiogram and cardioversion procedure performed by Dr. Gutiérrez on 05/20/2025. Since his hospitalizations, he reports no new or worsening cardiac symptoms. He experiences occasional shortness of breath but overall feels an improvement in his condition. He has initiated a walking regimen to enhance his lung capacity and heart rate, which appears to be beneficial. He notes an increase in his activity level without experiencing significant fatigue.    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    Depression     Diabetes mellitus     Difficulty walking 8 years ago    Elevated cholesterol     GERD (gastroesophageal reflux disease)     Heart murmur     Hypertension     Ingrown toenail 10 12 years ago    Knee sprain 1985    Knee swelling 15 years ago    Low back pain     Low back strain 1992    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    MI (myocardial infarction)     Neck strain 15 years ago    Have stiffness and pain in Neck and reduce range of motion    Neuropathy in diabetes 10 to 12 years ago    Nonocclusive coronary atherosclerosis of native coronary artery 6/26/2025    Rotator cuff syndrome 15 years ago    Substance abuse adult life    have used various drugs most of my adult life         ALLERGY  Allergies    Allergen Reactions    Statins Myalgia    Sulfa Antibiotics Unknown (See Comments) and Unknown - Low Severity     Remembers mother always saying he was allergic          SURGICALHX  Past Surgical History:   Procedure Laterality Date    CARDIAC CATHETERIZATION N/A 10/26/2018    Procedure: Left Heart Cath;  Surgeon: Param Mcfarland MD;  Location: [facility] CATH INVASIVE LOCATION;  Service: Cardiology    CARDIAC CATHETERIZATION N/A 2025    Procedure: Left Heart Cath;  Surgeon: Dinesh Gutiérrez MD;  Location: [facility] CATH INVASIVE LOCATION;  Service: Cardiovascular;  Laterality: N/A;    COLONOSCOPY N/A 2021    Procedure: COLONOSCOPY;  Surgeon: Dinesh Drummond MD;  Location: [facility] ENDOSCOPY;  Service: Gastroenterology;  Laterality: N/A;    COLONOSCOPY N/A 2022    Procedure: COLONOSCOPY;  Surgeon: Dinesh Drummond MD;  Location: [facility] ENDOSCOPY;  Service: Gastroenterology;  Laterality: N/A;    ELBOW PROCEDURE Left     left    ENDOSCOPY N/A 2021    Procedure: ESOPHAGOGASTRODUODENOSCOPY;  Surgeon: Dinesh Drummond MD;  Location: [facility] ENDOSCOPY;  Service: Gastroenterology;  Laterality: N/A;    ROTATOR CUFF REPAIR Right     RIGHT    SHOULDER SURGERY  8-15 years ago    Have had rotator cuff surgery on both shoulders    TUMOR REMOVAL      FATTY TUMOR    UPPER GASTROINTESTINAL ENDOSCOPY  2021    WRIST SURGERY Right     RIGHT          SOC  Social History     Socioeconomic History    Marital status:    Tobacco Use    Smoking status: Former     Current packs/day: 0.00     Average packs/day: 0.5 packs/day for 36.4 years (18.2 ttl pk-yrs)     Types: Cigarettes, Pipe, Electronic Cigarette     Start date: 1987     Quit date: 2023     Years since quittin.5     Passive exposure: Past    Smokeless tobacco: Former     Types: Snuff   Vaping Use    Vaping status: Former    Substances: Nicotine    Devices: Disposable   Substance and Sexual Activity    Alcohol use: Not Currently     Comment: he has  not drank for 1 year    Drug use: Not Currently     Comment: he has been clean from drugs for 1 year    Sexual activity: Defer         FAMHX  Family History   Problem Relation Age of Onset    Diabetes Mother         Insulin dependent    Hypertension Mother     Heart disease Mother     Heart failure Mother     Thyroid disease Mother         Has thyroid problems    Diabetes Father     No Known Problems Brother     Colon polyps Paternal Uncle     Malig Hyperthermia Neg Hx           MEDSIGONLY  Current Outpatient Medications on File Prior to Visit   Medication Sig    amantadine (SYMMETREL) 100 MG tablet     amiodarone (PACERONE) 200 MG tablet Take 1 tablet by mouth Daily. Take one tablet three times a day for the first week and then one tablet per day after    apixaban (ELIQUIS) 5 MG tablet tablet Take 1 tablet by mouth Every 12 (Twelve) Hours.    ARIPiprazole (ABILIFY) 5 MG tablet Take 1 tablet by mouth Daily.    buPROPion XL (WELLBUTRIN XL) 300 MG 24 hr tablet Take 1 tablet by mouth Every Morning.    cetirizine (zyrTEC) 10 MG tablet Take 1 tablet by mouth Daily.    Cholecalciferol (vitamin D3) 125 MCG (5000 UT) capsule capsule Take 1 capsule by mouth Daily.    dapagliflozin Propanediol 10 MG tablet Take 10 mg by mouth Daily.    doxepin (SINEquan) 10 MG capsule Take 1 capsule by mouth every night at bedtime.    DULoxetine HCl 40 MG capsule delayed-release particles Take 1 capsule by mouth Every 12 (Twelve) Hours.    Evolocumab (REPATHA) solution auto-injector SureClick injection Inject 1 mL under the skin into the appropriate area as directed Every 14 (Fourteen) Days.    furosemide (LASIX) 40 MG tablet Take 1 tablet by mouth Daily.    Insulin Disposable Pump (Omnipod 5 [id number] Pods Gen 5) misc 1 Units Daily.    Insulin Pen Needle 32G X 4 MM misc Use as directed 4 (Four) Times a Day.    latanoprost (XALATAN) 0.005 % ophthalmic solution instill 1 drop both eyes every night at bedtime    metoprolol succinate XL  "(TOPROL-XL) 100 MG 24 hr tablet Take 1 tablet by mouth Daily.    mirtazapine (REMERON) 7.5 MG tablet Take 1 tablet by mouth Every Night.    OneTouch Ultra test strip 1 each by Other route 4 (Four) Times a Day. as directed    promethazine (PHENERGAN) 12.5 MG tablet Take 1 tablet by mouth As Needed for Nausea or Vomiting.    sacubitril-valsartan (Entresto) 49-51 MG tablet Take 1 tablet by mouth 2 (Two) Times a Day.    spironolactone (ALDACTONE) 25 MG tablet Take 1 tablet by mouth Daily.    [DISCONTINUED] naloxone (Narcan) 4 MG/0.1ML nasal spray Administer 1 spray into the nostril(s) as directed by provider As Needed for Opioid Reversal. Call 911. Don't prime. [place] in 1 nostril for overdose. Repeat in 2-3 minutes in other nostril if no or minimal breathing/responsiveness.    [DISCONTINUED] Insulin Lispro (humaLOG) 100 UNIT/ML injection Inject 20 Units under the skin into the appropriate area as directed 3 (Three) Times a Day Before Meals. Depending on A1c adds extra units     No current facility-administered medications on file prior to visit.         Objective   /76   Pulse (!) 48   Ht 180.3 cm (70.98\")   Wt 113 kg (249 lb 3.2 oz)   BMI 34.77 kg/m²       Physical Exam  Constitutional:       Appearance: He is obese.   HENT:      Head: Normocephalic.   Neck:      Vascular: No carotid bruit.   Cardiovascular:      Rate and Rhythm: Regular rhythm. Bradycardia present.      Pulses: Normal pulses.      Heart sounds: Normal heart sounds. No murmur heard.  Pulmonary:      Effort: Pulmonary effort is normal.      Breath sounds: Normal breath sounds.   Musculoskeletal:      Cervical back: Neck supple.      Right lower leg: No edema.      Left lower leg: No edema.   Skin:     General: Skin is dry.   Neurological:      Mental Status: He is alert and oriented to person, place, and time.   Psychiatric:         Behavior: Behavior normal.         Result Review :   The following data was reviewed by: NATHANIEL Reis " "on 06/26/2025:  proBNP   Date Value Ref Range Status   06/10/2025 2,372.0 (H) 0.0 - 900.0 pg/mL Final     CMP          4/30/2025    08:30 5/6/2025    11:35 6/10/2025    09:44   CMP   Glucose 58  86  79    BUN 12  17  17.0    Creatinine 1.32  1.21  1.33    EGFR 63.3  70.3  62.7    Sodium 140  136  136    Potassium 4.3  3.9  3.8    Chloride 104  99  101    Calcium 9.0  9.2  8.8    Total Protein   6.3    Albumin   3.2    Total Bilirubin   0.8    Alkaline Phosphatase   66    AST (SGOT)   23    ALT (SGPT)   16    BUN/Creatinine Ratio 9.1  14.0  12.8    Anion Gap 11.3  12.2  15.1      CBC w/diff          4/20/2025    10:52 4/30/2025    08:30 5/6/2025    11:35   CBC w/Diff   WBC 6.40  6.22  6.64    RBC 5.50  6.43  6.93    Hemoglobin 15.0  17.4  18.8    Hematocrit 48.3  55.7  58.5    MCV 87.8  86.6  84.4    MCH 27.3  27.1  27.1    MCHC 31.1  31.2  32.1    RDW 16.1  17.2  17.3    Platelets 210  286  286    Neutrophil Rel % 62.6   60.5    Immature Granulocyte Rel % 0.3   0.3    Lymphocyte Rel % 28.3   29.5    Monocyte Rel % 6.9   7.7    Eosinophil Rel % 1.3   1.1    Basophil Rel % 0.6   0.9       No results found for: \"TSH\"   No results found for: \"FREET4\"   No results found for: \"DDIMERQUANT\"  Magnesium   Date Value Ref Range Status   02/27/2021 2.3 1.6 - 2.6 mg/dL Final      Digoxin   Date Value Ref Range Status   10/22/2024 0.40 (L) 0.60 - 1.20 ng/mL Final      Lab Results   Component Value Date    TROPONINT 54 (C) 04/20/2025           Lipid Panel          10/22/2024    11:26 6/10/2025    09:44   Lipid Panel   Total Cholesterol 152  105    Triglycerides 115  83    HDL Cholesterol 45  46    VLDL Cholesterol 21  17    LDL Cholesterol  86  42    LDL/HDL Ratio 1.87  0.92        Results for orders placed during the hospital encounter of 05/20/25    Adult Transesophageal Echo ([facility]NE) W/ Cont if Necessary Per Protocol    Interpretation Summary    Left ventricular systolic function is moderately decreased. Estimated left ventricular " EF = 35%    The left ventricular cavity is mildly dilated.    The left atrial cavity is mildly dilated.    Saline test results are negative.    The right atrial cavity is mildly dilated.           Assessment and Plan    Diagnoses and all orders for this visit:    1. Nonocclusive CAD (Primary)    2. Chronic HFrEF (heart failure with reduced ejection fraction)    3. PAF (paroxysmal atrial fibrillation)    4. Hyperlipidemia LDL goal <70      Assessment & Plan  1. Coronary artery disease:  - Continue Repatha 140 mg every 14 days for cholesterol management.    2. Heart failure with reduced ejection fraction (35%):  - Continue Farxiga 10 mg daily to preserve heart muscle function.  - Continue Lasix 40 mg daily to prevent fluid retention.  - Continue metoprolol 100 mg daily to lower heart rate and reduce cardiac workload.  - Continue Entresto 49-51 mg twice daily for blood pressure control and heart muscle preservation.  - Continue spironolactone to prevent fluid retention and preserve heart muscle.  - Schedule echocardiogram in 3 months to reassess ejection fraction.  - Discussed potential defibrillator implantation if ejection fraction remains <30%     3. Atrial fibrillation:  - Continue amiodarone 200 mg daily to maintain normal sinus rhythm.  - Continue Eliquis 5 mg twice daily to reduce stroke risk associated with atrial fibrillation.  - Discussed the role of blood thinners in preventing clot formation due to irregular heart rhythm.    4. Shortness of breath:  - Patient reports occasional shortness of breath but overall improvement.  - Encouraged continuation of walking and physical activity to improve lung and heart function.    5. Routine blood work:  - Kidney function stable.  - Sodium and potassium levels within normal limits.  - Liver enzymes within normal range.  - Cholesterol levels satisfactory.    Follow-up:  - Follow-up appointment scheduled in 3 months to coincide with repeat echocardiogram.      Follow Up    Return in about 3 months (around 9/26/2025) for Follow up with Dr Gutiérrez.    Patient was given instructions and counseling regarding his condition or for health maintenance advice. Please see specific information pulled into the AVS if appropriate.     Edd Willingham  reports that he quit smoking about 18 months ago. His smoking use included cigarettes, pipe, and electronic cigarette. He started smoking about 38 years ago. He has a 18.2 pack-year smoking history. He has been exposed to tobacco smoke. He has quit using smokeless tobacco.  His smokeless tobacco use included snuff.            Patient or patient representative verbalized consent for the use of Ambient Listening during the visit with  NATHANIEL Reis for chart documentation. 7/2/2025  08:15 EDT    NATHANIEL Reis  06/26/25  10:38 EDT    Dictated Utilizing Dragon Dictation

## 2025-07-22 ENCOUNTER — SPECIALTY PHARMACY (OUTPATIENT)
Dept: CARDIOLOGY | Facility: CLINIC | Age: 55
End: 2025-07-22
Payer: MEDICAID

## 2025-07-22 NOTE — PROGRESS NOTES
Specialty Pharmacy Patient Management Program  Refill Outreach     Edd was contacted today regarding refills of their medication(s).    Refill Questions      Flowsheet Row Most Recent Value   Changes to allergies? No   Changes to medications? No   New conditions or infections since last clinic visit No   Unplanned office visit, urgent care, ED, or hospital admission in the last 4 weeks  No   How does patient/caregiver feel medication is working? Good   Financial problems or insurance changes  No   Since the previous refill, were any specialty medication doses or scheduled injections missed or delayed?  No   Does this patient require a clinical escalation to a pharmacist? No            Delivery Questions      Flowsheet Row Most Recent Value   Delivery method  at Pharmacy   Delivery address verified with patient/caregiver? Yes   Delivery address Other (Enter below)  [ at Pharmacy]   Other address preferred  at Pharmacy   Number of medications in delivery 2   Medication(s) being filled and delivered Evolocumab (REPATHA), Dapagliflozin Propanediol   Doses left of specialty medications Farxiga: 8 Repatha: 0   Copay verified? Yes   Copay amount $0.00   Copay form of payment No copayment ($0)   Delivery Date Selection 07/22/25  [ at Pharmacy]   Signature Required Yes   Do you consent to receive electronic handouts?  No                 Follow-up: 25 day(s)     Nba Garcia, Pharmacy Technician  7/22/2025  10:24 EDT

## 2025-08-12 ENCOUNTER — APPOINTMENT (OUTPATIENT)
Dept: GENERAL RADIOLOGY | Facility: HOSPITAL | Age: 55
End: 2025-08-12
Payer: MEDICAID

## 2025-08-12 ENCOUNTER — HOSPITAL ENCOUNTER (INPATIENT)
Facility: HOSPITAL | Age: 55
LOS: 9 days | Discharge: NURSING, REHAB OR LONG-TERM CARE | End: 2025-08-21
Attending: EMERGENCY MEDICINE | Admitting: STUDENT IN AN ORGANIZED HEALTH CARE EDUCATION/TRAINING PROGRAM
Payer: MEDICAID

## 2025-08-12 PROBLEM — I48.91 A-FIB: Status: ACTIVE | Noted: 2025-08-12

## 2025-08-13 ENCOUNTER — TELEPHONE (OUTPATIENT)
Dept: GASTROENTEROLOGY | Facility: CLINIC | Age: 55
End: 2025-08-13
Payer: MEDICAID

## 2025-08-14 ENCOUNTER — APPOINTMENT (OUTPATIENT)
Facility: HOSPITAL | Age: 55
End: 2025-08-14
Payer: MEDICAID

## 2025-08-14 ENCOUNTER — APPOINTMENT (OUTPATIENT)
Dept: CARDIOLOGY | Facility: HOSPITAL | Age: 55
End: 2025-08-14
Payer: MEDICAID

## 2025-08-14 ENCOUNTER — APPOINTMENT (OUTPATIENT)
Dept: MRI IMAGING | Facility: HOSPITAL | Age: 55
End: 2025-08-14
Payer: MEDICAID

## 2025-08-15 PROBLEM — I63.442 CEREBROVASCULAR ACCIDENT (CVA) DUE TO EMBOLISM OF LEFT CEREBELLAR ARTERY: Status: ACTIVE | Noted: 2025-08-15

## 2025-08-18 PROBLEM — E87.20 METABOLIC ACIDOSIS: Status: ACTIVE | Noted: 2025-08-18

## 2025-08-18 PROBLEM — E11.21 DIABETIC NEPHROPATHY: Status: ACTIVE | Noted: 2025-08-18

## 2025-08-18 PROBLEM — E87.1 HYPONATREMIA: Status: ACTIVE | Noted: 2025-08-18

## 2025-08-18 PROBLEM — D75.1 POLYCYTHEMIA: Status: ACTIVE | Noted: 2025-08-18

## 2025-08-21 ENCOUNTER — SPECIALTY PHARMACY (OUTPATIENT)
Dept: GASTROENTEROLOGY | Facility: HOSPITAL | Age: 55
End: 2025-08-21
Payer: MEDICAID

## (undated) DEVICE — CATH F6 ST AR MOD 100CM: Brand: SUPERTORQUE

## (undated) DEVICE — ANGIO-SEAL EVOLUTION VASCULAR CLOSURE DEVICE: Brand: ANGIO-SEAL

## (undated) DEVICE — CATH F6 ST JR 4 100CM: Brand: SUPERTORQUE

## (undated) DEVICE — PATIENT RETURN ELECTRODE, SINGLE-USE, CONTACT QUALITY MONITORING, ADULT, WITH 9FT CORD, FOR PATIENTS WEIGING OVER 33LBS. (15KG): Brand: MEGADYNE

## (undated) DEVICE — Device: Brand: DISPOSABLE ELECTROSURGICAL SNARE

## (undated) DEVICE — TRAP SXN POLYP QUICKCATCH LF

## (undated) DEVICE — KT INTRO MINISTICK MAX W/GW NITNL/TUNG ECHO 4F 21G 7CM

## (undated) DEVICE — MODEL BT2000 P/N 700287-012KIT CONTENTS: MANIFOLD WITH SALINE AND CONTRAST PORTS, SALINE TUBING WITH SPIKE AND HAND SYRINGE, TRANSDUCER: Brand: BT2000 AUTOMATED MANIFOLD KIT

## (undated) DEVICE — GLIDESHEATH SLENDER STAINLESS STEEL KIT: Brand: GLIDESHEATH SLENDER

## (undated) DEVICE — SINGLE-USE BIOPSY FORCEPS: Brand: RADIAL JAW 4

## (undated) DEVICE — DEV COMP RAD PRELUDESYNC 24CM

## (undated) DEVICE — CATH F6INF TL 3DRC 100CM: Brand: INFINITI

## (undated) DEVICE — GW FC FLOP/TP .035 260CM 3MM

## (undated) DEVICE — CATH DIAG EXPO M/ PK 6FR FL4/FR4 PIG 3PK

## (undated) DEVICE — MODEL AT P65, P/N 701554-001KIT CONTENTS: HAND CONTROLLER, 3-WAY HIGH-PRESSURE STOPCOCK WITH ROTATING END AND PREMIUM HIGH-PRESSURE TUBING: Brand: ANGIOTOUCH® KIT

## (undated) DEVICE — INTRO SHEATH ART/FEM ENGAGE .038 6F12CM

## (undated) DEVICE — RADIFOCUS OPTITORQUE ANGIOGRAPHIC CATHETER: Brand: OPTITORQUE

## (undated) DEVICE — GW PERIPH GUIDERIGHT STD/J/TP PTFE/PCOAT SS 0.038IN 5X150CM

## (undated) DEVICE — CANN SMPL SOFTECH BIFLO ETCO2 A/M 7FT

## (undated) DEVICE — PK CATH LAB 60

## (undated) DEVICE — BITEBLOCK ENDO W/STRAP 60F A/ LF DISP

## (undated) DEVICE — ELECTRODE,RT,STRESS,FOAM,50PK: Brand: MEDLINE